# Patient Record
Sex: MALE | Race: BLACK OR AFRICAN AMERICAN | NOT HISPANIC OR LATINO | Employment: UNEMPLOYED | URBAN - METROPOLITAN AREA
[De-identification: names, ages, dates, MRNs, and addresses within clinical notes are randomized per-mention and may not be internally consistent; named-entity substitution may affect disease eponyms.]

---

## 2019-05-02 ENCOUNTER — OFFICE VISIT (OUTPATIENT)
Dept: FAMILY MEDICINE CLINIC | Facility: CLINIC | Age: 39
End: 2019-05-02

## 2019-05-02 ENCOUNTER — LAB (OUTPATIENT)
Dept: LAB | Facility: HOSPITAL | Age: 39
End: 2019-05-02

## 2019-05-02 VITALS
SYSTOLIC BLOOD PRESSURE: 140 MMHG | BODY MASS INDEX: 38.36 KG/M2 | WEIGHT: 315 LBS | OXYGEN SATURATION: 99 % | HEART RATE: 89 BPM | HEIGHT: 76 IN | DIASTOLIC BLOOD PRESSURE: 90 MMHG | TEMPERATURE: 98.1 F

## 2019-05-02 DIAGNOSIS — I50.9 CHRONIC CONGESTIVE HEART FAILURE, UNSPECIFIED HEART FAILURE TYPE (HCC): Primary | ICD-10-CM

## 2019-05-02 DIAGNOSIS — K92.1 MELENA: ICD-10-CM

## 2019-05-02 DIAGNOSIS — F33.9 RECURRENT MAJOR DEPRESSIVE DISORDER, REMISSION STATUS UNSPECIFIED (HCC): ICD-10-CM

## 2019-05-02 DIAGNOSIS — Z86.711 HISTORY OF PULMONARY EMBOLISM: ICD-10-CM

## 2019-05-02 DIAGNOSIS — I10 ESSENTIAL HYPERTENSION: ICD-10-CM

## 2019-05-02 DIAGNOSIS — F41.1 GENERALIZED ANXIETY DISORDER: ICD-10-CM

## 2019-05-02 LAB
INR PPP: 2.55 (ref 0.85–1.16)
PROTHROMBIN TIME: 26.4 SECONDS (ref 11.2–14.3)

## 2019-05-02 PROCEDURE — 85610 PROTHROMBIN TIME: CPT | Performed by: INTERNAL MEDICINE

## 2019-05-02 PROCEDURE — 99205 OFFICE O/P NEW HI 60 MIN: CPT | Performed by: INTERNAL MEDICINE

## 2019-05-02 RX ORDER — LISINOPRIL 10 MG/1
10 TABLET ORAL DAILY
COMMUNITY
End: 2019-09-12 | Stop reason: SDUPTHER

## 2019-05-02 RX ORDER — WARFARIN SODIUM 5 MG/1
5 TABLET ORAL
COMMUNITY
End: 2019-06-14

## 2019-05-02 RX ORDER — HYDROXYZINE PAMOATE 25 MG/1
25 CAPSULE ORAL 3 TIMES DAILY PRN
Status: ON HOLD | COMMUNITY
End: 2020-02-10 | Stop reason: SDUPTHER

## 2019-05-02 RX ORDER — SERTRALINE HYDROCHLORIDE 25 MG/1
25 TABLET, FILM COATED ORAL DAILY
COMMUNITY
End: 2019-05-02

## 2019-05-02 RX ORDER — DULOXETIN HYDROCHLORIDE 30 MG/1
30 CAPSULE, DELAYED RELEASE ORAL DAILY
Qty: 30 CAPSULE | Refills: 5 | Status: SHIPPED | OUTPATIENT
Start: 2019-05-02 | End: 2019-08-20 | Stop reason: DRUGHIGH

## 2019-05-02 RX ORDER — CARVEDILOL 6.25 MG/1
6.25 TABLET ORAL 2 TIMES DAILY WITH MEALS
COMMUNITY
End: 2020-05-28 | Stop reason: SDUPTHER

## 2019-05-02 NOTE — PROGRESS NOTES
"Chief Complaint:  F/u htn, depression    HPI:  Brigida Rodriguez is a 39 y.o. male who presents today for f/u multiple chronic medical conditions.  HTN- does not check pressure at home. On coreg and lisinopril. Pt reports he did not take medication today but \"usually\" takes it on most days.  History of heart failure- previously following with cardiology, [facility] records report echo in 2016 with reduced ejection fraction  Recurrent DVT/PE- Currently taking warfarin 10-15mg depending on the day. He would like his INR checked today. 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 Now has IVC filter.   Depression: takes sertraline \"every other day or so\" because it makes him tired. He is on wellbutrin as well but takes this rarely. He denies any side effects of wellbutrin but just doesn;t like taking so many mediations  Anxiety: takes hydroxyzine as needed on most days.   Tobacco abuse: smoking 0.5 ppd.   Genital herpes: takes acyclovir as needed  Melena: pt reports dark red stools over the last week. No symptoms or history of hemorrhoids.     ROS:  Constitutional: no fevers, night sweats or unexplained weight loss  Eyes: no vision changes  ENT: no runny nose, ear pain, sore throat  Cardio: no chest pain, palpitations  Pulm: no shortness of breath, wheezing, or cough  GI: no abdominal pain or changes in bowel movements  : no difficulty urinating  MSK: no difficulty ambulating, no joint pain  Neuro: no weakness, dizziness or headache  Psych: no trouble sleeping  Endo: no change in appetite      Past Medical History:   Diagnosis Date   • Depression    • GERD (gastroesophageal reflux disease)    • H/O blood clots    • Heart failure (CMS/HCC)    • Hypertension       Family History   Problem Relation Age of Onset   • Diabetes Mother    • Obesity Maternal Grandmother       Social History     Socioeconomic History   • Marital status: Single     Spouse name: Not on file   • Number of children: Not on file   • Years of " education: Not on file   • Highest education level: Not on file   Tobacco Use   • Smoking status: Current Every Day Smoker     Packs/day: 0.50     Years: 20.00     Pack years: 10.00     Types: Cigarettes   • Smokeless tobacco: Never Used   Substance and Sexual Activity   • Alcohol use: Yes     Alcohol/week: 3.6 oz     Types: 6 Cans of beer per week   • Drug use: Yes     Types: Marijuana     Comment: everyday smoker   • Sexual activity: Yes     Partners: Female     Birth control/protection: Condom      No Known Allergies     There is no immunization history on file for this patient.     PE:  Vitals:    05/02/19 1053   BP: 140/90   Pulse: 89   Temp: 98.1 °F (36.7 °C)   SpO2: 99%        Gen Appearance: NAD  HEENT: Normocephalic, PERRLA, no thyromegaly, trache midline  Heart: RRR, normal S1 and S2, no murmur  Lungs: CTA b/l, no wheezing, no crackles  Abdomen: Soft, non-tender, non-distended, no guarding and BSx4  MSK: Moves all extremities well, normal gait, no peripheral edema  Pulses: Palpable and equal b/l  Lymph nodes: No palpable lymphadenopathy   Neuro: No focal deficits      Current Outpatient Medications   Medication Sig Dispense Refill   • buPROPion HCl (WELLBUTRIN PO) Take  by mouth Daily.     • carvedilol (COREG) 6.25 MG tablet Take 6.25 mg by mouth 2 (Two) Times a Day With Meals.     • hydrOXYzine pamoate (VISTARIL) 25 MG capsule Take 25 mg by mouth 3 (Three) Times a Day As Needed for Itching.     • lisinopril (PRINIVIL,ZESTRIL) 10 MG tablet Take 10 mg by mouth Daily.     • warfarin (COUMADIN) 5 MG tablet Take 5 mg by mouth Daily. Take 2 tablets by mouth daily on everyday except for Thursday and Friday, take 3 tablets Thursday and Friday     • DULoxetine (CYMBALTA) 30 MG capsule Take 1 capsule by mouth Daily. 30 capsule 5     No current facility-administered medications for this visit.       Counseling was given to patient for the following topics: diagnostic results, instructions for management, risk factor  reductions, impressions, risks and benefits of treatment options and importance of treatment compliance for heart failure, anticoagulation, anxiety and depression. Total time of the encounter was 61 minutes and 34 minutes was spend counseling.    Brigida was seen today for establish care.    Diagnoses and all orders for this visit:    Chronic congestive heart failure, unspecified heart failure type (CMS/[facility])  Will refer to cards in [facility] to establish care. Will need records of recent cards visit and recent echo. Denies SOB or CP. Asymptomatic today. Taking coreg and lisinopril. No asa or statin, but taking daily warfarin for recurrent DVT/PE. Checking labs today.   Essential hypertension  -     CBC (No Diff); Future  -     Comprehensive Metabolic Panel; Future  -     Lipid Panel; Future  -     Hemoglobin A1c; Future  Elevated today, goal <130/80. He did not take blood pressure medication today.   Generalized anxiety disorder/Depression  -     DULoxetine (CYMBALTA) 30 MG capsule; Take 1 capsule by mouth Daily.  -     Ambulatory Referral to Psychology  Recommend stopping sertraline as he only takes this sporadically, which is not effective for SSRIs. He doesn't like the medicatoin because it makes him fatigued. No SI today. Recommend referral to psychology for therapy. Recommend trying SNRI rather than SSRI which may help with fatigue. Encourge daily wellbutrin as he does not have side effects to this med. Hydroxyzine as needed.  History of pulmonary embolism  -     Protime-INR; Future  See HPI for history. Recurrent DVT on warfarin therapy after TPA thrombolysis of DVT/PE. I suspect he may not be taking medication as prescribed based on history. He may benefit from DOAC although he is over the recommend weight limit. Check INR today, if in range would start xarleto the next day. Plan on checking factor Xa level after starting medication.  Recurrent major depressive disorder, remission status unspecified (CMS/[facility])  -      DULoxetine (CYMBALTA) 30 MG capsule; Take 1 capsule by mouth Daily.  -     Ambulatory Referral to Psychology  Melena   Recommend further evaluation with c-scope. He is on warfarin, checking blood work and INR today. No history of hemorrhoids. Pt deferred exam today.     Return in about 1 month (around 6/2/2019).

## 2019-05-03 DIAGNOSIS — Z86.711 HISTORY OF PULMONARY EMBOLISM: Primary | ICD-10-CM

## 2019-05-13 ENCOUNTER — TELEPHONE (OUTPATIENT)
Dept: FAMILY MEDICINE CLINIC | Facility: CLINIC | Age: 39
End: 2019-05-13

## 2019-05-13 NOTE — TELEPHONE ENCOUNTER
Patient would like to get a colonoscopy set up if that is possible. Please call back at [phone]

## 2019-05-15 DIAGNOSIS — K92.1 MELENA: Primary | ICD-10-CM

## 2019-05-15 NOTE — TELEPHONE ENCOUNTER
Central scheduling has tried to contact the patient twice to schedule colonoscopy. Attempted to call patient back. No answer.   Patient needs to call central scheduling.   [phone] ([facility]) ask for central scheduling

## 2019-05-17 NOTE — TELEPHONE ENCOUNTER
I left a message on his voicemail to contact the scheduling department to set up his colonoscopy appointment. The patient's voicemail did state his name.

## 2019-05-24 ENCOUNTER — TELEPHONE (OUTPATIENT)
Dept: FAMILY MEDICINE CLINIC | Facility: CLINIC | Age: 39
End: 2019-05-24

## 2019-06-07 NOTE — TELEPHONE ENCOUNTER
Patient has been contacted multiple times to call and schedule colonoscopy with no answer or call backs. Patient will have to contact central scheduling or Kassandra to get this completed.

## 2019-06-14 ENCOUNTER — TELEPHONE (OUTPATIENT)
Dept: FAMILY MEDICINE CLINIC | Facility: CLINIC | Age: 39
End: 2019-06-14

## 2019-06-14 ENCOUNTER — OFFICE VISIT (OUTPATIENT)
Dept: FAMILY MEDICINE CLINIC | Facility: CLINIC | Age: 39
End: 2019-06-14

## 2019-06-14 VITALS
SYSTOLIC BLOOD PRESSURE: 126 MMHG | DIASTOLIC BLOOD PRESSURE: 66 MMHG | TEMPERATURE: 96.7 F | HEIGHT: 76 IN | RESPIRATION RATE: 16 BRPM | WEIGHT: 315 LBS | OXYGEN SATURATION: 96 % | BODY MASS INDEX: 38.36 KG/M2 | HEART RATE: 94 BPM

## 2019-06-14 DIAGNOSIS — Z20.2 TRICHOMONAS CONTACT: Primary | ICD-10-CM

## 2019-06-14 DIAGNOSIS — Z86.711 HISTORY OF PULMONARY EMBOLISM: ICD-10-CM

## 2019-06-14 DIAGNOSIS — F33.9 RECURRENT MAJOR DEPRESSIVE DISORDER, REMISSION STATUS UNSPECIFIED (HCC): ICD-10-CM

## 2019-06-14 DIAGNOSIS — B00.9 HERPES: ICD-10-CM

## 2019-06-14 DIAGNOSIS — I10 ESSENTIAL HYPERTENSION: ICD-10-CM

## 2019-06-14 DIAGNOSIS — I50.9 CHRONIC CONGESTIVE HEART FAILURE, UNSPECIFIED HEART FAILURE TYPE (HCC): ICD-10-CM

## 2019-06-14 PROCEDURE — 99214 OFFICE O/P EST MOD 30 MIN: CPT | Performed by: INTERNAL MEDICINE

## 2019-06-14 RX ORDER — ACYCLOVIR 400 MG/1
400 TABLET ORAL DAILY PRN
Qty: 30 TABLET | Refills: 2 | Status: SHIPPED | OUTPATIENT
Start: 2019-06-14 | End: 2019-11-18 | Stop reason: SDUPTHER

## 2019-06-14 RX ORDER — METRONIDAZOLE 500 MG/1
500 TABLET ORAL 2 TIMES DAILY
Qty: 14 TABLET | Refills: 0 | Status: SHIPPED | OUTPATIENT
Start: 2019-06-14 | End: 2019-06-21

## 2019-06-14 NOTE — TELEPHONE ENCOUNTER
PER EVELIO CALLED, STATED THE ELIQUIS STARTER PACK IS NOT COVERED BY PT'S INSURANCE. CAN THIS BE CHANGED TO   ELIQUIS 5 MG? THE PHARMACY  WOULD NEED NEW DIRECTION AND QUANTITY. AMANUEL CALL BACK TO ADVISE.

## 2019-06-14 NOTE — PROGRESS NOTES
Chief Complaint:  Follow-up, history of PE, anxiety    HPI:  Brigida Rodriguez is a 39 y.o. male who presents today for follow-up several chronic medical conditions.  Melena- patient was referred to colonoscopy last visit but was unable to set up appointment.  Patient reports he had the wrong phone number listed in epic.  Anxiety-prescription of Cymbalta was sent to the pharmacy to wean off the Zoloft.  Patient picked up the prescription but did not start medication.  Patient states he is unsure of the directions.  Hypertension- currently taking medication as prescribed.  Does not check blood pressure at home.  Depression- stable on Wellbutrin  Trichomonas-patient reports recent exposure to trichomonas significantly.  Patient denies any symptoms.  He would like to be treated today if able.  History of PE- currently taking warfarin, not following with [facility] clinic.  He would like to switch to newer anticoagulant.  Heart failure-has not establish care with cardiology.  He was referred last visit.    ROS:  Constitutional: no fevers, night sweats or unexplained weight loss  Eyes: no vision changes  ENT: no runny nose, ear pain, sore throat  Cardio: no chest pain, palpitations  Pulm: no shortness of breath, wheezing, or cough  GI: no abdominal pain or changes in bowel movements  : no difficulty urinating  MSK: no difficulty ambulating, no joint pain  Neuro: no weakness, dizziness or headache  Psych: no trouble sleeping  Endo: no change in appetite      Past Medical History:   Diagnosis Date   • Depression    • GERD (gastroesophageal reflux disease)    • H/O blood clots    • Heart failure (CMS/HCC)    • Hypertension       Family History   Problem Relation Age of Onset   • Diabetes Mother    • Obesity Maternal Grandmother       Social History     Socioeconomic History   • Marital status: Single     Spouse name: Not on file   • Number of children: Not on file   • Years of education: Not on file   • Highest education  level: Not on file   Tobacco Use   • Smoking status: Current Every Day Smoker     Packs/day: 0.50     Years: 20.00     Pack years: 10.00     Types: Cigarettes   • Smokeless tobacco: Never Used   Substance and Sexual Activity   • Alcohol use: Yes     Alcohol/week: 3.6 oz     Types: 6 Cans of beer per week   • Drug use: Yes     Types: Marijuana     Comment: everyday smoker   • Sexual activity: Yes     Partners: Female     Birth control/protection: Condom      No Known Allergies     There is no immunization history on file for this patient.     PE:  Vitals:    06/14/19 1123   BP: 126/66   Pulse: 94   Resp: 16   Temp: 96.7 °F (35.9 °C)   SpO2: 96%        Gen Appearance: NAD  HEENT: Normocephalic, PERRLA, no thyromegaly, trache midline  Heart: RRR, normal S1 and S2, no murmur  Lungs: CTA b/l, no wheezing, no crackles  Abdomen: Soft, non-tender, non-distended, no guarding and BSx4  MSK: Moves all extremities well, normal gait, no peripheral edema  Pulses: Palpable and equal b/l  Lymph nodes: No palpable lymphadenopathy   Neuro: No focal deficits      Current Outpatient Medications   Medication Sig Dispense Refill   • buPROPion HCl (WELLBUTRIN PO) Take  by mouth Daily.     • carvedilol (COREG) 6.25 MG tablet Take 6.25 mg by mouth 2 (Two) Times a Day With Meals.     • DULoxetine (CYMBALTA) 30 MG capsule Take 1 capsule by mouth Daily. 30 capsule 5   • hydrOXYzine pamoate (VISTARIL) 25 MG capsule Take 25 mg by mouth 3 (Three) Times a Day As Needed for Itching.     • lisinopril (PRINIVIL,ZESTRIL) 10 MG tablet Take 10 mg by mouth Daily.     • acyclovir (ZOVIRAX) 400 MG tablet Take 1 tablet by mouth Daily As Needed (outbreaks). Take no more than 5 doses a day. 30 tablet 2   • Apixaban (ELIQUIS STARTER PACK) tablet Take two 5 mg tablets by mouth every 12 hours for 7 days. Followed by one 5 mg tablet every 12 hours. (Dispense starter pack if available) 74 tablet 0   • metroNIDAZOLE (FLAGYL) 500 MG tablet Take 1 tablet by mouth 2  (Two) Times a Day for 7 days. 14 tablet 0     No current facility-administered medications for this visit.         Brigida was seen today for chronic chf and blepharitis.    Diagnoses and all orders for this visit:    Trichomonas contact  -     metroNIDAZOLE (FLAGYL) 500 MG tablet; Take 1 tablet by mouth 2 (Two) Times a Day for 7 days.  Preventative treatment with Flagyl.  No symptoms.  History of pulmonary embolism  -     Apixaban (ELIQUIS STARTER PACK) tablet; Take two 5 mg tablets by mouth every 12 hours for 7 days. Followed by one 5 mg tablet every 12 hours. (Dispense starter pack if available)  Will be switching to Eliquis from warfarin.  Recent INR within normal limits.  Recurrent major depressive disorder, remission status unspecified (CMS/[facility])  Recommend DC Zoloft and starting Cymbalta.  Follow-up 4 weeks for recheck.  Essential hypertension  Well-controlled today 126/66.  Continue current medication.  Herpes  -     acyclovir (ZOVIRAX) 400 MG tablet; Take 1 tablet by mouth Daily As Needed (outbreaks). Take no more than 5 doses a day.  Refill acyclovir as needed.       Return in about 1 month (around 7/14/2019).

## 2019-06-20 ENCOUNTER — PRIOR AUTHORIZATION (OUTPATIENT)
Dept: FAMILY MEDICINE CLINIC | Facility: CLINIC | Age: 39
End: 2019-06-20

## 2019-06-25 NOTE — TELEPHONE ENCOUNTER
Unable to reach patient, phone goes straight to . If he returns call then we could possibly try xarelto instead of eliquis. Otherwise, he needs to be referred to coag clinic for warfarin monitoring.

## 2019-06-26 NOTE — TELEPHONE ENCOUNTER
Noted.       Attempted to Call patient to advise, no answer. Straight to voicemail. lvm with office number to call back.     If patient call back we can try two other options. Listed in previous message

## 2019-07-01 ENCOUNTER — HOSPITAL ENCOUNTER (EMERGENCY)
Facility: HOSPITAL | Age: 39
Discharge: PSYCHIATRIC OR SUBSTANCE USE FACILITY | End: 2019-07-02
Attending: EMERGENCY MEDICINE | Admitting: EMERGENCY MEDICINE

## 2019-07-01 DIAGNOSIS — F32.A DEPRESSION, UNSPECIFIED DEPRESSION TYPE: ICD-10-CM

## 2019-07-01 DIAGNOSIS — R45.851 SUICIDAL IDEATIONS: Primary | ICD-10-CM

## 2019-07-01 LAB
ALBUMIN SERPL-MCNC: 5.2 G/DL (ref 3.5–5.2)
ALBUMIN/GLOB SERPL: 1.3 G/DL
ALP SERPL-CCNC: 64 U/L (ref 39–117)
ALT SERPL W P-5'-P-CCNC: 27 U/L (ref 1–41)
ANION GAP SERPL CALCULATED.3IONS-SCNC: 20 MMOL/L (ref 5–15)
APAP SERPL-MCNC: <5 MCG/ML (ref 10–30)
AST SERPL-CCNC: 31 U/L (ref 1–40)
BASOPHILS # BLD AUTO: 0.04 10*3/MM3 (ref 0–0.2)
BASOPHILS NFR BLD AUTO: 0.5 % (ref 0–1.5)
BILIRUB SERPL-MCNC: 0.8 MG/DL (ref 0.2–1.2)
BUN BLD-MCNC: 12 MG/DL (ref 6–20)
BUN/CREAT SERPL: 14.1 (ref 7–25)
CALCIUM SPEC-SCNC: 10.2 MG/DL (ref 8.6–10.5)
CHLORIDE SERPL-SCNC: 97 MMOL/L (ref 98–107)
CO2 SERPL-SCNC: 19 MMOL/L (ref 22–29)
CREAT BLD-MCNC: 0.85 MG/DL (ref 0.76–1.27)
DEPRECATED RDW RBC AUTO: 43.5 FL (ref 37–54)
EOSINOPHIL # BLD AUTO: 0.16 10*3/MM3 (ref 0–0.4)
EOSINOPHIL NFR BLD AUTO: 1.9 % (ref 0.3–6.2)
ERYTHROCYTE [DISTWIDTH] IN BLOOD BY AUTOMATED COUNT: 13.3 % (ref 12.3–15.4)
ETHANOL BLD-MCNC: 41 MG/DL (ref 0–10)
GFR SERPL CREATININE-BSD FRML MDRD: 122 ML/MIN/1.73
GLOBULIN UR ELPH-MCNC: 3.9 GM/DL
GLUCOSE BLD-MCNC: 76 MG/DL (ref 65–99)
HCT VFR BLD AUTO: 45.6 % (ref 37.5–51)
HGB BLD-MCNC: 15.5 G/DL (ref 13–17.7)
HOLD SPECIMEN: NORMAL
HOLD SPECIMEN: NORMAL
IMM GRANULOCYTES # BLD AUTO: 0.02 10*3/MM3 (ref 0–0.05)
IMM GRANULOCYTES NFR BLD AUTO: 0.2 % (ref 0–0.5)
LYMPHOCYTES # BLD AUTO: 2.38 10*3/MM3 (ref 0.7–3.1)
LYMPHOCYTES NFR BLD AUTO: 27.7 % (ref 19.6–45.3)
MCH RBC QN AUTO: 30.2 PG (ref 26.6–33)
MCHC RBC AUTO-ENTMCNC: 34 G/DL (ref 31.5–35.7)
MCV RBC AUTO: 88.9 FL (ref 79–97)
MONOCYTES # BLD AUTO: 0.67 10*3/MM3 (ref 0.1–0.9)
MONOCYTES NFR BLD AUTO: 7.8 % (ref 5–12)
NEUTROPHILS # BLD AUTO: 5.31 10*3/MM3 (ref 1.7–7)
NEUTROPHILS NFR BLD AUTO: 61.9 % (ref 42.7–76)
NRBC BLD AUTO-RTO: 0 /100 WBC (ref 0–0.2)
PLATELET # BLD AUTO: 271 10*3/MM3 (ref 140–450)
PMV BLD AUTO: 10.2 FL (ref 6–12)
POTASSIUM BLD-SCNC: 3.9 MMOL/L (ref 3.5–5.2)
PROT SERPL-MCNC: 9.1 G/DL (ref 6–8.5)
RBC # BLD AUTO: 5.13 10*6/MM3 (ref 4.14–5.8)
SALICYLATES SERPL-MCNC: <0.3 MG/DL
SODIUM BLD-SCNC: 136 MMOL/L (ref 136–145)
WBC NRBC COR # BLD: 8.58 10*3/MM3 (ref 3.4–10.8)
WHOLE BLOOD HOLD SPECIMEN: NORMAL
WHOLE BLOOD HOLD SPECIMEN: NORMAL

## 2019-07-01 PROCEDURE — 80307 DRUG TEST PRSMV CHEM ANLYZR: CPT | Performed by: NURSE PRACTITIONER

## 2019-07-01 PROCEDURE — 99285 EMERGENCY DEPT VISIT HI MDM: CPT

## 2019-07-01 PROCEDURE — 85025 COMPLETE CBC W/AUTO DIFF WBC: CPT | Performed by: NURSE PRACTITIONER

## 2019-07-01 PROCEDURE — 80307 DRUG TEST PRSMV CHEM ANLYZR: CPT | Performed by: EMERGENCY MEDICINE

## 2019-07-01 PROCEDURE — 93005 ELECTROCARDIOGRAM TRACING: CPT | Performed by: NURSE PRACTITIONER

## 2019-07-01 PROCEDURE — 85610 PROTHROMBIN TIME: CPT | Performed by: EMERGENCY MEDICINE

## 2019-07-01 PROCEDURE — 80053 COMPREHEN METABOLIC PANEL: CPT | Performed by: NURSE PRACTITIONER

## 2019-07-02 VITALS
HEART RATE: 81 BPM | SYSTOLIC BLOOD PRESSURE: 162 MMHG | DIASTOLIC BLOOD PRESSURE: 94 MMHG | HEIGHT: 76 IN | OXYGEN SATURATION: 94 % | TEMPERATURE: 98.1 F | BODY MASS INDEX: 38.36 KG/M2 | WEIGHT: 315 LBS | RESPIRATION RATE: 14 BRPM

## 2019-07-02 LAB
AMPHET+METHAMPHET UR QL: NEGATIVE
AMPHETAMINES UR QL: NEGATIVE
BARBITURATES UR QL SCN: NEGATIVE
BENZODIAZ UR QL SCN: NEGATIVE
BUPRENORPHINE SERPL-MCNC: NEGATIVE NG/ML
CANNABINOIDS SERPL QL: POSITIVE
COCAINE UR QL: POSITIVE
INR PPP: 2.77 (ref 0.85–1.16)
METHADONE UR QL SCN: NEGATIVE
OPIATES UR QL: NEGATIVE
OXYCODONE UR QL SCN: NEGATIVE
PCP UR QL SCN: NEGATIVE
PROPOXYPH UR QL: NEGATIVE
PROTHROMBIN TIME: 28.2 SECONDS (ref 11.2–14.3)
TRICYCLICS UR QL SCN: NEGATIVE

## 2019-07-02 PROCEDURE — 80306 DRUG TEST PRSMV INSTRMNT: CPT | Performed by: EMERGENCY MEDICINE

## 2019-07-02 RX ORDER — WARFARIN SODIUM 5 MG/1
5 TABLET ORAL
COMMUNITY
End: 2019-08-20

## 2019-07-02 RX ORDER — DULOXETIN HYDROCHLORIDE 30 MG/1
30 CAPSULE, DELAYED RELEASE ORAL ONCE
Status: COMPLETED | OUTPATIENT
Start: 2019-07-02 | End: 2019-07-02

## 2019-07-02 RX ORDER — LISINOPRIL 10 MG/1
10 TABLET ORAL ONCE
Status: COMPLETED | OUTPATIENT
Start: 2019-07-02 | End: 2019-07-02

## 2019-07-02 RX ORDER — HYDROXYZINE HYDROCHLORIDE 25 MG/1
25 TABLET, FILM COATED ORAL ONCE
Status: COMPLETED | OUTPATIENT
Start: 2019-07-02 | End: 2019-07-02

## 2019-07-02 RX ORDER — CARVEDILOL 6.25 MG/1
6.25 TABLET ORAL ONCE
Status: COMPLETED | OUTPATIENT
Start: 2019-07-02 | End: 2019-07-02

## 2019-07-02 RX ADMIN — HYDROXYZINE HYDROCHLORIDE 25 MG: 25 TABLET, FILM COATED ORAL at 02:41

## 2019-07-02 RX ADMIN — DULOXETINE HYDROCHLORIDE 30 MG: 30 CAPSULE, DELAYED RELEASE ORAL at 02:41

## 2019-07-02 RX ADMIN — CARVEDILOL 6.25 MG: 6.25 TABLET, FILM COATED ORAL at 02:41

## 2019-07-02 RX ADMIN — LISINOPRIL 10 MG: 10 TABLET ORAL at 02:41

## 2019-07-02 NOTE — ED PROVIDER NOTES
Subjective   Mr. Brigida Rodriguez is a 39 y.o. male with a history of depression who presents to the ED due to suicidal ideation. He reports that he thinks about killing himself every day and came into the ED today to receive help and in-patient treatment. He states that he cuts himself and has thought of plans for suicide in the past but he has never made any suicidal attempts. He notes that he has been drinking since yesterday and his last drink was 30 minutes ago but he does not drink every day. He also has cocaine and marijuana in his system at this time. He has a history of HTN and heart failure. No other acute complaints at this time.         History provided by:  Patient  Suicidal   Presenting symptoms: self-mutilation and suicidal thoughts    Presenting symptoms: no suicide attempt    Timing:  Constant  Chronicity:  Chronic  Ineffective treatments:  Antidepressants  Associated symptoms: anxiety    Associated symptoms: no chest pain    Risk factors: no hx of suicide attempts        Review of Systems   Respiratory: Negative for shortness of breath.    Cardiovascular: Negative for chest pain.   Psychiatric/Behavioral: Positive for self-injury, sleep disturbance and suicidal ideas. The patient is nervous/anxious.    All other systems reviewed and are negative.      Past Medical History:   Diagnosis Date   • Depression    • GERD (gastroesophageal reflux disease)    • H/O blood clots    • Heart failure (CMS/HCC)    • Hypertension        No Known Allergies    Past Surgical History:   Procedure Laterality Date   • ADENOIDECTOMY     • KNEE SURGERY     • TONSILLECTOMY         Family History   Problem Relation Age of Onset   • Diabetes Mother    • Obesity Maternal Grandmother        Social History     Socioeconomic History   • Marital status: Single     Spouse name: Not on file   • Number of children: Not on file   • Years of education: Not on file   • Highest education level: Not on file   Tobacco Use   • Smoking  status: Current Every Day Smoker     Packs/day: 0.50     Years: 20.00     Pack years: 10.00     Types: Cigarettes   • Smokeless tobacco: Never Used   Substance and Sexual Activity   • Alcohol use: Yes     Alcohol/week: 3.6 oz     Types: 6 Cans of beer per week   • Drug use: Yes     Types: Marijuana     Comment: everyday smoker   • Sexual activity: Yes     Partners: Female     Birth control/protection: Condom         Objective   Physical Exam   Constitutional: He is oriented to person, place, and time. He appears well-developed and well-nourished. He is cooperative.  Non-toxic appearance. He appears distressed.   Pt is anxious.  Pt is easily agitated.   HENT:   Head: Normocephalic and atraumatic.   Mouth/Throat: Oropharynx is clear and moist.   Eyes: Conjunctivae, EOM and lids are normal. Pupils are equal, round, and reactive to light.   Neck: Trachea normal, normal range of motion and full passive range of motion without pain.   Cardiovascular: Regular rhythm, normal heart sounds, intact distal pulses and normal pulses.   Pulmonary/Chest: Effort normal and breath sounds normal. No respiratory distress. He has no decreased breath sounds. He has no wheezes. He has no rhonchi. He has no rales.   Abdominal: Soft. Normal appearance and bowel sounds are normal. There is no tenderness.   Musculoskeletal: Normal range of motion.   Neurological: He is alert and oriented to person, place, and time. He has normal strength. No cranial nerve deficit.   Skin: Skin is warm, dry and intact. No rash noted.   Psychiatric: His speech is normal. His mood appears anxious. He is agitated. He expresses suicidal ideation. He expresses no homicidal ideation. He expresses no suicidal plans and no homicidal plans.   Nursing note and vitals reviewed.      Procedures         ED Course  ED Course as of Jul 02 0513   Tue Jul 02, 2019   0130 THC Screen, Urine: (!) Positive [KG]   0130 Cocaine Screen, Urine: (!) Positive [KG]   0310 Patient has  been accepted at the [facility].  Patient will be transferred to the [facility].  [KG]      ED Course User Index  [KG] Mildred Hooper, SILVIA       Recent Results (from the past 24 hour(s))   Protime-INR    Collection Time: 07/01/19  9:19 PM   Result Value Ref Range    Protime 28.2 (H) 11.2 - 14.3 Seconds    INR 2.77 (H) 0.85 - 1.16   Ethanol    Collection Time: 07/01/19  9:53 PM   Result Value Ref Range    Ethanol 41 (H) 0 - 10 mg/dL   Light Blue Top    Collection Time: 07/01/19  9:53 PM   Result Value Ref Range    Extra Tube hold for add-on    Green Top (Gel)    Collection Time: 07/01/19  9:53 PM   Result Value Ref Range    Extra Tube Hold for add-ons.    Lavender Top    Collection Time: 07/01/19  9:53 PM   Result Value Ref Range    Extra Tube hold for add-on    Gold Top - SST    Collection Time: 07/01/19  9:53 PM   Result Value Ref Range    Extra Tube Hold for add-ons.    Comprehensive Metabolic Panel    Collection Time: 07/01/19  9:53 PM   Result Value Ref Range    Glucose 76 65 - 99 mg/dL    BUN 12 6 - 20 mg/dL    Creatinine 0.85 0.76 - 1.27 mg/dL    Sodium 136 136 - 145 mmol/L    Potassium 3.9 3.5 - 5.2 mmol/L    Chloride 97 (L) 98 - 107 mmol/L    CO2 19.0 (L) 22.0 - 29.0 mmol/L    Calcium 10.2 8.6 - 10.5 mg/dL    Total Protein 9.1 (H) 6.0 - 8.5 g/dL    Albumin 5.20 3.50 - 5.20 g/dL    ALT (SGPT) 27 1 - 41 U/L    AST (SGOT) 31 1 - 40 U/L    Alkaline Phosphatase 64 39 - 117 U/L    Total Bilirubin 0.8 0.2 - 1.2 mg/dL    eGFR  African Amer 122 >60 mL/min/1.73    Globulin 3.9 gm/dL    A/G Ratio 1.3 g/dL    BUN/Creatinine Ratio 14.1 7.0 - 25.0    Anion Gap 20.0 (H) 5.0 - 15.0 mmol/L   Acetaminophen Level    Collection Time: 07/01/19  9:53 PM   Result Value Ref Range    Acetaminophen <5.0 (L) 10.0 - 30.0 mcg/mL   Salicylate Level    Collection Time: 07/01/19  9:53 PM   Result Value Ref Range    Salicylate <0.3 <=30.0 mg/dL   CBC Auto Differential    Collection Time: 07/01/19  9:53 PM   Result Value Ref Range    WBC 8.58  3.40 - 10.80 10*3/mm3    RBC 5.13 4.14 - 5.80 10*6/mm3    Hemoglobin 15.5 13.0 - 17.7 g/dL    Hematocrit 45.6 37.5 - 51.0 %    MCV 88.9 79.0 - 97.0 fL    MCH 30.2 26.6 - 33.0 pg    MCHC 34.0 31.5 - 35.7 g/dL    RDW 13.3 12.3 - 15.4 %    RDW-SD 43.5 37.0 - 54.0 fl    MPV 10.2 6.0 - 12.0 fL    Platelets 271 140 - 450 10*3/mm3    Neutrophil % 61.9 42.7 - 76.0 %    Lymphocyte % 27.7 19.6 - 45.3 %    Monocyte % 7.8 5.0 - 12.0 %    Eosinophil % 1.9 0.3 - 6.2 %    Basophil % 0.5 0.0 - 1.5 %    Immature Grans % 0.2 0.0 - 0.5 %    Neutrophils, Absolute 5.31 1.70 - 7.00 10*3/mm3    Lymphocytes, Absolute 2.38 0.70 - 3.10 10*3/mm3    Monocytes, Absolute 0.67 0.10 - 0.90 10*3/mm3    Eosinophils, Absolute 0.16 0.00 - 0.40 10*3/mm3    Basophils, Absolute 0.04 0.00 - 0.20 10*3/mm3    Immature Grans, Absolute 0.02 0.00 - 0.05 10*3/mm3    nRBC 0.0 0.0 - 0.2 /100 WBC   Urine Drug Screen - Urine, Clean Catch    Collection Time: 07/02/19 12:58 AM   Result Value Ref Range    THC, Screen, Urine Positive (A) Negative    Phencyclidine (PCP), Urine Negative Negative    Cocaine Screen, Urine Positive (A) Negative    Methamphetamine, Ur Negative Negative    Opiate Screen Negative Negative    Amphetamine Screen, Urine Negative Negative    Benzodiazepine Screen, Urine Negative Negative    Tricyclic Antidepressants Screen Negative Negative    Methadone Screen, Urine Negative Negative    Barbiturates Screen, Urine Negative Negative    Oxycodone Screen, Urine Negative Negative    Propoxyphene Screen Negative Negative    Buprenorphine, Screen, Urine Negative Negative     Note: In addition to lab results from this visit, the labs listed above may include labs taken at another facility or during a different encounter within the last 24 hours. Please correlate lab times with ED admission and discharge times for further clarification of the services performed during this visit.    No orders to display     Vitals:    07/01/19 2104 07/02/19 0201 07/02/19  "0315   BP: (!) 187/108 152/92 162/94   BP Location:  Right arm Right arm   Patient Position:  Lying Lying   Pulse: 100 77 81   Resp: 18 15 14   Temp: 98.1 °F (36.7 °C)     TempSrc: Oral     SpO2: 96% 95% 94%   Weight: (!) 168 kg (370 lb)     Height: 193 cm (76\")       Medications   carvedilol (COREG) tablet 6.25 mg (6.25 mg Oral Given 7/2/19 0241)   DULoxetine (CYMBALTA) DR capsule 30 mg (30 mg Oral Given 7/2/19 0241)   hydrOXYzine (ATARAX) tablet 25 mg (25 mg Oral Given 7/2/19 0241)   lisinopril (PRINIVIL,ZESTRIL) tablet 10 mg (10 mg Oral Given 7/2/19 0241)     ECG/EMG Results (last 24 hours)     ** No results found for the last 24 hours. **        ECG 12 Lead                           MDM    Final diagnoses:   Suicidal ideations   Depression, unspecified depression type       Documentation assistance provided by peter Benavidez.  Information recorded by the peter was done at my direction and has been verified and validated by me.     Clarisa Benavidez  07/01/19 1008       Mildred Hooper APRN  07/02/19 6391    "

## 2019-07-11 ENCOUNTER — TELEPHONE (OUTPATIENT)
Dept: FAMILY MEDICINE CLINIC | Facility: CLINIC | Age: 39
End: 2019-07-11

## 2019-07-11 NOTE — TELEPHONE ENCOUNTER
JULIETH FROM [facility] REQUESTING INFO ON PTS MEDS..  NOT SURE WHICH MEDS PT NEEDS TO BE ON AS SOME SEEM TO BE OVERLAPPING

## 2019-07-12 ENCOUNTER — PRIOR AUTHORIZATION (OUTPATIENT)
Dept: FAMILY MEDICINE CLINIC | Facility: CLINIC | Age: 39
End: 2019-07-12

## 2019-07-12 ENCOUNTER — TELEPHONE (OUTPATIENT)
Dept: FAMILY MEDICINE CLINIC | Facility: CLINIC | Age: 39
End: 2019-07-12

## 2019-07-12 NOTE — TELEPHONE ENCOUNTER
Please contact pharmacy. They want to discuss medications. They have concern over medications that should not be taken together and written by different providers.     [facility] [place] [address] - [place], [place] - [address] AT [facility] - [phone]  - [phone]   [phone]

## 2019-07-12 NOTE — TELEPHONE ENCOUNTER
Called pharmacy regarding medications. Pharmacist stated that the patient was being prescribed Cymbalta 30 mg and 60 mg, Zoloft, Wellbutrin, and trazodone. She was wanting to verify the mediations. I advised her  That we prescribed the patient Cymbalta 30 mg daily on 05/02/2019. She stated that he was prescribed Cymbalta 60 mg on 07/05/2019 by another provider.    Pharmacist stated that she would try to contact other offices to receive information about what's going on.    Attempted to contact patient to verify med list, no answer. lvm with office number to call back. I did notice where  Patient was seen in the ER on 07/01/2019 and he decided to be admitted to the [facility]. Patient may be unavailable.

## 2019-07-15 NOTE — TELEPHONE ENCOUNTER
I tried calling him about this to switch to Xarelto.  No response.  Please give him a call and ask if he would like to switch to Xarelto which is very similar to Eliquis.  If he has any questions I can speak to him.

## 2019-07-17 NOTE — TELEPHONE ENCOUNTER
I was unable to reach Dilma so I informed the patient of the message, patient is willing to try xarelto

## 2019-08-20 ENCOUNTER — OFFICE VISIT (OUTPATIENT)
Dept: FAMILY MEDICINE CLINIC | Facility: CLINIC | Age: 39
End: 2019-08-20

## 2019-08-20 VITALS
WEIGHT: 315 LBS | RESPIRATION RATE: 14 BRPM | BODY MASS INDEX: 38.36 KG/M2 | HEART RATE: 87 BPM | TEMPERATURE: 98.8 F | HEIGHT: 76 IN | DIASTOLIC BLOOD PRESSURE: 88 MMHG | SYSTOLIC BLOOD PRESSURE: 144 MMHG | OXYGEN SATURATION: 99 %

## 2019-08-20 DIAGNOSIS — K64.9 HEMORRHOIDS, UNSPECIFIED HEMORRHOID TYPE: Primary | ICD-10-CM

## 2019-08-20 DIAGNOSIS — S03.00XA DISLOCATION OF TEMPOROMANDIBULAR JOINT, INITIAL ENCOUNTER: ICD-10-CM

## 2019-08-20 DIAGNOSIS — F32.A DEPRESSION, UNSPECIFIED DEPRESSION TYPE: ICD-10-CM

## 2019-08-20 DIAGNOSIS — I10 ESSENTIAL HYPERTENSION: ICD-10-CM

## 2019-08-20 PROCEDURE — 99214 OFFICE O/P EST MOD 30 MIN: CPT | Performed by: INTERNAL MEDICINE

## 2019-08-20 RX ORDER — DULOXETIN HYDROCHLORIDE 30 MG/1
60 CAPSULE, DELAYED RELEASE ORAL DAILY
COMMUNITY
Start: 2019-07-05 | End: 2019-11-04

## 2019-08-20 RX ORDER — TRAZODONE HYDROCHLORIDE 50 MG/1
50 TABLET ORAL NIGHTLY
COMMUNITY
Start: 2019-07-05 | End: 2022-05-19
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
## 2019-09-04 ENCOUNTER — OFFICE VISIT (OUTPATIENT)
Dept: FAMILY MEDICINE CLINIC | Facility: CLINIC | Age: 39
End: 2019-09-04

## 2019-09-04 VITALS
DIASTOLIC BLOOD PRESSURE: 84 MMHG | WEIGHT: 315 LBS | BODY MASS INDEX: 38.36 KG/M2 | HEIGHT: 76 IN | HEART RATE: 78 BPM | OXYGEN SATURATION: 98 % | SYSTOLIC BLOOD PRESSURE: 132 MMHG

## 2019-09-04 DIAGNOSIS — Z86.711 HISTORY OF PULMONARY EMBOLISM: Primary | ICD-10-CM

## 2019-09-04 DIAGNOSIS — Z72.0 TOBACCO ABUSE: ICD-10-CM

## 2019-09-04 DIAGNOSIS — Z86.711 HISTORY OF PULMONARY EMBOLISM: ICD-10-CM

## 2019-09-04 DIAGNOSIS — L29.9 ITCHING: ICD-10-CM

## 2019-09-04 PROCEDURE — 99214 OFFICE O/P EST MOD 30 MIN: CPT | Performed by: INTERNAL MEDICINE

## 2019-09-04 NOTE — PROGRESS NOTES
Chief Complaint:  itching    HPI:  Brigida Rodriguez is a 39 y.o. male who presents today for itching. Pt reports significant diffuse itching since starting eliquis ~4 weeks ago. He would like to stop smoking today as well. Currently smoking anywhere from 1 ppd to no cigarettes. He is asking about chantix. No other acute complaints or concerns.     ROS:  Constitutional: no fevers, night sweats or unexplained weight loss  Eyes: no vision changes  ENT: no runny nose, ear pain, sore throat  Cardio: no chest pain, palpitations  Pulm: no shortness of breath, wheezing, or cough  GI: no abdominal pain or changes in bowel movements  : no difficulty urinating  MSK: no difficulty ambulating, no joint pain  Neuro: no weakness, dizziness or headache  Psych: no trouble sleeping  Endo: no change in appetite      Past Medical History:   Diagnosis Date   • Depression    • GERD (gastroesophageal reflux disease)    • H/O blood clots    • Heart failure (CMS/HCC)    • Hypertension       Family History   Problem Relation Age of Onset   • Diabetes Mother    • Obesity Maternal Grandmother       Social History     Socioeconomic History   • Marital status: Single     Spouse name: Not on file   • Number of children: Not on file   • Years of education: Not on file   • Highest education level: Not on file   Tobacco Use   • Smoking status: Current Every Day Smoker     Packs/day: 0.50     Years: 20.00     Pack years: 10.00     Types: Cigarettes   • Smokeless tobacco: Never Used   Substance and Sexual Activity   • Alcohol use: Yes     Alcohol/week: 3.6 oz     Types: 6 Cans of beer per week   • Drug use: Yes     Types: Marijuana     Comment: everyday smoker   • Sexual activity: Yes     Partners: Female     Birth control/protection: Condom      No Known Allergies     There is no immunization history on file for this patient.     PE:  Vitals:    09/04/19 1439   BP: 132/84   Pulse: 78   SpO2: 98%        Gen Appearance: NAD  HEENT:  Normocephalic, PERRLA, no thyromegaly, trache midline  Heart: RRR, normal S1 and S2, no murmur  Lungs: CTA b/l, no wheezing, no crackles  Abdomen: Soft, non-tender, non-distended, no guarding and BSx4  MSK: Moves all extremities well, normal gait, no peripheral edema  Pulses: Palpable and equal b/l  Lymph nodes: No palpable lymphadenopathy   Neuro: No focal deficits      Current Outpatient Medications   Medication Sig Dispense Refill   • acyclovir (ZOVIRAX) 400 MG tablet Take 1 tablet by mouth Daily As Needed (outbreaks). Take no more than 5 doses a day. 30 tablet 2   • buPROPion HCl (WELLBUTRIN PO) Take 150 mg by mouth Daily.     • carvedilol (COREG) 6.25 MG tablet Take 6.25 mg by mouth 2 (Two) Times a Day With Meals.     • DULoxetine (CYMBALTA) 60 MG capsule Take 60 mg by mouth Daily.     • hydrOXYzine pamoate (VISTARIL) 25 MG capsule Take 25 mg by mouth 3 (Three) Times a Day As Needed for Itching.     • lisinopril (PRINIVIL,ZESTRIL) 10 MG tablet Take 10 mg by mouth Daily.     • traZODone (DESYREL) 50 MG tablet Take 50 mg by mouth Every Night.     • Rivaroxaban (XARELTO) tablet therapy pack starter pack Take one 15 mg tablet twice daily with food for 21 days.  Followed by one 20 mg tablet by mouth once daily with food. Take as directed 51 each 0   • varenicline (CHANTIX STARTING MONTH PAK) 0.5 MG X 11 & 1 MG X 42 tablet Take 0.5 mg one daily on days 1-3 and and 0.5 mg twice daily on days 4-7.Then 1 mg twice daily for a total of 12 weeks. 53 tablet 0     No current facility-administered medications for this visit.         Brigida was seen today for medication reaction and nicotine dependence.    Diagnoses and all orders for this visit:    History of pulmonary embolism  -     Rivaroxaban (XARELTO) tablet therapy pack starter pack; Take one 15 mg tablet twice daily with food for 21 days.  Followed by one 20 mg tablet by mouth once daily with food. Take as directed  Side effects to his Eliquis.  Will be starting on  Xarelto.  Follow-up 4 weeks.  Recommend checking blood work and CMP with symptoms.  Tobacco abuse  -     varenicline (CHANTIX STARTING MONTH NATTY) 0.5 MG X 11 & 1 MG X 42 tablet; Take 0.5 mg one daily on days 1-3 and and 0.5 mg twice daily on days 4-7.Then 1 mg twice daily for a total of 12 weeks.  Counseled patient on tobacco cessation.  Recommend setting quit date 7 days after starting Chantix.  Itching  -     CBC & Differential; Future  -     Comprehensive Metabolic Panel; Future         Return in about 4 weeks (around 10/2/2019).

## 2019-09-06 ENCOUNTER — TELEPHONE (OUTPATIENT)
Dept: PHARMACY | Facility: HOSPITAL | Age: 39
End: 2019-09-06

## 2019-09-06 DIAGNOSIS — Z86.711 HISTORY OF PULMONARY EMBOLISM: Primary | ICD-10-CM

## 2019-09-06 RX ORDER — WARFARIN SODIUM 5 MG/1
TABLET ORAL
Qty: 70 TABLET | Refills: 5 | Status: ON HOLD | OUTPATIENT
Start: 2019-09-06 | End: 2020-02-10 | Stop reason: SDUPTHER

## 2019-09-11 ENCOUNTER — APPOINTMENT (OUTPATIENT)
Dept: PHARMACY | Facility: HOSPITAL | Age: 39
End: 2019-09-11

## 2019-09-11 ENCOUNTER — TELEPHONE (OUTPATIENT)
Dept: PHARMACY | Facility: HOSPITAL | Age: 39
End: 2019-09-11

## 2019-09-11 NOTE — TELEPHONE ENCOUNTER
Patient called to request reschedule of anticoag appt for today. He reported he would not be able to come into clinic on Mon, 9/16 at 1400

## 2019-09-11 NOTE — TELEPHONE ENCOUNTER
Spoke with patient to see if he would be willing to have INR drawn at a lab of his choosing tonight or tomorrow. He has respectfully declined due to life issues and will come into clinic on 9/16 @ 1400. He says he is taking the warfarin dosing he had previously been on.

## 2019-09-11 NOTE — TELEPHONE ENCOUNTER
LVM requesting call back. Will ask patient if he would be willing to have INR drawn today or tomorrow at lab convenient to him. If patient started warfarin on Sat, 9/7, as directed by Dr Tom then would prefer to have INR drawn ASAP.

## 2019-09-12 ENCOUNTER — TELEPHONE (OUTPATIENT)
Dept: FAMILY MEDICINE CLINIC | Facility: CLINIC | Age: 39
End: 2019-09-12

## 2019-09-12 RX ORDER — LISINOPRIL 10 MG/1
10 TABLET ORAL DAILY
Qty: 30 TABLET | Refills: 0 | Status: SHIPPED | OUTPATIENT
Start: 2019-09-12 | End: 2019-10-09 | Stop reason: SDUPTHER

## 2019-09-16 ENCOUNTER — APPOINTMENT (OUTPATIENT)
Dept: PHARMACY | Facility: HOSPITAL | Age: 39
End: 2019-09-16

## 2019-09-19 ENCOUNTER — TELEPHONE (OUTPATIENT)
Dept: PHARMACY | Facility: HOSPITAL | Age: 39
End: 2019-09-19

## 2019-09-19 ENCOUNTER — ANTICOAGULATION VISIT (OUTPATIENT)
Dept: PHARMACY | Facility: HOSPITAL | Age: 39
End: 2019-09-19

## 2019-09-19 DIAGNOSIS — Z86.711 HISTORY OF PULMONARY EMBOLISM: ICD-10-CM

## 2019-09-19 LAB
INR PPP: 2.6 (ref 0.91–1.09)
PROTHROMBIN TIME: 31.4 SECONDS (ref 10–13.8)

## 2019-09-19 PROCEDURE — 36416 COLLJ CAPILLARY BLOOD SPEC: CPT

## 2019-09-19 PROCEDURE — G0463 HOSPITAL OUTPT CLINIC VISIT: HCPCS

## 2019-09-19 PROCEDURE — 85610 PROTHROMBIN TIME: CPT

## 2019-09-19 NOTE — PROGRESS NOTES
Anticoagulation Clinic Progress Note  Indication: Hx of PE and LE DVT (2010,2014)  Referring Provider: Vaishali  Initial Warfarin Start Date: 2010  Planned Duration of Therapy: lifelong  Goal INR: 2-3 (verified Dr Tom 9/19/19)  Current Drug Interactions:   Other: d/c Eliquis 9/6/19 due to itching    Diet: does like cooked greens, provided Vit K handout- encouraged consistency 9/19/19  Alcohol:   Tobacco: trying to quit smoking, started Chantix 9/16/19  OTC Pain Medication: recommended Tylenol prn    Anticoagulation Clinic INR History:  Date 9/19          Total Weekly Dose 80mg          INR 2.6          Notes             Clinic Interview:  Tablet Strength: 5mg  Verbal Release Authorization signed on 9/19/19-- may speak with Jammie Rodriguez (mother) John Rodriguez (father)      Patient Findings:    Brigida Rodriguez is a new start to warfarin therapy. He was by himself for counseling. Discussed warfarin's indication, mechanism, and dosing. Also enforced the importance of taking warfarin as instructed and at the same time every day, preferably in the evening so that we can make dose adjustments more easily following subsequent clinic visits. He expressed understanding of this fact. We also discussed what he should do about a missed dose; pts can take missed doses within about 12 hours of their usual scheduled dose, but he was instructed on the importance of not doubling up on doses unless told to do so by the warfarin clinic. Explained possible side effects of warfarin therapy, including increased risk of bleeding, s/sx of bleeding and s/sx of any additional clots/PE/CVA. Also discussed monitoring of warfarin, the INR, goal INR range 2-3, and the frequency of monitoring. Explained that he would be coming into the clinic more frequently in these first few weeks of therapy as we try to adjust his dose and achieve a therapeutic INR x 2 consecutive readings. Once that is achieved, pt will follow up in clinic every 4 weeks, on  "average. He stated no problems with transportation or scheduling clinic appts in this manner. Reviewed drug/food/tobacco/EtOH interactions and provided written information covering these topics in more detail, explaining that green, leafy vegetables interact most heavily with warfarin. Instructed the pt not to take or discontinue any medications without informing his physician/pharmacist and reminded him to inform us of any dietary changes, as well. He expressed understanding of the information provided and has no additional questions at this time.    Positives:  Signs/symptoms of bleeding, Change in medications   Negatives:  Signs/symptoms of thrombosis, Laboratory test error suspected, Change in health, Change in alcohol use, Change in activity, Upcoming invasive procedure, Emergency department visit, Upcoming dental procedure, Missed doses, Extra doses, Change in diet/appetite, Hospital admission, Bruising, Other complaints   Comments:  From phone encounter 9/6 \"Mr Rodriguez was originally prescribed warfarin for PE  in 2010 with a goal range of 2.0-3.0. In July 2019 he switched to Eliquis 5mg q12h. He started to have adverse side effects (itching, rash) and has decided to switch back to warfarin. Dr. Tom has called him in a prescription for warfarin which he will start tomorrow (9/7). Per Dr. Tom, he will resume his prior maintenance dose of warfarin 10mg daily except 15mg on ThursFri.\"   Mr Rodriguez is trying to quit smoking and started Chantix Monday 9/16. He has still had a few cigarettes this week. Verified last dose of eliquis was 9/7. He had an episode of bloody stool he believes was \"a few weeks ago\" and discussed with Dr Tom who recommended he get a colonoscopy, has not been scheduled or referred to a provider at this time. Per phone encounter from Dr Tom in May 2019 \"Central scheduling has tried to contact the patient twice to schedule colonoscopy. Attempted to call patient back. No answer. Patient needs " "to call central scheduling. [phone] ([facility]) ask for central scheduling \"     Mr Rodriguez was previously managed by the  antico clinic.   Per Melvin at clinic- Mr Rodriguez was seen in 2017 by the antico clinic, in 2018 he was being followed by family medicine, permanently dismissed in 10/2018 due to no shows.   At that time: INR goal 2-3  Warfarin dose 10mg sun-wed and 15mg thurs-sat (85mg/week)   Indication: PE in 2010 at [facility] likely secondary to LE DVT , second DVT in 2014 Dr Bennett with Family Medicine at  recommended lifelong therapy with goal 2-3   Last INR 3/2019 2.7          Plan:  1. INR is WNL today at 2.6.  For a more consistent INR kept weekly dosing the same but adjusted higher dose days and instructed pt to take 10mg daily except 15mg MonFri (80mg/week)  2. RTC in 2 weeks to ensure WNL, patient would prefer monthly checks after this time.  3. Medications reviewed, kirstie created-- He was unsure of his current Bupropion dose, verify at next encounter.   4. Pt declines warfarin refills.  5. Verbal and written information provided. Brigida Rodriguez expresses understanding by teach back and has no further questions at this time.  6. Sent message to Dr Tom to verify goal INR    Tara Barger RPH  9/19/2019  9:06 AM    "

## 2019-09-19 NOTE — TELEPHONE ENCOUNTER
Dr Tom,    Mr. Rodriguez is a new referral to [facility] anticoagulation clinic [facility] He was previously managed by Family Medicine at  with a goal INR of 2-3. Will continue to target this range unless otherwise directed by you.    Thank you for your referral!    Tara

## 2019-10-03 ENCOUNTER — APPOINTMENT (OUTPATIENT)
Dept: PHARMACY | Facility: HOSPITAL | Age: 39
End: 2019-10-03

## 2019-10-07 ENCOUNTER — OFFICE VISIT (OUTPATIENT)
Dept: FAMILY MEDICINE CLINIC | Facility: CLINIC | Age: 39
End: 2019-10-07

## 2019-10-07 VITALS
WEIGHT: 315 LBS | RESPIRATION RATE: 20 BRPM | TEMPERATURE: 97.4 F | BODY MASS INDEX: 38.36 KG/M2 | DIASTOLIC BLOOD PRESSURE: 102 MMHG | OXYGEN SATURATION: 97 % | HEIGHT: 76 IN | HEART RATE: 74 BPM | SYSTOLIC BLOOD PRESSURE: 158 MMHG

## 2019-10-07 DIAGNOSIS — Z72.0 TOBACCO ABUSE: Primary | ICD-10-CM

## 2019-10-07 DIAGNOSIS — K92.1 MELENA: ICD-10-CM

## 2019-10-07 DIAGNOSIS — Z86.711 HISTORY OF PULMONARY EMBOLISM: ICD-10-CM

## 2019-10-07 DIAGNOSIS — I10 ESSENTIAL HYPERTENSION: ICD-10-CM

## 2019-10-07 DIAGNOSIS — I50.9 CHRONIC CONGESTIVE HEART FAILURE, UNSPECIFIED HEART FAILURE TYPE (HCC): ICD-10-CM

## 2019-10-07 DIAGNOSIS — F33.9 RECURRENT MAJOR DEPRESSIVE DISORDER, REMISSION STATUS UNSPECIFIED (HCC): ICD-10-CM

## 2019-10-07 PROCEDURE — 99214 OFFICE O/P EST MOD 30 MIN: CPT | Performed by: INTERNAL MEDICINE

## 2019-10-07 NOTE — PROGRESS NOTES
Chief Complaint:  Tobacco abuse    HPI:  Brigida Rodriguez is a 39 y.o. male who presents today for conditions.  Tobacco abuse-currently smoking half pack a day.  He tried taking Chantix although this caused worsening depression so he discontinued.  He previously was on Wellbutrin for depression, although not taking this now since he started duloxetine.  He is asking about nicotine patches today.  History of heart failure- patient reports this was diagnosed during/after his PE.  No previous records available for review.  Patient denies any chest pain or shortness of breath currently.  He reports he had to cancel his cardiologist appointment due to her general.  History of PE-following with Coumadin clinic, currently taking warfarin.  Hypertension- taking lisinopril and also carvedilol daily.  Patient reports he did not take his blood pressure medication today.  He states that he has a home cuff although he does not think it is accurate because it is always low.  Anxiety and depression- well-controlled on duloxetine and hydroxyzine as needed.  Hemorrhoids and bloody stool-has not established with gastroenterology or had a colonoscopy yet.  Continues to have symptoms.    ROS:  Constitutional: no fevers, night sweats or unexplained weight loss  Eyes: no vision changes  ENT: no runny nose, ear pain, sore throat  Cardio: no chest pain, palpitations  Pulm: no shortness of breath, wheezing, or cough  GI: no abdominal pain + changes in bowel movements  : no difficulty urinating  MSK: no difficulty ambulating, no joint pain  Neuro: no weakness, dizziness or headache  Psych: no trouble sleeping  Endo: no change in appetite      Past Medical History:   Diagnosis Date   • Depression    • GERD (gastroesophageal reflux disease)    • H/O blood clots    • Heart failure (CMS/HCC)    • Hypertension       Family History   Problem Relation Age of Onset   • Diabetes Mother    • Obesity Maternal Grandmother       Social History      Socioeconomic History   • Marital status: Single     Spouse name: Not on file   • Number of children: Not on file   • Years of education: Not on file   • Highest education level: Not on file   Tobacco Use   • Smoking status: Current Every Day Smoker     Packs/day: 0.50     Years: 20.00     Pack years: 10.00     Types: Cigarettes   • Smokeless tobacco: Never Used   Substance and Sexual Activity   • Alcohol use: Yes     Alcohol/week: 3.6 oz     Types: 6 Cans of beer per week   • Drug use: Yes     Types: Marijuana     Comment: everyday smoker   • Sexual activity: Yes     Partners: Female     Birth control/protection: Condom      No Known Allergies     There is no immunization history on file for this patient.     PE:  Vitals:    10/07/19 0826   BP: (!) 158/102   Pulse: 74   Resp: 20   Temp: 97.4 °F (36.3 °C)   SpO2: 97%        Gen Appearance: NAD  HEENT: Normocephalic, PERRLA, no thyromegaly, trache midline  Heart: RRR, normal S1 and S2, no murmur  Lungs: CTA b/l, no wheezing, no crackles  Abdomen: Soft, non-tender, non-distended, no guarding and BSx4  MSK: Moves all extremities well, normal gait, no peripheral edema  Pulses: Palpable and equal b/l  Lymph nodes: No palpable lymphadenopathy   Neuro: No focal deficits      Current Outpatient Medications   Medication Sig Dispense Refill   • acyclovir (ZOVIRAX) 400 MG tablet Take 1 tablet by mouth Daily As Needed (outbreaks). Take no more than 5 doses a day. 30 tablet 2   • buPROPion HCl (WELLBUTRIN PO) Take 150 mg by mouth Daily.     • carvedilol (COREG) 6.25 MG tablet Take 6.25 mg by mouth 2 (Two) Times a Day With Meals.     • DULoxetine (CYMBALTA) 30 MG capsule Take 60 mg by mouth Daily.     • hydrOXYzine pamoate (VISTARIL) 25 MG capsule Take 25 mg by mouth 3 (Three) Times a Day As Needed for Itching.     • lisinopril (PRINIVIL,ZESTRIL) 10 MG tablet Take 1 tablet by mouth Daily. 30 tablet 0   • traZODone (DESYREL) 50 MG tablet Take 50 mg by mouth Every Night.     •  warfarin (COUMADIN) 5 MG tablet Take 2 tabs daily except 3 tabs on Thursday and Friday. 70 tablet 5   • varenicline (CHANTIX STARTING MONTH PAK) 0.5 MG X 11 & 1 MG X 42 tablet Take 0.5 mg one daily on days 1-3 and and 0.5 mg twice daily on days 4-7.Then 1 mg twice daily for a total of 12 weeks. 53 tablet 0     No current facility-administered medications for this visit.         Diagnoses and all orders for this visit:    Tobacco abuse  Recommend establishing care with cardiology.  Will need updated echo and possibly stress testing.  We will hold off on nicotine patches until he further discusses with cards.  Melena  -     Ambulatory Referral For Screening Colonoscopy  Sending for colonoscopy.  Recurrent major depressive disorder, remission status unspecified (CMS/HCC)  Stable on duloxetine.  Continue.  History of pulmonary embolism  Stable on warfarin.  Follows with anticoagulant.  Goal INR 2-3.  Essential hypertension  Significantly elevated today.  Patient reports he did not take his blood pressure medication today.  Previously well controlled last visit.  Recommend follow-up in 4 weeks.  Bring blood pressure cuff to visit.  Recommend taking medication at least 1 hour before visit as well.  Chronic congestive heart failure, unspecified heart failure type (CMS/HCC)  Recommend establishing care with cardiology.  Referral was ordered previously.       Return in about 4 weeks (around 11/4/2019).     Please note that portions of this document were completed with a voice recognition program. Efforts were made to edit the dictations, but occasionally words are mis-transcribed.

## 2019-10-08 ENCOUNTER — TELEPHONE (OUTPATIENT)
Dept: PHARMACY | Facility: HOSPITAL | Age: 39
End: 2019-10-08

## 2019-10-09 RX ORDER — LISINOPRIL 10 MG/1
TABLET ORAL
Qty: 30 TABLET | Refills: 0 | Status: SHIPPED | OUTPATIENT
Start: 2019-10-09 | End: 2019-11-04 | Stop reason: SDUPTHER

## 2019-10-16 ENCOUNTER — ANTICOAGULATION VISIT (OUTPATIENT)
Dept: PHARMACY | Facility: HOSPITAL | Age: 39
End: 2019-10-16

## 2019-10-16 ENCOUNTER — TELEPHONE (OUTPATIENT)
Dept: PHARMACY | Facility: HOSPITAL | Age: 39
End: 2019-10-16

## 2019-10-16 DIAGNOSIS — Z86.711 HISTORY OF PULMONARY EMBOLISM: ICD-10-CM

## 2019-10-16 LAB
INR PPP: 3.4 (ref 0.91–1.09)
PROTHROMBIN TIME: 40.7 SECONDS (ref 10–13.8)

## 2019-10-16 PROCEDURE — G0463 HOSPITAL OUTPT CLINIC VISIT: HCPCS

## 2019-10-16 PROCEDURE — 36416 COLLJ CAPILLARY BLOOD SPEC: CPT

## 2019-10-16 PROCEDURE — 85610 PROTHROMBIN TIME: CPT

## 2019-10-16 RX ORDER — BUPROPION HYDROCHLORIDE 150 MG/1
150 TABLET, EXTENDED RELEASE ORAL 2 TIMES DAILY
COMMUNITY
Start: 2019-10-16 | End: 2019-11-04 | Stop reason: SDUPTHER

## 2019-10-16 NOTE — TELEPHONE ENCOUNTER
Patient called to report he will take Wellbutrin 150mg 1 tab BID starting today to help with quitting smoking. He reports he stopped taking Chantix ~2 weeks ago.

## 2019-10-30 ENCOUNTER — APPOINTMENT (OUTPATIENT)
Dept: PHARMACY | Facility: HOSPITAL | Age: 39
End: 2019-10-30

## 2019-11-01 ENCOUNTER — TELEPHONE (OUTPATIENT)
Dept: PHARMACY | Facility: HOSPITAL | Age: 39
End: 2019-11-01

## 2019-11-01 NOTE — TELEPHONE ENCOUNTER
Patient LVM this morning requesting to cancel his appt for today and call him back to reschedule    LVM this morning to try to reschedule appt

## 2019-11-04 ENCOUNTER — OFFICE VISIT (OUTPATIENT)
Dept: FAMILY MEDICINE CLINIC | Facility: CLINIC | Age: 39
End: 2019-11-04

## 2019-11-04 VITALS
SYSTOLIC BLOOD PRESSURE: 130 MMHG | WEIGHT: 315 LBS | HEIGHT: 76 IN | BODY MASS INDEX: 38.36 KG/M2 | HEART RATE: 93 BPM | DIASTOLIC BLOOD PRESSURE: 86 MMHG | OXYGEN SATURATION: 99 %

## 2019-11-04 DIAGNOSIS — E66.01 MORBIDLY OBESE (HCC): ICD-10-CM

## 2019-11-04 DIAGNOSIS — I10 ESSENTIAL HYPERTENSION: Primary | ICD-10-CM

## 2019-11-04 DIAGNOSIS — F41.9 ANXIETY: ICD-10-CM

## 2019-11-04 DIAGNOSIS — F33.9 RECURRENT MAJOR DEPRESSIVE DISORDER, REMISSION STATUS UNSPECIFIED (HCC): ICD-10-CM

## 2019-11-04 DIAGNOSIS — Z23 IMMUNIZATION DUE: ICD-10-CM

## 2019-11-04 DIAGNOSIS — Z86.79 HISTORY OF CONGESTIVE HEART FAILURE: ICD-10-CM

## 2019-11-04 DIAGNOSIS — Z72.0 TOBACCO ABUSE: ICD-10-CM

## 2019-11-04 DIAGNOSIS — Z86.711 HISTORY OF PULMONARY EMBOLISM: ICD-10-CM

## 2019-11-04 PROCEDURE — 90674 CCIIV4 VAC NO PRSV 0.5 ML IM: CPT | Performed by: INTERNAL MEDICINE

## 2019-11-04 PROCEDURE — 99214 OFFICE O/P EST MOD 30 MIN: CPT | Performed by: INTERNAL MEDICINE

## 2019-11-04 PROCEDURE — 90471 IMMUNIZATION ADMIN: CPT | Performed by: INTERNAL MEDICINE

## 2019-11-04 RX ORDER — LISINOPRIL 10 MG/1
10 TABLET ORAL DAILY
Qty: 90 TABLET | Refills: 3 | Status: ON HOLD | OUTPATIENT
Start: 2019-11-04 | End: 2020-02-10 | Stop reason: SDUPTHER

## 2019-11-04 RX ORDER — BUPROPION HYDROCHLORIDE 150 MG/1
150 TABLET, EXTENDED RELEASE ORAL 2 TIMES DAILY
Qty: 60 TABLET | Refills: 5 | Status: SHIPPED | OUTPATIENT
Start: 2019-11-04 | End: 2020-07-30

## 2019-11-04 NOTE — PROGRESS NOTES
Chief Complaint:  htn    HPI:  Brigida Rodriguez is a 39 y.o. male who presents today for follow-up chronic medical conditions.  Patient reports he has a disability hearing later this month.  He is asking for letter that includes an update on his general health.  Patient reports his previous PCP for letter saying he has recurrent lower extremity ulcers requiring bedrest as treatment.  Patient reports he is currently not working, he has tried going back to work on several occasions but he reports his lower extremity ulcers will open back up.  He has no active ulcers today.  He states he has not seen a disability doctor before.  Hypertension-does not check blood pressure at home, taking lisinopril and Coreg as prescribed.  Depression-stable on Cymbalta 30 mg, bupropion 150 mg twice daily.  Denies any suicidal ideation.  Anxiety- takes hydroxyzine as needed, usually once daily.  History of PE-followed with anticoagulation clinic, taking warfarin daily.  Tobacco abuse-patient reports he is currently cutting down on cigarettes, he reports bupropion does help with his cravings.  History of congestive heart failure- no symptoms including weight gain, shortness of breath, chest pain.  Data deficient.  Patient reports he has not established with cardiology.  He missed his previous appointment.  Blood in stool-intermittent-has not scheduled colonoscopy yet.  Insomnia-taking trazodone nightly.  ROS:  Constitutional: no fevers, night sweats or unexplained weight loss  Eyes: no vision changes  ENT: no runny nose, ear pain, sore throat  Cardio: no chest pain, palpitations  Pulm: no shortness of breath, wheezing, or cough  GI: no abdominal pain, positive melena  : no difficulty urinating  MSK: no difficulty ambulating, no joint pain  Neuro: no weakness, dizziness or headache  Psych: + trouble sleeping  Endo: no change in appetite      Past Medical History:   Diagnosis Date   • Depression    • GERD (gastroesophageal reflux  disease)    • H/O blood clots    • Heart failure (CMS/HCC)    • Hypertension       Family History   Problem Relation Age of Onset   • Diabetes Mother    • Obesity Maternal Grandmother       Social History     Socioeconomic History   • Marital status: Single     Spouse name: Not on file   • Number of children: Not on file   • Years of education: Not on file   • Highest education level: Not on file   Tobacco Use   • Smoking status: Current Every Day Smoker     Packs/day: 0.50     Years: 20.00     Pack years: 10.00     Types: Cigarettes   • Smokeless tobacco: Never Used   Substance and Sexual Activity   • Alcohol use: Yes     Alcohol/week: 3.6 oz     Types: 6 Cans of beer per week   • Drug use: Yes     Types: Marijuana     Comment: everyday smoker   • Sexual activity: Yes     Partners: Female     Birth control/protection: Condom      No Known Allergies   Immunization History   Administered Date(s) Administered   • FLUARIX/FLUZONE/AFLURIA/FLULAVAL QUAD 11/04/2019        PE:  Vitals:    11/04/19 0809   BP: 130/86   Pulse: 93   SpO2: 99%      Body mass index is 46.26 kg/m².    Gen Appearance: NAD, obese  HEENT: Normocephalic, PERRLA, no thyromegaly, trache midline  Heart: RRR, normal S1 and S2, no murmur  Lungs: CTA b/l, no wheezing, no crackles  Abdomen: Soft, non-tender, non-distended, no guarding and BSx4  MSK: Moves all extremities well, normal gait, no peripheral edema, no ulcers  Pulses: Palpable and equal b/l  Lymph nodes: No palpable lymphadenopathy   Neuro: No focal deficits      Current Outpatient Medications   Medication Sig Dispense Refill   • acyclovir (ZOVIRAX) 400 MG tablet Take 1 tablet by mouth Daily As Needed (outbreaks). Take no more than 5 doses a day. 30 tablet 2   • buPROPion SR (WELLBUTRIN SR) 150 MG 12 hr tablet Take 1 tablet by mouth 2 (Two) Times a Day. 60 tablet 5   • carvedilol (COREG) 6.25 MG tablet Take 6.25 mg by mouth 2 (Two) Times a Day With Meals.     • hydrOXYzine pamoate (VISTARIL) 25  MG capsule Take 25 mg by mouth 3 (Three) Times a Day As Needed for Itching.     • lisinopril (PRINIVIL,ZESTRIL) 10 MG tablet Take 1 tablet by mouth Daily. 90 tablet 3   • traZODone (DESYREL) 50 MG tablet Take 50 mg by mouth Every Night.     • warfarin (COUMADIN) 5 MG tablet Take 2 tabs daily except 3 tabs on Thursday and Friday. 70 tablet 5     No current facility-administered medications for this visit.         Brigida was seen today for depression.    Diagnoses and all orders for this visit:    Essential hypertension  -     lisinopril (PRINIVIL,ZESTRIL) 10 MG tablet; Take 1 tablet by mouth Daily.  Stable.  Continue current medication.  Recommend checking blood pressure at home when able.  Diastolic slightly elevated at 86.  Follow-up 3 months for recheck and physical.  Tobacco abuse  Continue Wellbutrin.  Patient trying to wean down on cigarettes daily as able.  Recurrent major depressive disorder, remission status unspecified (CMS/[facility])  -     buPROPion SR (WELLBUTRIN SR) 150 MG 12 hr tablet; Take 1 tablet by mouth 2 (Two) Times a Day.  Stable.  Continue current medication.  Anxiety  Stable and hydroxyzine as needed.  History of pulmonary embolism  Stable, follows with anticoagulation clinic, taking warfarin daily for prevention.  History of congestive heart failure  Discussed with patient that he needs to establish care with cardiology.  Data deficient, no echo on file here.  I suspect his heart failure may have been transient during pulmonary embolism.  Morbidly obese (CMS/[facility])  Discussed weight loss with patient.  Immunization due  -     Fluarix/Fluzone/Afluria Quad>6 Months         Return in about 3 months (around 2/4/2020) for Annual.     Please note that portions of this document were completed with a voice recognition program. Efforts were made to edit the dictations, but occasionally words are mis-transcribed.

## 2019-11-04 NOTE — TELEPHONE ENCOUNTER
LVM re:overdue PT/INR    Of note, patient saw Dr Tom today. It appears he discontinued patient's duloxetine 60mg QD and called in another script for Wellbutrin SR 150mg BID. Patient had previously been taking this medication per 10/16/19 telephone encounter

## 2019-11-05 DIAGNOSIS — Z11.3 SCREENING EXAMINATION FOR STD (SEXUALLY TRANSMITTED DISEASE): Primary | ICD-10-CM

## 2019-11-06 ENCOUNTER — OFFICE VISIT (OUTPATIENT)
Dept: CARDIOLOGY | Facility: HOSPITAL | Age: 39
End: 2019-11-06

## 2019-11-06 VITALS
HEIGHT: 76 IN | HEART RATE: 81 BPM | SYSTOLIC BLOOD PRESSURE: 121 MMHG | DIASTOLIC BLOOD PRESSURE: 79 MMHG | TEMPERATURE: 96.9 F | BODY MASS INDEX: 38.36 KG/M2 | OXYGEN SATURATION: 99 % | RESPIRATION RATE: 16 BRPM | WEIGHT: 315 LBS

## 2019-11-06 DIAGNOSIS — R06.09 DOE (DYSPNEA ON EXERTION): ICD-10-CM

## 2019-11-06 DIAGNOSIS — I50.22 CHRONIC SYSTOLIC CONGESTIVE HEART FAILURE (HCC): Primary | ICD-10-CM

## 2019-11-06 DIAGNOSIS — I10 ESSENTIAL HYPERTENSION: ICD-10-CM

## 2019-11-06 DIAGNOSIS — R53.83 OTHER FATIGUE: ICD-10-CM

## 2019-11-06 LAB
ALBUMIN SERPL-MCNC: 3.8 G/DL (ref 3.5–5.2)
ALBUMIN/GLOB SERPL: 1 G/DL
ALP SERPL-CCNC: 62 U/L (ref 39–117)
ALT SERPL W P-5'-P-CCNC: 14 U/L (ref 1–41)
ANION GAP SERPL CALCULATED.3IONS-SCNC: 12.2 MMOL/L (ref 5–15)
AST SERPL-CCNC: 20 U/L (ref 1–40)
BASOPHILS # BLD AUTO: 0.03 10*3/MM3 (ref 0–0.2)
BASOPHILS NFR BLD AUTO: 0.6 % (ref 0–1.5)
BILIRUB SERPL-MCNC: 0.2 MG/DL (ref 0.2–1.2)
BUN BLD-MCNC: 16 MG/DL (ref 6–20)
BUN/CREAT SERPL: 13.9 (ref 7–25)
CALCIUM SPEC-SCNC: 9.3 MG/DL (ref 8.6–10.5)
CHLORIDE SERPL-SCNC: 106 MMOL/L (ref 98–107)
CO2 SERPL-SCNC: 22.8 MMOL/L (ref 22–29)
CREAT BLD-MCNC: 1.15 MG/DL (ref 0.76–1.27)
DEPRECATED RDW RBC AUTO: 42.1 FL (ref 37–54)
EOSINOPHIL # BLD AUTO: 0.29 10*3/MM3 (ref 0–0.4)
EOSINOPHIL NFR BLD AUTO: 5.3 % (ref 0.3–6.2)
ERYTHROCYTE [DISTWIDTH] IN BLOOD BY AUTOMATED COUNT: 12.9 % (ref 12.3–15.4)
GFR SERPL CREATININE-BSD FRML MDRD: 86 ML/MIN/1.73
GLOBULIN UR ELPH-MCNC: 3.8 GM/DL
GLUCOSE BLD-MCNC: 114 MG/DL (ref 65–99)
HCT VFR BLD AUTO: 39.2 % (ref 37.5–51)
HGB BLD-MCNC: 13.5 G/DL (ref 13–17.7)
IMM GRANULOCYTES # BLD AUTO: 0.02 10*3/MM3 (ref 0–0.05)
IMM GRANULOCYTES NFR BLD AUTO: 0.4 % (ref 0–0.5)
LYMPHOCYTES # BLD AUTO: 1.93 10*3/MM3 (ref 0.7–3.1)
LYMPHOCYTES NFR BLD AUTO: 35.5 % (ref 19.6–45.3)
MCH RBC QN AUTO: 31 PG (ref 26.6–33)
MCHC RBC AUTO-ENTMCNC: 34.4 G/DL (ref 31.5–35.7)
MCV RBC AUTO: 90.1 FL (ref 79–97)
MONOCYTES # BLD AUTO: 0.5 10*3/MM3 (ref 0.1–0.9)
MONOCYTES NFR BLD AUTO: 9.2 % (ref 5–12)
NEUTROPHILS # BLD AUTO: 2.67 10*3/MM3 (ref 1.7–7)
NEUTROPHILS NFR BLD AUTO: 49 % (ref 42.7–76)
NRBC BLD AUTO-RTO: 0.2 /100 WBC (ref 0–0.2)
NT-PROBNP SERPL-MCNC: 12.1 PG/ML (ref 5–450)
PLATELET # BLD AUTO: 191 10*3/MM3 (ref 140–450)
PMV BLD AUTO: 10.7 FL (ref 6–12)
POTASSIUM BLD-SCNC: 3.5 MMOL/L (ref 3.5–5.2)
PROT SERPL-MCNC: 7.6 G/DL (ref 6–8.5)
RBC # BLD AUTO: 4.35 10*6/MM3 (ref 4.14–5.8)
SODIUM BLD-SCNC: 141 MMOL/L (ref 136–145)
WBC NRBC COR # BLD: 5.44 10*3/MM3 (ref 3.4–10.8)

## 2019-11-06 PROCEDURE — 80053 COMPREHEN METABOLIC PANEL: CPT | Performed by: NURSE PRACTITIONER

## 2019-11-06 PROCEDURE — 85025 COMPLETE CBC W/AUTO DIFF WBC: CPT | Performed by: NURSE PRACTITIONER

## 2019-11-06 PROCEDURE — 99214 OFFICE O/P EST MOD 30 MIN: CPT | Performed by: NURSE PRACTITIONER

## 2019-11-06 PROCEDURE — 83880 ASSAY OF NATRIURETIC PEPTIDE: CPT | Performed by: NURSE PRACTITIONER

## 2019-11-06 NOTE — PROGRESS NOTES
[facility]      Encounter Date:11/06/2019     Brigida Rodriguez  [address] [address]  [unfilled]    1980    Elias Tom DO    Brigida Rodriguez is a 39 y.o. male.      Subjective:     Chief Complaint:  Congestive Heart Failure and Establish Care       HPI 39-year-old male presents the office today for ongoing evaluation of his chronic systolic heart failure and hypertension.  He is currently on anticoagulation for DVT/PE from 2010.  Past medical history significant for depression and anxiety.  He was discharged from [facility] due to repeated no shows.  He smokes half a pack a day.  Pulmonary embolus 2010 with TPA thrombolysis at [facility].  Thought to be secondary to right leg DVT.  Status post IVC filter.  Per echo 2016 [facility] EF 40 to 50%. Patient notes mild JARRELL that improves with rest, fatigue when he overdoes it. Denies chest pain. Notes mild pedal edema as the day goes on but notes that his ankles are normal size upon awakening in am. Has not seen Cardiology in awhile. Was followed by [facility]    Patient Active Problem List   Diagnosis   • Chronic congestive heart failure (CMS/HCC)   • Essential hypertension   • Generalized anxiety disorder   • Recurrent major depressive disorder (CMS/HCC)   • History of pulmonary embolism   • Melena   • Morbidly obese (CMS/HCC)       Past Medical History:   Diagnosis Date   • Depression    • GERD (gastroesophageal reflux disease)    • H/O blood clots    • Heart failure (CMS/HCC)    • Hypertension        Past Surgical History:   Procedure Laterality Date   • ADENOIDECTOMY     • KNEE SURGERY     • TONSILLECTOMY         Family History   Problem Relation Age of Onset   • Diabetes Mother    • Obesity Maternal Grandmother    • Diabetes Maternal Grandmother    • Cancer Father         prostrate   • No Known Problems Sister    • No Known Problems Brother    • No Known Problems Maternal  Grandfather    • No Known Problems Paternal Grandmother    • No Known Problems Paternal Grandfather        Social History     Socioeconomic History   • Marital status: Single     Spouse name: Not on file   • Number of children: Not on file   • Years of education: Not on file   • Highest education level: Not on file   Tobacco Use   • Smoking status: Current Every Day Smoker     Packs/day: 0.50     Years: 20.00     Pack years: 10.00     Types: Cigarettes   • Smokeless tobacco: Never Used   Substance and Sexual Activity   • Alcohol use: Yes     Alcohol/week: 3.6 oz     Types: 6 Cans of beer per week   • Drug use: Yes     Types: Marijuana     Comment: everyday smoker   • Sexual activity: Yes     Partners: Female     Birth control/protection: Condom   Social History Narrative    Caffeine 0       No Known Allergies      Current Outpatient Medications:   •  acyclovir (ZOVIRAX) 400 MG tablet, Take 1 tablet by mouth Daily As Needed (outbreaks). Take no more than 5 doses a day., Disp: 30 tablet, Rfl: 2  •  buPROPion SR (WELLBUTRIN SR) 150 MG 12 hr tablet, Take 1 tablet by mouth 2 (Two) Times a Day., Disp: 60 tablet, Rfl: 5  •  carvedilol (COREG) 6.25 MG tablet, Take 6.25 mg by mouth 2 (Two) Times a Day With Meals., Disp: , Rfl:   •  hydrOXYzine pamoate (VISTARIL) 25 MG capsule, Take 25 mg by mouth 3 (Three) Times a Day As Needed for Itching., Disp: , Rfl:   •  lisinopril (PRINIVIL,ZESTRIL) 10 MG tablet, Take 1 tablet by mouth Daily., Disp: 90 tablet, Rfl: 3  •  traZODone (DESYREL) 50 MG tablet, Take 50 mg by mouth Every Night., Disp: , Rfl:   •  warfarin (COUMADIN) 5 MG tablet, Take 2 tabs daily except 3 tabs on Thursday and Friday., Disp: 70 tablet, Rfl: 5    The following portions of the patient's history were reviewed today and updated as appropriate: allergies, current medications, past family history, past medical history, past social history, past surgical history and problem list     Review of Systems   Constitution:  "Positive for malaise/fatigue. Negative for chills, decreased appetite, diaphoresis, fever, weakness, night sweats, weight gain and weight loss.   HENT: Negative for congestion, hearing loss, hoarse voice and nosebleeds.    Eyes: Positive for blurred vision. Negative for visual disturbance and visual halos.   Cardiovascular: Positive for dyspnea on exertion and leg swelling. Negative for chest pain, claudication, cyanosis, irregular heartbeat, near-syncope, orthopnea, palpitations, paroxysmal nocturnal dyspnea and syncope.   Respiratory: Positive for shortness of breath. Negative for cough, hemoptysis, sleep disturbances due to breathing, snoring, sputum production and wheezing.    Hematologic/Lymphatic: Negative for bleeding problem. Does not bruise/bleed easily.   Skin: Negative for dry skin, itching and rash.   Musculoskeletal: Negative for arthritis, falls, joint pain, joint swelling, myalgias and neck pain.   Gastrointestinal: Negative for bloating, abdominal pain, constipation, diarrhea, flatus, heartburn, hematemesis, hematochezia, melena, nausea and vomiting.   Genitourinary: Negative for dysuria, frequency, hematuria, nocturia and urgency.   Neurological: Negative for excessive daytime sleepiness, dizziness, headaches, light-headedness and loss of balance.   Psychiatric/Behavioral: Negative for depression. The patient does not have insomnia and is not nervous/anxious.        Objective:     Vitals:    11/06/19 0836 11/06/19 0838 11/06/19 0839   BP: 139/81 133/79 121/79   BP Location: Right arm Left arm Left arm   Patient Position: Sitting Sitting Standing   Cuff Size: Large Adult Large Adult Large Adult   Pulse: 63  81   Resp: 16     Temp: 96.9 °F (36.1 °C)     TempSrc: Temporal     SpO2: 98%  99%   Weight: (!) 173 kg (382 lb 6 oz)     Height: 193 cm (76\")       Body mass index is 46.54 kg/m².  Physical Exam   Constitutional: He is oriented to person, place, and time. He appears well-developed and " well-nourished. He is active and cooperative. No distress.   HENT:   Head: Normocephalic and atraumatic.   Mouth/Throat: Oropharynx is clear and moist.   Eyes: Conjunctivae and EOM are normal. Pupils are equal, round, and reactive to light.   Neck: Normal range of motion. Neck supple. No JVD present. No tracheal deviation present. No thyromegaly present.   Cardiovascular: Normal rate, regular rhythm, normal heart sounds and intact distal pulses.   Pulmonary/Chest: Effort normal and breath sounds normal.   Abdominal: Soft. Bowel sounds are normal. He exhibits no distension. There is no tenderness.   Musculoskeletal: Normal range of motion.   Neurological: He is alert and oriented to person, place, and time.   Skin: Skin is warm, dry and intact.   Psychiatric: He has a normal mood and affect. His behavior is normal.   Nursing note and vitals reviewed.      Lab and Diagnostic Review:  Results for orders placed or performed in visit on 10/16/19   POC Protime / INR   Result Value Ref Range    Protime 40.7 (H) 10.0 - 13.8 seconds    INR 3.4 (H) 0.91 - 1.09     Bnp.cbc, probnp pending from today    Assessment and Plan:   1. Chronic systolic congestive heart failure (CMS/HCC)  euvolemic  Heart failure education today including signs and symptoms, the role of the heart failure center, daily weights, low sodium diet (less than 1500 mg per day), and daily physical activity. Reviewed HF Zones with patient and family.  Patient to continue current medications as previously ordered.   - Adult Transthoracic Echo Complete W/ Cont if Necessary Per Protocol; Future  - Comprehensive Metabolic Panel; Future  - proBNP; Future        2. Essential hypertension  Well controlled on coreg,lisinopril  - Adult Transthoracic Echo Complete W/ Cont if Necessary Per Protocol; Future  HTN Education provided today including signs and symptoms, medication management, daily blood pressure monitoring. Patient encouraged to call the Heart and Valve  center with any abnormal readings.   - Comprehensive Metabolic Panel    3. JARRELL (dyspnea on exertion)    - Adult Transthoracic Echo Complete W/ Cont if Necessary Per Protocol; Future    4. Other fatigue      - CBC Auto Differential    It has been a pleasure to participate in the care of this patient.  Patient was instructed to call the [facility] with any questions, concerns, or worsening symptoms.    *Please note that portions of this note were completed with a voice recognition program. Efforts were made to edit the dictations, but occasionally words are mistranscribed.

## 2019-11-07 ENCOUNTER — ANTICOAGULATION VISIT (OUTPATIENT)
Dept: PHARMACY | Facility: HOSPITAL | Age: 39
End: 2019-11-07

## 2019-11-07 ENCOUNTER — TELEPHONE (OUTPATIENT)
Dept: PHARMACY | Facility: HOSPITAL | Age: 39
End: 2019-11-07

## 2019-11-07 DIAGNOSIS — Z86.711 HISTORY OF PULMONARY EMBOLISM: ICD-10-CM

## 2019-11-07 LAB
INR PPP: 3.1 (ref 0.91–1.09)
PROTHROMBIN TIME: 37.7 SECONDS (ref 10–13.8)

## 2019-11-07 PROCEDURE — 85610 PROTHROMBIN TIME: CPT

## 2019-11-07 PROCEDURE — 36416 COLLJ CAPILLARY BLOOD SPEC: CPT

## 2019-11-07 PROCEDURE — G0463 HOSPITAL OUTPT CLINIC VISIT: HCPCS

## 2019-11-07 NOTE — TELEPHONE ENCOUNTER
Dr. Tom,    We were recently informed that Brigida Rodriguez is undergoing a colonoscopy on 27 November 2019 with John Mcdonnell MD, at [facility]. Dr. Mcdonnell requested that the patient hold warfarin 5 days prior to procedure and resume warfarin evening of procedure.     Brigida Rodriguez is a 39 y.o. male on warfarin for history of LE DVT and PE, therefore bridge therapy is not recommended    11/22: start warfarin hold 5x days    11/27: procedure with Dr Mcdonnell; resume warfarin that evening (will boost 2-3x doses)    12/3: recheck INR in clinic      Please advise if you are agreeable to plan above or if you prefer an alternative approach to Brigida Rodriguez's anticoagulation plan for the upcoming procedure.    Thank you,    Owen Boston    [facility] Anticoagulation Team  (t) [phone]  (f) [phone]

## 2019-11-07 NOTE — PROGRESS NOTES
Anticoagulation Clinic Progress Note  Indication: Hx of PE and LE DVT (2010,2014)  Referring Provider: Vaishali  Initial Warfarin Start Date: 2010  Planned Duration of Therapy: lifelong  Goal INR: 2-3 (verified Dr Tom 9/19/19)  Current Drug Interactions:   Other: d/c Eliquis 9/6/19 due to itching    Diet: does like cooked greens, provided Vit K handout- encouraged consistency 9/19/19  Alcohol:   Tobacco: trying to quit smoking, started Chantix 9/16/19  OTC Pain Medication: recommended Tylenol prn    Anticoagulation Clinic INR History:  Date 9/19 10/16 11/7        Total Weekly Dose 80mg 80 mg 77.5 mg        INR 2.6 3.4 3.1        Notes             Clinic Interview:  Tablet Strength: 5mg  Verbal Release Authorization signed on 9/19/19-- may speak with Jammie Rodriguez (mother) John Rodriguez (father)  Patient contact info:  [phone] (Mobile)    Patient Findings   Positives:  Upcoming invasive procedure, Change in medications   Negatives:  Signs/symptoms of thrombosis, Signs/symptoms of bleeding, Laboratory test error suspected, Change in health, Change in alcohol use, Change in activity, Emergency department visit, Upcoming dental procedure, Missed doses, Extra doses, Change in diet/appetite, Hospital admission, Bruising, Other complaints   Comments:  He continues on Wellbutrin SR 150mg BID to help with smoking cessation.  He stated that he is smoking less but that he has not quit altogether.     He stated that he continues to take Cymbalta as directed.  It has not been stopped.   He has a colonoscopy scheduled for November 27th with Dr. Mcdonnell.  He stated that they want his warfarin held for 5 days.  I will contact Lucretia in his office.     He stated that he has eaten more GLV this week.     Plan:    1. INR is slightly supra therapeutic today at 3.1.  Instructed Mr. Rodriguez to continue warfarin 10mg oral daily except 12.5mg MonWedFri (77.5mg/week) for now.  He will have a serving of GLV tonight.  May need to consider reducing  to 75 mg/week if INR supra therapeutic at next encounter.  2. RTC in 2 weeks on 11/19 to ensure WNL.  Will need to review perioperative plan at this appointment  3. Pt declines warfarin refills.  4. Verbal and written information provided. Brigida Rodriguez expresses understanding by teach back and has no further questions at this time.       Nishi Hooper, PharmD  11/7/2019  8:24 AM

## 2019-11-11 NOTE — TELEPHONE ENCOUNTER
Will plan to discuss perioperative plan with the patient at the next scheduled encounter scheduled for 11/19.

## 2019-11-13 ENCOUNTER — LAB (OUTPATIENT)
Dept: LAB | Facility: HOSPITAL | Age: 39
End: 2019-11-13

## 2019-11-13 ENCOUNTER — OFFICE VISIT (OUTPATIENT)
Dept: FAMILY MEDICINE CLINIC | Facility: CLINIC | Age: 39
End: 2019-11-13

## 2019-11-13 VITALS
DIASTOLIC BLOOD PRESSURE: 84 MMHG | WEIGHT: 315 LBS | HEART RATE: 97 BPM | HEIGHT: 76 IN | BODY MASS INDEX: 38.36 KG/M2 | OXYGEN SATURATION: 99 % | SYSTOLIC BLOOD PRESSURE: 144 MMHG

## 2019-11-13 DIAGNOSIS — I10 ESSENTIAL HYPERTENSION: ICD-10-CM

## 2019-11-13 DIAGNOSIS — F33.9 RECURRENT MAJOR DEPRESSIVE DISORDER, REMISSION STATUS UNSPECIFIED (HCC): ICD-10-CM

## 2019-11-13 DIAGNOSIS — Z11.3 SCREENING EXAMINATION FOR STD (SEXUALLY TRANSMITTED DISEASE): ICD-10-CM

## 2019-11-13 DIAGNOSIS — Z20.2 TRICHOMONAS EXPOSURE: ICD-10-CM

## 2019-11-13 DIAGNOSIS — F41.1 GENERALIZED ANXIETY DISORDER: ICD-10-CM

## 2019-11-13 DIAGNOSIS — Z11.3 SCREENING EXAMINATION FOR STD (SEXUALLY TRANSMITTED DISEASE): Primary | ICD-10-CM

## 2019-11-13 DIAGNOSIS — Z86.711 HISTORY OF PULMONARY EMBOLISM: ICD-10-CM

## 2019-11-13 LAB
HCV AB SER DONR QL: NORMAL
HIV1+2 AB SER QL: NORMAL
RPR SER QL: NORMAL

## 2019-11-13 PROCEDURE — 86592 SYPHILIS TEST NON-TREP QUAL: CPT

## 2019-11-13 PROCEDURE — 87491 CHLMYD TRACH DNA AMP PROBE: CPT

## 2019-11-13 PROCEDURE — 99214 OFFICE O/P EST MOD 30 MIN: CPT | Performed by: INTERNAL MEDICINE

## 2019-11-13 PROCEDURE — 86803 HEPATITIS C AB TEST: CPT

## 2019-11-13 PROCEDURE — G0432 EIA HIV-1/HIV-2 SCREEN: HCPCS

## 2019-11-13 PROCEDURE — 87591 N.GONORRHOEAE DNA AMP PROB: CPT

## 2019-11-13 PROCEDURE — 87661 TRICHOMONAS VAGINALIS AMPLIF: CPT

## 2019-11-13 RX ORDER — DULOXETIN HYDROCHLORIDE 30 MG/1
CAPSULE, DELAYED RELEASE ORAL
COMMUNITY
Start: 2019-11-08 | End: 2019-12-09 | Stop reason: SDUPTHER

## 2019-11-13 RX ORDER — METRONIDAZOLE 500 MG/1
2000 TABLET ORAL ONCE
Qty: 4 TABLET | Refills: 0 | Status: SHIPPED | OUTPATIENT
Start: 2019-11-13 | End: 2019-11-13

## 2019-11-13 NOTE — PROGRESS NOTES
Chief Complaint:  STD screening    HPI:  Brigida Rodriguez is a 39 y.o. male who presents today for follow-up and STD screening.  Patient reports his girlfriend was recently diagnosed with trichomonas again.  He states that she likely has not been faithful.  He denies any symptoms today including dysuria, pelvic pain or discharge.  He is again requesting a letter stating or including an update on his general health.  Reports his previous PCP had written a letter for recurrent lower extremity swelling and ulcers that prevented him from working.  He has not had a job since 2015.  He reports he is currently unable to work due to the swelling that occurs in both his feet and legs when he stands for long periods of time.  He reports these results and ulcers.  He recently established with cardiology for chronic heart failure.  He has an upcoming echo.  He denies any shortness of breath or chest pain today.  Hypertension-taking medication as prescribed, he reports his blood pressure is high due to rushing to visit.  Depression and anxiety-stable on current dose Cymbalta and Wellbutrin.  Taking trazodone to help sleep at night.    ROS:  Constitutional: no fevers, night sweats or unexplained weight loss  Eyes: no vision changes  ENT: no runny nose, ear pain, sore throat  Cardio: no chest pain, palpitations  Pulm: no shortness of breath, wheezing, or cough  GI: no abdominal pain or changes in bowel movements  : no difficulty urinating  MSK: no difficulty ambulating, no joint pain  Neuro: no weakness, dizziness or headache  Psych: no trouble sleeping  Endo: no change in appetite      Past Medical History:   Diagnosis Date   • Depression    • GERD (gastroesophageal reflux disease)    • H/O blood clots    • Heart failure (CMS/HCC)    • Hypertension       Family History   Problem Relation Age of Onset   • Diabetes Mother    • Obesity Maternal Grandmother    • Diabetes Maternal Grandmother    • Cancer Father          prostrate   • No Known Problems Sister    • No Known Problems Brother    • No Known Problems Maternal Grandfather    • No Known Problems Paternal Grandmother    • No Known Problems Paternal Grandfather       Social History     Socioeconomic History   • Marital status: Single     Spouse name: Not on file   • Number of children: Not on file   • Years of education: Not on file   • Highest education level: Not on file   Tobacco Use   • Smoking status: Current Every Day Smoker     Packs/day: 0.50     Years: 20.00     Pack years: 10.00     Types: Cigarettes   • Smokeless tobacco: Never Used   Substance and Sexual Activity   • Alcohol use: Yes     Alcohol/week: 3.6 oz     Types: 6 Cans of beer per week   • Drug use: Yes     Types: Marijuana     Comment: everyday smoker   • Sexual activity: Yes     Partners: Female     Birth control/protection: Condom   Social History Narrative    Caffeine 0      No Known Allergies   Immunization History   Administered Date(s) Administered   • FLUARIX/FLUZONE/AFLURIA/FLULAVAL QUAD 11/04/2019        PE:  Vitals:    11/13/19 0814   BP: 144/84   Pulse: 97   SpO2: 99%      Body mass index is 46.5 kg/m².    Gen Appearance: NAD  HEENT: Normocephalic, PERRLA, no thyromegaly, trache midline  Heart: RRR, normal S1 and S2, no murmur  Lungs: CTA b/l, no wheezing, no crackles  Abdomen: Soft, non-tender, non-distended, no guarding and BSx4  MSK: Moves all extremities well, normal gait, no peripheral edema  Pulses: Palpable and equal b/l  Lymph nodes: No palpable lymphadenopathy   Neuro: No focal deficits      Current Outpatient Medications   Medication Sig Dispense Refill   • acyclovir (ZOVIRAX) 400 MG tablet Take 1 tablet by mouth Daily As Needed (outbreaks). Take no more than 5 doses a day. 30 tablet 2   • buPROPion SR (WELLBUTRIN SR) 150 MG 12 hr tablet Take 1 tablet by mouth 2 (Two) Times a Day. 60 tablet 5   • carvedilol (COREG) 6.25 MG tablet Take 6.25 mg by mouth 2 (Two) Times a Day With Meals.      • DULoxetine (CYMBALTA) 30 MG capsule      • hydrOXYzine pamoate (VISTARIL) 25 MG capsule Take 25 mg by mouth 3 (Three) Times a Day As Needed for Itching.     • lisinopril (PRINIVIL,ZESTRIL) 10 MG tablet Take 1 tablet by mouth Daily. 90 tablet 3   • traZODone (DESYREL) 50 MG tablet Take 50 mg by mouth Every Night.     • warfarin (COUMADIN) 5 MG tablet Take 2 tabs daily except 3 tabs on Thursday and Friday. 70 tablet 5     No current facility-administered medications for this visit.         Brigida was seen today for exposure to std.  Will be writing letter for patient with an update on his general health.    Diagnoses and all orders for this visit:    Screening examination for STD (sexually transmitted disease)  -     Chlamydia trachomatis, Neisseria gonorrhoeae, Trichomonas vaginalis, PCR - Urine, Urine, Clean Catch; Future  -     RPR; Future  -     HIV-1 / O / 2 Ag / Antibody 4th Generation; Future  -     Hepatitis C Antibody; Future  Checking for STDs.  Asymptomatic today.  He reports his girlfriend was recently diagnosed with trichomonas.  Will be sending in prescription for Flagyl.  Would recommend 2 g once.  Flagyl can sometimes increase the effects of warfarin.  Will need to check INR 2 to 3 days after Flagyl dose.  History of pulmonary embolism  Continue warfarin.  Follows with anticoagulation clinic.  Essential hypertension  Elevated today due to rushing to visit.  Follow-up 3 months for recheck.  Generalized anxiety disorder  Stable on current medication.  Continue.  Recurrent major depressive disorder, remission status unspecified (CMS/HCC)  Stable on current medication.  Continue.       Return in about 3 months (around 2/13/2020).     Please note that portions of this document were completed with a voice recognition program. Efforts were made to edit the dictations, but occasionally words are mis-transcribed.

## 2019-11-15 LAB
C TRACH RRNA SPEC DONR QL NAA+PROBE: NEGATIVE
GNRH SERPL-MCNC: NEGATIVE
T VAGINALIS RRNA GENITAL QL PROBE: NEGATIVE

## 2019-11-18 DIAGNOSIS — B00.9 HERPES: ICD-10-CM

## 2019-11-18 RX ORDER — ACYCLOVIR 400 MG/1
400 TABLET ORAL DAILY PRN
Qty: 30 TABLET | Refills: 2 | Status: SHIPPED | OUTPATIENT
Start: 2019-11-18 | End: 2020-02-25 | Stop reason: SDUPTHER

## 2019-11-19 ENCOUNTER — ANTICOAGULATION VISIT (OUTPATIENT)
Dept: PHARMACY | Facility: HOSPITAL | Age: 39
End: 2019-11-19

## 2019-11-19 DIAGNOSIS — Z86.711 HISTORY OF PULMONARY EMBOLISM: ICD-10-CM

## 2019-11-19 LAB
INR PPP: 2.7 (ref 0.91–1.09)
PROTHROMBIN TIME: 32.1 SECONDS (ref 10–13.8)

## 2019-11-19 PROCEDURE — 36416 COLLJ CAPILLARY BLOOD SPEC: CPT

## 2019-11-19 PROCEDURE — 85610 PROTHROMBIN TIME: CPT

## 2019-11-19 PROCEDURE — G0463 HOSPITAL OUTPT CLINIC VISIT: HCPCS

## 2019-11-19 NOTE — PROGRESS NOTES
Anticoagulation Clinic Progress Note  Indication: Hx of PE and LE DVT (2010, 2014)  Referring Provider: Vaishali  Initial Warfarin Start Date: 2010  Planned Duration of Therapy: lifelong  Goal INR: 2-3 (verified Dr Tom 9/19/19)  Current Drug Interactions:   Other: d/c Eliquis 9/6/19 due to itching    Diet: does like cooked greens, provided Vit K handout- encouraged consistency 9/19/19  Alcohol:   Tobacco: trying to quit smoking, started Chantix 9/16/19  OTC Pain Medication: recommended Tylenol prn    Anticoagulation Clinic INR History:  Date 9/19 10/16 11/7 11/19       Total Weekly Dose 80mg 80 mg 77.5 mg 77.5mg       INR 2.6 3.4 3.1 2.7       Notes             Clinic Interview:  Tablet Strength: 5mg  Verbal Release Authorization signed on 9/19/19-- may speak with Jammie Rodriguez (mother) John Rodriguez (father)  Patient contact info:  [phone] (Mobile)    Patient Findings     Positives:  Upcoming invasive procedure   Negatives:  Signs/symptoms of thrombosis, Signs/symptoms of bleeding, Laboratory test error suspected, Change in health, Change in alcohol use, Change in activity, Emergency department visit, Upcoming dental procedure, Missed doses, Extra doses, Change in medications, Change in diet/appetite, Hospital admission, Bruising, Other complaints   Comments:  Mr. Rodriguez has a colonoscopy coming up with Dr. Mcdonnell. He reports that he has not been off of anticoagulation therapy since his previous VTE's.     Plan:  1. INR is therapeutic today at 2.7.  Instructed Mr. Rodriguez to continue warfarin 10mg oral daily except 12.5mg MonWedFri (77.5mg/week) until upcoming procedure. Cleared use of lovenox bridge with Dr. Tom's office and clarified when to resume warfarin with Dr. Mcdonnell's office. Dr. Tom did clear lovenox use, and Dr. Mcdonnell's office has noted this. They will instruct patient when to resume lovenox pending if polyps are removed.   Provided following dosing plan to Mr. Rodriguez today: Emailed new plan to  [email or web address]; sent lovenox in to his pharmacy  11/22: start warfarin hold 5x days  11/23: Warfarin dose hold; begin lovenox 150mg in PM only  11/24: Warfarin dose hold; Lovenox 150mg Q12h  11/25: Warfarin dose hold; Lovenox 150mg Q12h  11/26: Warfarin dose hold; Lovenox 150mg in the AM only  11/27: procedure with Dr Mcdonnell; Resume warfarin 15mg boost dose and Lovenox IF approved by Dr. Mcdonnell. Will try to call patient or post op to determine if can resume Lovenox; will attempt to call post op on Wednesday about [phone] to determine if patient is able to resume Lovenox.   11/28: Warfarin 12.5mg boost dose; Lovenox 150mg Q12h  11/29: Warfarin resume maintenance dose of 10mg daily except 12.5mg MonWedFri and continue Lovenox 150mg Q12h  12/3: recheck INR in clinic     2. RTC in 2 weeks on 12/3 post colonoscopy to ensure WNL.    3. Pt declines warfarin refills.  4. Verbal and written information provided. Jaretcharissa Nia Rodriguez expresses understanding by teach back and has no further questions at this time.     Grace Coyle, PharmD  11/19/2019  8:10 AM

## 2019-11-19 NOTE — TELEPHONE ENCOUNTER
Dr. Tom,     Upon further evaluation of Mr. Rodriguez who is on lifelong anticoagulation therapy for history of multiple VTEs (PE in 2010 at [facility] likely secondary to LE DVT , second DVT in 2014), would consider lovenox bridge for upcoming dose hold.     If you are agreeable, will dose subcutaneous lovenox 150mg q12h based on weight: 173kg; Scr: 1.15 and CrCl: 157.7.     Please advise if you are agreeable or prefer to proceed in an alternative way.    Thank you,    Grace Coyle

## 2019-11-27 ENCOUNTER — OUTSIDE FACILITY SERVICE (OUTPATIENT)
Dept: GASTROENTEROLOGY | Facility: CLINIC | Age: 39
End: 2019-11-27

## 2019-11-27 PROCEDURE — 45378 DIAGNOSTIC COLONOSCOPY: CPT | Performed by: INTERNAL MEDICINE

## 2019-12-03 ENCOUNTER — ANTICOAGULATION VISIT (OUTPATIENT)
Dept: PHARMACY | Facility: HOSPITAL | Age: 39
End: 2019-12-03

## 2019-12-03 DIAGNOSIS — Z86.711 HISTORY OF PULMONARY EMBOLISM: ICD-10-CM

## 2019-12-03 LAB
INR PPP: 1.5 (ref 0.91–1.09)
PROTHROMBIN TIME: 17.9 SECONDS (ref 10–13.8)

## 2019-12-03 PROCEDURE — 36416 COLLJ CAPILLARY BLOOD SPEC: CPT

## 2019-12-03 PROCEDURE — G0463 HOSPITAL OUTPT CLINIC VISIT: HCPCS

## 2019-12-03 PROCEDURE — 85610 PROTHROMBIN TIME: CPT

## 2019-12-03 NOTE — PROGRESS NOTES
"Anticoagulation Clinic Progress Note  Indication: Hx of PE and LE DVT (2010, 2014)  Referring Provider: Vaishali  Initial Warfarin Start Date: 2010  Planned Duration of Therapy: lifelong  Goal INR: 2-3 (verified Dr Tom 9/19/19)  Current Drug Interactions:   Other: d/c Eliquis 9/6/19 due to itching    Diet: does like cooked greens, provided Vit K handout- encouraged consistency 9/19/19  Alcohol:   Tobacco: trying to quit smoking, started Chantix 9/16/19  OTC Pain Medication: recommended Tylenol prn    Anticoagulation Clinic INR History:  Date 9/19 10/16 11/7 11/19 12/3      Total Weekly Dose 80mg 80 mg 77.5 mg 77.5mg 57.5 mg      INR 2.6 3.4 3.1 2.7 1.5      Notes     S/p c'scope, librado greens        Clinic Interview:  Tablet Strength: 5mg  Verbal Release Authorization signed on 9/19/19-- may speak with Jammie Rodriguez (mother) John Rodriguez (father)  Patient contact info:  [phone] (Mobile)    Patient Findings   Positives:  Missed doses, Change in diet/appetite   Negatives:  Signs/symptoms of thrombosis, Signs/symptoms of bleeding, Laboratory test error suspected, Change in health, Change in alcohol use, Change in activity, Upcoming invasive procedure, Emergency department visit, Upcoming dental procedure, Extra doses, Change in medications, Hospital admission, Bruising, Other complaints   Comments:  Patient had servings of librado greens yesterday and on Thanksgiving. Patient was unsure of exact amount, but estimated \"maybe a couple of cups both times\". Patient restarted warfarin on Thanksgiving and did not take boosted 15 mg dose from tracker on 11/27. Patient estimates he has 7 or 8 Lovenox syringes left, which will get him to Friday.       Plan:  1. INR is SUBtherapeutic today at 1.5 after holding warfarin for colonoscopy on 11/27. Patient restarted warfarin on 11/28 with 12.5 mg dose and did not take planned boosted 15 mg dose on 11/27. On top of this, patient had servings of librado greens on Thanksgiving and " yesterday (12/2). Patient has been using Lovenox 150 mg SQ q12h as planned. Due to low INR today, instructed Mr. Rodriguez to take boosted dose of warfarin 12.5 mg tonight (3% increase) and otherwise continue previous maintenance dose of warfarin 10mg daily except 12.5mg MonWedFri. Also instructed patient to continue to take Lovenox 150 mg SQ q12h and to avoid all GLV for time being. Avoiding any additional warfarin dose increases currently as there are clear reasons for low INR, patient has only restarted warfarin 5 days ago and did not take planned 15 mg dose 11/27, and previous supratherapeutic INRs.  2. RTC Friday 12/6 due to subtherapeutic INR today. If INR low again patient may need new prescription for Lovenox at that time.  3. Verbal and written information provided. Brigida Rodriguez expresses understanding by teach back and has no further questions at this time.     Maximo Hernandez, PharmD  12/3/2019  8:51 AM

## 2019-12-05 ENCOUNTER — APPOINTMENT (OUTPATIENT)
Dept: PHARMACY | Facility: HOSPITAL | Age: 39
End: 2019-12-05

## 2019-12-06 ENCOUNTER — APPOINTMENT (OUTPATIENT)
Dept: PHARMACY | Facility: HOSPITAL | Age: 39
End: 2019-12-06

## 2019-12-09 ENCOUNTER — ANTICOAGULATION VISIT (OUTPATIENT)
Dept: PHARMACY | Facility: HOSPITAL | Age: 39
End: 2019-12-09

## 2019-12-09 DIAGNOSIS — F41.1 GENERALIZED ANXIETY DISORDER: ICD-10-CM

## 2019-12-09 DIAGNOSIS — Z86.711 HISTORY OF PULMONARY EMBOLISM: ICD-10-CM

## 2019-12-09 DIAGNOSIS — F33.9 RECURRENT MAJOR DEPRESSIVE DISORDER, REMISSION STATUS UNSPECIFIED (HCC): ICD-10-CM

## 2019-12-09 LAB
INR PPP: 2.1 (ref 0.91–1.09)
PROTHROMBIN TIME: 25.7 SECONDS (ref 10–13.8)

## 2019-12-09 PROCEDURE — G0463 HOSPITAL OUTPT CLINIC VISIT: HCPCS

## 2019-12-09 PROCEDURE — 36416 COLLJ CAPILLARY BLOOD SPEC: CPT

## 2019-12-09 PROCEDURE — 85610 PROTHROMBIN TIME: CPT

## 2019-12-09 RX ORDER — DULOXETIN HYDROCHLORIDE 30 MG/1
CAPSULE, DELAYED RELEASE ORAL
Qty: 30 CAPSULE | Refills: 0 | Status: SHIPPED | OUTPATIENT
Start: 2019-12-09 | End: 2020-01-10

## 2019-12-09 NOTE — PROGRESS NOTES
Anticoagulation Clinic Progress Note  Indication: Hx of PE and LE DVT (2010, 2014)  Referring Provider: Vaishali  Initial Warfarin Start Date: 2010  Planned Duration of Therapy: lifelong  Goal INR: 2-3 (verified Dr Tom 9/19/19)  Current Drug Interactions:   Other: d/c Eliquis 9/6/19 due to itching    Diet: does like cooked greens, provided Vit K handout- encouraged consistency 9/19/19  Alcohol:   Tobacco: trying to quit smoking, started Chantix 9/16/19  OTC Pain Medication: recommended Tylenol prn    Anticoagulation Clinic INR History:  Date 9/19 10/16 11/7 11/19 12/3 12/9     Total Weekly Dose 80mg 80 mg 77.5 mg 77.5mg 57.5 mg 80mg 77.5mg    INR 2.6 3.4 3.1 2.7 1.5 2.1     Notes     S/p c'scope, librado greens 1 boost       Clinic Interview:  Tablet Strength: 5mg  Verbal Release Authorization signed on 9/19/19-- may speak with Jammie Rodriguez (mother) John Rodriguez (father)  Patient contact info:  [phone] (Mobile)    Patient Findings   Negatives:  Signs/symptoms of thrombosis, Signs/symptoms of bleeding, Laboratory test error suspected, Change in health, Change in alcohol use, Change in activity, Upcoming invasive procedure, Emergency department visit, Upcoming dental procedure, Missed doses, Extra doses, Change in medications, Change in diet/appetite, Hospital admission, Bruising, Other complaints   Comments:  Mr Rodriguez continued lovenox for 11 days post procedure, he believes his last dose was 12/5. Has resumed normal GLV consumption, had a few salads over the weekend.       Plan:  1. INR is therapeutic today 2.1, WNL, however increased from last week. Instructed Mr oRdriguez to resume maintenance dose of 10mg daily except 12.5mg MWF (77.5mg/week)  2. RTC in 2 weeks to ensure WNL 12/23  3. Verbal and written information provided. Brigida Rodriguez expresses understanding by teach back and has no further questions at this time.    Tara Barger, PharmD.  12/09/19   8:38 AM

## 2019-12-23 ENCOUNTER — APPOINTMENT (OUTPATIENT)
Dept: PHARMACY | Facility: HOSPITAL | Age: 39
End: 2019-12-23

## 2019-12-27 ENCOUNTER — APPOINTMENT (OUTPATIENT)
Dept: PHARMACY | Facility: HOSPITAL | Age: 39
End: 2019-12-27

## 2020-01-03 ENCOUNTER — ANTICOAGULATION VISIT (OUTPATIENT)
Dept: PHARMACY | Facility: HOSPITAL | Age: 40
End: 2020-01-03

## 2020-01-03 DIAGNOSIS — Z86.711 HISTORY OF PULMONARY EMBOLISM: ICD-10-CM

## 2020-01-03 LAB — INR PPP: 2.6

## 2020-01-03 PROCEDURE — G0463 HOSPITAL OUTPT CLINIC VISIT: HCPCS

## 2020-01-03 NOTE — PROGRESS NOTES
Anticoagulation Clinic Progress Note  Indication: Hx of PE and LE DVT (2010, 2014)  Referring Provider: Vaishali  Initial Warfarin Start Date: 2010  Planned Duration of Therapy: lifelong  Goal INR: 2-3 (verified Dr Tom 9/19/19)  Current Drug Interactions:   Other: d/c Eliquis 9/6/19 due to itching    Diet: iceburg salad 1/3/2020  Alcohol: Social   Tobacco: trying to quit smoking, started Chantix 9/16/19  OTC Pain Medication: recommended Tylenol prn    Anticoagulation Clinic INR History:  Date 9/19 10/16 11/7 11/19 12/3 12/9 1/3/2020    Total Weekly Dose 80mg 80 mg 77.5 mg 77.5mg 57.5 mg 80mg 72.5mg    INR 2.6 3.4 3.1 2.7 1.5 2.1 2.6    Notes     S/p c'scope, librado greens 1 boost Dec cig use      Clinic Interview:  Tablet Strength: 5mg  Verbal Release Authorization signed on 9/19/19-- may speak with Jammie Rodriguez (mother) John Rodriguez (father)  Patient contact info:  [phone] (Mobile)    Patient Findings   Positives:  Missed doses, Other complaints   Negatives:  Signs/symptoms of thrombosis, Signs/symptoms of bleeding, Laboratory test error suspected, Change in health, Change in alcohol use, Change in activity, Upcoming invasive procedure, Emergency department visit, Upcoming dental procedure, Extra doses, Change in medications, Change in diet/appetite, Hospital admission, Bruising   Comments:  He saw blood in his mouth after brushing his teeth on Saturday so he self adjusted his dose to 10mg daily. He is attempting to stop smoking-- he is down to 2-3 cig/day. Mr. Rodriguez did have some alcohol use over the holiday but doesn't report that he consumed more than normal. He was out of town for the holidays and reports that he typically doesn't dose adjust on his own.      Plan:  1. INR is therapeutic today 2.6- however, patient decreased dose of warfarin last night. Instructed Mr Rodriguez to continue self reduced dose of 10mg daily except 12.5mg Fridays (72.5mg/week)  2. RTC in 2 weeks to ensure WNL 1/10/2020.  3. Verbal  and written information provided. Jaretcharissa Nia Rodriguez expresses understanding by teach back and has no further questions at this time.    Grace Coyle, PharmD  01/03/20   9:33 AM

## 2020-01-10 ENCOUNTER — TELEPHONE (OUTPATIENT)
Dept: PHARMACY | Facility: HOSPITAL | Age: 40
End: 2020-01-10

## 2020-01-10 ENCOUNTER — APPOINTMENT (OUTPATIENT)
Dept: PHARMACY | Facility: HOSPITAL | Age: 40
End: 2020-01-10

## 2020-01-10 DIAGNOSIS — F33.9 RECURRENT MAJOR DEPRESSIVE DISORDER, REMISSION STATUS UNSPECIFIED (HCC): ICD-10-CM

## 2020-01-10 DIAGNOSIS — F41.1 GENERALIZED ANXIETY DISORDER: ICD-10-CM

## 2020-01-10 RX ORDER — DULOXETIN HYDROCHLORIDE 30 MG/1
CAPSULE, DELAYED RELEASE ORAL
Qty: 30 CAPSULE | Refills: 1 | Status: SHIPPED | OUTPATIENT
Start: 2020-01-10 | End: 2020-03-09

## 2020-01-13 ENCOUNTER — TELEPHONE (OUTPATIENT)
Dept: PHARMACY | Facility: HOSPITAL | Age: 40
End: 2020-01-13

## 2020-01-13 NOTE — TELEPHONE ENCOUNTER
Patient LVM prior to clinic hour this morning wanting to cancel/reschedule appt for today.       LVM for patient requesting call back

## 2020-01-20 ENCOUNTER — TELEPHONE (OUTPATIENT)
Dept: PHARMACY | Facility: HOSPITAL | Age: 40
End: 2020-01-20

## 2020-01-20 ENCOUNTER — APPOINTMENT (OUTPATIENT)
Dept: PHARMACY | Facility: HOSPITAL | Age: 40
End: 2020-01-20

## 2020-01-20 NOTE — TELEPHONE ENCOUNTER
Mr. Rodriguez LVM early this morning prior to clinic opening stating he needed to cancel his appointment this morning due to a family emergency. He has no showed/cancelled his last 3 appointments.     Per Grace Coyle, will give patient time to call the clinic later today. If he does not call by the end of the day, follow up with patient tomorrow. If he does not answer or return call by tomorrow- can send first letter.

## 2020-01-28 ENCOUNTER — HOSPITAL ENCOUNTER (INPATIENT)
Facility: HOSPITAL | Age: 40
LOS: 13 days | Discharge: HOME HEALTH | End: 2020-02-10
Attending: EMERGENCY MEDICINE | Admitting: SURGERY

## 2020-01-28 ENCOUNTER — APPOINTMENT (OUTPATIENT)
Dept: CT IMAGING | Facility: HOSPITAL | Age: 40
End: 2020-01-28

## 2020-01-28 ENCOUNTER — APPOINTMENT (OUTPATIENT)
Dept: GENERAL RADIOLOGY | Facility: HOSPITAL | Age: 40
End: 2020-01-28

## 2020-01-28 DIAGNOSIS — K57.92 DIVERTICULITIS: ICD-10-CM

## 2020-01-28 DIAGNOSIS — R10.9 INTRACTABLE ABDOMINAL PAIN: Primary | ICD-10-CM

## 2020-01-28 DIAGNOSIS — Z86.711 HISTORY OF PULMONARY EMBOLISM: ICD-10-CM

## 2020-01-28 DIAGNOSIS — K65.9 PERITONITIS (HCC): ICD-10-CM

## 2020-01-28 DIAGNOSIS — K57.32 SIGMOID DIVERTICULITIS: ICD-10-CM

## 2020-01-28 DIAGNOSIS — K57.80 PERFORATED DIVERTICULUM: ICD-10-CM

## 2020-01-28 DIAGNOSIS — I10 ESSENTIAL HYPERTENSION: ICD-10-CM

## 2020-01-28 DIAGNOSIS — Z98.890 S/P EXPLORATORY LAPAROTOMY: ICD-10-CM

## 2020-01-28 LAB
ALBUMIN SERPL-MCNC: 4.9 G/DL (ref 3.5–5.2)
ALBUMIN/GLOB SERPL: 1.4 G/DL
ALP SERPL-CCNC: 59 U/L (ref 39–117)
ALT SERPL W P-5'-P-CCNC: 14 U/L (ref 1–41)
ANION GAP SERPL CALCULATED.3IONS-SCNC: 15 MMOL/L (ref 5–15)
AST SERPL-CCNC: 18 U/L (ref 1–40)
BACTERIA UR QL AUTO: ABNORMAL /HPF
BASOPHILS # BLD AUTO: 0.03 10*3/MM3 (ref 0–0.2)
BASOPHILS NFR BLD AUTO: 0.4 % (ref 0–1.5)
BILIRUB SERPL-MCNC: 0.8 MG/DL (ref 0.2–1.2)
BILIRUB UR QL STRIP: NEGATIVE
BUN BLD-MCNC: 12 MG/DL (ref 6–20)
BUN/CREAT SERPL: 11.8 (ref 7–25)
CALCIUM SPEC-SCNC: 9.7 MG/DL (ref 8.6–10.5)
CHLORIDE SERPL-SCNC: 101 MMOL/L (ref 98–107)
CLARITY UR: CLEAR
CO2 SERPL-SCNC: 25 MMOL/L (ref 22–29)
COLOR UR: YELLOW
CREAT BLD-MCNC: 1.02 MG/DL (ref 0.76–1.27)
D-LACTATE SERPL-SCNC: 1.8 MMOL/L (ref 0.5–2)
DEPRECATED RDW RBC AUTO: 45.7 FL (ref 37–54)
DEVELOPER EXPIRATION DATE: NORMAL
DEVELOPER LOT NUMBER: NORMAL
EOSINOPHIL # BLD AUTO: 0.11 10*3/MM3 (ref 0–0.4)
EOSINOPHIL NFR BLD AUTO: 1.4 % (ref 0.3–6.2)
ERYTHROCYTE [DISTWIDTH] IN BLOOD BY AUTOMATED COUNT: 13.5 % (ref 12.3–15.4)
EXPIRATION DATE: NORMAL
FECAL OCCULT BLOOD SCREEN, POC: NEGATIVE
GFR SERPL CREATININE-BSD FRML MDRD: 98 ML/MIN/1.73
GLOBULIN UR ELPH-MCNC: 3.4 GM/DL
GLUCOSE BLD-MCNC: 111 MG/DL (ref 65–99)
GLUCOSE UR STRIP-MCNC: NEGATIVE MG/DL
HCT VFR BLD AUTO: 43.1 % (ref 37.5–51)
HGB BLD-MCNC: 14.2 G/DL (ref 13–17.7)
HGB UR QL STRIP.AUTO: ABNORMAL
HOLD SPECIMEN: NORMAL
HOLD SPECIMEN: NORMAL
HYALINE CASTS UR QL AUTO: ABNORMAL /LPF
IMM GRANULOCYTES # BLD AUTO: 0.02 10*3/MM3 (ref 0–0.05)
IMM GRANULOCYTES NFR BLD AUTO: 0.2 % (ref 0–0.5)
INR PPP: 2.1 (ref 0.85–1.16)
KETONES UR QL STRIP: ABNORMAL
LEUKOCYTE ESTERASE UR QL STRIP.AUTO: NEGATIVE
LIPASE SERPL-CCNC: 60 U/L (ref 13–60)
LYMPHOCYTES # BLD AUTO: 1.33 10*3/MM3 (ref 0.7–3.1)
LYMPHOCYTES NFR BLD AUTO: 16.5 % (ref 19.6–45.3)
Lab: NORMAL
MCH RBC QN AUTO: 30.1 PG (ref 26.6–33)
MCHC RBC AUTO-ENTMCNC: 32.9 G/DL (ref 31.5–35.7)
MCV RBC AUTO: 91.3 FL (ref 79–97)
MONOCYTES # BLD AUTO: 0.42 10*3/MM3 (ref 0.1–0.9)
MONOCYTES NFR BLD AUTO: 5.2 % (ref 5–12)
NEGATIVE CONTROL: NEGATIVE
NEUTROPHILS # BLD AUTO: 6.14 10*3/MM3 (ref 1.7–7)
NEUTROPHILS NFR BLD AUTO: 76.3 % (ref 42.7–76)
NITRITE UR QL STRIP: NEGATIVE
NRBC BLD AUTO-RTO: 0 /100 WBC (ref 0–0.2)
PH UR STRIP.AUTO: 6 [PH] (ref 5–8)
PLATELET # BLD AUTO: 212 10*3/MM3 (ref 140–450)
PMV BLD AUTO: 10.5 FL (ref 6–12)
POSITIVE CONTROL: POSITIVE
POTASSIUM BLD-SCNC: 4.4 MMOL/L (ref 3.5–5.2)
PROT SERPL-MCNC: 8.3 G/DL (ref 6–8.5)
PROT UR QL STRIP: NEGATIVE
PROTHROMBIN TIME: 22.7 SECONDS (ref 11.2–14.3)
RBC # BLD AUTO: 4.72 10*6/MM3 (ref 4.14–5.8)
RBC # UR: ABNORMAL /HPF
REF LAB TEST METHOD: ABNORMAL
SODIUM BLD-SCNC: 141 MMOL/L (ref 136–145)
SP GR UR STRIP: 1.04 (ref 1–1.03)
SQUAMOUS #/AREA URNS HPF: ABNORMAL /HPF
TROPONIN T SERPL-MCNC: <0.01 NG/ML (ref 0–0.03)
UROBILINOGEN UR QL STRIP: ABNORMAL
WBC NRBC COR # BLD: 8.05 10*3/MM3 (ref 3.4–10.8)
WBC UR QL AUTO: ABNORMAL /HPF
WHOLE BLOOD HOLD SPECIMEN: NORMAL
WHOLE BLOOD HOLD SPECIMEN: NORMAL

## 2020-01-28 PROCEDURE — 25010000002 HYDROMORPHONE 1 MG/ML SOLUTION: Performed by: EMERGENCY MEDICINE

## 2020-01-28 PROCEDURE — 85025 COMPLETE CBC W/AUTO DIFF WBC: CPT | Performed by: EMERGENCY MEDICINE

## 2020-01-28 PROCEDURE — 74174 CTA ABD&PLVS W/CONTRAST: CPT

## 2020-01-28 PROCEDURE — 83605 ASSAY OF LACTIC ACID: CPT | Performed by: EMERGENCY MEDICINE

## 2020-01-28 PROCEDURE — 80053 COMPREHEN METABOLIC PANEL: CPT | Performed by: EMERGENCY MEDICINE

## 2020-01-28 PROCEDURE — 85610 PROTHROMBIN TIME: CPT | Performed by: EMERGENCY MEDICINE

## 2020-01-28 PROCEDURE — 25010000002 PIPERACILLIN SOD-TAZOBACTAM PER 1 G: Performed by: NURSE PRACTITIONER

## 2020-01-28 PROCEDURE — 74176 CT ABD & PELVIS W/O CONTRAST: CPT

## 2020-01-28 PROCEDURE — 25010000002 ONDANSETRON PER 1 MG: Performed by: EMERGENCY MEDICINE

## 2020-01-28 PROCEDURE — 84484 ASSAY OF TROPONIN QUANT: CPT | Performed by: EMERGENCY MEDICINE

## 2020-01-28 PROCEDURE — 99223 1ST HOSP IP/OBS HIGH 75: CPT | Performed by: INTERNAL MEDICINE

## 2020-01-28 PROCEDURE — 0 IOPAMIDOL PER 1 ML: Performed by: EMERGENCY MEDICINE

## 2020-01-28 PROCEDURE — 93005 ELECTROCARDIOGRAM TRACING: CPT | Performed by: EMERGENCY MEDICINE

## 2020-01-28 PROCEDURE — 82270 OCCULT BLOOD FECES: CPT | Performed by: EMERGENCY MEDICINE

## 2020-01-28 PROCEDURE — 25010000002 MORPHINE PER 10 MG: Performed by: NURSE PRACTITIONER

## 2020-01-28 PROCEDURE — 99285 EMERGENCY DEPT VISIT HI MDM: CPT

## 2020-01-28 PROCEDURE — 83690 ASSAY OF LIPASE: CPT | Performed by: EMERGENCY MEDICINE

## 2020-01-28 PROCEDURE — 25010000002 MORPHINE PER 10 MG: Performed by: INTERNAL MEDICINE

## 2020-01-28 PROCEDURE — 81001 URINALYSIS AUTO W/SCOPE: CPT | Performed by: EMERGENCY MEDICINE

## 2020-01-28 PROCEDURE — 71045 X-RAY EXAM CHEST 1 VIEW: CPT

## 2020-01-28 RX ORDER — SODIUM CHLORIDE 0.9 % (FLUSH) 0.9 %
10 SYRINGE (ML) INJECTION AS NEEDED
Status: DISCONTINUED | OUTPATIENT
Start: 2020-01-28 | End: 2020-02-10 | Stop reason: HOSPADM

## 2020-01-28 RX ORDER — SODIUM CHLORIDE 9 MG/ML
50 INJECTION, SOLUTION INTRAVENOUS CONTINUOUS
Status: ACTIVE | OUTPATIENT
Start: 2020-01-28 | End: 2020-01-28

## 2020-01-28 RX ORDER — ONDANSETRON 2 MG/ML
4 INJECTION INTRAMUSCULAR; INTRAVENOUS EVERY 6 HOURS PRN
Status: DISCONTINUED | OUTPATIENT
Start: 2020-01-28 | End: 2020-01-31 | Stop reason: SDUPTHER

## 2020-01-28 RX ORDER — ONDANSETRON 4 MG/1
4 TABLET, FILM COATED ORAL EVERY 6 HOURS PRN
Status: DISCONTINUED | OUTPATIENT
Start: 2020-01-28 | End: 2020-01-31 | Stop reason: SDUPTHER

## 2020-01-28 RX ORDER — CARVEDILOL 6.25 MG/1
6.25 TABLET ORAL 2 TIMES DAILY WITH MEALS
Status: DISCONTINUED | OUTPATIENT
Start: 2020-01-28 | End: 2020-01-30

## 2020-01-28 RX ORDER — SODIUM CHLORIDE 0.9 % (FLUSH) 0.9 %
10 SYRINGE (ML) INJECTION EVERY 12 HOURS SCHEDULED
Status: DISCONTINUED | OUTPATIENT
Start: 2020-01-28 | End: 2020-02-10 | Stop reason: HOSPADM

## 2020-01-28 RX ORDER — LEVOFLOXACIN 5 MG/ML
750 INJECTION, SOLUTION INTRAVENOUS ONCE
Status: DISCONTINUED | OUTPATIENT
Start: 2020-01-28 | End: 2020-01-28

## 2020-01-28 RX ORDER — ACETAMINOPHEN 325 MG/1
650 TABLET ORAL EVERY 4 HOURS PRN
Status: DISCONTINUED | OUTPATIENT
Start: 2020-01-28 | End: 2020-02-10 | Stop reason: HOSPADM

## 2020-01-28 RX ORDER — WARFARIN SODIUM 2.5 MG/1
2.5 TABLET ORAL
Status: DISCONTINUED | OUTPATIENT
Start: 2020-01-31 | End: 2020-01-30

## 2020-01-28 RX ORDER — LEVOFLOXACIN 5 MG/ML
750 INJECTION, SOLUTION INTRAVENOUS EVERY 24 HOURS
Status: DISCONTINUED | OUTPATIENT
Start: 2020-01-28 | End: 2020-01-28 | Stop reason: ALTCHOICE

## 2020-01-28 RX ORDER — WARFARIN SODIUM 5 MG/1
10 TABLET ORAL
Status: DISCONTINUED | OUTPATIENT
Start: 2020-01-28 | End: 2020-01-29

## 2020-01-28 RX ORDER — ACETAMINOPHEN 160 MG/5ML
650 SOLUTION ORAL EVERY 4 HOURS PRN
Status: DISCONTINUED | OUTPATIENT
Start: 2020-01-28 | End: 2020-02-10 | Stop reason: HOSPADM

## 2020-01-28 RX ORDER — WARFARIN SODIUM 2 MG/1
2 TABLET ORAL
Status: DISCONTINUED | OUTPATIENT
Start: 2020-01-28 | End: 2020-01-28 | Stop reason: SDUPTHER

## 2020-01-28 RX ORDER — ONDANSETRON 2 MG/ML
4 INJECTION INTRAMUSCULAR; INTRAVENOUS ONCE
Status: COMPLETED | OUTPATIENT
Start: 2020-01-28 | End: 2020-01-28

## 2020-01-28 RX ORDER — HYDROCODONE BITARTRATE AND ACETAMINOPHEN 7.5; 325 MG/1; MG/1
1 TABLET ORAL EVERY 4 HOURS PRN
Status: DISCONTINUED | OUTPATIENT
Start: 2020-01-28 | End: 2020-02-10 | Stop reason: HOSPADM

## 2020-01-28 RX ORDER — MORPHINE SULFATE 2 MG/ML
2 INJECTION, SOLUTION INTRAMUSCULAR; INTRAVENOUS
Status: DISPENSED | OUTPATIENT
Start: 2020-01-28 | End: 2020-01-29

## 2020-01-28 RX ORDER — LISINOPRIL 10 MG/1
10 TABLET ORAL DAILY
Status: DISCONTINUED | OUTPATIENT
Start: 2020-01-28 | End: 2020-01-30

## 2020-01-28 RX ORDER — BUPROPION HYDROCHLORIDE 150 MG/1
150 TABLET, EXTENDED RELEASE ORAL 2 TIMES DAILY
Status: DISCONTINUED | OUTPATIENT
Start: 2020-01-28 | End: 2020-01-29 | Stop reason: SDUPTHER

## 2020-01-28 RX ORDER — MORPHINE SULFATE 2 MG/ML
1 INJECTION, SOLUTION INTRAMUSCULAR; INTRAVENOUS ONCE AS NEEDED
Status: COMPLETED | OUTPATIENT
Start: 2020-01-28 | End: 2020-01-28

## 2020-01-28 RX ORDER — HYDROCODONE BITARTRATE AND ACETAMINOPHEN 5; 325 MG/1; MG/1
1 TABLET ORAL EVERY 4 HOURS PRN
Status: DISCONTINUED | OUTPATIENT
Start: 2020-01-28 | End: 2020-01-28

## 2020-01-28 RX ORDER — TRAZODONE HYDROCHLORIDE 50 MG/1
50 TABLET ORAL NIGHTLY
Status: DISCONTINUED | OUTPATIENT
Start: 2020-01-28 | End: 2020-02-10 | Stop reason: HOSPADM

## 2020-01-28 RX ORDER — ACETAMINOPHEN 650 MG/1
650 SUPPOSITORY RECTAL EVERY 4 HOURS PRN
Status: DISCONTINUED | OUTPATIENT
Start: 2020-01-28 | End: 2020-02-10 | Stop reason: HOSPADM

## 2020-01-28 RX ORDER — LEVOFLOXACIN 5 MG/ML
750 INJECTION, SOLUTION INTRAVENOUS EVERY 24 HOURS
Status: DISCONTINUED | OUTPATIENT
Start: 2020-01-29 | End: 2020-01-28

## 2020-01-28 RX ADMIN — MORPHINE SULFATE 2 MG: 2 INJECTION, SOLUTION INTRAMUSCULAR; INTRAVENOUS at 09:43

## 2020-01-28 RX ADMIN — HYDROMORPHONE HYDROCHLORIDE 1 MG: 1 INJECTION, SOLUTION INTRAMUSCULAR; INTRAVENOUS; SUBCUTANEOUS at 01:00

## 2020-01-28 RX ADMIN — MORPHINE SULFATE 2 MG: 2 INJECTION, SOLUTION INTRAMUSCULAR; INTRAVENOUS at 18:45

## 2020-01-28 RX ADMIN — SODIUM CHLORIDE 1000 ML: 9 INJECTION, SOLUTION INTRAVENOUS at 00:44

## 2020-01-28 RX ADMIN — SODIUM CHLORIDE, PRESERVATIVE FREE 10 ML: 5 INJECTION INTRAVENOUS at 08:36

## 2020-01-28 RX ADMIN — TAZOBACTAM SODIUM AND PIPERACILLIN SODIUM 3.38 G: 375; 3 INJECTION, SOLUTION INTRAVENOUS at 16:00

## 2020-01-28 RX ADMIN — WARFARIN SODIUM 10 MG: 5 TABLET ORAL at 17:46

## 2020-01-28 RX ADMIN — HYDROCODONE BITARTRATE AND ACETAMINOPHEN 1 TABLET: 7.5; 325 TABLET ORAL at 14:44

## 2020-01-28 RX ADMIN — BUPROPION HYDROCHLORIDE 150 MG: 150 TABLET, EXTENDED RELEASE ORAL at 21:56

## 2020-01-28 RX ADMIN — HYDROCODONE BITARTRATE AND ACETAMINOPHEN 1 TABLET: 5; 325 TABLET ORAL at 08:33

## 2020-01-28 RX ADMIN — MORPHINE SULFATE 2 MG: 2 INJECTION, SOLUTION INTRAMUSCULAR; INTRAVENOUS at 15:31

## 2020-01-28 RX ADMIN — HYDROCODONE BITARTRATE AND ACETAMINOPHEN 1 TABLET: 5; 325 TABLET ORAL at 04:50

## 2020-01-28 RX ADMIN — CARVEDILOL 6.25 MG: 6.25 TABLET, FILM COATED ORAL at 08:33

## 2020-01-28 RX ADMIN — MORPHINE SULFATE 2 MG: 2 INJECTION, SOLUTION INTRAMUSCULAR; INTRAVENOUS at 21:56

## 2020-01-28 RX ADMIN — BUPROPION HYDROCHLORIDE 150 MG: 150 TABLET, EXTENDED RELEASE ORAL at 08:33

## 2020-01-28 RX ADMIN — CARVEDILOL 6.25 MG: 6.25 TABLET, FILM COATED ORAL at 17:46

## 2020-01-28 RX ADMIN — MORPHINE SULFATE 2 MG: 2 INJECTION, SOLUTION INTRAMUSCULAR; INTRAVENOUS at 12:19

## 2020-01-28 RX ADMIN — HYDROMORPHONE HYDROCHLORIDE 1 MG: 1 INJECTION, SOLUTION INTRAMUSCULAR; INTRAVENOUS; SUBCUTANEOUS at 03:20

## 2020-01-28 RX ADMIN — ONDANSETRON 4 MG: 2 INJECTION INTRAMUSCULAR; INTRAVENOUS at 00:48

## 2020-01-28 RX ADMIN — SODIUM CHLORIDE 50 ML/HR: 9 INJECTION, SOLUTION INTRAVENOUS at 05:52

## 2020-01-28 RX ADMIN — MORPHINE SULFATE 1 MG: 2 INJECTION, SOLUTION INTRAMUSCULAR; INTRAVENOUS at 05:52

## 2020-01-28 RX ADMIN — HYDROCODONE BITARTRATE AND ACETAMINOPHEN 1 TABLET: 7.5; 325 TABLET ORAL at 20:08

## 2020-01-28 RX ADMIN — METRONIDAZOLE 500 MG: 500 INJECTION, SOLUTION INTRAVENOUS at 04:19

## 2020-01-28 RX ADMIN — TAZOBACTAM SODIUM AND PIPERACILLIN SODIUM 3.38 G: 375; 3 INJECTION, SOLUTION INTRAVENOUS at 09:38

## 2020-01-28 RX ADMIN — HYDROMORPHONE HYDROCHLORIDE 1 MG: 1 INJECTION, SOLUTION INTRAMUSCULAR; INTRAVENOUS; SUBCUTANEOUS at 00:45

## 2020-01-28 RX ADMIN — TRAZODONE HYDROCHLORIDE 50 MG: 50 TABLET ORAL at 20:08

## 2020-01-28 RX ADMIN — IOPAMIDOL 150 ML: 755 INJECTION, SOLUTION INTRAVENOUS at 01:53

## 2020-01-28 RX ADMIN — LISINOPRIL 10 MG: 10 TABLET ORAL at 08:33

## 2020-01-29 ENCOUNTER — APPOINTMENT (OUTPATIENT)
Dept: CT IMAGING | Facility: HOSPITAL | Age: 40
End: 2020-01-29

## 2020-01-29 LAB
ALBUMIN SERPL-MCNC: 4.3 G/DL (ref 3.5–5.2)
ALBUMIN/GLOB SERPL: 1 G/DL
ALP SERPL-CCNC: 56 U/L (ref 39–117)
ALT SERPL W P-5'-P-CCNC: 9 U/L (ref 1–41)
ANION GAP SERPL CALCULATED.3IONS-SCNC: 15 MMOL/L (ref 5–15)
AST SERPL-CCNC: 9 U/L (ref 1–40)
BASOPHILS # BLD AUTO: 0.02 10*3/MM3 (ref 0–0.2)
BASOPHILS NFR BLD AUTO: 0.2 % (ref 0–1.5)
BILIRUB SERPL-MCNC: 1.4 MG/DL (ref 0.2–1.2)
BUN BLD-MCNC: 17 MG/DL (ref 6–20)
BUN/CREAT SERPL: 14.9 (ref 7–25)
CALCIUM SPEC-SCNC: 10.3 MG/DL (ref 8.6–10.5)
CHLORIDE SERPL-SCNC: 99 MMOL/L (ref 98–107)
CO2 SERPL-SCNC: 25 MMOL/L (ref 22–29)
CREAT BLD-MCNC: 1.14 MG/DL (ref 0.76–1.27)
D-LACTATE SERPL-SCNC: 1.1 MMOL/L (ref 0.5–2)
DEPRECATED RDW RBC AUTO: 47 FL (ref 37–54)
EOSINOPHIL # BLD AUTO: 0.04 10*3/MM3 (ref 0–0.4)
EOSINOPHIL NFR BLD AUTO: 0.3 % (ref 0.3–6.2)
ERYTHROCYTE [DISTWIDTH] IN BLOOD BY AUTOMATED COUNT: 13.8 % (ref 12.3–15.4)
GFR SERPL CREATININE-BSD FRML MDRD: 86 ML/MIN/1.73
GLOBULIN UR ELPH-MCNC: 4.5 GM/DL
GLUCOSE BLD-MCNC: 123 MG/DL (ref 65–99)
HCT VFR BLD AUTO: 42.1 % (ref 37.5–51)
HGB BLD-MCNC: 14.1 G/DL (ref 13–17.7)
IMM GRANULOCYTES # BLD AUTO: 0.2 10*3/MM3 (ref 0–0.05)
IMM GRANULOCYTES NFR BLD AUTO: 1.5 % (ref 0–0.5)
INR PPP: 2.58 (ref 0.85–1.16)
INR PPP: 2.59 (ref 0.85–1.16)
LYMPHOCYTES # BLD AUTO: 0.87 10*3/MM3 (ref 0.7–3.1)
LYMPHOCYTES NFR BLD AUTO: 6.6 % (ref 19.6–45.3)
MCH RBC QN AUTO: 31.1 PG (ref 26.6–33)
MCHC RBC AUTO-ENTMCNC: 33.5 G/DL (ref 31.5–35.7)
MCV RBC AUTO: 92.7 FL (ref 79–97)
MONOCYTES # BLD AUTO: 1.23 10*3/MM3 (ref 0.1–0.9)
MONOCYTES NFR BLD AUTO: 9.3 % (ref 5–12)
NEUTROPHILS # BLD AUTO: 10.87 10*3/MM3 (ref 1.7–7)
NEUTROPHILS NFR BLD AUTO: 82.1 % (ref 42.7–76)
NRBC BLD AUTO-RTO: 0 /100 WBC (ref 0–0.2)
PLATELET # BLD AUTO: 192 10*3/MM3 (ref 140–450)
PMV BLD AUTO: 10.6 FL (ref 6–12)
POTASSIUM BLD-SCNC: 3.6 MMOL/L (ref 3.5–5.2)
PROT SERPL-MCNC: 8.8 G/DL (ref 6–8.5)
PROTHROMBIN TIME: 26.6 SECONDS (ref 11.2–14.3)
PROTHROMBIN TIME: 26.7 SECONDS (ref 11.2–14.3)
RBC # BLD AUTO: 4.54 10*6/MM3 (ref 4.14–5.8)
SODIUM BLD-SCNC: 139 MMOL/L (ref 136–145)
WBC NRBC COR # BLD: 13.23 10*3/MM3 (ref 3.4–10.8)

## 2020-01-29 PROCEDURE — 25010000002 HYDROMORPHONE PER 4 MG: Performed by: INTERNAL MEDICINE

## 2020-01-29 PROCEDURE — 25010000002 HYDROMORPHONE PER 4 MG: Performed by: NURSE PRACTITIONER

## 2020-01-29 PROCEDURE — 86901 BLOOD TYPING SEROLOGIC RH(D): CPT

## 2020-01-29 PROCEDURE — 85610 PROTHROMBIN TIME: CPT | Performed by: PHYSICIAN ASSISTANT

## 2020-01-29 PROCEDURE — 80053 COMPREHEN METABOLIC PANEL: CPT | Performed by: PHYSICIAN ASSISTANT

## 2020-01-29 PROCEDURE — 25010000002 PIPERACILLIN SOD-TAZOBACTAM PER 1 G: Performed by: NURSE PRACTITIONER

## 2020-01-29 PROCEDURE — 85025 COMPLETE CBC W/AUTO DIFF WBC: CPT | Performed by: NURSE PRACTITIONER

## 2020-01-29 PROCEDURE — 83605 ASSAY OF LACTIC ACID: CPT | Performed by: PHYSICIAN ASSISTANT

## 2020-01-29 PROCEDURE — 25010000002 MORPHINE PER 10 MG: Performed by: NURSE PRACTITIONER

## 2020-01-29 PROCEDURE — 99233 SBSQ HOSP IP/OBS HIGH 50: CPT | Performed by: NURSE PRACTITIONER

## 2020-01-29 PROCEDURE — 74178 CT ABD&PLV WO CNTR FLWD CNTR: CPT

## 2020-01-29 PROCEDURE — 86900 BLOOD TYPING SEROLOGIC ABO: CPT

## 2020-01-29 PROCEDURE — 25810000003 SODIUM CHLORIDE 0.9 % WITH KCL 20 MEQ 20-0.9 MEQ/L-% SOLUTION: Performed by: NURSE PRACTITIONER

## 2020-01-29 PROCEDURE — 25010000002 IOPAMIDOL 61 % SOLUTION: Performed by: INTERNAL MEDICINE

## 2020-01-29 RX ORDER — DOCUSATE SODIUM 100 MG/1
100 CAPSULE, LIQUID FILLED ORAL 2 TIMES DAILY
Status: DISCONTINUED | OUTPATIENT
Start: 2020-01-29 | End: 2020-01-29 | Stop reason: SDUPTHER

## 2020-01-29 RX ORDER — WARFARIN SODIUM 5 MG/1
10 TABLET ORAL
Status: DISCONTINUED | OUTPATIENT
Start: 2020-01-30 | End: 2020-01-30

## 2020-01-29 RX ORDER — HYDROMORPHONE HYDROCHLORIDE 1 MG/ML
0.5 INJECTION, SOLUTION INTRAMUSCULAR; INTRAVENOUS; SUBCUTANEOUS
Status: DISCONTINUED | OUTPATIENT
Start: 2020-01-29 | End: 2020-01-29

## 2020-01-29 RX ORDER — HYDROMORPHONE HYDROCHLORIDE 1 MG/ML
0.5 INJECTION, SOLUTION INTRAMUSCULAR; INTRAVENOUS; SUBCUTANEOUS EVERY 4 HOURS PRN
Status: DISCONTINUED | OUTPATIENT
Start: 2020-01-29 | End: 2020-01-29

## 2020-01-29 RX ORDER — BUPROPION HYDROCHLORIDE 75 MG/1
75 TABLET ORAL EVERY 6 HOURS SCHEDULED
Status: DISCONTINUED | OUTPATIENT
Start: 2020-01-30 | End: 2020-02-10 | Stop reason: HOSPADM

## 2020-01-29 RX ORDER — WARFARIN SODIUM 7.5 MG/1
7.5 TABLET ORAL
Status: COMPLETED | OUTPATIENT
Start: 2020-01-29 | End: 2020-01-29

## 2020-01-29 RX ORDER — HYDROMORPHONE HYDROCHLORIDE 1 MG/ML
0.5 INJECTION, SOLUTION INTRAMUSCULAR; INTRAVENOUS; SUBCUTANEOUS
Status: DISCONTINUED | OUTPATIENT
Start: 2020-01-29 | End: 2020-02-05 | Stop reason: ALTCHOICE

## 2020-01-29 RX ORDER — DOCUSATE SODIUM 50 MG/5 ML
100 LIQUID (ML) ORAL 2 TIMES DAILY
Status: DISCONTINUED | OUTPATIENT
Start: 2020-01-29 | End: 2020-01-29

## 2020-01-29 RX ORDER — HYDROMORPHONE HYDROCHLORIDE 1 MG/ML
0.5 INJECTION, SOLUTION INTRAMUSCULAR; INTRAVENOUS; SUBCUTANEOUS ONCE AS NEEDED
Status: COMPLETED | OUTPATIENT
Start: 2020-01-29 | End: 2020-01-29

## 2020-01-29 RX ORDER — SODIUM CHLORIDE AND POTASSIUM CHLORIDE 150; 900 MG/100ML; MG/100ML
100 INJECTION, SOLUTION INTRAVENOUS CONTINUOUS
Status: DISCONTINUED | OUTPATIENT
Start: 2020-01-29 | End: 2020-01-31

## 2020-01-29 RX ORDER — BISACODYL 10 MG
10 SUPPOSITORY, RECTAL RECTAL DAILY
Status: DISCONTINUED | OUTPATIENT
Start: 2020-01-29 | End: 2020-02-05

## 2020-01-29 RX ADMIN — IOPAMIDOL 99 ML: 612 INJECTION, SOLUTION INTRAVENOUS at 18:15

## 2020-01-29 RX ADMIN — TAZOBACTAM SODIUM AND PIPERACILLIN SODIUM 3.38 G: 375; 3 INJECTION, SOLUTION INTRAVENOUS at 00:20

## 2020-01-29 RX ADMIN — HYDROCODONE BITARTRATE AND ACETAMINOPHEN 1 TABLET: 7.5; 325 TABLET ORAL at 18:05

## 2020-01-29 RX ADMIN — PHENOL 2 SPRAY: 1.5 LIQUID ORAL at 23:09

## 2020-01-29 RX ADMIN — CARVEDILOL 6.25 MG: 6.25 TABLET, FILM COATED ORAL at 07:40

## 2020-01-29 RX ADMIN — BUPROPION HYDROCHLORIDE 150 MG: 150 TABLET, EXTENDED RELEASE ORAL at 11:59

## 2020-01-29 RX ADMIN — HYDROCODONE BITARTRATE AND ACETAMINOPHEN 1 TABLET: 7.5; 325 TABLET ORAL at 13:47

## 2020-01-29 RX ADMIN — TRAZODONE HYDROCHLORIDE 50 MG: 50 TABLET ORAL at 23:05

## 2020-01-29 RX ADMIN — HYDROCODONE BITARTRATE AND ACETAMINOPHEN 1 TABLET: 7.5; 325 TABLET ORAL at 00:19

## 2020-01-29 RX ADMIN — TAZOBACTAM SODIUM AND PIPERACILLIN SODIUM 3.38 G: 375; 3 INJECTION, SOLUTION INTRAVENOUS at 07:40

## 2020-01-29 RX ADMIN — HYDROMORPHONE HYDROCHLORIDE 0.5 MG: 1 INJECTION, SOLUTION INTRAMUSCULAR; INTRAVENOUS; SUBCUTANEOUS at 20:33

## 2020-01-29 RX ADMIN — HYDROMORPHONE HYDROCHLORIDE 0.5 MG: 1 INJECTION, SOLUTION INTRAMUSCULAR; INTRAVENOUS; SUBCUTANEOUS at 15:55

## 2020-01-29 RX ADMIN — CARVEDILOL 6.25 MG: 6.25 TABLET, FILM COATED ORAL at 18:07

## 2020-01-29 RX ADMIN — HYDROMORPHONE HYDROCHLORIDE 0.5 MG: 1 INJECTION, SOLUTION INTRAMUSCULAR; INTRAVENOUS; SUBCUTANEOUS at 12:00

## 2020-01-29 RX ADMIN — HYDROCODONE BITARTRATE AND ACETAMINOPHEN 1 TABLET: 7.5; 325 TABLET ORAL at 09:11

## 2020-01-29 RX ADMIN — TAZOBACTAM SODIUM AND PIPERACILLIN SODIUM 3.38 G: 375; 3 INJECTION, SOLUTION INTRAVENOUS at 15:55

## 2020-01-29 RX ADMIN — MORPHINE SULFATE 2 MG: 2 INJECTION, SOLUTION INTRAMUSCULAR; INTRAVENOUS at 01:47

## 2020-01-29 RX ADMIN — WARFARIN SODIUM 7.5 MG: 7.5 TABLET ORAL at 18:07

## 2020-01-29 RX ADMIN — HYDROMORPHONE HYDROCHLORIDE 0.5 MG: 1 INJECTION, SOLUTION INTRAMUSCULAR; INTRAVENOUS; SUBCUTANEOUS at 07:43

## 2020-01-29 RX ADMIN — SODIUM CHLORIDE, PRESERVATIVE FREE 10 ML: 5 INJECTION INTRAVENOUS at 09:13

## 2020-01-29 RX ADMIN — LISINOPRIL 10 MG: 10 TABLET ORAL at 09:11

## 2020-01-29 RX ADMIN — BUPROPION HYDROCHLORIDE 75 MG: 75 TABLET, FILM COATED ORAL at 23:05

## 2020-01-29 RX ADMIN — HYDROMORPHONE HYDROCHLORIDE 0.5 MG: 1 INJECTION, SOLUTION INTRAMUSCULAR; INTRAVENOUS; SUBCUTANEOUS at 03:47

## 2020-01-29 RX ADMIN — POTASSIUM CHLORIDE AND SODIUM CHLORIDE 100 ML/HR: 900; 150 INJECTION, SOLUTION INTRAVENOUS at 21:55

## 2020-01-30 ENCOUNTER — ANESTHESIA EVENT (OUTPATIENT)
Dept: PERIOP | Facility: HOSPITAL | Age: 40
End: 2020-01-30

## 2020-01-30 ENCOUNTER — ANESTHESIA (OUTPATIENT)
Dept: PERIOP | Facility: HOSPITAL | Age: 40
End: 2020-01-30

## 2020-01-30 PROBLEM — K57.80 PERFORATED DIVERTICULUM: Status: ACTIVE | Noted: 2020-01-30

## 2020-01-30 PROBLEM — K65.9 PERITONITIS: Status: ACTIVE | Noted: 2020-01-30

## 2020-01-30 LAB
ABO GROUP BLD: NORMAL
ABO GROUP BLD: NORMAL
APTT PPP: 44 SECONDS (ref 24–37)
BASOPHILS # BLD AUTO: 0.02 10*3/MM3 (ref 0–0.2)
BASOPHILS NFR BLD AUTO: 0.1 % (ref 0–1.5)
BLD GP AB SCN SERPL QL: NEGATIVE
DEPRECATED RDW RBC AUTO: 47.7 FL (ref 37–54)
DEPRECATED RDW RBC AUTO: 48.4 FL (ref 37–54)
EOSINOPHIL # BLD AUTO: 0.07 10*3/MM3 (ref 0–0.4)
EOSINOPHIL NFR BLD AUTO: 0.5 % (ref 0.3–6.2)
ERYTHROCYTE [DISTWIDTH] IN BLOOD BY AUTOMATED COUNT: 13.8 % (ref 12.3–15.4)
ERYTHROCYTE [DISTWIDTH] IN BLOOD BY AUTOMATED COUNT: 14 % (ref 12.3–15.4)
HCT VFR BLD AUTO: 46.5 % (ref 37.5–51)
HCT VFR BLD AUTO: 49 % (ref 37.5–51)
HGB BLD-MCNC: 15.6 G/DL (ref 13–17.7)
HGB BLD-MCNC: 16.3 G/DL (ref 13–17.7)
IMM GRANULOCYTES # BLD AUTO: 0.1 10*3/MM3 (ref 0–0.05)
IMM GRANULOCYTES NFR BLD AUTO: 0.7 % (ref 0–0.5)
INR PPP: 1.4 (ref 0.85–1.16)
INR PPP: 2.69 (ref 0.85–1.16)
LYMPHOCYTES # BLD AUTO: 1.04 10*3/MM3 (ref 0.7–3.1)
LYMPHOCYTES NFR BLD AUTO: 7.4 % (ref 19.6–45.3)
MCH RBC QN AUTO: 31 PG (ref 26.6–33)
MCH RBC QN AUTO: 31.5 PG (ref 26.6–33)
MCHC RBC AUTO-ENTMCNC: 33.3 G/DL (ref 31.5–35.7)
MCHC RBC AUTO-ENTMCNC: 33.5 G/DL (ref 31.5–35.7)
MCV RBC AUTO: 93.2 FL (ref 79–97)
MCV RBC AUTO: 93.8 FL (ref 79–97)
MONOCYTES # BLD AUTO: 1.1 10*3/MM3 (ref 0.1–0.9)
MONOCYTES NFR BLD AUTO: 7.9 % (ref 5–12)
NEUTROPHILS # BLD AUTO: 11.66 10*3/MM3 (ref 1.7–7)
NEUTROPHILS NFR BLD AUTO: 83.4 % (ref 42.7–76)
NRBC BLD AUTO-RTO: 0 /100 WBC (ref 0–0.2)
PLATELET # BLD AUTO: 209 10*3/MM3 (ref 140–450)
PLATELET # BLD AUTO: 221 10*3/MM3 (ref 140–450)
PMV BLD AUTO: 11.1 FL (ref 6–12)
PMV BLD AUTO: 11.2 FL (ref 6–12)
POTASSIUM BLD-SCNC: 3.5 MMOL/L (ref 3.5–5.2)
PROTHROMBIN TIME: 16.5 SECONDS (ref 11.2–14.3)
PROTHROMBIN TIME: 27.5 SECONDS (ref 11.2–14.3)
RBC # BLD AUTO: 4.96 10*6/MM3 (ref 4.14–5.8)
RBC # BLD AUTO: 5.26 10*6/MM3 (ref 4.14–5.8)
RH BLD: POSITIVE
RH BLD: POSITIVE
T&S EXPIRATION DATE: NORMAL
WBC NRBC COR # BLD: 13.14 10*3/MM3 (ref 3.4–10.8)
WBC NRBC COR # BLD: 13.99 10*3/MM3 (ref 3.4–10.8)

## 2020-01-30 PROCEDURE — 25010000002 ALBUMIN HUMAN 5% PER 50 ML: Performed by: ANESTHESIOLOGY

## 2020-01-30 PROCEDURE — 25010000002 FENTANYL CITRATE (PF) 100 MCG/2ML SOLUTION: Performed by: ANESTHESIOLOGY

## 2020-01-30 PROCEDURE — 86901 BLOOD TYPING SEROLOGIC RH(D): CPT | Performed by: SURGERY

## 2020-01-30 PROCEDURE — 25010000003 LIDOCAINE 1 % SOLUTION: Performed by: ANESTHESIOLOGY

## 2020-01-30 PROCEDURE — 25010000002 SUCCINYLCHOLINE PER 20 MG: Performed by: ANESTHESIOLOGY

## 2020-01-30 PROCEDURE — 25010000002 PIPERACILLIN SOD-TAZOBACTAM PER 1 G: Performed by: NURSE PRACTITIONER

## 2020-01-30 PROCEDURE — 85027 COMPLETE CBC AUTOMATED: CPT | Performed by: SURGERY

## 2020-01-30 PROCEDURE — 25010000002 DEXAMETHASONE SODIUM PHOSPHATE 10 MG/ML SOLUTION: Performed by: ANESTHESIOLOGY

## 2020-01-30 PROCEDURE — 88307 TISSUE EXAM BY PATHOLOGIST: CPT | Performed by: SURGERY

## 2020-01-30 PROCEDURE — 25010000002 HYDROMORPHONE PER 4 MG: Performed by: NURSE PRACTITIONER

## 2020-01-30 PROCEDURE — P9041 ALBUMIN (HUMAN),5%, 50ML: HCPCS | Performed by: ANESTHESIOLOGY

## 2020-01-30 PROCEDURE — 0DTN0ZZ RESECTION OF SIGMOID COLON, OPEN APPROACH: ICD-10-PCS | Performed by: SURGERY

## 2020-01-30 PROCEDURE — 85730 THROMBOPLASTIN TIME PARTIAL: CPT | Performed by: SURGERY

## 2020-01-30 PROCEDURE — 0D1N0Z4 BYPASS SIGMOID COLON TO CUTANEOUS, OPEN APPROACH: ICD-10-PCS | Performed by: SURGERY

## 2020-01-30 PROCEDURE — 25010000002 VITAMIN K1 PER 1 MG: Performed by: SURGERY

## 2020-01-30 PROCEDURE — 84132 ASSAY OF SERUM POTASSIUM: CPT | Performed by: ANESTHESIOLOGY

## 2020-01-30 PROCEDURE — 25010000002 PROPOFOL 10 MG/ML EMULSION: Performed by: ANESTHESIOLOGY

## 2020-01-30 PROCEDURE — 25010000002 DEXAMETHASONE PER 1 MG: Performed by: ANESTHESIOLOGY

## 2020-01-30 PROCEDURE — 25010000002 PROTHROMBIN COMPLEX CONC HUMAN 1000 UNITS KIT: Performed by: SURGERY

## 2020-01-30 PROCEDURE — 25010000002 BUPRENORPHINE PER 0.1 MG: Performed by: ANESTHESIOLOGY

## 2020-01-30 PROCEDURE — 99291 CRITICAL CARE FIRST HOUR: CPT | Performed by: INTERNAL MEDICINE

## 2020-01-30 PROCEDURE — C9132 KCENTRA, PER I.U.: HCPCS | Performed by: SURGERY

## 2020-01-30 PROCEDURE — 85610 PROTHROMBIN TIME: CPT | Performed by: SURGERY

## 2020-01-30 PROCEDURE — 86927 PLASMA FRESH FROZEN: CPT

## 2020-01-30 PROCEDURE — P9017 PLASMA 1 DONOR FRZ W/IN 8 HR: HCPCS

## 2020-01-30 PROCEDURE — 85610 PROTHROMBIN TIME: CPT | Performed by: PHYSICIAN ASSISTANT

## 2020-01-30 PROCEDURE — 25010000002 ONDANSETRON PER 1 MG: Performed by: ANESTHESIOLOGY

## 2020-01-30 PROCEDURE — 25010000002 PHENYLEPHRINE PER 1 ML: Performed by: ANESTHESIOLOGY

## 2020-01-30 PROCEDURE — 86850 RBC ANTIBODY SCREEN: CPT | Performed by: SURGERY

## 2020-01-30 PROCEDURE — 86900 BLOOD TYPING SEROLOGIC ABO: CPT | Performed by: SURGERY

## 2020-01-30 PROCEDURE — 85025 COMPLETE CBC W/AUTO DIFF WBC: CPT | Performed by: NURSE PRACTITIONER

## 2020-01-30 PROCEDURE — 25010000002 PIPERACILLIN SOD-TAZOBACTAM PER 1 G: Performed by: ANESTHESIOLOGY

## 2020-01-30 PROCEDURE — 25010000002 NEOSTIGMINE 10 MG/10ML SOLUTION: Performed by: ANESTHESIOLOGY

## 2020-01-30 PROCEDURE — 25010000002 LORAZEPAM PER 2 MG: Performed by: INTERNAL MEDICINE

## 2020-01-30 PROCEDURE — 36430 TRANSFUSION BLD/BLD COMPNT: CPT

## 2020-01-30 DEVICE — CONTOUR CURVED CUTTER STAPLER RELOAD
Type: IMPLANTABLE DEVICE | Site: ABDOMEN | Status: FUNCTIONAL
Brand: CONTOUR

## 2020-01-30 DEVICE — CONTOUR CURVED CUTTER STAPLER
Type: IMPLANTABLE DEVICE | Site: ABDOMEN | Status: FUNCTIONAL
Brand: CONTOUR

## 2020-01-30 RX ORDER — FENTANYL CITRATE 50 UG/ML
50 INJECTION, SOLUTION INTRAMUSCULAR; INTRAVENOUS
Status: DISCONTINUED | OUTPATIENT
Start: 2020-01-30 | End: 2020-01-31 | Stop reason: HOSPADM

## 2020-01-30 RX ORDER — ONDANSETRON 2 MG/ML
INJECTION INTRAMUSCULAR; INTRAVENOUS AS NEEDED
Status: DISCONTINUED | OUTPATIENT
Start: 2020-01-30 | End: 2020-01-30 | Stop reason: SURG

## 2020-01-30 RX ORDER — SODIUM CHLORIDE 0.9 % (FLUSH) 0.9 %
10 SYRINGE (ML) INJECTION EVERY 12 HOURS SCHEDULED
Status: DISCONTINUED | OUTPATIENT
Start: 2020-01-30 | End: 2020-01-31 | Stop reason: HOSPADM

## 2020-01-30 RX ORDER — NEOSTIGMINE METHYLSULFATE 1 MG/ML
INJECTION, SOLUTION INTRAVENOUS AS NEEDED
Status: DISCONTINUED | OUTPATIENT
Start: 2020-01-30 | End: 2020-01-30 | Stop reason: SURG

## 2020-01-30 RX ORDER — BUPRENORPHINE HYDROCHLORIDE 0.32 MG/ML
INJECTION INTRAMUSCULAR; INTRAVENOUS
Status: COMPLETED | OUTPATIENT
Start: 2020-01-30 | End: 2020-01-30

## 2020-01-30 RX ORDER — FAMOTIDINE 20 MG/1
20 TABLET, FILM COATED ORAL ONCE
Status: CANCELLED | OUTPATIENT
Start: 2020-01-30 | End: 2020-01-30

## 2020-01-30 RX ORDER — GLYCOPYRROLATE 0.2 MG/ML
INJECTION INTRAMUSCULAR; INTRAVENOUS AS NEEDED
Status: DISCONTINUED | OUTPATIENT
Start: 2020-01-30 | End: 2020-01-30 | Stop reason: SURG

## 2020-01-30 RX ORDER — ATRACURIUM BESYLATE 10 MG/ML
INJECTION, SOLUTION INTRAVENOUS AS NEEDED
Status: DISCONTINUED | OUTPATIENT
Start: 2020-01-30 | End: 2020-01-30 | Stop reason: SURG

## 2020-01-30 RX ORDER — SODIUM CHLORIDE 0.9 % (FLUSH) 0.9 %
10 SYRINGE (ML) INJECTION AS NEEDED
Status: DISCONTINUED | OUTPATIENT
Start: 2020-01-30 | End: 2020-01-31 | Stop reason: HOSPADM

## 2020-01-30 RX ORDER — LORAZEPAM 2 MG/ML
1 INJECTION INTRAMUSCULAR EVERY 4 HOURS PRN
Status: DISPENSED | OUTPATIENT
Start: 2020-01-30 | End: 2020-02-09

## 2020-01-30 RX ORDER — BUPIVACAINE HYDROCHLORIDE 2.5 MG/ML
INJECTION, SOLUTION EPIDURAL; INFILTRATION; INTRACAUDAL
Status: COMPLETED | OUTPATIENT
Start: 2020-01-30 | End: 2020-01-30

## 2020-01-30 RX ORDER — HYDROMORPHONE HYDROCHLORIDE 1 MG/ML
0.5 INJECTION, SOLUTION INTRAMUSCULAR; INTRAVENOUS; SUBCUTANEOUS
Status: DISCONTINUED | OUTPATIENT
Start: 2020-01-30 | End: 2020-01-31 | Stop reason: HOSPADM

## 2020-01-30 RX ORDER — SUCCINYLCHOLINE CHLORIDE 20 MG/ML
INJECTION INTRAMUSCULAR; INTRAVENOUS AS NEEDED
Status: DISCONTINUED | OUTPATIENT
Start: 2020-01-30 | End: 2020-01-30 | Stop reason: SURG

## 2020-01-30 RX ORDER — FAMOTIDINE 20 MG/1
20 TABLET, FILM COATED ORAL
Status: CANCELLED | OUTPATIENT
Start: 2020-01-30

## 2020-01-30 RX ORDER — ROCURONIUM BROMIDE 10 MG/ML
INJECTION, SOLUTION INTRAVENOUS AS NEEDED
Status: DISCONTINUED | OUTPATIENT
Start: 2020-01-30 | End: 2020-01-30 | Stop reason: SURG

## 2020-01-30 RX ORDER — MAGNESIUM HYDROXIDE 1200 MG/15ML
LIQUID ORAL AS NEEDED
Status: DISCONTINUED | OUTPATIENT
Start: 2020-01-30 | End: 2020-01-30 | Stop reason: HOSPADM

## 2020-01-30 RX ORDER — LIDOCAINE HYDROCHLORIDE 10 MG/ML
0.5 INJECTION, SOLUTION EPIDURAL; INFILTRATION; INTRACAUDAL; PERINEURAL ONCE AS NEEDED
Status: CANCELLED | OUTPATIENT
Start: 2020-01-30

## 2020-01-30 RX ORDER — SODIUM CHLORIDE, SODIUM LACTATE, POTASSIUM CHLORIDE, AND CALCIUM CHLORIDE .6; .31; .03; .02 G/100ML; G/100ML; G/100ML; G/100ML
9 INJECTION, SOLUTION INTRAVENOUS CONTINUOUS
Status: DISCONTINUED | OUTPATIENT
Start: 2020-01-30 | End: 2020-01-31

## 2020-01-30 RX ORDER — FENTANYL CITRATE 50 UG/ML
50 INJECTION, SOLUTION INTRAMUSCULAR; INTRAVENOUS ONCE
Status: COMPLETED | OUTPATIENT
Start: 2020-01-30 | End: 2020-01-30

## 2020-01-30 RX ORDER — DEXAMETHASONE SODIUM PHOSPHATE 10 MG/ML
INJECTION, SOLUTION INTRAMUSCULAR; INTRAVENOUS
Status: COMPLETED | OUTPATIENT
Start: 2020-01-30 | End: 2020-01-30

## 2020-01-30 RX ORDER — SODIUM CHLORIDE, SODIUM LACTATE, POTASSIUM CHLORIDE, CALCIUM CHLORIDE 600; 310; 30; 20 MG/100ML; MG/100ML; MG/100ML; MG/100ML
9 INJECTION, SOLUTION INTRAVENOUS CONTINUOUS PRN
Status: CANCELLED | OUTPATIENT
Start: 2020-01-30

## 2020-01-30 RX ORDER — SODIUM CHLORIDE, SODIUM LACTATE, POTASSIUM CHLORIDE, CALCIUM CHLORIDE 600; 310; 30; 20 MG/100ML; MG/100ML; MG/100ML; MG/100ML
INJECTION, SOLUTION INTRAVENOUS CONTINUOUS PRN
Status: DISCONTINUED | OUTPATIENT
Start: 2020-01-30 | End: 2020-01-30 | Stop reason: SURG

## 2020-01-30 RX ORDER — ALBUMIN, HUMAN INJ 5% 5 %
SOLUTION INTRAVENOUS CONTINUOUS PRN
Status: DISCONTINUED | OUTPATIENT
Start: 2020-01-30 | End: 2020-01-30 | Stop reason: SURG

## 2020-01-30 RX ORDER — FAMOTIDINE 10 MG/ML
20 INJECTION, SOLUTION INTRAVENOUS ONCE
Status: DISCONTINUED | OUTPATIENT
Start: 2020-01-30 | End: 2020-01-31 | Stop reason: HOSPADM

## 2020-01-30 RX ORDER — FAMOTIDINE 10 MG/ML
20 INJECTION, SOLUTION INTRAVENOUS ONCE
Status: COMPLETED | OUTPATIENT
Start: 2020-01-30 | End: 2020-01-30

## 2020-01-30 RX ORDER — DEXAMETHASONE SODIUM PHOSPHATE 4 MG/ML
INJECTION, SOLUTION INTRA-ARTICULAR; INTRALESIONAL; INTRAMUSCULAR; INTRAVENOUS; SOFT TISSUE AS NEEDED
Status: DISCONTINUED | OUTPATIENT
Start: 2020-01-30 | End: 2020-01-30 | Stop reason: SURG

## 2020-01-30 RX ORDER — SODIUM CHLORIDE, SODIUM LACTATE, POTASSIUM CHLORIDE, CALCIUM CHLORIDE 600; 310; 30; 20 MG/100ML; MG/100ML; MG/100ML; MG/100ML
9 INJECTION, SOLUTION INTRAVENOUS CONTINUOUS
Status: DISCONTINUED | OUTPATIENT
Start: 2020-01-30 | End: 2020-01-31

## 2020-01-30 RX ORDER — PROPOFOL 10 MG/ML
VIAL (ML) INTRAVENOUS AS NEEDED
Status: DISCONTINUED | OUTPATIENT
Start: 2020-01-30 | End: 2020-01-30 | Stop reason: SURG

## 2020-01-30 RX ORDER — FENTANYL CITRATE 50 UG/ML
INJECTION, SOLUTION INTRAMUSCULAR; INTRAVENOUS AS NEEDED
Status: DISCONTINUED | OUTPATIENT
Start: 2020-01-30 | End: 2020-01-30 | Stop reason: SURG

## 2020-01-30 RX ORDER — DULOXETIN HYDROCHLORIDE 30 MG/1
30 CAPSULE, DELAYED RELEASE ORAL DAILY
Status: DISCONTINUED | OUTPATIENT
Start: 2020-01-30 | End: 2020-02-10 | Stop reason: HOSPADM

## 2020-01-30 RX ORDER — LIDOCAINE HYDROCHLORIDE 10 MG/ML
INJECTION, SOLUTION INFILTRATION; PERINEURAL AS NEEDED
Status: DISCONTINUED | OUTPATIENT
Start: 2020-01-30 | End: 2020-01-30 | Stop reason: SURG

## 2020-01-30 RX ADMIN — ATRACURIUM BESYLATE 10 MG: 10 INJECTION, SOLUTION INTRAVENOUS at 20:42

## 2020-01-30 RX ADMIN — SODIUM CHLORIDE, POTASSIUM CHLORIDE, SODIUM LACTATE AND CALCIUM CHLORIDE 9 ML/HR: 600; 310; 30; 20 INJECTION, SOLUTION INTRAVENOUS at 17:32

## 2020-01-30 RX ADMIN — DEXAMETHASONE SODIUM PHOSPHATE 6 MG: 4 INJECTION, SOLUTION INTRAMUSCULAR; INTRAVENOUS at 19:26

## 2020-01-30 RX ADMIN — SODIUM CHLORIDE, POTASSIUM CHLORIDE, SODIUM LACTATE AND CALCIUM CHLORIDE: 600; 310; 30; 20 INJECTION, SOLUTION INTRAVENOUS at 19:11

## 2020-01-30 RX ADMIN — FENTANYL CITRATE 100 MCG: 50 INJECTION, SOLUTION INTRAMUSCULAR; INTRAVENOUS at 20:22

## 2020-01-30 RX ADMIN — DEXAMETHASONE SODIUM PHOSPHATE 2 MG: 10 INJECTION, SOLUTION INTRAMUSCULAR; INTRAVENOUS at 19:36

## 2020-01-30 RX ADMIN — HYDROMORPHONE HYDROCHLORIDE 0.5 MG: 1 INJECTION, SOLUTION INTRAMUSCULAR; INTRAVENOUS; SUBCUTANEOUS at 10:07

## 2020-01-30 RX ADMIN — ATRACURIUM BESYLATE 10 MG: 10 INJECTION, SOLUTION INTRAVENOUS at 20:00

## 2020-01-30 RX ADMIN — PROPOFOL 150 MG: 10 INJECTION, EMULSION INTRAVENOUS at 19:20

## 2020-01-30 RX ADMIN — LORAZEPAM 1 MG: 2 INJECTION INTRAMUSCULAR; INTRAVENOUS at 14:48

## 2020-01-30 RX ADMIN — TAZOBACTAM SODIUM AND PIPERACILLIN SODIUM 3.38 G: 375; 3 INJECTION, SOLUTION INTRAVENOUS at 00:39

## 2020-01-30 RX ADMIN — SUCCINYLCHOLINE CHLORIDE 200 MG: 20 INJECTION, SOLUTION INTRAMUSCULAR; INTRAVENOUS; PARENTERAL at 19:20

## 2020-01-30 RX ADMIN — SODIUM CHLORIDE, PRESERVATIVE FREE 10 ML: 5 INJECTION INTRAVENOUS at 08:59

## 2020-01-30 RX ADMIN — PHYTONADIONE 10 MG: 10 INJECTION, EMULSION INTRAMUSCULAR; INTRAVENOUS; SUBCUTANEOUS at 07:54

## 2020-01-30 RX ADMIN — ALBUMIN (HUMAN): 12.5 SOLUTION INTRAVENOUS at 21:53

## 2020-01-30 RX ADMIN — PROTHROMBIN, COAGULATION FACTOR VII HUMAN, COAGULATION FACTOR IX HUMAN, COAGULATION FACTOR X HUMAN, PROTEIN C, PROTEIN S HUMAN, AND WATER 5000 UNITS: KIT at 18:39

## 2020-01-30 RX ADMIN — FENTANYL CITRATE 100 MCG: 50 INJECTION, SOLUTION INTRAMUSCULAR; INTRAVENOUS at 20:35

## 2020-01-30 RX ADMIN — HYDROMORPHONE HYDROCHLORIDE 0.5 MG: 1 INJECTION, SOLUTION INTRAMUSCULAR; INTRAVENOUS; SUBCUTANEOUS at 04:24

## 2020-01-30 RX ADMIN — ATRACURIUM BESYLATE 10 MG: 10 INJECTION, SOLUTION INTRAVENOUS at 22:22

## 2020-01-30 RX ADMIN — ONDANSETRON 4 MG: 2 INJECTION INTRAMUSCULAR; INTRAVENOUS at 23:05

## 2020-01-30 RX ADMIN — TAZOBACTAM SODIUM AND PIPERACILLIN SODIUM 3.38 G: 375; 3 INJECTION, SOLUTION INTRAVENOUS at 16:53

## 2020-01-30 RX ADMIN — BUPIVACAINE HYDROCHLORIDE 60 ML: 2.5 INJECTION, SOLUTION EPIDURAL; INFILTRATION; INTRACAUDAL; PERINEURAL at 19:36

## 2020-01-30 RX ADMIN — FAMOTIDINE 20 MG: 10 INJECTION INTRAVENOUS at 17:32

## 2020-01-30 RX ADMIN — HYDROMORPHONE HYDROCHLORIDE 0.5 MG: 1 INJECTION, SOLUTION INTRAMUSCULAR; INTRAVENOUS; SUBCUTANEOUS at 00:39

## 2020-01-30 RX ADMIN — ATRACURIUM BESYLATE 10 MG: 10 INJECTION, SOLUTION INTRAVENOUS at 22:02

## 2020-01-30 RX ADMIN — PHENYLEPHRINE HYDROCHLORIDE 100 MCG: 10 INJECTION, SOLUTION INTRAMUSCULAR; INTRAVENOUS; SUBCUTANEOUS at 19:19

## 2020-01-30 RX ADMIN — METOPROLOL TARTRATE 1 MG: 5 INJECTION, SOLUTION INTRAVENOUS at 21:30

## 2020-01-30 RX ADMIN — ATRACURIUM BESYLATE 20 MG: 10 INJECTION, SOLUTION INTRAVENOUS at 20:30

## 2020-01-30 RX ADMIN — FENTANYL CITRATE 50 MCG: 50 INJECTION, SOLUTION INTRAMUSCULAR; INTRAVENOUS at 23:04

## 2020-01-30 RX ADMIN — NEOSTIGMINE METHYLSULFATE 3 MG: 1 INJECTION, SOLUTION INTRAVENOUS at 23:13

## 2020-01-30 RX ADMIN — BUPRENORPHINE HYDROCHLORIDE 0.3 MG: 0.32 INJECTION INTRAMUSCULAR; INTRAVENOUS at 19:36

## 2020-01-30 RX ADMIN — FENTANYL CITRATE 100 MCG: 50 INJECTION, SOLUTION INTRAMUSCULAR; INTRAVENOUS at 19:15

## 2020-01-30 RX ADMIN — GLYCOPYRROLATE 0.4 MG: 0.2 INJECTION, SOLUTION INTRAMUSCULAR; INTRAVENOUS at 23:13

## 2020-01-30 RX ADMIN — PHENOL 2 SPRAY: 1.5 LIQUID ORAL at 06:56

## 2020-01-30 RX ADMIN — ROCURONIUM BROMIDE 5 MG: 10 SOLUTION INTRAVENOUS at 19:19

## 2020-01-30 RX ADMIN — TAZOBACTAM SODIUM AND PIPERACILLIN SODIUM 3.38 G: 375; 3 INJECTION, SOLUTION INTRAVENOUS at 19:31

## 2020-01-30 RX ADMIN — FENTANYL CITRATE 50 MCG: 0.05 INJECTION, SOLUTION INTRAMUSCULAR; INTRAVENOUS at 18:30

## 2020-01-30 RX ADMIN — LORAZEPAM 1 MG: 2 INJECTION INTRAMUSCULAR; INTRAVENOUS at 10:23

## 2020-01-30 RX ADMIN — HYDROMORPHONE HYDROCHLORIDE 0.5 MG: 1 INJECTION, SOLUTION INTRAMUSCULAR; INTRAVENOUS; SUBCUTANEOUS at 07:44

## 2020-01-30 RX ADMIN — TAZOBACTAM SODIUM AND PIPERACILLIN SODIUM 3.38 G: 375; 3 INJECTION, SOLUTION INTRAVENOUS at 08:59

## 2020-01-30 RX ADMIN — FENTANYL CITRATE 100 MCG: 50 INJECTION, SOLUTION INTRAMUSCULAR; INTRAVENOUS at 21:20

## 2020-01-30 RX ADMIN — HYDROMORPHONE HYDROCHLORIDE 0.5 MG: 1 INJECTION, SOLUTION INTRAMUSCULAR; INTRAVENOUS; SUBCUTANEOUS at 14:41

## 2020-01-30 RX ADMIN — FENTANYL CITRATE 50 MCG: 0.05 INJECTION, SOLUTION INTRAMUSCULAR; INTRAVENOUS at 18:15

## 2020-01-30 RX ADMIN — LIDOCAINE HYDROCHLORIDE 50 MG: 10 INJECTION, SOLUTION INFILTRATION; PERINEURAL at 19:20

## 2020-01-30 RX ADMIN — ATRACURIUM BESYLATE 40 MG: 10 INJECTION, SOLUTION INTRAVENOUS at 19:23

## 2020-01-30 NOTE — ANESTHESIA PREPROCEDURE EVALUATION
Anesthesia Evaluation     Patient summary reviewed and Nursing notes reviewed                Airway   Mallampati: II  TM distance: >3 FB  Neck ROM: full  No difficulty expected  Dental - normal exam     Pulmonary - normal exam   (+) pulmonary embolism (10 YEARS AGO), a smoker Current,   Cardiovascular - normal exam    (+) hypertension, CHF , DVT (PRESENCE OF IVC FILTER),       Neuro/Psych  (+) psychiatric history Anxiety and Depression,     GI/Hepatic/Renal/Endo    (+) morbid obesity, GERD,      Musculoskeletal (-) negative ROS    Abdominal  - normal exam    Bowel sounds: normal.   Substance History   (+) drug use (MARIJUANA USE)     OB/GYN negative ob/gyn ROS         Other                      Anesthesia Plan    ASA 3     general   (TAPS FOR POSTOP PAIN)  intravenous induction     Anesthetic plan, all risks, benefits, and alternatives have been provided, discussed and informed consent has been obtained with: patient.    Plan discussed with CRNA.

## 2020-01-31 PROBLEM — Z72.0 TOBACCO USE: Status: ACTIVE | Noted: 2020-01-31

## 2020-01-31 PROBLEM — Z98.890 S/P EXPLORATORY LAPAROTOMY: Status: ACTIVE | Noted: 2020-01-31

## 2020-01-31 LAB
ABO + RH BLD: NORMAL
ABO + RH BLD: NORMAL
ALBUMIN SERPL-MCNC: 3.6 G/DL (ref 3.5–5.2)
ALBUMIN/GLOB SERPL: 1 G/DL
ALP SERPL-CCNC: 43 U/L (ref 39–117)
ALT SERPL W P-5'-P-CCNC: 10 U/L (ref 1–41)
ANION GAP SERPL CALCULATED.3IONS-SCNC: 16 MMOL/L (ref 5–15)
APTT PPP: 30.9 SECONDS (ref 24–37)
AST SERPL-CCNC: 18 U/L (ref 1–40)
BASOPHILS # BLD AUTO: 0.05 10*3/MM3 (ref 0–0.2)
BASOPHILS NFR BLD AUTO: 0.3 % (ref 0–1.5)
BH BB BLOOD EXPIRATION DATE: NORMAL
BH BB BLOOD EXPIRATION DATE: NORMAL
BH BB BLOOD TYPE BARCODE: 7300
BH BB BLOOD TYPE BARCODE: 7300
BH BB DISPENSE STATUS: NORMAL
BH BB DISPENSE STATUS: NORMAL
BH BB PRODUCT CODE: NORMAL
BH BB PRODUCT CODE: NORMAL
BH BB UNIT NUMBER: NORMAL
BH BB UNIT NUMBER: NORMAL
BILIRUB SERPL-MCNC: 2.6 MG/DL (ref 0.2–1.2)
BUN BLD-MCNC: 31 MG/DL (ref 6–20)
BUN/CREAT SERPL: 26.3 (ref 7–25)
CALCIUM SPEC-SCNC: 8.6 MG/DL (ref 8.6–10.5)
CHLORIDE SERPL-SCNC: 99 MMOL/L (ref 98–107)
CLUMPED PLATELETS: PRESENT
CO2 SERPL-SCNC: 24 MMOL/L (ref 22–29)
CREAT BLD-MCNC: 1.18 MG/DL (ref 0.76–1.27)
D-LACTATE SERPL-SCNC: 1.5 MMOL/L (ref 0.5–2)
D-LACTATE SERPL-SCNC: 2.2 MMOL/L (ref 0.5–2)
DEPRECATED RDW RBC AUTO: 46.7 FL (ref 37–54)
EOSINOPHIL # BLD AUTO: 0.04 10*3/MM3 (ref 0–0.4)
EOSINOPHIL NFR BLD AUTO: 0.2 % (ref 0.3–6.2)
ERYTHROCYTE [DISTWIDTH] IN BLOOD BY AUTOMATED COUNT: 13.9 % (ref 12.3–15.4)
GFR SERPL CREATININE-BSD FRML MDRD: 83 ML/MIN/1.73
GLOBULIN UR ELPH-MCNC: 3.7 GM/DL
GLUCOSE BLD-MCNC: 188 MG/DL (ref 65–99)
GLUCOSE BLDC GLUCOMTR-MCNC: 114 MG/DL (ref 70–130)
GLUCOSE BLDC GLUCOMTR-MCNC: 119 MG/DL (ref 70–130)
GLUCOSE BLDC GLUCOMTR-MCNC: 144 MG/DL (ref 70–130)
HBA1C MFR BLD: 5.6 % (ref 4.8–5.6)
HCT VFR BLD AUTO: 50.5 % (ref 37.5–51)
HGB BLD-MCNC: 16.7 G/DL (ref 13–17.7)
HOLD SPECIMEN: NORMAL
IMM GRANULOCYTES # BLD AUTO: 0.1 10*3/MM3 (ref 0–0.05)
IMM GRANULOCYTES NFR BLD AUTO: 0.6 % (ref 0–0.5)
INR PPP: 1.22 (ref 0.85–1.16)
LYMPHOCYTES # BLD AUTO: 0.78 10*3/MM3 (ref 0.7–3.1)
LYMPHOCYTES NFR BLD AUTO: 4.8 % (ref 19.6–45.3)
MAGNESIUM SERPL-MCNC: 2.1 MG/DL (ref 1.6–2.6)
MCH RBC QN AUTO: 30.1 PG (ref 26.6–33)
MCHC RBC AUTO-ENTMCNC: 33.1 G/DL (ref 31.5–35.7)
MCV RBC AUTO: 91.2 FL (ref 79–97)
MONOCYTES # BLD AUTO: 1.68 10*3/MM3 (ref 0.1–0.9)
MONOCYTES NFR BLD AUTO: 10.3 % (ref 5–12)
NEUTROPHILS # BLD AUTO: 13.64 10*3/MM3 (ref 1.7–7)
NEUTROPHILS NFR BLD AUTO: 83.8 % (ref 42.7–76)
NRBC BLD AUTO-RTO: 0 /100 WBC (ref 0–0.2)
PHOSPHATE SERPL-MCNC: 4.5 MG/DL (ref 2.5–4.5)
PLATELET # BLD AUTO: 246 10*3/MM3 (ref 140–450)
PMV BLD AUTO: 11.2 FL (ref 6–12)
POTASSIUM BLD-SCNC: 3.9 MMOL/L (ref 3.5–5.2)
PROCALCITONIN SERPL-MCNC: 2.99 NG/ML (ref 0.1–0.25)
PROT SERPL-MCNC: 7.3 G/DL (ref 6–8.5)
PROTHROMBIN TIME: 14.8 SECONDS (ref 11.2–14.3)
RBC # BLD AUTO: 5.54 10*6/MM3 (ref 4.14–5.8)
RBC MORPH BLD: NORMAL
SODIUM BLD-SCNC: 139 MMOL/L (ref 136–145)
UNIT  ABO: NORMAL
UNIT  ABO: NORMAL
UNIT  RH: NORMAL
UNIT  RH: NORMAL
WBC MORPH BLD: NORMAL
WBC NRBC COR # BLD: 16.29 10*3/MM3 (ref 3.4–10.8)

## 2020-01-31 PROCEDURE — 99233 SBSQ HOSP IP/OBS HIGH 50: CPT | Performed by: INTERNAL MEDICINE

## 2020-01-31 PROCEDURE — 85007 BL SMEAR W/DIFF WBC COUNT: CPT | Performed by: INTERNAL MEDICINE

## 2020-01-31 PROCEDURE — 84145 PROCALCITONIN (PCT): CPT | Performed by: INTERNAL MEDICINE

## 2020-01-31 PROCEDURE — 83036 HEMOGLOBIN GLYCOSYLATED A1C: CPT | Performed by: INTERNAL MEDICINE

## 2020-01-31 PROCEDURE — 83605 ASSAY OF LACTIC ACID: CPT | Performed by: INTERNAL MEDICINE

## 2020-01-31 PROCEDURE — 85730 THROMBOPLASTIN TIME PARTIAL: CPT | Performed by: SURGERY

## 2020-01-31 PROCEDURE — 63710000001 INSULIN LISPRO (HUMAN) PER 5 UNITS: Performed by: INTERNAL MEDICINE

## 2020-01-31 PROCEDURE — 85610 PROTHROMBIN TIME: CPT | Performed by: SURGERY

## 2020-01-31 PROCEDURE — 82962 GLUCOSE BLOOD TEST: CPT

## 2020-01-31 PROCEDURE — 25010000002 HYDROMORPHONE PER 4 MG: Performed by: ANESTHESIOLOGY

## 2020-01-31 PROCEDURE — 25010000002 HYDROMORPHONE PER 4 MG: Performed by: SURGERY

## 2020-01-31 PROCEDURE — 25010000002 HYDROMORPHONE HCL-NACL 30-0.9 MG/30ML-% SOLUTION PREFILLED SYRINGE: Performed by: SURGERY

## 2020-01-31 PROCEDURE — 25010000002 MORPHINE PER 10 MG: Performed by: SURGERY

## 2020-01-31 PROCEDURE — 83735 ASSAY OF MAGNESIUM: CPT | Performed by: INTERNAL MEDICINE

## 2020-01-31 PROCEDURE — 25010000002 HEPARIN (PORCINE) PER 1000 UNITS: Performed by: SURGERY

## 2020-01-31 PROCEDURE — 80053 COMPREHEN METABOLIC PANEL: CPT | Performed by: SURGERY

## 2020-01-31 PROCEDURE — 25010000002 PIPERACILLIN SOD-TAZOBACTAM PER 1 G: Performed by: SURGERY

## 2020-01-31 PROCEDURE — 84100 ASSAY OF PHOSPHORUS: CPT | Performed by: INTERNAL MEDICINE

## 2020-01-31 PROCEDURE — 85025 COMPLETE CBC W/AUTO DIFF WBC: CPT | Performed by: INTERNAL MEDICINE

## 2020-01-31 PROCEDURE — 25010000002 LORAZEPAM PER 2 MG: Performed by: SURGERY

## 2020-01-31 RX ORDER — NALOXONE HCL 0.4 MG/ML
0.1 VIAL (ML) INJECTION
Status: DISCONTINUED | OUTPATIENT
Start: 2020-01-31 | End: 2020-02-10 | Stop reason: HOSPADM

## 2020-01-31 RX ORDER — PROMETHAZINE HYDROCHLORIDE 25 MG/ML
12.5 INJECTION, SOLUTION INTRAMUSCULAR; INTRAVENOUS EVERY 6 HOURS PRN
Status: DISCONTINUED | OUTPATIENT
Start: 2020-01-31 | End: 2020-01-31 | Stop reason: SDUPTHER

## 2020-01-31 RX ORDER — PROMETHAZINE HYDROCHLORIDE 25 MG/ML
12.5 INJECTION, SOLUTION INTRAMUSCULAR; INTRAVENOUS EVERY 6 HOURS PRN
Status: DISCONTINUED | OUTPATIENT
Start: 2020-01-31 | End: 2020-02-10 | Stop reason: HOSPADM

## 2020-01-31 RX ORDER — LABETALOL HYDROCHLORIDE 5 MG/ML
5 INJECTION, SOLUTION INTRAVENOUS
Status: DISCONTINUED | OUTPATIENT
Start: 2020-01-31 | End: 2020-01-31 | Stop reason: HOSPADM

## 2020-01-31 RX ORDER — ONDANSETRON 2 MG/ML
4 INJECTION INTRAMUSCULAR; INTRAVENOUS EVERY 6 HOURS PRN
Status: DISCONTINUED | OUTPATIENT
Start: 2020-01-31 | End: 2020-02-10 | Stop reason: HOSPADM

## 2020-01-31 RX ORDER — NALOXONE HCL 0.4 MG/ML
0.4 VIAL (ML) INJECTION
Status: DISCONTINUED | OUTPATIENT
Start: 2020-01-31 | End: 2020-02-05 | Stop reason: ALTCHOICE

## 2020-01-31 RX ORDER — DIPHENHYDRAMINE HYDROCHLORIDE 50 MG/ML
25 INJECTION INTRAMUSCULAR; INTRAVENOUS EVERY 6 HOURS PRN
Status: DISCONTINUED | OUTPATIENT
Start: 2020-01-31 | End: 2020-02-10 | Stop reason: HOSPADM

## 2020-01-31 RX ORDER — PANTOPRAZOLE SODIUM 40 MG/1
40 TABLET, DELAYED RELEASE ORAL
Status: DISCONTINUED | OUTPATIENT
Start: 2020-01-31 | End: 2020-02-10 | Stop reason: HOSPADM

## 2020-01-31 RX ORDER — DOCUSATE SODIUM 100 MG/1
100 CAPSULE, LIQUID FILLED ORAL 2 TIMES DAILY
Status: DISCONTINUED | OUTPATIENT
Start: 2020-01-31 | End: 2020-02-10 | Stop reason: HOSPADM

## 2020-01-31 RX ORDER — MORPHINE SULFATE 2 MG/ML
2 INJECTION, SOLUTION INTRAMUSCULAR; INTRAVENOUS
Status: DISCONTINUED | OUTPATIENT
Start: 2020-01-31 | End: 2020-02-05 | Stop reason: ALTCHOICE

## 2020-01-31 RX ORDER — SODIUM CHLORIDE, SODIUM LACTATE, POTASSIUM CHLORIDE, CALCIUM CHLORIDE 600; 310; 30; 20 MG/100ML; MG/100ML; MG/100ML; MG/100ML
125 INJECTION, SOLUTION INTRAVENOUS CONTINUOUS
Status: DISCONTINUED | OUTPATIENT
Start: 2020-01-31 | End: 2020-02-04

## 2020-01-31 RX ORDER — HEPARIN SODIUM 5000 [USP'U]/ML
5000 INJECTION, SOLUTION INTRAVENOUS; SUBCUTANEOUS EVERY 8 HOURS SCHEDULED
Status: DISCONTINUED | OUTPATIENT
Start: 2020-01-31 | End: 2020-02-06

## 2020-01-31 RX ORDER — ONDANSETRON 4 MG/1
4 TABLET, FILM COATED ORAL EVERY 6 HOURS PRN
Status: DISCONTINUED | OUTPATIENT
Start: 2020-01-31 | End: 2020-02-10 | Stop reason: HOSPADM

## 2020-01-31 RX ADMIN — PHENOL 2 SPRAY: 1.5 LIQUID ORAL at 02:00

## 2020-01-31 RX ADMIN — HYDROMORPHONE HYDROCHLORIDE 0.5 MG: 1 INJECTION, SOLUTION INTRAMUSCULAR; INTRAVENOUS; SUBCUTANEOUS at 07:29

## 2020-01-31 RX ADMIN — HYDROMORPHONE HYDROCHLORIDE 0.5 MG: 1 INJECTION, SOLUTION INTRAMUSCULAR; INTRAVENOUS; SUBCUTANEOUS at 11:27

## 2020-01-31 RX ADMIN — PANTOPRAZOLE SODIUM 40 MG: 40 TABLET, DELAYED RELEASE ORAL at 07:30

## 2020-01-31 RX ADMIN — MORPHINE SULFATE 2 MG: 2 INJECTION, SOLUTION INTRAMUSCULAR; INTRAVENOUS at 20:31

## 2020-01-31 RX ADMIN — DULOXETINE 30 MG: 30 CAPSULE, DELAYED RELEASE ORAL at 08:23

## 2020-01-31 RX ADMIN — LORAZEPAM 1 MG: 2 INJECTION INTRAMUSCULAR; INTRAVENOUS at 16:24

## 2020-01-31 RX ADMIN — ACETAMINOPHEN 650 MG: 325 TABLET, FILM COATED ORAL at 22:24

## 2020-01-31 RX ADMIN — HYDROCODONE BITARTRATE AND ACETAMINOPHEN 1 TABLET: 7.5; 325 TABLET ORAL at 07:30

## 2020-01-31 RX ADMIN — MORPHINE SULFATE 2 MG: 2 INJECTION, SOLUTION INTRAMUSCULAR; INTRAVENOUS at 04:35

## 2020-01-31 RX ADMIN — DOCUSATE SODIUM 100 MG: 100 CAPSULE, LIQUID FILLED ORAL at 20:32

## 2020-01-31 RX ADMIN — SODIUM CHLORIDE, POTASSIUM CHLORIDE, SODIUM LACTATE AND CALCIUM CHLORIDE 175 ML/HR: 600; 310; 30; 20 INJECTION, SOLUTION INTRAVENOUS at 11:32

## 2020-01-31 RX ADMIN — HEPARIN SODIUM 5000 UNITS: 5000 INJECTION, SOLUTION INTRAVENOUS; SUBCUTANEOUS at 14:28

## 2020-01-31 RX ADMIN — HYDROMORPHONE HYDROCHLORIDE 0.5 MG: 1 INJECTION, SOLUTION INTRAMUSCULAR; INTRAVENOUS; SUBCUTANEOUS at 18:00

## 2020-01-31 RX ADMIN — LORAZEPAM 1 MG: 2 INJECTION INTRAMUSCULAR; INTRAVENOUS at 20:31

## 2020-01-31 RX ADMIN — TRAZODONE HYDROCHLORIDE 50 MG: 50 TABLET ORAL at 20:32

## 2020-01-31 RX ADMIN — HYDROMORPHONE HYDROCHLORIDE 0.5 MG: 1 INJECTION, SOLUTION INTRAMUSCULAR; INTRAVENOUS; SUBCUTANEOUS at 00:55

## 2020-01-31 RX ADMIN — HYDROCODONE BITARTRATE AND ACETAMINOPHEN 1 TABLET: 7.5; 325 TABLET ORAL at 01:20

## 2020-01-31 RX ADMIN — LORAZEPAM 1 MG: 2 INJECTION INTRAMUSCULAR; INTRAVENOUS at 02:30

## 2020-01-31 RX ADMIN — SODIUM CHLORIDE, POTASSIUM CHLORIDE, SODIUM LACTATE AND CALCIUM CHLORIDE 175 ML/HR: 600; 310; 30; 20 INJECTION, SOLUTION INTRAVENOUS at 00:46

## 2020-01-31 RX ADMIN — HYDROMORPHONE HYDROCHLORIDE 0.5 MG: 1 INJECTION, SOLUTION INTRAMUSCULAR; INTRAVENOUS; SUBCUTANEOUS at 14:29

## 2020-01-31 RX ADMIN — SODIUM CHLORIDE, POTASSIUM CHLORIDE, SODIUM LACTATE AND CALCIUM CHLORIDE 175 ML/HR: 600; 310; 30; 20 INJECTION, SOLUTION INTRAVENOUS at 06:12

## 2020-01-31 RX ADMIN — BUPROPION HYDROCHLORIDE 75 MG: 75 TABLET, FILM COATED ORAL at 11:32

## 2020-01-31 RX ADMIN — HYDROCODONE BITARTRATE AND ACETAMINOPHEN 1 TABLET: 7.5; 325 TABLET ORAL at 14:29

## 2020-01-31 RX ADMIN — TAZOBACTAM SODIUM AND PIPERACILLIN SODIUM 3.38 G: 375; 3 INJECTION, SOLUTION INTRAVENOUS at 16:16

## 2020-01-31 RX ADMIN — HEPARIN SODIUM 5000 UNITS: 5000 INJECTION, SOLUTION INTRAVENOUS; SUBCUTANEOUS at 22:00

## 2020-01-31 RX ADMIN — HYDROMORPHONE HYDROCHLORIDE 0.5 MG: 1 INJECTION, SOLUTION INTRAMUSCULAR; INTRAVENOUS; SUBCUTANEOUS at 00:00

## 2020-01-31 RX ADMIN — SODIUM CHLORIDE, POTASSIUM CHLORIDE, SODIUM LACTATE AND CALCIUM CHLORIDE 175 ML/HR: 600; 310; 30; 20 INJECTION, SOLUTION INTRAVENOUS at 17:55

## 2020-01-31 RX ADMIN — HYDROMORPHONE HYDROCHLORIDE 0.5 MG: 1 INJECTION, SOLUTION INTRAMUSCULAR; INTRAVENOUS; SUBCUTANEOUS at 22:24

## 2020-01-31 RX ADMIN — HYDROMORPHONE HYDROCHLORIDE 0.5 MG: 1 INJECTION, SOLUTION INTRAMUSCULAR; INTRAVENOUS; SUBCUTANEOUS at 00:03

## 2020-01-31 RX ADMIN — MORPHINE SULFATE 2 MG: 2 INJECTION, SOLUTION INTRAMUSCULAR; INTRAVENOUS at 02:16

## 2020-01-31 RX ADMIN — TAZOBACTAM SODIUM AND PIPERACILLIN SODIUM 3.38 G: 375; 3 INJECTION, SOLUTION INTRAVENOUS at 07:30

## 2020-01-31 RX ADMIN — Medication: at 00:10

## 2020-01-31 RX ADMIN — LABETALOL 20 MG/4 ML (5 MG/ML) INTRAVENOUS SYRINGE 5 MG: at 00:21

## 2020-01-31 RX ADMIN — HEPARIN SODIUM 5000 UNITS: 5000 INJECTION, SOLUTION INTRAVENOUS; SUBCUTANEOUS at 07:29

## 2020-01-31 RX ADMIN — BUPROPION HYDROCHLORIDE 75 MG: 75 TABLET, FILM COATED ORAL at 07:30

## 2020-01-31 NOTE — ANESTHESIA PROCEDURE NOTES
Airway  Urgency: elective    Date/Time: 1/30/2020 7:21 PM  End Time:1/30/2020 7:21 PM  Airway not difficult    General Information and Staff    Patient location during procedure: OR    Indications and Patient Condition  Indications for airway management: airway protection    Preoxygenated: yes  MILS not maintained throughout  Mask difficulty assessment: 1 - vent by mask    Final Airway Details  Final airway type: endotracheal airway      Successful airway: ETT  Cuffed: yes   Successful intubation technique: direct laryngoscopy  Endotracheal tube insertion site: oral  Blade: Alondra  Blade size: 3  ETT size (mm): 7.5  Cormack-Lehane Classification: grade I - full view of glottis  Placement verified by: chest auscultation and capnometry   Measured from: lips  ETT/EBT  to lips (cm): 20  Number of attempts at approach: 1    Additional Comments  Negative epigastric sounds, Breath sound equal bilaterally with symmetric chest rise and fall

## 2020-01-31 NOTE — ANESTHESIA PROCEDURE NOTES
Peripheral Block      Patient reassessed immediately prior to procedure    Patient location during procedure: OR  Stop time: 1/30/2020 7:30 PM  Reason for block: at surgeon's request and post-op pain management  Performed by  Anesthesiologist: Michael Miller MD  Preanesthetic Checklist  Completed: patient identified, site marked, surgical consent, pre-op evaluation, timeout performed, IV checked, risks and benefits discussed and monitors and equipment checked  Prep:  Pt Position: supine  Sterile barriers:cap, gloves, sterile barriers and mask  Prep: ChloraPrep  Patient monitoring: blood pressure monitoring, continuous pulse oximetry and EKG  Procedure  Sedation:yes  Performed under: general  Guidance:ultrasound guided  Images:still images obtained, printed/placed on chart    Laterality:Bilateral  Block Type:TAP  Injection Technique:single-shot  Needle Type:short-bevel and echogenic  Needle Gauge:20 G  Resistance on Injection: none    Medications Used: buprenorphine (BUPRENEX) injection, 0.3 mg  dexamethasone sodium phosphate injection, 2 mg  bupivacaine PF (MARCAINE) 0.25 % injection, 60 mL      Medications  Comment:Block Injection:  LA dose divided between Right and Left block  Subcostal blocks        Post Assessment  Injection Assessment: negative aspiration for heme, incremental injection and no paresthesia on injection  Patient Tolerance:comfortable throughout block  Complications:no  Additional Notes      Under Ultrasound guidance, a BBraun 4inch 360 degree needle was advanced with Normal Saline hydro dissection of tissue.  The Internal Oblique and Transversus Abdominus muscles where visualized.  At or before the aponeurosis of Internal Oblique, local anesthetic spread was visualized in the Transversus Abdominus Plane. Injection was made incrementally with aspiration every 5 mls.  There was no  intravascular injection,  injection pressure was normal, there was no neural injection, and the procedure was  completed without difficulty.  Thank You.

## 2020-01-31 NOTE — ANESTHESIA POSTPROCEDURE EVALUATION
Patient: Brigida Rodriguez    Procedure Summary     Date:  01/30/20 Room / Location:   [facility] /  [facility]    Anesthesia Start:  1911 Anesthesia Stop:  2347    Procedure:  EXPLORATORY LAPAROTOMY,  SIGMOID COLECTOMY End COLOSTOMY, EGD (N/A Abdomen) Diagnosis:      Surgeon:  Taye Valenzuela MD Provider:  Michael Miller MD    Anesthesia Type:  general ASA Status:  3          Anesthesia Type: general    Vitals  No vitals data found for the desired time range.          Post Anesthesia Care and Evaluation    Patient location during evaluation: PACU  Patient participation: complete - patient participated  Level of consciousness: combative  Pain score: 2  Pain management: adequate  Airway patency: patent  Anesthetic complications: No anesthetic complications  PONV Status: none  Cardiovascular status: acceptable  Respiratory status: acceptable  Hydration status: acceptable

## 2020-02-01 LAB
ALBUMIN SERPL-MCNC: 3.4 G/DL (ref 3.5–5.2)
ALBUMIN/GLOB SERPL: 0.9 G/DL
ALP SERPL-CCNC: 45 U/L (ref 39–117)
ALT SERPL W P-5'-P-CCNC: 10 U/L (ref 1–41)
ANION GAP SERPL CALCULATED.3IONS-SCNC: 14 MMOL/L (ref 5–15)
AST SERPL-CCNC: 15 U/L (ref 1–40)
BASOPHILS # BLD AUTO: 0.04 10*3/MM3 (ref 0–0.2)
BASOPHILS NFR BLD AUTO: 0.3 % (ref 0–1.5)
BILIRUB SERPL-MCNC: 3 MG/DL (ref 0.2–1.2)
BUN BLD-MCNC: 24 MG/DL (ref 6–20)
BUN/CREAT SERPL: 22 (ref 7–25)
CALCIUM SPEC-SCNC: 8.7 MG/DL (ref 8.6–10.5)
CHLORIDE SERPL-SCNC: 98 MMOL/L (ref 98–107)
CO2 SERPL-SCNC: 28 MMOL/L (ref 22–29)
CREAT BLD-MCNC: 1.09 MG/DL (ref 0.76–1.27)
D-LACTATE SERPL-SCNC: 1.4 MMOL/L (ref 0.5–2)
DEPRECATED RDW RBC AUTO: 48.6 FL (ref 37–54)
EOSINOPHIL # BLD AUTO: 0.14 10*3/MM3 (ref 0–0.4)
EOSINOPHIL NFR BLD AUTO: 1.1 % (ref 0.3–6.2)
ERYTHROCYTE [DISTWIDTH] IN BLOOD BY AUTOMATED COUNT: 14 % (ref 12.3–15.4)
GFR SERPL CREATININE-BSD FRML MDRD: 91 ML/MIN/1.73
GLOBULIN UR ELPH-MCNC: 3.6 GM/DL
GLUCOSE BLD-MCNC: 117 MG/DL (ref 65–99)
GLUCOSE BLDC GLUCOMTR-MCNC: 106 MG/DL (ref 70–130)
GLUCOSE BLDC GLUCOMTR-MCNC: 86 MG/DL (ref 70–130)
GLUCOSE BLDC GLUCOMTR-MCNC: 96 MG/DL (ref 70–130)
GLUCOSE BLDC GLUCOMTR-MCNC: 99 MG/DL (ref 70–130)
HCT VFR BLD AUTO: 42.5 % (ref 37.5–51)
HGB BLD-MCNC: 14.3 G/DL (ref 13–17.7)
IMM GRANULOCYTES # BLD AUTO: 0.08 10*3/MM3 (ref 0–0.05)
IMM GRANULOCYTES NFR BLD AUTO: 0.6 % (ref 0–0.5)
INR PPP: 1.44 (ref 0.85–1.16)
LYMPHOCYTES # BLD AUTO: 1.32 10*3/MM3 (ref 0.7–3.1)
LYMPHOCYTES NFR BLD AUTO: 10.6 % (ref 19.6–45.3)
MAGNESIUM SERPL-MCNC: 2.4 MG/DL (ref 1.6–2.6)
MCH RBC QN AUTO: 31.4 PG (ref 26.6–33)
MCHC RBC AUTO-ENTMCNC: 33.6 G/DL (ref 31.5–35.7)
MCV RBC AUTO: 93.2 FL (ref 79–97)
MONOCYTES # BLD AUTO: 1.67 10*3/MM3 (ref 0.1–0.9)
MONOCYTES NFR BLD AUTO: 13.4 % (ref 5–12)
NEUTROPHILS # BLD AUTO: 9.25 10*3/MM3 (ref 1.7–7)
NEUTROPHILS NFR BLD AUTO: 74 % (ref 42.7–76)
NRBC BLD AUTO-RTO: 0 /100 WBC (ref 0–0.2)
NT-PROBNP SERPL-MCNC: 62.3 PG/ML (ref 5–450)
PHOSPHATE SERPL-MCNC: 1.5 MG/DL (ref 2.5–4.5)
PHOSPHATE SERPL-MCNC: 1.5 MG/DL (ref 2.5–4.5)
PLATELET # BLD AUTO: 205 10*3/MM3 (ref 140–450)
PMV BLD AUTO: 10.8 FL (ref 6–12)
POTASSIUM BLD-SCNC: 3.7 MMOL/L (ref 3.5–5.2)
PROT SERPL-MCNC: 7 G/DL (ref 6–8.5)
PROTHROMBIN TIME: 16.9 SECONDS (ref 11.2–14.3)
RBC # BLD AUTO: 4.56 10*6/MM3 (ref 4.14–5.8)
SODIUM BLD-SCNC: 140 MMOL/L (ref 136–145)
WBC NRBC COR # BLD: 12.5 10*3/MM3 (ref 3.4–10.8)

## 2020-02-01 PROCEDURE — 25010000002 METOCLOPRAMIDE PER 10 MG: Performed by: SURGERY

## 2020-02-01 PROCEDURE — 80053 COMPREHEN METABOLIC PANEL: CPT | Performed by: INTERNAL MEDICINE

## 2020-02-01 PROCEDURE — 99232 SBSQ HOSP IP/OBS MODERATE 35: CPT | Performed by: INTERNAL MEDICINE

## 2020-02-01 PROCEDURE — 84100 ASSAY OF PHOSPHORUS: CPT | Performed by: INTERNAL MEDICINE

## 2020-02-01 PROCEDURE — 83880 ASSAY OF NATRIURETIC PEPTIDE: CPT | Performed by: INTERNAL MEDICINE

## 2020-02-01 PROCEDURE — 25010000002 HEPARIN (PORCINE) PER 1000 UNITS: Performed by: SURGERY

## 2020-02-01 PROCEDURE — 85610 PROTHROMBIN TIME: CPT | Performed by: SURGERY

## 2020-02-01 PROCEDURE — 25010000002 HYDROMORPHONE PER 4 MG: Performed by: SURGERY

## 2020-02-01 PROCEDURE — 82962 GLUCOSE BLOOD TEST: CPT

## 2020-02-01 PROCEDURE — 25010000002 LORAZEPAM PER 2 MG: Performed by: SURGERY

## 2020-02-01 PROCEDURE — 85025 COMPLETE CBC W/AUTO DIFF WBC: CPT | Performed by: INTERNAL MEDICINE

## 2020-02-01 PROCEDURE — 83735 ASSAY OF MAGNESIUM: CPT | Performed by: INTERNAL MEDICINE

## 2020-02-01 PROCEDURE — 83605 ASSAY OF LACTIC ACID: CPT | Performed by: INTERNAL MEDICINE

## 2020-02-01 PROCEDURE — 25010000002 PIPERACILLIN SOD-TAZOBACTAM PER 1 G: Performed by: SURGERY

## 2020-02-01 PROCEDURE — 25010000002 MORPHINE PER 10 MG: Performed by: SURGERY

## 2020-02-01 RX ORDER — BISACODYL 5 MG/1
10 TABLET, DELAYED RELEASE ORAL DAILY
Status: DISCONTINUED | OUTPATIENT
Start: 2020-02-01 | End: 2020-02-05

## 2020-02-01 RX ORDER — METOCLOPRAMIDE HYDROCHLORIDE 5 MG/ML
10 INJECTION INTRAMUSCULAR; INTRAVENOUS EVERY 6 HOURS SCHEDULED
Status: DISCONTINUED | OUTPATIENT
Start: 2020-02-01 | End: 2020-02-09

## 2020-02-01 RX ADMIN — PANTOPRAZOLE SODIUM 40 MG: 40 TABLET, DELAYED RELEASE ORAL at 06:17

## 2020-02-01 RX ADMIN — TRAZODONE HYDROCHLORIDE 50 MG: 50 TABLET ORAL at 21:50

## 2020-02-01 RX ADMIN — BISACODYL 10 MG: 5 TABLET, COATED ORAL at 09:02

## 2020-02-01 RX ADMIN — MORPHINE SULFATE 2 MG: 2 INJECTION, SOLUTION INTRAMUSCULAR; INTRAVENOUS at 04:52

## 2020-02-01 RX ADMIN — SODIUM CHLORIDE, POTASSIUM CHLORIDE, SODIUM LACTATE AND CALCIUM CHLORIDE 175 ML/HR: 600; 310; 30; 20 INJECTION, SOLUTION INTRAVENOUS at 00:30

## 2020-02-01 RX ADMIN — SODIUM CHLORIDE, PRESERVATIVE FREE 10 ML: 5 INJECTION INTRAVENOUS at 22:21

## 2020-02-01 RX ADMIN — METRONIDAZOLE 500 MG: 500 INJECTION, SOLUTION INTRAVENOUS at 16:15

## 2020-02-01 RX ADMIN — HEPARIN SODIUM 5000 UNITS: 5000 INJECTION, SOLUTION INTRAVENOUS; SUBCUTANEOUS at 21:50

## 2020-02-01 RX ADMIN — MORPHINE SULFATE 2 MG: 2 INJECTION, SOLUTION INTRAMUSCULAR; INTRAVENOUS at 00:31

## 2020-02-01 RX ADMIN — METOCLOPRAMIDE 10 MG: 5 INJECTION, SOLUTION INTRAMUSCULAR; INTRAVENOUS at 09:03

## 2020-02-01 RX ADMIN — METOCLOPRAMIDE 10 MG: 5 INJECTION, SOLUTION INTRAMUSCULAR; INTRAVENOUS at 11:29

## 2020-02-01 RX ADMIN — TAZOBACTAM SODIUM AND PIPERACILLIN SODIUM 3.38 G: 375; 3 INJECTION, SOLUTION INTRAVENOUS at 16:15

## 2020-02-01 RX ADMIN — BUPROPION HYDROCHLORIDE 75 MG: 75 TABLET, FILM COATED ORAL at 06:17

## 2020-02-01 RX ADMIN — HEPARIN SODIUM 5000 UNITS: 5000 INJECTION, SOLUTION INTRAVENOUS; SUBCUTANEOUS at 13:02

## 2020-02-01 RX ADMIN — LORAZEPAM 1 MG: 2 INJECTION INTRAMUSCULAR; INTRAVENOUS at 04:53

## 2020-02-01 RX ADMIN — MORPHINE SULFATE 2 MG: 2 INJECTION, SOLUTION INTRAMUSCULAR; INTRAVENOUS at 11:22

## 2020-02-01 RX ADMIN — HYDROMORPHONE HYDROCHLORIDE 0.5 MG: 1 INJECTION, SOLUTION INTRAMUSCULAR; INTRAVENOUS; SUBCUTANEOUS at 02:18

## 2020-02-01 RX ADMIN — POTASSIUM & SODIUM PHOSPHATES POWDER PACK 280-160-250 MG 2 PACKET: 280-160-250 PACK at 06:18

## 2020-02-01 RX ADMIN — LORAZEPAM 1 MG: 2 INJECTION INTRAMUSCULAR; INTRAVENOUS at 00:31

## 2020-02-01 RX ADMIN — METOCLOPRAMIDE 10 MG: 5 INJECTION, SOLUTION INTRAMUSCULAR; INTRAVENOUS at 18:13

## 2020-02-01 RX ADMIN — DOCUSATE SODIUM 100 MG: 100 CAPSULE, LIQUID FILLED ORAL at 09:02

## 2020-02-01 RX ADMIN — BUPROPION HYDROCHLORIDE 75 MG: 75 TABLET, FILM COATED ORAL at 11:29

## 2020-02-01 RX ADMIN — BUPROPION HYDROCHLORIDE 75 MG: 75 TABLET, FILM COATED ORAL at 18:01

## 2020-02-01 RX ADMIN — SODIUM CHLORIDE, POTASSIUM CHLORIDE, SODIUM LACTATE AND CALCIUM CHLORIDE 125 ML/HR: 600; 310; 30; 20 INJECTION, SOLUTION INTRAVENOUS at 22:09

## 2020-02-01 RX ADMIN — SODIUM CHLORIDE, POTASSIUM CHLORIDE, SODIUM LACTATE AND CALCIUM CHLORIDE 125 ML/HR: 600; 310; 30; 20 INJECTION, SOLUTION INTRAVENOUS at 13:01

## 2020-02-01 RX ADMIN — TAZOBACTAM SODIUM AND PIPERACILLIN SODIUM 3.38 G: 375; 3 INJECTION, SOLUTION INTRAVENOUS at 09:03

## 2020-02-01 RX ADMIN — BUPROPION HYDROCHLORIDE 75 MG: 75 TABLET, FILM COATED ORAL at 00:00

## 2020-02-01 RX ADMIN — TAZOBACTAM SODIUM AND PIPERACILLIN SODIUM 3.38 G: 375; 3 INJECTION, SOLUTION INTRAVENOUS at 00:30

## 2020-02-01 RX ADMIN — HYDROCODONE BITARTRATE AND ACETAMINOPHEN 1 TABLET: 7.5; 325 TABLET ORAL at 09:03

## 2020-02-01 RX ADMIN — HYDROCODONE BITARTRATE AND ACETAMINOPHEN 1 TABLET: 7.5; 325 TABLET ORAL at 13:01

## 2020-02-01 RX ADMIN — MORPHINE SULFATE 2 MG: 2 INJECTION, SOLUTION INTRAMUSCULAR; INTRAVENOUS at 06:54

## 2020-02-01 RX ADMIN — METRONIDAZOLE 500 MG: 500 INJECTION, SOLUTION INTRAVENOUS at 09:18

## 2020-02-01 RX ADMIN — DULOXETINE 30 MG: 30 CAPSULE, DELAYED RELEASE ORAL at 09:03

## 2020-02-01 RX ADMIN — POTASSIUM & SODIUM PHOSPHATES POWDER PACK 280-160-250 MG 2 PACKET: 280-160-250 PACK at 16:30

## 2020-02-01 RX ADMIN — HEPARIN SODIUM 5000 UNITS: 5000 INJECTION, SOLUTION INTRAVENOUS; SUBCUTANEOUS at 06:17

## 2020-02-01 NOTE — NURSING NOTE
Stoma looks good. Appliance is intact. Serosanguinous drainage noted. No flatus or stool. NT-tube is anchored. POC discussed with patient. Will follow daily for stomal support and performed extensive stoma education with patient when appropriate. Thanks

## 2020-02-01 NOTE — PLAN OF CARE
Problem: Patient Care Overview  Goal: Plan of Care Review  Outcome: Ongoing (interventions implemented as appropriate)  Flowsheets  Taken 2/1/2020 1714  Progress: improving  Taken 2/1/2020 1600  Plan of Care Reviewed With: patient  Note:   VSS. NVI. Pain well controlled with PO pain medication and intermittent morphine for breakthrough pain. Pt having intermittent suction and tolerating ice chips well. Will cont to monitor.

## 2020-02-01 NOTE — PLAN OF CARE
Problem: Patient Care Overview  Goal: Plan of Care Review  Outcome: Ongoing (interventions implemented as appropriate)  Flowsheets (Taken 2/1/2020 1911)  Progress: improving  Plan of Care Reviewed With: patient  Note:   PT VSS. AO x4. However, Pt has brief intermit episodes of medication-related confusion at times. HR continues to be -125 bpm. Abdominal pain rated 3-9/10 and addressed with PCA pain pump and PRN pain meds. Pt abdominal dressing changed and tolerated well. Pt's wounds look pink with clear serosanguinous scant drainage; the wound appears to be healing well. Pt voiding without difficulty post gonzales cath removal. WCTM.

## 2020-02-01 NOTE — PROGRESS NOTES
"Patient Name:  Brigida Rodriguez  YOB: 1980  [id number]    Surgery Progress Note    Date of visit: 2/1/2020    Subjective   Subjective: Feels better, pain better controlled. High NG output, but partly due to amount of ice chips ingested.         Objective     Objective:     BP (!) 165/113   Pulse 117   Temp 98.8 °F (37.1 °C) (Axillary)   Resp 22   Ht 193 cm (75.98\")   Wt (!) 166 kg (365 lb 11.9 oz)   SpO2 93%   BMI 44.54 kg/m²     Intake/Output Summary (Last 24 hours) at 2/1/2020 0743  Last data filed at 2/1/2020 0600  Gross per 24 hour   Intake 4151.88 ml   Output 7072 ml   Net -2920.12 ml       CV:  Rhythm  regular and rate regular   L:  Clear  to auscultation bilaterally   Abd:  Bowel sounds hypoactive, soft, appropriately tender. Ostomy pink, viable. TAMARA serous. Dressings c/d/i   Ext:  No cyanosis, clubbing, edema    Recent labs that are back at this time have been reviewed.        Assessment/Plan     Assessment/ Plan:    Hospital Problem List     * (Principal) Diverticulitis - Stable after resection. Continue NG. Increase mobility.       Chronic congestive heart failure. Data deficit (CMS/HCC)        Essential hypertension        Generalized anxiety disorder        Recurrent major depressive disorder (CMS/HCC)            Morbidly obese (CMS/HCC)        Peritonitis (CMS/HCC)    Perforated diverticulum of sigmoid colon    S/P exploratory laparotomy with sigmoid colectomy/end colostomy/enterotomy repair 1/31/20    Active Tobacco use              Taye Valenzuela MD  2/1/2020  7:43 AM      "

## 2020-02-01 NOTE — PROGRESS NOTES
Intensivist Note     2/1/2020  Hospital Day: 4  2 Days Post-Op  ICU Stays Timeline      Dates and times are displayed in the time zone of the admission          Hospital Admission: 01/28/20 0022 - Current  ICU stays: 1      In Date/Time Event Department ICU Stay Duration     01/28/20 0022 Admission  [facility]      01/28/20 0436 Transfer In  [facility] 5F      01/30/20 1709 Transfer In  [facility]      01/31/20 0035 Transfer In  [facility] 2B ICU 1 day 15 hours 8 minutes                Mr. Brigida Rodriguez, 40 y.o. male is followed for:    Diverticulitis    Perforated diverticulum of sigmoid colon    S/P exploratory laparotomy with sigmoid colectomy/end colostomy/enterotomy repair 1/31/20    Peritonitis (CMS/HCC)    Chronic congestive heart failure. 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  Patient was admitted 1/28/2020 with 2-day history of epigastric pain and a CT of the abdomen/pelvis showing acute sigmoid diverticulitis.  Also noted on CT RIVERS was chronic occlusion of the IVC.  Was placed on Zosyn and pain medications but developed severe leukocytosis, worsening pain, and repeat CT scan suggested bowel obstruction.  Was taken to the OR 1/30/2020 and underwent exploratory laparotomy, sigmoid colectomy and end colostomy.was noted to have massively dilated bowel requiring enterotomy for decompression with subsequent repair.  Was then transferred to the ICU.    Interval history: No problems overnight.  He has the usual amount of postoperative pain and is receiving Dilaudid PCA without plaints of nausea  "or vomiting..  Does have significant NG output but this is due to oral intake of ice chips.  Urine output is excellent and renal function looks good on labs.  Remains on lactated Ringer's at 125 cc an hour.  No fever noted and WBC down to 12,000.  Remains tachycardic with  bpm, but no chest pain, edema, pleurisy, or complaints of dyspnea.  (O2 sats are excellent on room air).    ROS: Per subjective, all other systems reviewed and were negative.    The patient's relevant PMH, PSH, FH, and SH were reviewed and updated in Epic as appropriate. Allergies and Medications reviewed.    OBJECTIVE     /95   Pulse 99   Temp 98.8 °F (37.1 °C) (Axillary)   Resp 22   Ht 193 cm (75.98\")   Wt (!) 166 kg (365 lb 11.9 oz)   SpO2 93%   BMI 44.54 kg/m²      Presently on room air    Flowsheet Rows      First Filed Value   Admission Height  193 cm (76\") Documented at 01/28/2020 0031   Admission Weight  (!) 170 kg (375 lb) Documented at 01/28/2020 0031        Intake & Output (last day)       01/31 0701 - 02/01 0700 02/01 0701 - 02/02 0700    P.O. 360     I.V. (mL/kg) 4010.8 (24.2) 893.1 (5.4)    Blood      IV Piggyback 69 77.1    Total Intake(mL/kg) 4439.8 (26.7) 970.2 (5.8)    Urine (mL/kg/hr) 2225 (0.6)     Emesis/NG output 4900     Drains 22 50    Stool 50     Blood      Total Output 7197 50    Net -2757.2 +920.2                Exam:  General Exam:  Well-developed overweight middle-aged black male propped up in bed in NAD  HEENT: Pupils equal and reactive.  NG tube to low wall suction  Neck:                          Supple, no JVD, thyromegaly, or adenopathy  Lungs: Clear anteriorly, laterally, posteriorly.  Cardiovascular: Regular rate and rhythm without murmurs or gallops.  Abdomen: Diffuse postop tenderness without organomegaly or masses. No peritoneal signs. Diminished bowel sounds.  Colostomy functioning and TAMARA drain in place   and rectal: Deferred.  Extremities: No cyanosis clubbing edema.  Neurologic:        "          Symmetric strength. No focal deficits.  Oriented x3    Chest X-Ray: No film today    INFUSIONS    HYDROmorphone HCl-NaCl   PCA pump   lactated ringers 125 mL/hr Last Rate: 125 mL/hr (02/01/20 1301)       Results from last 7 days   Lab Units 02/01/20  0320 01/31/20  0541 01/30/20  1734   WBC 10*3/mm3 12.50* 16.29* 13.14*   HEMOGLOBIN g/dL 14.3 16.7 15.6   HEMATOCRIT % 42.5 50.5 46.5   PLATELETS 10*3/mm3 205 246 209     Results from last 7 days   Lab Units 02/01/20  0320 01/31/20  0541   SODIUM mmol/L 140 139   POTASSIUM mmol/L 3.7 3.9   CHLORIDE mmol/L 98 99   CO2 mmol/L 28.0 24.0   BUN mg/dL 24* 31*   CREATININE mg/dL 1.09 1.18   GLUCOSE mg/dL 117* 188*   CALCIUM mg/dL 8.7 8.6     Results from last 7 days   Lab Units 02/01/20  1156 02/01/20  0320 01/31/20  0541   MAGNESIUM mg/dL  --  2.4 2.1   PHOSPHORUS mg/dL 1.5* 1.5* 4.5     Results from last 7 days   Lab Units 02/01/20  0320 01/31/20  0541 01/29/20  2043   ALK PHOS U/L 45 43 56   BILIRUBIN mg/dL 3.0* 2.6* 1.4*   ALT (SGPT) U/L 10 10 9   AST (SGOT) U/L 15 18 9       No results found for: SEDRATE  No results found for: BNP  Lab Results   Component Value Date    TROPONINT <0.010 01/28/2020     No results found for: TSH  Lab Results   Component Value Date    LACTATE 1.4 02/01/2020     No results found for: CORTISOL        I reviewed the patient's results, images and medication.    Assessment/Plan   ASSESSMENT        Diverticulitis    Perforated diverticulum of sigmoid colon    S/P exploratory laparotomy with sigmoid colectomy/end colostomy/enterotomy repair 1/31/20    Peritonitis (CMS/HCC)    Chronic congestive heart failure. 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  Remains on Zosyn and  metronidazole because of his perforated diverticulum but he is afebrile and WBC coming down.  No pulmonary or cardiac complications noted.  Is on appropriate DVT prophylaxis with subcu heparin and ulcer prophylaxis with pantoprazole.    PLAN     1.  Continue antimicrobial therapy  2.  Continue ulcer and DVT prophylaxis  3.  Mobilize up in chair  4.  Hopefully will continue to rapidly improve and allow oral intake as determined by surgery.  5.  Incentive spirometry/pulmonary toilet  6.  Should be able to back off on fluids tomorrow depending on NG output    Plan of care and goals reviewed with mulitdisciplinary team at daily rounds.    I discussed the patient's findings and my recommendations with patient and nursing staff        Time spent Critical care 25 min (It does not include procedure time).    Imtiaz Grissom MD  Intensive Care Medicine  02/01/20 3:43 PM

## 2020-02-01 NOTE — PROGRESS NOTES
Adult Nutrition  Assessment/PES    Patient Name:  Brigida Rodriguez  YOB: 1980  MRN: [id number]  Admit Date:  1/28/2020    Assessment Date:  2/1/2020    Comments:  Pt NPO>/= 72 hrs; if unable to use consider PN , RD and pharmacy available per consult.    Reason for Assessment     Row Name 02/01/20 1544          Reason for Assessment    Reason For Assessment  per organizational policy NPO>/= 72 hrs     Diagnosis  gastrointestinal disease Pt adm w/ perforated diverticulum s/p exp lapr w/ sigmid colectomy and end colostomy, enterotomy repair (1/30) , peritonitis Hx: HTN,HF, PE , diverticulitis     Identified At Risk by Screening Criteria  no indicators present         Nutrition/Diet History     Row Name 02/01/20 1547          Nutrition/Diet History    Typical Food/Fluid Intake  RN reports pt w/ significant amount of NGT output d/t pt consuming signifcant amount of ice chips     Factors Affecting Nutritional Intake  altered gastrointestinal function           Labs/Tests/Procedures/Meds     Row Name 02/01/20 1549          Labs/Procedures/Meds    Lab Results Reviewed  reviewed, pertinent     Lab Results Comments  low phos and K+ noted        Diagnostic Tests/Procedures    Diagnostic Test/Procedures Comments  OP note, surgery notes        Medications    Pertinent Medications Reviewed  reviewed, pertinent     Pertinent Medications Comments  reglan added         Physical Findings     Row Name 02/01/20 1605          Physical Findings    Overall Physical Appearance  obese     Tubes  nasogastric tube     Skin  surgical incision           Nutrition Prescription Ordered     Row Name 02/01/20 1605          Nutrition Prescription PO    Current PO Diet  NPO         Evaluation of Received Nutrient/Fluid Intake     Row Name 02/01/20 1605          PO Evaluation    Number of Days PO Intake Evaluated  3 days     Number of Meals  0     % PO Intake  NPO since adm 1/28               Problem/Interventions:  Problem 1      Row Name 02/01/20 1606          Nutrition Diagnoses Problem 1    Problem 1  Inadequate Intake/Infusion     Inadequate Intake Type  Oral     Macronutrient  Kcal;Protein     Micronutrient  Vitamin;Mineral     Etiology (related to)  Medical Diagnosis     Gastrointestinal  Bowel perforation;Bowel resection     Signs/Symptoms (evidenced by)  NPO NPO >/= 72 hrs; consider PN if unable to use gut               Intervention Goal     Row Name 02/01/20 1607          Intervention Goal    General  Nutrition support treatment;Meet nutritional needs for age/condition     PO  Initiate feeding     TF/PN  Inititiate TF/PN         Nutrition Intervention     Row Name 02/01/20 1607          Nutrition Intervention    RD/Tech Action  Await begin PO;Follow Tx progress;Care plan reviewd           Education/Evaluation     Row Name 02/01/20 1607          Monitor/Evaluation    Monitor  Per protocol;I&O;Pertinent labs;Symptoms;Skin status           Electronically signed by:  Lana Butterfield MS,RD,LD  02/01/20 4:08 PM

## 2020-02-02 ENCOUNTER — APPOINTMENT (OUTPATIENT)
Dept: CARDIOLOGY | Facility: HOSPITAL | Age: 40
End: 2020-02-02

## 2020-02-02 LAB
ALBUMIN SERPL-MCNC: 3 G/DL (ref 3.5–5.2)
ANION GAP SERPL CALCULATED.3IONS-SCNC: 12 MMOL/L (ref 5–15)
BASOPHILS # BLD MANUAL: 0 10*3/MM3 (ref 0–0.2)
BASOPHILS NFR BLD AUTO: 0 % (ref 0–1.5)
BH CV ECHO MEAS - AO MAX PG (FULL): 0.72 MMHG
BH CV ECHO MEAS - AO MAX PG: 3.8 MMHG
BH CV ECHO MEAS - AO MEAN PG (FULL): 0.9 MMHG
BH CV ECHO MEAS - AO MEAN PG: 2.2 MMHG
BH CV ECHO MEAS - AO ROOT AREA (BSA CORRECTED): 1.3
BH CV ECHO MEAS - AO ROOT AREA: 11.4 CM^2
BH CV ECHO MEAS - AO ROOT DIAM: 3.8 CM
BH CV ECHO MEAS - AO V2 MAX: 97.1 CM/SEC
BH CV ECHO MEAS - AO V2 MEAN: 65.9 CM/SEC
BH CV ECHO MEAS - AO V2 VTI: 16 CM
BH CV ECHO MEAS - AVA(I,A): 4.1 CM^2
BH CV ECHO MEAS - AVA(I,D): 4.1 CM^2
BH CV ECHO MEAS - AVA(V,A): 4.6 CM^2
BH CV ECHO MEAS - AVA(V,D): 4.6 CM^2
BH CV ECHO MEAS - BSA(HAYCOCK): 3.1 M^2
BH CV ECHO MEAS - BSA: 2.9 M^2
BH CV ECHO MEAS - BZI_BMI: 44.8 KILOGRAMS/M^2
BH CV ECHO MEAS - BZI_METRIC_HEIGHT: 193 CM
BH CV ECHO MEAS - BZI_METRIC_WEIGHT: 166.9 KG
BH CV ECHO MEAS - EDV(CUBED): 81.5 ML
BH CV ECHO MEAS - EDV(MOD-SP2): 102 ML
BH CV ECHO MEAS - EDV(MOD-SP4): 120 ML
BH CV ECHO MEAS - EDV(TEICH): 84.7 ML
BH CV ECHO MEAS - EF(CUBED): 72.2 %
BH CV ECHO MEAS - EF(MOD-BP): 65 %
BH CV ECHO MEAS - EF(MOD-SP2): 67.6 %
BH CV ECHO MEAS - EF(MOD-SP4): 63.3 %
BH CV ECHO MEAS - EF(TEICH): 64.2 %
BH CV ECHO MEAS - ESV(CUBED): 22.7 ML
BH CV ECHO MEAS - ESV(MOD-SP2): 33 ML
BH CV ECHO MEAS - ESV(MOD-SP4): 44 ML
BH CV ECHO MEAS - ESV(TEICH): 30.3 ML
BH CV ECHO MEAS - FS: 34.7 %
BH CV ECHO MEAS - IVS/LVPW: 0.81
BH CV ECHO MEAS - IVSD: 1.1 CM
BH CV ECHO MEAS - LA DIMENSION: 3.6 CM
BH CV ECHO MEAS - LA/AO: 0.94
BH CV ECHO MEAS - LAD MAJOR: 4.7 CM
BH CV ECHO MEAS - LAT PEAK E' VEL: 7.9 CM/SEC
BH CV ECHO MEAS - LATERAL E/E' RATIO: 7.1
BH CV ECHO MEAS - LV DIASTOLIC VOL/BSA (35-75): 41.8 ML/M^2
BH CV ECHO MEAS - LV MASS(C)D: 162.6 GRAMS
BH CV ECHO MEAS - LV MASS(C)DI: 56.6 GRAMS/M^2
BH CV ECHO MEAS - LV MAX PG: 3 MMHG
BH CV ECHO MEAS - LV MEAN PG: 1.3 MMHG
BH CV ECHO MEAS - LV SYSTOLIC VOL/BSA (12-30): 15.3 ML/M^2
BH CV ECHO MEAS - LV V1 MAX: 87.3 CM/SEC
BH CV ECHO MEAS - LV V1 MEAN: 49.6 CM/SEC
BH CV ECHO MEAS - LV V1 VTI: 12.8 CM
BH CV ECHO MEAS - LVIDD: 4.3 CM
BH CV ECHO MEAS - LVIDS: 2.8 CM
BH CV ECHO MEAS - LVLD AP2: 8.6 CM
BH CV ECHO MEAS - LVLD AP4: 8.2 CM
BH CV ECHO MEAS - LVLS AP2: 6.4 CM
BH CV ECHO MEAS - LVLS AP4: 5.9 CM
BH CV ECHO MEAS - LVOT AREA (M): 4.9 CM^2
BH CV ECHO MEAS - LVOT AREA: 5.1 CM^2
BH CV ECHO MEAS - LVOT DIAM: 2.5 CM
BH CV ECHO MEAS - LVPWD: 1.1 CM
BH CV ECHO MEAS - MED PEAK E' VEL: 7.9 CM/SEC
BH CV ECHO MEAS - MEDIAL E/E' RATIO: 7.1
BH CV ECHO MEAS - MV A MAX VEL: 48.8 CM/SEC
BH CV ECHO MEAS - MV DEC TIME: 0.24 SEC
BH CV ECHO MEAS - MV E MAX VEL: 57.3 CM/SEC
BH CV ECHO MEAS - MV E/A: 1.2
BH CV ECHO MEAS - MV MAX PG: 2 MMHG
BH CV ECHO MEAS - MV MEAN PG: 1.2 MMHG
BH CV ECHO MEAS - MV V2 MAX: 70 CM/SEC
BH CV ECHO MEAS - MV V2 MEAN: 53.1 CM/SEC
BH CV ECHO MEAS - MV V2 VTI: 20 CM
BH CV ECHO MEAS - MVA(VTI): 3.3 CM^2
BH CV ECHO MEAS - PA ACC SLOPE: 780.5 CM/SEC^2
BH CV ECHO MEAS - PA ACC TIME: 0.13 SEC
BH CV ECHO MEAS - PA MAX PG: 5.5 MMHG
BH CV ECHO MEAS - PA PR(ACCEL): 20.4 MMHG
BH CV ECHO MEAS - PA V2 MAX: 116.7 CM/SEC
BH CV ECHO MEAS - SI(AO): 63.8 ML/M^2
BH CV ECHO MEAS - SI(CUBED): 20.5 ML/M^2
BH CV ECHO MEAS - SI(LVOT): 22.7 ML/M^2
BH CV ECHO MEAS - SI(MOD-SP2): 24 ML/M^2
BH CV ECHO MEAS - SI(MOD-SP4): 26.5 ML/M^2
BH CV ECHO MEAS - SI(TEICH): 18.9 ML/M^2
BH CV ECHO MEAS - SV(AO): 183.1 ML
BH CV ECHO MEAS - SV(CUBED): 58.9 ML
BH CV ECHO MEAS - SV(LVOT): 65.1 ML
BH CV ECHO MEAS - SV(MOD-SP2): 69 ML
BH CV ECHO MEAS - SV(MOD-SP4): 76 ML
BH CV ECHO MEAS - SV(TEICH): 54.4 ML
BH CV ECHO MEAS - TAPSE (>1.6): 2.7 CM2
BH CV ECHO MEASUREMENTS AVERAGE E/E' RATIO: 7.25
BH CV VAS BP RIGHT ARM: NORMAL MMHG
BH CV XLRA - RV BASE: 2.4 CM
BH CV XLRA - RV LENGTH: 7 CM
BH CV XLRA - RV MID: 1.5 CM
BH CV XLRA - TDI S': 15.8 CM/SEC
BUN BLD-MCNC: 13 MG/DL (ref 6–20)
BUN/CREAT SERPL: 13.7 (ref 7–25)
CALCIUM SPEC-SCNC: 8.7 MG/DL (ref 8.6–10.5)
CHLORIDE SERPL-SCNC: 99 MMOL/L (ref 98–107)
CO2 SERPL-SCNC: 27 MMOL/L (ref 22–29)
CREAT BLD-MCNC: 0.95 MG/DL (ref 0.76–1.27)
DEPRECATED RDW RBC AUTO: 47.5 FL (ref 37–54)
EOSINOPHIL # BLD MANUAL: 0.73 10*3/MM3 (ref 0–0.4)
EOSINOPHIL NFR BLD MANUAL: 6.1 % (ref 0.3–6.2)
ERYTHROCYTE [DISTWIDTH] IN BLOOD BY AUTOMATED COUNT: 14 % (ref 12.3–15.4)
GFR SERPL CREATININE-BSD FRML MDRD: 106 ML/MIN/1.73
GLUCOSE BLD-MCNC: 94 MG/DL (ref 65–99)
GLUCOSE BLDC GLUCOMTR-MCNC: 79 MG/DL (ref 70–130)
GLUCOSE BLDC GLUCOMTR-MCNC: 80 MG/DL (ref 70–130)
GLUCOSE BLDC GLUCOMTR-MCNC: 82 MG/DL (ref 70–130)
GLUCOSE BLDC GLUCOMTR-MCNC: 89 MG/DL (ref 70–130)
HCT VFR BLD AUTO: 37.2 % (ref 37.5–51)
HGB BLD-MCNC: 12.4 G/DL (ref 13–17.7)
INR PPP: 1.43 (ref 0.85–1.16)
LEFT ATRIUM VOLUME INDEX: 18.1 ML/M^2
LEFT ATRIUM VOLUME: 52 ML
LV EF 2D ECHO EST: 65 %
LYMPHOCYTES # BLD MANUAL: 2.05 10*3/MM3 (ref 0.7–3.1)
LYMPHOCYTES NFR BLD MANUAL: 12.1 % (ref 5–12)
LYMPHOCYTES NFR BLD MANUAL: 17.2 % (ref 19.6–45.3)
MAXIMAL PREDICTED HEART RATE: 180 BPM
MCH RBC QN AUTO: 31.1 PG (ref 26.6–33)
MCHC RBC AUTO-ENTMCNC: 33.3 G/DL (ref 31.5–35.7)
MCV RBC AUTO: 93.2 FL (ref 79–97)
MONOCYTES # BLD AUTO: 1.44 10*3/MM3 (ref 0.1–0.9)
NEUTROPHILS # BLD AUTO: 7.48 10*3/MM3 (ref 1.7–7)
NEUTROPHILS NFR BLD MANUAL: 61.6 % (ref 42.7–76)
NEUTS BAND NFR BLD MANUAL: 1 % (ref 0–5)
PHOSPHATE SERPL-MCNC: 1.7 MG/DL (ref 2.5–4.5)
PHOSPHATE SERPL-MCNC: 2.5 MG/DL (ref 2.5–4.5)
PLAT MORPH BLD: NORMAL
PLATELET # BLD AUTO: 197 10*3/MM3 (ref 140–450)
PMV BLD AUTO: 10.5 FL (ref 6–12)
POTASSIUM BLD-SCNC: 3.5 MMOL/L (ref 3.5–5.2)
PROTHROMBIN TIME: 16.8 SECONDS (ref 11.2–14.3)
RBC # BLD AUTO: 3.99 10*6/MM3 (ref 4.14–5.8)
RBC MORPH BLD: NORMAL
SMUDGE CELLS BLD QL SMEAR: ABNORMAL
SODIUM BLD-SCNC: 138 MMOL/L (ref 136–145)
STRESS TARGET HR: 153 BPM
VARIANT LYMPHS NFR BLD MANUAL: 2 % (ref 0–5)
WBC NRBC COR # BLD: 11.94 10*3/MM3 (ref 3.4–10.8)

## 2020-02-02 PROCEDURE — 85610 PROTHROMBIN TIME: CPT | Performed by: SURGERY

## 2020-02-02 PROCEDURE — 93306 TTE W/DOPPLER COMPLETE: CPT | Performed by: INTERNAL MEDICINE

## 2020-02-02 PROCEDURE — 93306 TTE W/DOPPLER COMPLETE: CPT

## 2020-02-02 PROCEDURE — 99232 SBSQ HOSP IP/OBS MODERATE 35: CPT | Performed by: INTERNAL MEDICINE

## 2020-02-02 PROCEDURE — 25010000002 METOCLOPRAMIDE PER 10 MG: Performed by: SURGERY

## 2020-02-02 PROCEDURE — 25010000002 HEPARIN (PORCINE) PER 1000 UNITS: Performed by: SURGERY

## 2020-02-02 PROCEDURE — 85025 COMPLETE CBC W/AUTO DIFF WBC: CPT | Performed by: SURGERY

## 2020-02-02 PROCEDURE — 25010000002 LORAZEPAM PER 2 MG: Performed by: SURGERY

## 2020-02-02 PROCEDURE — 25010000002 MORPHINE PER 10 MG: Performed by: SURGERY

## 2020-02-02 PROCEDURE — 84100 ASSAY OF PHOSPHORUS: CPT | Performed by: INTERNAL MEDICINE

## 2020-02-02 PROCEDURE — 85007 BL SMEAR W/DIFF WBC COUNT: CPT | Performed by: SURGERY

## 2020-02-02 PROCEDURE — 80069 RENAL FUNCTION PANEL: CPT | Performed by: SURGERY

## 2020-02-02 PROCEDURE — 82962 GLUCOSE BLOOD TEST: CPT

## 2020-02-02 PROCEDURE — 25010000002 PIPERACILLIN SOD-TAZOBACTAM PER 1 G: Performed by: SURGERY

## 2020-02-02 RX ORDER — POTASSIUM CHLORIDE 750 MG/1
40 CAPSULE, EXTENDED RELEASE ORAL AS NEEDED
Status: DISCONTINUED | OUTPATIENT
Start: 2020-02-02 | End: 2020-02-10 | Stop reason: HOSPADM

## 2020-02-02 RX ORDER — POTASSIUM CHLORIDE 1.5 G/1.77G
40 POWDER, FOR SOLUTION ORAL AS NEEDED
Status: DISCONTINUED | OUTPATIENT
Start: 2020-02-02 | End: 2020-02-10 | Stop reason: HOSPADM

## 2020-02-02 RX ADMIN — HEPARIN SODIUM 5000 UNITS: 5000 INJECTION, SOLUTION INTRAVENOUS; SUBCUTANEOUS at 06:11

## 2020-02-02 RX ADMIN — METRONIDAZOLE 500 MG: 500 INJECTION, SOLUTION INTRAVENOUS at 16:13

## 2020-02-02 RX ADMIN — TAZOBACTAM SODIUM AND PIPERACILLIN SODIUM 3.38 G: 375; 3 INJECTION, SOLUTION INTRAVENOUS at 08:35

## 2020-02-02 RX ADMIN — LORAZEPAM 1 MG: 2 INJECTION INTRAMUSCULAR; INTRAVENOUS at 01:22

## 2020-02-02 RX ADMIN — DULOXETINE 30 MG: 30 CAPSULE, DELAYED RELEASE ORAL at 08:33

## 2020-02-02 RX ADMIN — METOCLOPRAMIDE 10 MG: 5 INJECTION, SOLUTION INTRAMUSCULAR; INTRAVENOUS at 00:58

## 2020-02-02 RX ADMIN — PANTOPRAZOLE SODIUM 40 MG: 40 TABLET, DELAYED RELEASE ORAL at 06:11

## 2020-02-02 RX ADMIN — HEPARIN SODIUM 5000 UNITS: 5000 INJECTION, SOLUTION INTRAVENOUS; SUBCUTANEOUS at 14:35

## 2020-02-02 RX ADMIN — METOCLOPRAMIDE 10 MG: 5 INJECTION, SOLUTION INTRAMUSCULAR; INTRAVENOUS at 06:11

## 2020-02-02 RX ADMIN — METOCLOPRAMIDE 10 MG: 5 INJECTION, SOLUTION INTRAMUSCULAR; INTRAVENOUS at 11:23

## 2020-02-02 RX ADMIN — TAZOBACTAM SODIUM AND PIPERACILLIN SODIUM 3.38 G: 375; 3 INJECTION, SOLUTION INTRAVENOUS at 00:58

## 2020-02-02 RX ADMIN — POTASSIUM & SODIUM PHOSPHATES POWDER PACK 280-160-250 MG 2 PACKET: 280-160-250 PACK at 08:33

## 2020-02-02 RX ADMIN — HYDROCODONE BITARTRATE AND ACETAMINOPHEN 1 TABLET: 7.5; 325 TABLET ORAL at 21:22

## 2020-02-02 RX ADMIN — POTASSIUM & SODIUM PHOSPHATES POWDER PACK 280-160-250 MG 2 PACKET: 280-160-250 PACK at 21:08

## 2020-02-02 RX ADMIN — PANTOPRAZOLE SODIUM 40 MG: 40 TABLET, DELAYED RELEASE ORAL at 08:33

## 2020-02-02 RX ADMIN — METOCLOPRAMIDE 10 MG: 5 INJECTION, SOLUTION INTRAMUSCULAR; INTRAVENOUS at 18:24

## 2020-02-02 RX ADMIN — HEPARIN SODIUM 5000 UNITS: 5000 INJECTION, SOLUTION INTRAVENOUS; SUBCUTANEOUS at 21:08

## 2020-02-02 RX ADMIN — METRONIDAZOLE 500 MG: 500 INJECTION, SOLUTION INTRAVENOUS at 08:36

## 2020-02-02 RX ADMIN — POTASSIUM CHLORIDE 40 MEQ: 1.5 POWDER, FOR SOLUTION ORAL at 21:09

## 2020-02-02 RX ADMIN — LORAZEPAM 1 MG: 2 INJECTION INTRAMUSCULAR; INTRAVENOUS at 08:50

## 2020-02-02 RX ADMIN — MORPHINE SULFATE 2 MG: 2 INJECTION, SOLUTION INTRAMUSCULAR; INTRAVENOUS at 16:20

## 2020-02-02 RX ADMIN — SODIUM CHLORIDE, PRESERVATIVE FREE 10 ML: 5 INJECTION INTRAVENOUS at 08:39

## 2020-02-02 RX ADMIN — METRONIDAZOLE 500 MG: 500 INJECTION, SOLUTION INTRAVENOUS at 00:58

## 2020-02-02 RX ADMIN — SODIUM CHLORIDE, POTASSIUM CHLORIDE, SODIUM LACTATE AND CALCIUM CHLORIDE 125 ML/HR: 600; 310; 30; 20 INJECTION, SOLUTION INTRAVENOUS at 07:37

## 2020-02-02 RX ADMIN — BUPROPION HYDROCHLORIDE 75 MG: 75 TABLET, FILM COATED ORAL at 08:34

## 2020-02-02 RX ADMIN — BISACODYL 10 MG: 5 TABLET, COATED ORAL at 08:34

## 2020-02-02 RX ADMIN — BUPROPION HYDROCHLORIDE 75 MG: 75 TABLET, FILM COATED ORAL at 00:57

## 2020-02-02 RX ADMIN — TRAZODONE HYDROCHLORIDE 50 MG: 50 TABLET ORAL at 21:08

## 2020-02-02 RX ADMIN — TAZOBACTAM SODIUM AND PIPERACILLIN SODIUM 3.38 G: 375; 3 INJECTION, SOLUTION INTRAVENOUS at 16:13

## 2020-02-02 RX ADMIN — DOCUSATE SODIUM 100 MG: 100 CAPSULE, LIQUID FILLED ORAL at 21:07

## 2020-02-02 NOTE — PROGRESS NOTES
Nutrition Services    Patient Name:  Brigida Rodriguez  YOB: 1980  MRN: [id number]  Admit Date:  1/28/2020    Spoke w/ Dr. Valenzuela , he does not to begin PN w/ this pt. He believes he will be able to start po diet tomorrow.    Electronically signed by:  Lana Butterfield MS,RD,LD  02/02/20 9:09 AM

## 2020-02-02 NOTE — PROGRESS NOTES
"Patient Name:  Brigida Rodriguez  YOB: 1980  [id number]    Surgery Progress Note    Date of visit: 2/2/2020    Subjective   Subjective: Pain control adequate. Up in chair. Some output from colostomy         Objective     Objective:     /91 (BP Location: Right leg, Patient Position: Lying)   Pulse 106   Temp 97 °F (36.1 °C) (Axillary)   Resp 18   Ht 193 cm (75.98\")   Wt (!) 166 kg (365 lb 11.9 oz)   SpO2 93%   BMI 44.54 kg/m²     Intake/Output Summary (Last 24 hours) at 2/2/2020 0838  Last data filed at 2/2/2020 0600  Gross per 24 hour   Intake 3644.74 ml   Output 5670 ml   Net -2025.26 ml       CV:  Rhythm  regular and rate mildly tachy  L:  Clear  to auscultation bilaterally   Abd:  Bowel sounds hypoactive, soft, appropriately tender. TAMARA serous. Ostomy pink with some stool output  Ext:  No cyanosis, clubbing, edema    Recent labs that are back at this time have been reviewed.        Assessment/Plan     Assessment/ Plan:    Hospital Problem List     * (Principal) Diverticulitis - Slow improvement. Given significant distention and serosal tears, would wait 1 more day prior to starting PO. Continue IVF. Increase mobility. Plan for transfer to floor tomorrow.      Chronic congestive heart failure. 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

## 2020-02-02 NOTE — NURSING NOTE
No issues at this time. Appliance intact. Stoma viable. Encouraged ambulation. Will plan to meet patient and patients mother tomorrow for extensive stomal education in th AM. Will continue to follow daily for stomal support. Thanks

## 2020-02-02 NOTE — PLAN OF CARE
Problem: Patient Care Overview  Goal: Plan of Care Review  Outcome: Ongoing (interventions implemented as appropriate)  Flowsheets (Taken 2/2/2020 1600)  Plan of Care Reviewed With: patient  Note:   RAMESH CLARK. Pt complains of severe pain in the L AC from IV infiltration. Pt refuses to ambulate and states he is in too much pain. Pt educated on importance of activity in healing process. Will cont to monitor.

## 2020-02-03 ENCOUNTER — APPOINTMENT (OUTPATIENT)
Dept: GENERAL RADIOLOGY | Facility: HOSPITAL | Age: 40
End: 2020-02-03

## 2020-02-03 LAB
ALBUMIN SERPL-MCNC: 3.5 G/DL (ref 3.5–5.2)
ALBUMIN/GLOB SERPL: 0.9 G/DL
ALP SERPL-CCNC: 43 U/L (ref 39–117)
ALT SERPL W P-5'-P-CCNC: 13 U/L (ref 1–41)
ANION GAP SERPL CALCULATED.3IONS-SCNC: 14 MMOL/L (ref 5–15)
AST SERPL-CCNC: 18 U/L (ref 1–40)
BASOPHILS # BLD AUTO: 0.06 10*3/MM3 (ref 0–0.2)
BASOPHILS NFR BLD AUTO: 0.7 % (ref 0–1.5)
BILIRUB SERPL-MCNC: 2.2 MG/DL (ref 0.2–1.2)
BUN BLD-MCNC: 11 MG/DL (ref 6–20)
BUN/CREAT SERPL: 11.8 (ref 7–25)
CALCIUM SPEC-SCNC: 8.9 MG/DL (ref 8.6–10.5)
CHLORIDE SERPL-SCNC: 99 MMOL/L (ref 98–107)
CO2 SERPL-SCNC: 26 MMOL/L (ref 22–29)
CREAT BLD-MCNC: 0.93 MG/DL (ref 0.76–1.27)
DEPRECATED RDW RBC AUTO: 49.3 FL (ref 37–54)
EOSINOPHIL # BLD AUTO: 0.36 10*3/MM3 (ref 0–0.4)
EOSINOPHIL NFR BLD AUTO: 4 % (ref 0.3–6.2)
ERYTHROCYTE [DISTWIDTH] IN BLOOD BY AUTOMATED COUNT: 14.3 % (ref 12.3–15.4)
GFR SERPL CREATININE-BSD FRML MDRD: 109 ML/MIN/1.73
GLOBULIN UR ELPH-MCNC: 4 GM/DL
GLUCOSE BLD-MCNC: 84 MG/DL (ref 65–99)
HCT VFR BLD AUTO: 37.9 % (ref 37.5–51)
HGB BLD-MCNC: 12.2 G/DL (ref 13–17.7)
IMM GRANULOCYTES # BLD AUTO: 0.12 10*3/MM3 (ref 0–0.05)
IMM GRANULOCYTES NFR BLD AUTO: 1.3 % (ref 0–0.5)
INR PPP: 1.38 (ref 0.85–1.16)
LYMPHOCYTES # BLD AUTO: 1.65 10*3/MM3 (ref 0.7–3.1)
LYMPHOCYTES NFR BLD AUTO: 18.2 % (ref 19.6–45.3)
MAGNESIUM SERPL-MCNC: 2.2 MG/DL (ref 1.6–2.6)
MCH RBC QN AUTO: 30.5 PG (ref 26.6–33)
MCHC RBC AUTO-ENTMCNC: 32.2 G/DL (ref 31.5–35.7)
MCV RBC AUTO: 94.8 FL (ref 79–97)
MONOCYTES # BLD AUTO: 0.74 10*3/MM3 (ref 0.1–0.9)
MONOCYTES NFR BLD AUTO: 8.2 % (ref 5–12)
NEUTROPHILS # BLD AUTO: 6.14 10*3/MM3 (ref 1.7–7)
NEUTROPHILS NFR BLD AUTO: 67.6 % (ref 42.7–76)
NRBC BLD AUTO-RTO: 0 /100 WBC (ref 0–0.2)
PHOSPHATE SERPL-MCNC: 2.7 MG/DL (ref 2.5–4.5)
PLAT MORPH BLD: NORMAL
PLATELET # BLD AUTO: 208 10*3/MM3 (ref 140–450)
PMV BLD AUTO: 11 FL (ref 6–12)
POTASSIUM BLD-SCNC: 3.5 MMOL/L (ref 3.5–5.2)
POTASSIUM BLD-SCNC: 3.5 MMOL/L (ref 3.5–5.2)
PROT SERPL-MCNC: 7.5 G/DL (ref 6–8.5)
PROTHROMBIN TIME: 16.3 SECONDS (ref 11.2–14.3)
RBC # BLD AUTO: 4 10*6/MM3 (ref 4.14–5.8)
RBC MORPH BLD: NORMAL
SODIUM BLD-SCNC: 139 MMOL/L (ref 136–145)
WBC MORPH BLD: NORMAL
WBC NRBC COR # BLD: 9.07 10*3/MM3 (ref 3.4–10.8)

## 2020-02-03 PROCEDURE — 80053 COMPREHEN METABOLIC PANEL: CPT | Performed by: SURGERY

## 2020-02-03 PROCEDURE — 25010000002 ONDANSETRON PER 1 MG: Performed by: SURGERY

## 2020-02-03 PROCEDURE — 83735 ASSAY OF MAGNESIUM: CPT | Performed by: INTERNAL MEDICINE

## 2020-02-03 PROCEDURE — 85007 BL SMEAR W/DIFF WBC COUNT: CPT | Performed by: SURGERY

## 2020-02-03 PROCEDURE — 25010000002 MORPHINE PER 10 MG: Performed by: SURGERY

## 2020-02-03 PROCEDURE — 84132 ASSAY OF SERUM POTASSIUM: CPT | Performed by: NURSE PRACTITIONER

## 2020-02-03 PROCEDURE — 85025 COMPLETE CBC W/AUTO DIFF WBC: CPT | Performed by: SURGERY

## 2020-02-03 PROCEDURE — 94799 UNLISTED PULMONARY SVC/PX: CPT

## 2020-02-03 PROCEDURE — 25010000002 METHYLNALTREXONE 12 MG/0.6ML SOLUTION: Performed by: INTERNAL MEDICINE

## 2020-02-03 PROCEDURE — 85610 PROTHROMBIN TIME: CPT | Performed by: SURGERY

## 2020-02-03 PROCEDURE — 25010000002 LORAZEPAM PER 2 MG: Performed by: SURGERY

## 2020-02-03 PROCEDURE — 25010000002 PIPERACILLIN SOD-TAZOBACTAM PER 1 G: Performed by: INTERNAL MEDICINE

## 2020-02-03 PROCEDURE — 99233 SBSQ HOSP IP/OBS HIGH 50: CPT | Performed by: INTERNAL MEDICINE

## 2020-02-03 PROCEDURE — 71045 X-RAY EXAM CHEST 1 VIEW: CPT

## 2020-02-03 PROCEDURE — 25010000002 PIPERACILLIN SOD-TAZOBACTAM PER 1 G: Performed by: SURGERY

## 2020-02-03 PROCEDURE — 84100 ASSAY OF PHOSPHORUS: CPT | Performed by: INTERNAL MEDICINE

## 2020-02-03 PROCEDURE — 25010000002 HEPARIN (PORCINE) PER 1000 UNITS: Performed by: SURGERY

## 2020-02-03 PROCEDURE — 25010000002 METOCLOPRAMIDE PER 10 MG: Performed by: SURGERY

## 2020-02-03 RX ORDER — WARFARIN SODIUM 5 MG/1
10 TABLET ORAL
Status: DISCONTINUED | OUTPATIENT
Start: 2020-02-03 | End: 2020-02-05

## 2020-02-03 RX ADMIN — BUPROPION HYDROCHLORIDE 75 MG: 75 TABLET, FILM COATED ORAL at 23:10

## 2020-02-03 RX ADMIN — DOCUSATE SODIUM 100 MG: 100 CAPSULE, LIQUID FILLED ORAL at 20:34

## 2020-02-03 RX ADMIN — LORAZEPAM 1 MG: 2 INJECTION INTRAMUSCULAR; INTRAVENOUS at 13:02

## 2020-02-03 RX ADMIN — HYDROCODONE BITARTRATE AND ACETAMINOPHEN 1 TABLET: 7.5; 325 TABLET ORAL at 12:55

## 2020-02-03 RX ADMIN — ONDANSETRON 4 MG: 2 INJECTION INTRAMUSCULAR; INTRAVENOUS at 13:37

## 2020-02-03 RX ADMIN — BUPROPION HYDROCHLORIDE 75 MG: 75 TABLET, FILM COATED ORAL at 06:30

## 2020-02-03 RX ADMIN — TAZOBACTAM SODIUM AND PIPERACILLIN SODIUM 3.38 G: 375; 3 INJECTION, SOLUTION INTRAVENOUS at 08:26

## 2020-02-03 RX ADMIN — MORPHINE SULFATE 2 MG: 2 INJECTION, SOLUTION INTRAMUSCULAR; INTRAVENOUS at 07:37

## 2020-02-03 RX ADMIN — METOCLOPRAMIDE 10 MG: 5 INJECTION, SOLUTION INTRAMUSCULAR; INTRAVENOUS at 12:11

## 2020-02-03 RX ADMIN — LORAZEPAM 1 MG: 2 INJECTION INTRAMUSCULAR; INTRAVENOUS at 00:32

## 2020-02-03 RX ADMIN — BISACODYL 10 MG: 5 TABLET, COATED ORAL at 08:25

## 2020-02-03 RX ADMIN — HEPARIN SODIUM 5000 UNITS: 5000 INJECTION, SOLUTION INTRAVENOUS; SUBCUTANEOUS at 21:53

## 2020-02-03 RX ADMIN — METOCLOPRAMIDE 10 MG: 5 INJECTION, SOLUTION INTRAMUSCULAR; INTRAVENOUS at 00:31

## 2020-02-03 RX ADMIN — TAZOBACTAM SODIUM AND PIPERACILLIN SODIUM 3.38 G: 375; 3 INJECTION, SOLUTION INTRAVENOUS at 00:30

## 2020-02-03 RX ADMIN — METOCLOPRAMIDE 10 MG: 5 INJECTION, SOLUTION INTRAMUSCULAR; INTRAVENOUS at 18:11

## 2020-02-03 RX ADMIN — DULOXETINE 30 MG: 30 CAPSULE, DELAYED RELEASE ORAL at 08:25

## 2020-02-03 RX ADMIN — BUPROPION HYDROCHLORIDE 75 MG: 75 TABLET, FILM COATED ORAL at 12:12

## 2020-02-03 RX ADMIN — POTASSIUM CHLORIDE 40 MEQ: 1.5 POWDER, FOR SOLUTION ORAL at 20:34

## 2020-02-03 RX ADMIN — PIPERACILLIN AND TAZOBACTAM 3.38 G: 3; .375 INJECTION, POWDER, LYOPHILIZED, FOR SOLUTION INTRAVENOUS at 23:10

## 2020-02-03 RX ADMIN — METRONIDAZOLE 500 MG: 500 INJECTION, SOLUTION INTRAVENOUS at 00:30

## 2020-02-03 RX ADMIN — POTASSIUM CHLORIDE 40 MEQ: 1.5 POWDER, FOR SOLUTION ORAL at 03:09

## 2020-02-03 RX ADMIN — WARFARIN SODIUM 10 MG: 5 TABLET ORAL at 18:11

## 2020-02-03 RX ADMIN — BUPROPION HYDROCHLORIDE 75 MG: 75 TABLET, FILM COATED ORAL at 00:31

## 2020-02-03 RX ADMIN — POTASSIUM CHLORIDE 40 MEQ: 750 CAPSULE, EXTENDED RELEASE ORAL at 12:55

## 2020-02-03 RX ADMIN — HEPARIN SODIUM 5000 UNITS: 5000 INJECTION, SOLUTION INTRAVENOUS; SUBCUTANEOUS at 14:35

## 2020-02-03 RX ADMIN — METOCLOPRAMIDE 10 MG: 5 INJECTION, SOLUTION INTRAMUSCULAR; INTRAVENOUS at 23:10

## 2020-02-03 RX ADMIN — DOCUSATE SODIUM 100 MG: 100 CAPSULE, LIQUID FILLED ORAL at 08:25

## 2020-02-03 RX ADMIN — HEPARIN SODIUM 5000 UNITS: 5000 INJECTION, SOLUTION INTRAVENOUS; SUBCUTANEOUS at 06:19

## 2020-02-03 RX ADMIN — BUPROPION HYDROCHLORIDE 75 MG: 75 TABLET, FILM COATED ORAL at 18:11

## 2020-02-03 RX ADMIN — HYDROCODONE BITARTRATE AND ACETAMINOPHEN 1 TABLET: 7.5; 325 TABLET ORAL at 20:40

## 2020-02-03 RX ADMIN — METOCLOPRAMIDE 10 MG: 5 INJECTION, SOLUTION INTRAMUSCULAR; INTRAVENOUS at 06:20

## 2020-02-03 RX ADMIN — METRONIDAZOLE 500 MG: 500 INJECTION, SOLUTION INTRAVENOUS at 08:26

## 2020-02-03 RX ADMIN — SODIUM CHLORIDE, POTASSIUM CHLORIDE, SODIUM LACTATE AND CALCIUM CHLORIDE 125 ML/HR: 600; 310; 30; 20 INJECTION, SOLUTION INTRAVENOUS at 03:21

## 2020-02-03 RX ADMIN — PANTOPRAZOLE SODIUM 40 MG: 40 TABLET, DELAYED RELEASE ORAL at 06:20

## 2020-02-03 RX ADMIN — TAZOBACTAM SODIUM AND PIPERACILLIN SODIUM 3.38 G: 375; 3 INJECTION, SOLUTION INTRAVENOUS at 16:16

## 2020-02-03 RX ADMIN — SODIUM CHLORIDE, POTASSIUM CHLORIDE, SODIUM LACTATE AND CALCIUM CHLORIDE 125 ML/HR: 600; 310; 30; 20 INJECTION, SOLUTION INTRAVENOUS at 21:53

## 2020-02-03 RX ADMIN — METHYLNALTREXONE BROMIDE 12 MG: 12 INJECTION, SOLUTION SUBCUTANEOUS at 12:12

## 2020-02-03 RX ADMIN — TRAZODONE HYDROCHLORIDE 50 MG: 50 TABLET ORAL at 20:34

## 2020-02-03 NOTE — PROGRESS NOTES
"Pharmacy Consult  -  Warfarin    Brigida Rodriguez is a  40 y.o. male   Height - 193 cm (75.98\")  Weight - (!) 166 kg (365 lb)    Consulting Provider: - Hospitalist  Indication: - Hx of PE and LE DVT  Goal INR: 2-3   Home Regimen:   - 10 mg daily, except 12.5 mg on Friday     Bridge Therapy:     Drug-Drug Interactions with current regimen:   Trazodone- increases bleed risk    Warfarin Dosing During Admission:    Date  2/3           INR  1.38           Dose  (10 mg)                Education Provided: Patient is on warfarin prior to admission.  Education provided  on 2/3 verbally and in writing .  Discussed effects of warfarin, importance of checking INR, drug-drug and drug-food interactions, and signs/symptoms of bleeding and clotting.  Patient verbalized understanding through teach back.  All pertinent questions were answered.        Discharge Follow up:   Following Provider - [facility] anticoagulation clinic   Follow up time range or appointment: 2-3 days after discharge      Labs:    Results from last 7 days   Lab Units 02/03/20  0348 02/02/20  0704 02/01/20  0320 01/31/20  0748 01/31/20  0541 01/30/20  1734 01/30/20  0423 01/29/20  0824   INR  1.38* 1.43* 1.44* 1.22*  --  1.40* 2.69* 2.58*   APTT seconds  --   --   --  30.9  --  44.0*  --   --    HEMOGLOBIN g/dL 12.2* 12.4* 14.3  --  16.7 15.6 16.3 14.1   HEMATOCRIT % 37.9 37.2* 42.5  --  50.5 46.5 49.0 42.1   PLATELETS 10*3/mm3 208 197 205  --  246 209 221 192     Results from last 7 days   Lab Units 02/03/20  0634 02/02/20  0704 02/01/20  0320 01/31/20  0541   SODIUM mmol/L 139 138 140 139   POTASSIUM mmol/L 3.5  3.5 3.5 3.7 3.9   CHLORIDE mmol/L 99 99 98 99   CO2 mmol/L 26.0 27.0 28.0 24.0   BUN mg/dL 11 13 24* 31*   CREATININE mg/dL 0.93 0.95 1.09 1.18   CALCIUM mg/dL 8.9 8.7 8.7 8.6   BILIRUBIN mg/dL 2.2*  --  3.0* 2.6*   ALK PHOS U/L 43  --  45 43   ALT (SGPT) U/L 13  --  10 10   AST (SGOT) U/L 18  --  15 18   GLUCOSE mg/dL 84 94 117* 188*     Current " dietary intake: 25% of breakfast    Assessment/Plan:   1. Warfarin dosing for a history of DVT/PE.   2. INR today subtherapeutic at 1.38.  This is to be expected as warfarin has been on hold due to colectomy/colostomy/enterotomy repair on 1/30.  Patient has been on metronidazole for the past 3 days, therapy was discontinued today.  Will resume patient's home dose of warfarin and give 10 mg today.  3. Monitor for s/sx of bleeding, dietary intake, drug/drug interactions, and clinical status.  4. Will follow daily INR and adjust accordingly.     Thank you,  Aurora Wright RPH  2/3/2020  1:43 PM

## 2020-02-03 NOTE — PROGRESS NOTES
"                  Clinical Nutrition     Nutrition Assessment  Reason for Visit:   MDR, NPO/Clear liquid  NPO/Clear >5days     Patient Name: Brigida Rodriguez  YOB: 1980  MRN: [id number]  Date of Encounter: 02/03/20 7:25 AM  Admission date: 1/28/2020    Nutrition Assessment   Admission Diagnosis     Diverticulitis    Additional applicable diagnosis, conditions, procedures    Perforated diverticulum of sigmoid colon    S/P exploratory laparotomy with sigmoid colectomy/end colostomy/enterotomy repair 1/31/20    Peritonitis (CMS/HCC)    Applicable PMH/ PSxH    Chronic congestive heart failure. 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  Order to transfer out of the ICU .    Patient with active calorie count since 2/1- patient has been NPO.     Anthropometrics     Height: 193 cm (75.98\")  Last filed wt: Weight: (!) 166 kg (365 lb) (02/02/20 1240)  Weight Method: Bed scale    BMI: BMI (Calculated): 44.4  Obese Class III extreme obesity: > or equal to 40kg/m2    Ideal Body Weight (IBW) (kg): 93.2    Labs reviewed     Results from last 7 days   Lab Units 02/02/20  0704 02/01/20  0320   SODIUM mmol/L 138 140   POTASSIUM mmol/L 3.5 3.7   CHLORIDE mmol/L 99 98   CO2 mmol/L 27.0 28.0   BUN mg/dL 13 24*   CREATININE mg/dL 0.95 1.09   CALCIUM mg/dL 8.7 8.7   BILIRUBIN mg/dL  --  3.0*   ALK PHOS U/L  --  45   ALT (SGPT) U/L  --  10   AST (SGOT) U/L  --  15   GLUCOSE mg/dL 94 117*       Results from last 7 days   Lab Units 02/02/20  2101 02/02/20  1805 02/02/20  1134 02/02/20  0741 02/01/20 2020 02/01/20  1608   GLUCOSE mg/dL 82 89 80 79 86 96 "     Lab Results   Lab Value Date/Time    HGBA1C 5.60 01/31/2020 0541       Medications reviewed   Pertinent:  Dulcolax, colace, wellbutrin, Reglan, Protonix, abx, trazodone   LR @ 125ml/hr     Current Nutrition Prescription     PO: Diet Clear Liquid  Orders Placed This Encounter      Calorie Count    Intake:  25% x 1 meal (patient has been mostly NPO)    Nutrition Diagnosis   2/3  Problem Inadequate oral intake   Etiology Clinical condition; Altered GI function    Signs/Symptoms Slow GI progress post GI surgery      Nutrition Intervention     Interventions Goal    General: Nutrition support treatment    Nutrition Interventions   1.  Follow treatment progress, Care plan reviewed, Supplement provided   Start clear liquid supplement with meals until diet is advanced.   D/C calorie count as patient NPO and now only on clear liquids.   Will monitor intake per nursing intake report.     Monitor/ Evaluation    Per protocol, PO intake, Supplement intake, Pertinent labs\      Will Continue to follow per protocol      Simona Carmona RD  Time Spent: 30min

## 2020-02-03 NOTE — PLAN OF CARE
Problem: Patient Care Overview  Goal: Plan of Care Review  Outcome: Ongoing (interventions implemented as appropriate)  Flowsheets (Taken 2/3/2020 6623)  Progress: improving  Plan of Care Reviewed With: patient  Note:   Pt c/o abdominal pain. PCA managing fairly well. Norco given. Anxiety required ativan mid shift. Gas discomfort and belching often (usually relieved after NG unclamped following meds). Phos replaced. K replaced. VSS. Afebrile.

## 2020-02-03 NOTE — NURSING NOTE
Inpatient Ostomy Therapy Note    Date of Surgery: 1/30/20      Days Post Procedure:  4 Days Post-Op    Surgeon:  Taye Valenzuela MD    Ostomy:  Colostomy- LUQ    Appliance Type:  Domingo, 1 Piece and Flat    Appliance Size:  Size: 2-3/4    Stoma Description: Viable, Red and Moist    Stoma Size:  35 X 44 mm    Peristomal Skin:  Intact    Last Appliance Change:  2/3/20    Accessories:  Cristofer Ring and Stomahesive Paste    Education:  Diet, Emptying, Changing Appliance, [employer] [place] and Peristomal Skin Issues    Dressing Change:  No    Supplies Provided:  No    Education Folder Provided:  Yes    Plan of Care:  WOC Visit    Teaching provided to:  Patient    Comments:  WOC nurse f/u for colostomy. Appliance leaking at lower side. Stoma red and moist with minimal protrusion above skin level; os pointing to 5:00 at skin level. Peristomal skin intact and dry. Scant amount serosaanguinous output in pouch. Cristofer ring placed from 3:00 - 9:00; Domingo #[id number] flat flexible one-piece appliance used with paste around opening. Pt likely will need convexity eventually. Pt observed measuring of stoma, touched stoma, closed appliance end, observed remainder of change; drowsy from medications. NGT clamped at this time.      Summary:  Limited stomal education performed today as above. WOC nurse will f/u for continued colostomy education. Please contact WOC nurse as needed for concerns.     Dannielle Vasquez, SILVIA - 02/03/20, 4:17 PM

## 2020-02-03 NOTE — PROGRESS NOTES
Continued Stay Note  [facility]     Patient Name: Brigida Rodriguez  MRN: [id number]  Today's Date: 2/3/2020    Admit Date: 1/28/2020    Discharge Plan     Row Name 02/03/20 1010       Plan    Plan  Home    Plan Comments  Spoke c Mr. Rodriguez at .  His goal is to return home c assist from his wife/mother.  CM will follow for needs.         Discharge Codes    No documentation.       Expected Discharge Date and Time     Expected Discharge Date Expected Discharge Time    Jan 29, 2020             Stephanie Pressley RN

## 2020-02-03 NOTE — PROGRESS NOTES
Intensivist Note     2/2/2020  Hospital Day: 5  3 Days Post-Op  ICU Stays Timeline      Dates and times are displayed in the time zone of the admission          Hospital Admission: 01/28/20 0022 - Current  ICU stays: 1      In Date/Time Event Department ICU Stay Duration     01/28/20 0022 Admission  [facility]      01/28/20 0436 Transfer In  [facility] 5F      01/30/20 1709 Transfer In  [facility] OR      01/31/20 0035 Transfer In  [facility] 2B ICU 2 days 21 hours 39 minutes                Mr. Brigida Rodriguez, 40 y.o. male is followed for:    Diverticulitis    Perforated diverticulum of sigmoid colon    S/P exploratory laparotomy with sigmoid colectomy/end colostomy/enterotomy repair 1/31/20    Peritonitis (CMS/HCC)    Chronic congestive heart failure. 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  Patient was admitted 1/28/2020 with 2-day history of epigastric pain and a CT of the abdomen/pelvis showing acute sigmoid diverticulitis.  Also noted on CTA was chronic occlusion of the IVC.  Was placed on Zosyn and pain medications but developed severe leukocytosis, worsening abdominal pain, and repeat CT scan suggested bowel obstruction.  Was taken to the OR 1/30/2020 and underwent exploratory laparotomy, sigmoid colectomy and end colostomy.was noted to have massively dilated bowel with serosal tears.  Required enterotomy for decompression with subsequent repair.  Was then transferred to the ICU.     Interval history:  Continues to have an acceptable postoperative course.  Pain is adequately controlled on  "Dilaudid PCA without complaints of nausea or vomiting.  Continues with significant NG output in the last 24 hours (3650 cc) but he must be taking in a significant amount of ice chips.  BUN and creatinine continue to improve and are now normal and urine output is adequate.  Remains on lactated Ringer's at 125 cc an hour.  T-max 99.7 and WBC down to 11.94.  Heart rate down to 85 bpm and no complaints of chest pain cough purulent sputum production, hemoptysis, pleuritic pain, or complaints of dyspnea.  Well oxygenated on room air.         ROS: Per subjective, all other systems reviewed and were negative.    The patient's relevant PMH, PSH, FH, and SH were reviewed and updated in Epic as appropriate. Allergies and Medications reviewed.    OBJECTIVE     /95 (BP Location: Right arm, Patient Position: Lying)   Pulse 94   Temp 98.2 °F (36.8 °C) (Oral)   Resp 18   Ht 193 cm (75.98\")   Wt (!) 166 kg (365 lb)   SpO2 95%   BMI 44.45 kg/m²      Flow (L/min): 2    Flowsheet Rows      First Filed Value   Admission Height  193 cm (76\") Documented at 01/28/2020 0031   Admission Weight  (!) 170 kg (375 lb) Documented at 01/28/2020 0031        Intake & Output (last day)     intake 3645 cc                    output 5720 cc (1850 cc urine)     Exam:  General Exam:  Overweight black male propped up in bed in NAD  HEENT: Pupils equal and reactive.  NG tube in place  Neck:                          Supple, no JVD, thyromegaly, or adenopathy  Lungs: Clear anteriorly and laterally  Cardiovascular: Regular rate and rhythm without murmurs or gallops.  HR 85 bpm  Abdomen: Mildly tender to palpation.  Colostomy functioning.  TAMARA drain with minimal drainage (75 cc over the last 24 hours).  Obese   and rectal: Deferred.  Extremities: No cyanosis clubbing edema.  Neurologic:                 Symmetric strength. No focal deficits.    Chest X-Ray: No film today    INFUSIONS    HYDROmorphone HCl-NaCl   PCA pump   lactated ringers 125 mL/hr " Last Rate: 125 mL/hr (02/02/20 0737)       Results from last 7 days   Lab Units 02/02/20  0704 02/01/20  0320 01/31/20  0541   WBC 10*3/mm3 11.94* 12.50* 16.29*   HEMOGLOBIN g/dL 12.4* 14.3 16.7   HEMATOCRIT % 37.2* 42.5 50.5   PLATELETS 10*3/mm3 197 205 246     Results from last 7 days   Lab Units 02/02/20  0704 02/01/20  0320   SODIUM mmol/L 138 140   POTASSIUM mmol/L 3.5 3.7   CHLORIDE mmol/L 99 98   CO2 mmol/L 27.0 28.0   BUN mg/dL 13 24*   CREATININE mg/dL 0.95 1.09   GLUCOSE mg/dL 94 117*   CALCIUM mg/dL 8.7 8.7     Results from last 7 days   Lab Units 02/02/20  1645 02/02/20  0704 02/01/20  1156 02/01/20  0320 01/31/20  0541   MAGNESIUM mg/dL  --   --   --  2.4 2.1   PHOSPHORUS mg/dL 2.5 1.7* 1.5* 1.5* 4.5     Results from last 7 days   Lab Units 02/01/20  0320 01/31/20  0541 01/29/20  2043   ALK PHOS U/L 45 43 56   BILIRUBIN mg/dL 3.0* 2.6* 1.4*   ALT (SGPT) U/L 10 10 9   AST (SGOT) U/L 15 18 9       No results found for: SEDRATE  No results found for: BNP  Lab Results   Component Value Date    TROPONINT <0.010 01/28/2020     No results found for: TSH  Lab Results   Component Value Date    LACTATE 1.4 02/01/2020     No results found for: CORTISOL      I reviewed the patient's results, images and medication.    Assessment/Plan   ASSESSMENT        Diverticulitis    Perforated diverticulum of sigmoid colon    S/P exploratory laparotomy with sigmoid colectomy/end colostomy/enterotomy repair 1/31/20    Peritonitis (CMS/HCC)    Chronic congestive heart failure. 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  Is to remain n.p.o. another day because of massive dilatation and serosal tears.  Overall however seems to be doing well with excellent gas exchange, normal renal function,  only low-grade temp and minimal leukocytosis.    PLAN     1.  Continue n.p.o. and fluids.  Timing of oral intake to be determined by surgery  2.  Continue empiric antimicrobial therapy with Zosyn and Flagyl  3.  Is not a diabetic and all blood sugars controlled so we will discontinue Accu-Cheks    Plan of care and goals reviewed with mulitdisciplinary team at daily rounds.    I discussed the patient's findings and my recommendations with patient and nursing staff    High level of risk due to: severe exacerbation of chronic illness, illness with threat to life or bodily function and parenteral controlled substances.    Time spent Critical care 20 min (It does not include procedure time).    Imtiaz Grissom MD  Intensive Care Medicine  02/02/20 10:14 PM

## 2020-02-03 NOTE — PROGRESS NOTES
"Patient Name:  Brigida Rodriguez  YOB: 1980  [id number]    Surgery Progress Note    Date of visit: 2/3/2020    Subjective   Subjective: Reports minimal nausea, and occasional pain, but overall doing better.         Objective     Objective:     /85   Pulse 98   Temp 97.7 °F (36.5 °C) (Oral)   Resp 16   Ht 193 cm (75.98\")   Wt (!) 166 kg (365 lb)   SpO2 97%   BMI 44.45 kg/m²     Intake/Output Summary (Last 24 hours) at 2/3/2020 0627  Last data filed at 2/3/2020 0430  Gross per 24 hour   Intake 3739.25 ml   Output 5395 ml   Net -1655.75 ml       CV:  Rhythm  regular and rate regular   L:  Clear  to auscultation bilaterally   Abd:  Bowel sounds hypoactive, soft, appropriately tender. Ostomy pink, viable, with minimal stool output. TAMARA serous  Ext:  No cyanosis, clubbing, edema    Recent labs that are back at this time have been reviewed. WBC normalizing       Assessment/Plan     Assessment/ Plan:    Hospital Problem List     * (Principal) Diverticulitis - Slowly improving. Clamp NG and check residuals. OK for transfer to floor from my perspective (telemetry). OK to start anticoagulation with heparin gtt from my perspective (no bolus please). Will follow.      Chronic congestive heart failure. 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

## 2020-02-03 NOTE — PROGRESS NOTES
INTENSIVIST / PULMONARY FOLLOW UP NOTE     Hospital:  LOS: 6 days   Mr. Brigida Rodriguez, 40 y.o. male is followed for:     Diverticulitis    Chronic congestive heart failure. 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    Morbidly obese (CMS/HCC)    Peritonitis (CMS/HCC)    Perforated diverticulum of sigmoid colon    S/P exploratory laparotomy with sigmoid colectomy/end colostomy/enterotomy repair 1/31/20    Active Tobacco use          SUBJECTIVE   Pain controlled    The patient's relevant past medical, surgical, family, and social history were reviewed    Allergies and medications were reviewed    ROS:  Per subjective, all other systems were reviewed and were negative        OBJECTIVE     Vital Sign Min/Max for last 24 hours:  Temp  Min: 97.7 °F (36.5 °C)  Max: 98.4 °F (36.9 °C)   BP  Min: 102/77  Max: 179/99   Pulse  Min: 77  Max: 98   Resp  Min: 16  Max: 18   SpO2  Min: 91 %  Max: 97 %   No data recorded     Physical Exam:  General Appearance:  Conversant, in no acute distress  Eyes:  No scleral icterus or pallor, pupils normal  Ears, Nose, Mouth, Throat:  Atraumatic, oropharynx clear  Neck:  Trachea midline, thyroid normal  Respiratory:  Clear to auscultation bilaterally, normal effort, no tenderness to palpation  Cardiovascular:  Regular rate and rhythm, no murmurs, no peripheral edema, no thrill  Gastrointestinal:  Soft, +ostomy, incisions C/D/I  Skin:  Normal temperature, no rash  Psychiatric:  Alert and oriented x 3, normal judgement and insight  Neuro:  No new focal neurologic deficits observed    Telemetry:              Hemodynamics:   CVP:     PAP:     PAOP:     CO:     CI:     SVI:     SVR:       SpO2: 94 % SpO2  Min: 91 %  Max: 97 %   Device:      Flow Rate:   No data recorded     Mechanical Ventilator Settings:                                          Intake/Ouptut 24 hrs (7:00AM - 6:59 AM)  Intake & Output (last 3 days)       01/31 0701 - 02/01 0700 02/01 0701 - 02/02 0700 02/02 0701 - 02/03 0700 02/03 0701 - 02/04 0700    P.O. 360  600 300    I.V. (mL/kg) 4010.8 (24.2) 3318.4 (20) 2926.2 (17.6) 276.2 (1.7)    Blood        NG/GT   400     IV Piggyback 69 326.3 200 2.8    Total Intake(mL/kg) 4439.8 (26.7) 3644.7 (22) 4126.2 (24.9) 579 (3.5)    Urine (mL/kg/hr) 2225 (0.6) 1850 (0.5) 2050 (0.5)     Emesis/NG output 4900 3650 4450     Drains 22 145 25     Stool 50 75 50     Blood        Total Output 7197 5720 6575     Net -2757.2 -2075.3 -2448.9 +579                  Lines, Drains & Airways    Active LDAs     Name:   Placement date:   Placement time:   Site:   Days:    Peripheral IV 01/30/20 1905 Anterior;Left Forearm   01/30/20    1905    Forearm   3    Closed/Suction Drain RLQ Bulb 10 Fr.   01/30/20    2141    RLQ   3    NG/OG Tube Nasogastric 16 Fr Left nostril   01/30/20    0654    Left nostril   4    Colostomy LUQ   01/30/20    2245    LUQ   3                Hematology:  Results from last 7 days   Lab Units 02/03/20  0348 02/02/20  0704 02/01/20  0320 01/31/20  0541 01/30/20  1734 01/30/20  0423 01/29/20  0824   WBC 10*3/mm3 9.07 11.94* 12.50* 16.29* 13.14* 13.99* 13.23*   HEMOGLOBIN g/dL 12.2* 12.4* 14.3 16.7 15.6 16.3 14.1   HEMATOCRIT % 37.9 37.2* 42.5 50.5 46.5 49.0 42.1   PLATELETS 10*3/mm3 208 197 205 246 209 221 192   MONOCYTES % %  --  12.1*  --   --   --   --   --      Electrolytes, Magnesium and Phosphorus:  Results from last 7 days   Lab Units 02/03/20  0634 02/02/20  1645 02/02/20  0704 02/01/20  1156 02/01/20  0320 01/31/20  0541 01/30/20  1734 01/29/20  2043 01/28/20  0031   SODIUM mmol/L 139  --  138  --  140 139  --  139 141   CHLORIDE mmol/L 99  --  99  --  98 99  --  99 101   POTASSIUM mmol/L 3.5  3.5  --  3.5  --  3.7 3.9 3.5 3.6 4.4   CO2 mmol/L 26.0  --  27.0  --  28.0 24.0  --  25.0 25.0   MAGNESIUM mg/dL 2.2  --   --   --  2.4 2.1   --   --   --    PHOSPHORUS mg/dL 2.7 2.5 1.7* 1.5* 1.5* 4.5  --   --   --      Renal:  Results from last 7 days   Lab Units 02/03/20  0634 02/02/20  0704 02/01/20 0320 01/31/20  0541 01/29/20 2043 01/28/20  0031   CREATININE mg/dL 0.93 0.95 1.09 1.18 1.14 1.02   BUN mg/dL 11 13 24* 31* 17 12     Estimated Creatinine Clearance: 176.2 mL/min (by C-G formula based on SCr of 0.93 mg/dL).  Hepatic:  Results from last 7 days   Lab Units 02/03/20  0634 02/01/20  0320 01/31/20  0541 01/29/20 2043 01/28/20  0031   ALK PHOS U/L 43 45 43 56 59   BILIRUBIN mg/dL 2.2* 3.0* 2.6* 1.4* 0.8   ALT (SGPT) U/L 13 10 10 9 14   AST (SGOT) U/L 18 15 18 9 18     Arterial Blood Gases:        Results from last 7 days   Lab Units 01/31/20  0541   HEMOGLOBIN A1C % 5.60       Lab Results   Component Value Date    LACTATE 1.4 02/01/2020       Relevant imaging studies and labs from 02/03/20 were reviewed and interpreted by me    Medications (drips):    HYDROmorphone HCl-NaCl    lactated ringers Last Rate: 125 mL/hr (02/03/20 0321)         bisacodyl 10 mg Oral Daily   bisacodyl 10 mg Rectal Daily   buPROPion 75 mg Oral Q6H   docusate sodium 100 mg Oral BID   DULoxetine 30 mg Oral Daily   heparin (porcine) 5,000 Units Subcutaneous Q8H   methylnaltrexone 12 mg Subcutaneous Once   metoclopramide 10 mg Intravenous Q6H   pantoprazole 40 mg Oral Q AM   piperacillin-tazobactam 3.375 g Intravenous Q8H   sodium chloride 10 mL Intravenous Q12H   traZODone 50 mg Oral Nightly       Assessment/Plan   IMPRESSION / PLAN     Inpatient Problem List:  40 y.o.male:  Active Hospital Problems    Diagnosis   • **Diverticulitis   • S/P exploratory laparotomy with sigmoid colectomy/end colostomy/enterotomy repair 1/31/20   • Active Tobacco use   • Peritonitis (CMS/HCC)   • Perforated diverticulum of sigmoid colon   • Morbidly obese (CMS/HCC)   • Recurrent major depressive disorder (CMS/HCC)   • 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 Data deficit (CMS/[facility])        Impression:  41 y/o AAM w/ h/o Obesity, Diastolic HF EF 65%, h/o remote PE 10 years ago, IVC filter which is clotted off or stenoses w/ abdominal wall collateralization, GERD, Tobacco abuse, Anxiety, Depression admitted on 1/28/20 with abdominal pain, vomiting, and constipation.  He had an episode of diverticulitis in Nov 2019.  CT initially showed acute sigmoid diverticulitis with evidence of abscess and chronic occlusion of the IVC filter.  He was managed conservatively initially, however on 1/30 repeat CT showed free air, worsening distention of small bowel, abscess of the sigmoid colon with increasing amount of intraabdominal free fluid.  He went to to the OR on same day with Dr. Valenzuela and had exploratory laparotomy with sigmoid colectomy, end colostomy and EGD.    Plan:  Post op care per Dr. Valenzuela    Peritonitis - d/c Flagyl, continue Pip-Tazo    Ileus - Relistor SQ x 1, on stool softeners as well    H/o DVT / PE / Occluded IVC Filter - patient tried Eliquis in past.  He prefers to stay on coumadin. Will resume.  I expect it will take some time to get back in the therapeutic range and by then his risk of post op bleeding should be low.    PT/OT    DVT prophylaxis w/ SQ Heparin - d/c when INR close the therapeutic range    Nutrition - Diet Clear Liquid    To tele    Plan of care and goals reviewed with mulitdisciplinary team at daily rounds           Valentin Andrade MD  Intensive Care Medicine  02/03/20 12:05 PM

## 2020-02-04 LAB
ALBUMIN SERPL-MCNC: 3.5 G/DL (ref 3.5–5.2)
ALBUMIN/GLOB SERPL: 1.1 G/DL
ALP SERPL-CCNC: 45 U/L (ref 39–117)
ALT SERPL W P-5'-P-CCNC: 22 U/L (ref 1–41)
ANION GAP SERPL CALCULATED.3IONS-SCNC: 13 MMOL/L (ref 5–15)
AST SERPL-CCNC: 28 U/L (ref 1–40)
BASOPHILS # BLD AUTO: 0.04 10*3/MM3 (ref 0–0.2)
BASOPHILS NFR BLD AUTO: 0.6 % (ref 0–1.5)
BILIRUB SERPL-MCNC: 1.8 MG/DL (ref 0.2–1.2)
BUN BLD-MCNC: 9 MG/DL (ref 6–20)
BUN/CREAT SERPL: 10.1 (ref 7–25)
CALCIUM SPEC-SCNC: 8.7 MG/DL (ref 8.6–10.5)
CHLORIDE SERPL-SCNC: 101 MMOL/L (ref 98–107)
CO2 SERPL-SCNC: 24 MMOL/L (ref 22–29)
CREAT BLD-MCNC: 0.89 MG/DL (ref 0.76–1.27)
CYTO UR: NORMAL
DEPRECATED RDW RBC AUTO: 49.4 FL (ref 37–54)
EOSINOPHIL # BLD AUTO: 0.25 10*3/MM3 (ref 0–0.4)
EOSINOPHIL NFR BLD AUTO: 3.6 % (ref 0.3–6.2)
ERYTHROCYTE [DISTWIDTH] IN BLOOD BY AUTOMATED COUNT: 14.3 % (ref 12.3–15.4)
GFR SERPL CREATININE-BSD FRML MDRD: 115 ML/MIN/1.73
GLOBULIN UR ELPH-MCNC: 3.1 GM/DL
GLUCOSE BLD-MCNC: 86 MG/DL (ref 65–99)
HCT VFR BLD AUTO: 36.3 % (ref 37.5–51)
HGB BLD-MCNC: 11.9 G/DL (ref 13–17.7)
IMM GRANULOCYTES # BLD AUTO: 0.11 10*3/MM3 (ref 0–0.05)
IMM GRANULOCYTES NFR BLD AUTO: 1.6 % (ref 0–0.5)
INR PPP: 1.55 (ref 0.85–1.16)
LAB AP CASE REPORT: NORMAL
LAB AP CLINICAL INFORMATION: NORMAL
LYMPHOCYTES # BLD AUTO: 1.23 10*3/MM3 (ref 0.7–3.1)
LYMPHOCYTES NFR BLD AUTO: 17.5 % (ref 19.6–45.3)
MAGNESIUM SERPL-MCNC: 2.1 MG/DL (ref 1.6–2.6)
MCH RBC QN AUTO: 30.8 PG (ref 26.6–33)
MCHC RBC AUTO-ENTMCNC: 32.8 G/DL (ref 31.5–35.7)
MCV RBC AUTO: 94 FL (ref 79–97)
MONOCYTES # BLD AUTO: 0.64 10*3/MM3 (ref 0.1–0.9)
MONOCYTES NFR BLD AUTO: 9.1 % (ref 5–12)
NEUTROPHILS # BLD AUTO: 4.77 10*3/MM3 (ref 1.7–7)
NEUTROPHILS NFR BLD AUTO: 67.6 % (ref 42.7–76)
NRBC BLD AUTO-RTO: 0 /100 WBC (ref 0–0.2)
PATH REPORT.FINAL DX SPEC: NORMAL
PATH REPORT.GROSS SPEC: NORMAL
PHOSPHATE SERPL-MCNC: 2.7 MG/DL (ref 2.5–4.5)
PLATELET # BLD AUTO: 202 10*3/MM3 (ref 140–450)
PMV BLD AUTO: 11.2 FL (ref 6–12)
POTASSIUM BLD-SCNC: 4.2 MMOL/L (ref 3.5–5.2)
POTASSIUM BLD-SCNC: 4.7 MMOL/L (ref 3.5–5.2)
PROT SERPL-MCNC: 6.6 G/DL (ref 6–8.5)
PROTHROMBIN TIME: 17.9 SECONDS (ref 11.2–14.3)
RBC # BLD AUTO: 3.86 10*6/MM3 (ref 4.14–5.8)
SODIUM BLD-SCNC: 138 MMOL/L (ref 136–145)
WBC NRBC COR # BLD: 7.04 10*3/MM3 (ref 3.4–10.8)

## 2020-02-04 PROCEDURE — 25010000002 METOCLOPRAMIDE PER 10 MG: Performed by: SURGERY

## 2020-02-04 PROCEDURE — 80053 COMPREHEN METABOLIC PANEL: CPT | Performed by: INTERNAL MEDICINE

## 2020-02-04 PROCEDURE — 25010000002 HEPARIN (PORCINE) PER 1000 UNITS: Performed by: SURGERY

## 2020-02-04 PROCEDURE — 85025 COMPLETE CBC W/AUTO DIFF WBC: CPT | Performed by: INTERNAL MEDICINE

## 2020-02-04 PROCEDURE — 84100 ASSAY OF PHOSPHORUS: CPT | Performed by: INTERNAL MEDICINE

## 2020-02-04 PROCEDURE — 85610 PROTHROMBIN TIME: CPT | Performed by: SURGERY

## 2020-02-04 PROCEDURE — 99232 SBSQ HOSP IP/OBS MODERATE 35: CPT | Performed by: INTERNAL MEDICINE

## 2020-02-04 PROCEDURE — 84132 ASSAY OF SERUM POTASSIUM: CPT | Performed by: NURSE PRACTITIONER

## 2020-02-04 PROCEDURE — 25010000002 ONDANSETRON PER 1 MG: Performed by: SURGERY

## 2020-02-04 PROCEDURE — 25010000002 PIPERACILLIN SOD-TAZOBACTAM PER 1 G: Performed by: INTERNAL MEDICINE

## 2020-02-04 PROCEDURE — 83735 ASSAY OF MAGNESIUM: CPT | Performed by: INTERNAL MEDICINE

## 2020-02-04 PROCEDURE — 25010000002 HYDROMORPHONE PER 4 MG: Performed by: SURGERY

## 2020-02-04 RX ORDER — OXYCODONE HYDROCHLORIDE AND ACETAMINOPHEN 5; 325 MG/1; MG/1
2 TABLET ORAL EVERY 4 HOURS PRN
Status: DISCONTINUED | OUTPATIENT
Start: 2020-02-04 | End: 2020-02-10 | Stop reason: HOSPADM

## 2020-02-04 RX ORDER — DEXTROSE, SODIUM CHLORIDE, AND POTASSIUM CHLORIDE 5; .45; .15 G/100ML; G/100ML; G/100ML
50 INJECTION INTRAVENOUS CONTINUOUS
Status: DISCONTINUED | OUTPATIENT
Start: 2020-02-04 | End: 2020-02-07

## 2020-02-04 RX ORDER — HYDROMORPHONE HYDROCHLORIDE 1 MG/ML
0.2 INJECTION, SOLUTION INTRAMUSCULAR; INTRAVENOUS; SUBCUTANEOUS
Status: DISCONTINUED | OUTPATIENT
Start: 2020-02-04 | End: 2020-02-10 | Stop reason: HOSPADM

## 2020-02-04 RX ADMIN — DULOXETINE 30 MG: 30 CAPSULE, DELAYED RELEASE ORAL at 08:47

## 2020-02-04 RX ADMIN — HEPARIN SODIUM 5000 UNITS: 5000 INJECTION, SOLUTION INTRAVENOUS; SUBCUTANEOUS at 05:30

## 2020-02-04 RX ADMIN — BUPROPION HYDROCHLORIDE 75 MG: 75 TABLET, FILM COATED ORAL at 05:30

## 2020-02-04 RX ADMIN — HYDROMORPHONE HYDROCHLORIDE 0.5 MG: 1 INJECTION, SOLUTION INTRAMUSCULAR; INTRAVENOUS; SUBCUTANEOUS at 21:50

## 2020-02-04 RX ADMIN — HYDROMORPHONE HYDROCHLORIDE 0.5 MG: 1 INJECTION, SOLUTION INTRAMUSCULAR; INTRAVENOUS; SUBCUTANEOUS at 18:50

## 2020-02-04 RX ADMIN — METOCLOPRAMIDE 10 MG: 5 INJECTION, SOLUTION INTRAMUSCULAR; INTRAVENOUS at 13:50

## 2020-02-04 RX ADMIN — POTASSIUM CHLORIDE, DEXTROSE MONOHYDRATE AND SODIUM CHLORIDE 100 ML/HR: 150; 5; 450 INJECTION, SOLUTION INTRAVENOUS at 18:42

## 2020-02-04 RX ADMIN — SODIUM CHLORIDE, PRESERVATIVE FREE 10 ML: 5 INJECTION INTRAVENOUS at 21:08

## 2020-02-04 RX ADMIN — PIPERACILLIN AND TAZOBACTAM 3.38 G: 3; .375 INJECTION, POWDER, LYOPHILIZED, FOR SOLUTION INTRAVENOUS at 08:47

## 2020-02-04 RX ADMIN — HYDROCODONE BITARTRATE AND ACETAMINOPHEN 1 TABLET: 7.5; 325 TABLET ORAL at 18:42

## 2020-02-04 RX ADMIN — WARFARIN SODIUM 10 MG: 5 TABLET ORAL at 18:42

## 2020-02-04 RX ADMIN — SODIUM CHLORIDE, PRESERVATIVE FREE 10 ML: 5 INJECTION INTRAVENOUS at 08:48

## 2020-02-04 RX ADMIN — METOCLOPRAMIDE 10 MG: 5 INJECTION, SOLUTION INTRAMUSCULAR; INTRAVENOUS at 18:42

## 2020-02-04 RX ADMIN — HEPARIN SODIUM 5000 UNITS: 5000 INJECTION, SOLUTION INTRAVENOUS; SUBCUTANEOUS at 13:50

## 2020-02-04 RX ADMIN — HYDROMORPHONE HYDROCHLORIDE 0.5 MG: 1 INJECTION, SOLUTION INTRAMUSCULAR; INTRAVENOUS; SUBCUTANEOUS at 11:36

## 2020-02-04 RX ADMIN — POTASSIUM CHLORIDE, DEXTROSE MONOHYDRATE AND SODIUM CHLORIDE 100 ML/HR: 150; 5; 450 INJECTION, SOLUTION INTRAVENOUS at 08:48

## 2020-02-04 RX ADMIN — DOCUSATE SODIUM 100 MG: 100 CAPSULE, LIQUID FILLED ORAL at 08:47

## 2020-02-04 RX ADMIN — HYDROCODONE BITARTRATE AND ACETAMINOPHEN 1 TABLET: 7.5; 325 TABLET ORAL at 13:50

## 2020-02-04 RX ADMIN — ONDANSETRON 4 MG: 2 INJECTION INTRAMUSCULAR; INTRAVENOUS at 11:36

## 2020-02-04 RX ADMIN — METOCLOPRAMIDE 10 MG: 5 INJECTION, SOLUTION INTRAMUSCULAR; INTRAVENOUS at 05:30

## 2020-02-04 RX ADMIN — OXYCODONE HYDROCHLORIDE AND ACETAMINOPHEN 2 TABLET: 5; 325 TABLET ORAL at 15:14

## 2020-02-04 RX ADMIN — HYDROMORPHONE HYDROCHLORIDE 0.5 MG: 1 INJECTION, SOLUTION INTRAMUSCULAR; INTRAVENOUS; SUBCUTANEOUS at 08:47

## 2020-02-04 RX ADMIN — BUPROPION HYDROCHLORIDE 75 MG: 75 TABLET, FILM COATED ORAL at 18:43

## 2020-02-04 RX ADMIN — SODIUM CHLORIDE, POTASSIUM CHLORIDE, SODIUM LACTATE AND CALCIUM CHLORIDE 125 ML/HR: 600; 310; 30; 20 INJECTION, SOLUTION INTRAVENOUS at 05:33

## 2020-02-04 RX ADMIN — HYDROCODONE BITARTRATE AND ACETAMINOPHEN 1 TABLET: 7.5; 325 TABLET ORAL at 08:47

## 2020-02-04 RX ADMIN — PIPERACILLIN AND TAZOBACTAM 3.38 G: 3; .375 INJECTION, POWDER, LYOPHILIZED, FOR SOLUTION INTRAVENOUS at 15:15

## 2020-02-04 RX ADMIN — HYDROCODONE BITARTRATE AND ACETAMINOPHEN 1 TABLET: 7.5; 325 TABLET ORAL at 03:28

## 2020-02-04 RX ADMIN — PANTOPRAZOLE SODIUM 40 MG: 40 TABLET, DELAYED RELEASE ORAL at 05:30

## 2020-02-04 RX ADMIN — HYDROMORPHONE HYDROCHLORIDE 0.5 MG: 1 INJECTION, SOLUTION INTRAMUSCULAR; INTRAVENOUS; SUBCUTANEOUS at 13:50

## 2020-02-04 RX ADMIN — BISACODYL 10 MG: 5 TABLET, COATED ORAL at 08:47

## 2020-02-04 RX ADMIN — HEPARIN SODIUM 5000 UNITS: 5000 INJECTION, SOLUTION INTRAVENOUS; SUBCUTANEOUS at 21:07

## 2020-02-04 NOTE — PROGRESS NOTES
Continued Stay Note  [facility]     Patient Name: Brigida Rodriguez  MRN: [id number]  Today's Date: 2/4/2020    Admit Date: 1/28/2020    Discharge Plan     Row Name 02/04/20 0850       Plan    Plan  update    Patient/Family in Agreement with Plan  yes    Plan Comments  Spoke with patient at bedside regarding discharge plan.  Patient reports not sure if he is feeling better, currently has NG tube.  No discharge needs verbalized.  CM following.  Patient plan is to discharge home via car with family to transport.      Final Discharge Disposition Code  01 - home or self-care        Discharge Codes    No documentation.       Expected Discharge Date and Time     Expected Discharge Date Expected Discharge Time    Feb 5, 2020             Raquel Barba RN

## 2020-02-04 NOTE — PROGRESS NOTES
"Pharmacy Consult  -  Warfarin    Brigida Rodriguez is a  40 y.o. male   Height - 193 cm (76\")  Weight - (!) 169 kg (373 lb 3.2 oz)    Consulting Provider: - Hospitalist  Indication: - Hx of PE and LE DVT  Goal INR: 2-3   Home Regimen:   - warfarin 10 mg Sunday, Monday, Tuesday, Wednesday, Thursday, Saturday               - warfarin 12.5 mg on Fridays     Bridge Therapy: none, patient is on SQH but not full anticoagulation    Drug-Drug Interactions with current regimen:   Trazodone- increases bleed risk    Warfarin Dosing During Admission:    Date  2/3 2/4          INR  1.38 1.55          Dose  10mg 10mg               Education Provided: Patient is on warfarin prior to admission.  Education provided  on 2/3 verbally and in writing .  Discussed effects of warfarin, importance of checking INR, drug-drug and drug-food interactions, and signs/symptoms of bleeding and clotting.  Patient verbalized understanding through teach back.  All pertinent questions were answered.        Discharge Follow up:   Following Provider - [facility] anticoagulation clinic   Follow up time range or appointment: 2-3 days after discharge      Labs:    Results from last 7 days   Lab Units 02/04/20  0848 02/04/20  0723 02/03/20  0348 02/02/20  0704 02/01/20  0320 01/31/20  0748 01/31/20  0541 01/30/20  1734 01/30/20  0423   INR  1.55*  --  1.38* 1.43* 1.44* 1.22*  --  1.40* 2.69*   APTT seconds  --   --   --   --   --  30.9  --  44.0*  --    HEMOGLOBIN g/dL  --  11.9* 12.2* 12.4* 14.3  --  16.7 15.6 16.3   HEMATOCRIT %  --  36.3* 37.9 37.2* 42.5  --  50.5 46.5 49.0   PLATELETS 10*3/mm3  --  202 208 197 205  --  246 209 221     Results from last 7 days   Lab Units 02/04/20  0723 02/04/20  0058 02/03/20  0634 02/02/20  0704 02/01/20  0320   SODIUM mmol/L 138  --  139 138 140   POTASSIUM mmol/L 4.2 4.7 3.5  3.5 3.5 3.7   CHLORIDE mmol/L 101  --  99 99 98   CO2 mmol/L 24.0  --  26.0 27.0 28.0   BUN mg/dL 9  --  11 13 24*   CREATININE mg/dL 0.89  --  " 0.93 0.95 1.09   CALCIUM mg/dL 8.7  --  8.9 8.7 8.7   BILIRUBIN mg/dL 1.8*  --  2.2*  --  3.0*   ALK PHOS U/L 45  --  43  --  45   ALT (SGPT) U/L 22  --  13  --  10   AST (SGOT) U/L 28  --  18  --  15   GLUCOSE mg/dL 86  --  84 94 117*     Current dietary intake: 25% of documented meals  Diet Order   Procedures    Diet Clear Liquid       Assessment/Plan:   Warfarin dosing for a history of DVT/PE.   Goal INR; 2-3  2/4 INR - 1.55  2/4 H/H - 11.9/36.3    Warfarin doses held 1/30, 1/31, 2/1, 2/2 - restarted 2/3 @ warfarin 10mg daily  Will continue warfarin 10mg daily  Currently on heparin 5000units sq q8h, d/c when INR >2  Monitor for s/sx of bleeding, dietary intake, drug/drug interactions, and clinical status.  Follow daily INR and adjust accordingly.     Thank you,  Kenn Maynard [facility]  2/4/2020  10:08 AM

## 2020-02-04 NOTE — PROGRESS NOTES
[facility] Medicine Services  PROGRESS NOTE    Patient Name: Brigida Rodriguez  : 1980  MRN: [id number]    Date of Admission: 2020  Primary Care Physician: Elias Tom,     Subjective   Subjective     CC:  Abdominal pain    HPI:  Still having a lot of abdominal pressure.  Denies flatus or stool in ostomy.  NG finally declogged and now that it is back on suction appears to be helping with abdominal discomfort.  She plans to get up and walk some this afternoon.    Review of Systems  Gen- No fevers, chills  CV- No chest pain, palpitations  Resp- No cough, dyspnea  GI-abdominal discomfort as above        Objective   Objective     Vital Signs:   Temp:  [97.5 °F (36.4 °C)-98.9 °F (37.2 °C)] 98.2 °F (36.8 °C)  Heart Rate:  [72-92] 86  Resp:  [16-18] 16  BP: (134-168)/() 146/96        Physical Exam:  Constitutional -nontoxic, in bed  HEENT-NCAT, mucous membranes moist, NG in place  CV-RRR, S1 S2 normal, no m/r/g  Resp-grossly clear bilaterally  Abd-soft, non-tender to light palpation, non-distended, bowel sounds hypoactive but present.  Ostomy in place no stool in bag.  Morbidly obese.  Ext-No lower extremity cyanosis, clubbing or edema bilaterally  Neuro-alert and oriented, speech clear, moves all extremities   Psych-normal affect   Skin- No rash on exposed UE or LE bilaterally      Results Reviewed:  Results from last 7 days   Lab Units 20  0848 20  0723 20  0348 20  0704  20  0541   WBC 10*3/mm3  --  7.04 9.07 11.94*   < > 16.29*   HEMOGLOBIN g/dL  --  11.9* 12.2* 12.4*   < > 16.7   HEMATOCRIT %  --  36.3* 37.9 37.2*   < > 50.5   PLATELETS 10*3/mm3  --  202 208 197   < > 246   INR  1.55*  --  1.38* 1.43*   < >  --    PROCALCITONIN ng/mL  --   --   --   --   --  2.99*    < > = values in this interval not displayed.     Results from last 7 days   Lab Units 20  0723 20  0058 20  0634 20  0704 20  0320    SODIUM mmol/L 138  --  139 138 140   POTASSIUM mmol/L 4.2 4.7 3.5  3.5 3.5 3.7   CHLORIDE mmol/L 101  --  99 99 98   CO2 mmol/L 24.0  --  26.0 27.0 28.0   BUN mg/dL 9  --  11 13 24*   CREATININE mg/dL 0.89  --  0.93 0.95 1.09   GLUCOSE mg/dL 86  --  84 94 117*   CALCIUM mg/dL 8.7  --  8.9 8.7 8.7   ALT (SGPT) U/L 22  --  13  --  10   AST (SGOT) U/L 28  --  18  --  15   PROBNP pg/mL  --   --   --   --  62.3     Estimated Creatinine Clearance: 187.3 mL/min (by C-G formula based on SCr of 0.89 mg/dL).    Microbiology Results Abnormal     None          Imaging Results (Last 24 Hours)     Procedure Component Value Units Date/Time    XR Chest 1 View [[id number]] Collected:  02/03/20 0842     Updated:  02/03/20 1822    Narrative:       EXAMINATION: XR CHEST 1 VW-      INDICATION: Postop perforated sigmoid diverticulum with exploratory lap,  sigmoid colectomy, and colostomy.; R10.9-Unspecified abdominal pain;  K57.32-Diverticulitis of large intestine without perforation or abscess  without bleeding; K57.92-Diverticulitis of intestine, part unspecified,  without perforation or abscess without bleeding.      COMPARISON: 01/28/2020.     FINDINGS: Portable chest reveals mild increased markings seen at the  lung bases bilaterally with small bilateral pleural effusions.  Nasogastric tube placed with tip below the level of the diaphragm. The  upper lung fields are clear.           Impression:       Mild increased markings seen at the lung bases bilaterally  with small bilateral pleural effusions and nasogastric tube below the  level of the diaphragm. Remainder of the chest is unremarkable.     D:  02/03/2020  E:  02/03/2020     This report was finalized on 2/3/2020 6:19 PM by Dr. iDana Brown MD.             Results for orders placed during the hospital encounter of 01/28/20   Adult Transthoracic Echo Complete W/ Cont if Necessary Per Protocol    Narrative · Mild mitral valve regurgitation is present.  · Mild tricuspid  valve regurgitation is present.  · Estimated EF = 65%.  · Left ventricular systolic function is normal.  · Normal right ventricular cavity size, wall thickness, systolic function   and septal motion noted.  · Left ventricular diastolic function is normal.  · No evidence of pulmonary hypertension is present.  · There is no evidence of pericardial effusion.  · No significant structural valvular abnormality demonstrated.          I have reviewed the medications:  Scheduled Meds:  bisacodyl 10 mg Oral Daily   bisacodyl 10 mg Rectal Daily   buPROPion 75 mg Oral Q6H   docusate sodium 100 mg Oral BID   DULoxetine 30 mg Oral Daily   heparin (porcine) 5,000 Units Subcutaneous Q8H   metoclopramide 10 mg Intravenous Q6H   pantoprazole 40 mg Oral Q AM   piperacillin-tazobactam 3.375 g Intravenous Q8H   sodium chloride 10 mL Intravenous Q12H   traZODone 50 mg Oral Nightly   warfarin 10 mg Oral Daily     Continuous Infusions:  dextrose 5 % and sodium chloride 0.45 % with KCl 20 mEq/L 100 mL/hr Last Rate: 100 mL/hr (02/04/20 0848)   Pharmacy to dose warfarin       PRN Meds:.•  acetaminophen **OR** acetaminophen **OR** acetaminophen  •  diphenhydrAMINE  •  HYDROcodone-acetaminophen  •  HYDROmorphone  •  HYDROmorphone  •  LORazepam  •  Morphine **AND** naloxone  •  naloxone  •  ondansetron **OR** ondansetron  •  oxyCODONE-acetaminophen  •  Pharmacy to dose warfarin  •  phenol  •  potassium & sodium phosphates **OR** potassium & sodium phosphates  •  potassium chloride  •  potassium chloride  •  promethazine **OR** promethazine  •  sodium chloride  •  sodium chloride    Assessment/Plan   Assessment & Plan     Active Hospital Problems    Diagnosis  POA   • **Diverticulitis [K57.92]  Yes   • S/P exploratory laparotomy with sigmoid colectomy/end colostomy/enterotomy repair 1/31/20 [Z98.890]  Not Applicable   • Active Tobacco use [Z72.0]  Yes   • Peritonitis (CMS/HCC) [K65.9]  No   • Perforated diverticulum of sigmoid colon [K57.80]  No    • Morbidly obese (CMS/[facility]) [E66.01]  Yes   • Recurrent major depressive disorder (CMS/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 Data deficit (CMS/[facility]) [I50.9]  Yes      Resolved Hospital Problems   No resolved problems to display.        Brief Hospital Course to date:  Brigida Rodriguez is a 40 y.o. male with history of diastolic heart failure, remote PE status post IVC filter, GERD, tobacco abuse, anxiety and depression who was admitted on 1/28/2020 with abdominal pain, vomiting and constipation.  Patient had a recent episode of diverticulitis in November 2019.    Diverticulitis with perforation  -Status post exploratory laparotomy and sigmoid colectomy  -Continue Zosyn    Ileus  -Encouraged ambulation  -NG to low wall suction    History of DVT/PE/occluded IVC filter  -Coumadin restarted, INR 1.5 today, repeat INR in the morning    Morbid obesity    Tobacco abuse  - cessation  - continue wellbutrin    Depression/anxiety      DVT Prophylaxis: Heparin    Disposition: I expect the patient to be discharged TBD  CODE STATUS:   Code Status and Medical Interventions:   Ordered at: 01/28/20 0410     Level Of Support Discussed With:    Patient     Code Status:    CPR     Medical Interventions (Level of Support Prior to Arrest):    Full         Electronically signed by Jagdish Stephens MD, 02/04/20, 12:09 PM.

## 2020-02-04 NOTE — PLAN OF CARE
Problem: Patient Care Overview  Goal: Plan of Care Review  Outcome: Ongoing (interventions implemented as appropriate)  Flowsheets (Taken 2/4/2020 1000)  Progress: no change  Plan of Care Reviewed With: patient; other (see comments) (Vianca RN)  Note:   WOC nurse f/u for colostomy. Appliance intact with no leakage; small amount serosanguinous output in pouch. Stoma red with minimal protrusion above skin level. NGT clamped at this time. Pt c/o abdominal pain. Did not want to discuss his colostomy at this time. Pt seems to having difficulty accepting his colostomy. Encouraged to increase ambulation. WOC nurse will f/u for more education, including ordering of supplies. Please contact WOC nurse as needed for concerns.

## 2020-02-04 NOTE — PLAN OF CARE
Problem: Patient Care Overview  Goal: Plan of Care Review  Outcome: Ongoing (interventions implemented as appropriate)  Flowsheets  Taken 2/3/2020 0540 by Brittney Avilez RN  Progress: improving  Taken 2/3/2020 2200 by Whitney Bangura RN  Plan of Care Reviewed With: patient  Taken 2/4/2020 0237 by Whitney Bangura RN  Outcome Summary: VSS. Received as transfer from ICU this last pm.  Has NG clamped.  Residual check was last 200ml.  NG remains clamped and will be checked q4 hours for residuals.  If greater than 300, will connect to suction for 1 hours.  IVFs with IV abx.  Colostomy with thin watery pink drainage.  Abdominal dressing with q 12 hour wet to dry dressing changes.  PCA for pain management.  Received potassium supplements prior to transfer.  K+ recheck was 4.7.  Am labs ordered.

## 2020-02-04 NOTE — PAYOR COMM NOTE
"Ref # [id number]  Laurel Andrade RN, BSN  Phone # [phone]  Fax # [phone]  Brigida Rodriguez (40 y.o. Male)     Date of Birth Social Security Number Address Home Phone MRN    1980  [id number] [facility] [phone] [id number]    Sikhism Marital Status          None Single       Admission Date Admission Type Admitting Provider Attending Provider Department, Room/Bed    1/28/20 Emergency Jagdish Stephens MD Sloan, Walker E, MD [facility] 6B, N633/1    Discharge Date Discharge Disposition Discharge Destination                       Attending Provider:  Jagdish Stephens MD    Allergies:  No Known Allergies    Isolation:  None   Infection:  None   Code Status:  CPR    Ht:  193 cm (76\")   Wt:  169 kg (373 lb 3.2 oz)    Admission Cmt:  None   Principal Problem:  Diverticulitis [K57.92]                 Active Insurance as of 1/28/2020     Primary Coverage     Payor Plan Insurance Group Employer/Plan Group    [employer] [place] [employer] [place]      Payor Plan Address Payor Plan Phone Number Payor Plan Fax Number Effective Dates    [address] [address] [phone]  5/2/2019 - None Entered    [facility] [id number]       Subscriber Name Subscriber Birth Date Member ID       BRIGIDA RODRIGUEZ 1980 [id number]                  Physician Progress Notes (last 48 hours) (Notes from 02/02/20 1115 through 02/04/20 1115)      Taye Valenzuela MD at 02/04/20 0652          Patient Name:  Brigida Rodriguez  YOB: 1980  [id number]    Surgery Progress Note    Date of visit: 2/4/2020    Subjective   Subjective: Feeling a bit better. Less nausea. Pain controlled.        Objective     Objective:     /95 (BP Location: Right arm, Patient Position: Lying)   Pulse 82   Temp 98.6 °F (37 °C) (Oral)   Resp 18   Ht 193 cm (76\")   Wt (!) 169 kg (373 lb 3.2 oz)   SpO2 96%   BMI 45.43 kg/m²      Intake/Output Summary (Last 24 hours) at 2/4/2020 0652  Last data " filed at 2/4/2020 0540  Gross per 24 hour   Intake 5788.9 ml   Output 4015 ml   Net 1773.9 ml       CV:  Rhythm  regular and rate regular   L:  Clear  to auscultation bilaterally   Abd:  Bowel sounds positive , soft, less tender. Ostomy pink, with minimal output. TAMARA serous. Dressings c/d/i  Ext:  No cyanosis, clubbing, edema    Recent labs that are back at this time have been reviewed.       Assessment/Plan     Assessment/ Plan:    Hospital Problem List     * (Principal) Diverticulitis - Slowly improving. Increase mobility. May D/C NG later today vs. Tomorrow. OK to resume anticoagulation from my standpoint.      Chronic congestive heart failure. 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 Brigida Rodriguez, 40 y.o. male is followed for:     Diverticulitis    Chronic congestive heart failure. 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    Morbidly obese (CMS/HCC)    Peritonitis (CMS/HCC)    Perforated diverticulum of sigmoid colon    S/P exploratory laparotomy with sigmoid colectomy/end colostomy/enterotomy repair 1/31/20    Active Tobacco use       Subjective   SUBJECTIVE   Pain controlled    The patient's relevant past medical,  surgical, family, and social history were reviewed    Allergies and medications were reviewed    ROS:  Per subjective, all other systems were reviewed and were negative     Objective   OBJECTIVE     Vital Sign Min/Max for last 24 hours:  Temp  Min: 97.7 °F (36.5 °C)  Max: 98.4 °F (36.9 °C)   BP  Min: 102/77  Max: 179/99   Pulse  Min: 77  Max: 98   Resp  Min: 16  Max: 18   SpO2  Min: 91 %  Max: 97 %   No data recorded     Physical Exam:  General Appearance:  Conversant, in no acute distress  Eyes:  No scleral icterus or pallor, pupils normal  Ears, Nose, Mouth, Throat:  Atraumatic, oropharynx clear  Neck:  Trachea midline, thyroid normal  Respiratory:  Clear to auscultation bilaterally, normal effort, no tenderness to palpation  Cardiovascular:  Regular rate and rhythm, no murmurs, no peripheral edema, no thrill  Gastrointestinal:  Soft, +ostomy, incisions C/D/I  Skin:  Normal temperature, no rash  Psychiatric:  Alert and oriented x 3, normal judgement and insight  Neuro:  No new focal neurologic deficits observed    Telemetry:              Hemodynamics:   CVP:     PAP:     PAOP:     CO:     CI:     SVI:     SVR:       SpO2: 94 % SpO2  Min: 91 %  Max: 97 %   Device:      Flow Rate:   No data recorded     Mechanical Ventilator Settings:                                         Intake/Ouptut 24 hrs (7:00AM - 6:59 AM)  Intake & Output (last 3 days)       01/31 0701 - 02/01 0700 02/01 0701 - 02/02 0700 02/02 0701 - 02/03 0700 02/03 0701 - 02/04 0700    P.O. 360  600 300    I.V. (mL/kg) 4010.8 (24.2) 3318.4 (20) 2926.2 (17.6) 276.2 (1.7)    Blood        NG/GT   400     IV Piggyback 69 326.3 200 2.8    Total Intake(mL/kg) 4439.8 (26.7) 3644.7 (22) 4126.2 (24.9) 579 (3.5)    Urine (mL/kg/hr) 2225 (0.6) 1850 (0.5) 2050 (0.5)     Emesis/NG output 4900 3650 4450     Drains 22 145 25     Stool 50 75 50     Blood        Total Output 7197 5720 6575     Net -2757.2 -2075.3 -2448.9 +579                  Lines, Drains & Airways     Active LDAs     Name:   Placement date:   Placement time:   Site:   Days:    Peripheral IV 01/30/20 1905 Anterior;Left Forearm   01/30/20 1905    Forearm   3    Closed/Suction Drain RLQ Bulb 10 Fr.   01/30/20    2141    RLQ   3    NG/OG Tube Nasogastric 16 Fr Left nostril   01/30/20    0654    Left nostril   4    Colostomy LUQ   01/30/20    2245    LUQ   3                Hematology:  Results from last 7 days   Lab Units 02/03/20  0348 02/02/20  0704 02/01/20  0320 01/31/20  0541 01/30/20  1734 01/30/20  0423 01/29/20  0824   WBC 10*3/mm3 9.07 11.94* 12.50* 16.29* 13.14* 13.99* 13.23*   HEMOGLOBIN g/dL 12.2* 12.4* 14.3 16.7 15.6 16.3 14.1   HEMATOCRIT % 37.9 37.2* 42.5 50.5 46.5 49.0 42.1   PLATELETS 10*3/mm3 208 197 205 246 209 221 192   MONOCYTES % %  --  12.1*  --   --   --   --   --      Electrolytes, Magnesium and Phosphorus:  Results from last 7 days   Lab Units 02/03/20  0634 02/02/20  1645 02/02/20  0704 02/01/20  1156 02/01/20  0320 01/31/20  0541 01/30/20  1734 01/29/20  2043 01/28/20  0031   SODIUM mmol/L 139  --  138  --  140 139  --  139 141   CHLORIDE mmol/L 99  --  99  --  98 99  --  99 101   POTASSIUM mmol/L 3.5  3.5  --  3.5  --  3.7 3.9 3.5 3.6 4.4   CO2 mmol/L 26.0  --  27.0  --  28.0 24.0  --  25.0 25.0   MAGNESIUM mg/dL 2.2  --   --   --  2.4 2.1  --   --   --    PHOSPHORUS mg/dL 2.7 2.5 1.7* 1.5* 1.5* 4.5  --   --   --      Renal:  Results from last 7 days   Lab Units 02/03/20  0634 02/02/20  0704 02/01/20  0320 01/31/20  0541 01/29/20 2043 01/28/20  0031   CREATININE mg/dL 0.93 0.95 1.09 1.18 1.14 1.02   BUN mg/dL 11 13 24* 31* 17 12     Estimated Creatinine Clearance: 176.2 mL/min (by C-G formula based on SCr of 0.93 mg/dL).  Hepatic:  Results from last 7 days   Lab Units 02/03/20  0634 02/01/20  0320 01/31/20  0541 01/29/20 2043 01/28/20  0031   ALK PHOS U/L 43 45 43 56 59   BILIRUBIN mg/dL 2.2* 3.0* 2.6* 1.4* 0.8   ALT (SGPT) U/L 13 10 10 9 14   AST (SGOT) U/L 18 15 18 9 18      Arterial Blood Gases:        Results from last 7 days   Lab Units 01/31/20  0541   HEMOGLOBIN A1C % 5.60       Lab Results   Component Value Date    LACTATE 1.4 02/01/2020       Relevant imaging studies and labs from 02/03/20 were reviewed and interpreted by me    Medications (kevin):    HYDROmorphone HCl-NaCl    lactated ringers Last Rate: 125 mL/hr (02/03/20 0321)         bisacodyl 10 mg Oral Daily   bisacodyl 10 mg Rectal Daily   buPROPion 75 mg Oral Q6H   docusate sodium 100 mg Oral BID   DULoxetine 30 mg Oral Daily   heparin (porcine) 5,000 Units Subcutaneous Q8H   methylnaltrexone 12 mg Subcutaneous Once   metoclopramide 10 mg Intravenous Q6H   pantoprazole 40 mg Oral Q AM   piperacillin-tazobactam 3.375 g Intravenous Q8H   sodium chloride 10 mL Intravenous Q12H   traZODone 50 mg Oral Nightly       Assessment/Plan   IMPRESSION / PLAN     Inpatient Problem List:  40 y.o.male:  Active Hospital Problems    Diagnosis   • **Diverticulitis   • S/P exploratory laparotomy with sigmoid colectomy/end colostomy/enterotomy repair 1/31/20   • Active Tobacco use   • Peritonitis (CMS/[facility])   • Perforated diverticulum of sigmoid colon   • Morbidly obese (CMS/[facility])   • Recurrent major depressive disorder (CMS/HCC)   • 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 Data deficit (CMS/[facility])        Impression:  39 y/o AAM w/ h/o Obesity, Diastolic HF EF 65%, h/o remote PE 10 years ago, IVC filter which is clotted off or stenoses w/ abdominal wall collateralization, GERD, Tobacco abuse, Anxiety, Depression admitted on 1/28/20 with abdominal pain, vomiting, and constipation.  He had an episode of diverticulitis in Nov 2019.  CT initially showed acute sigmoid diverticulitis with evidence of abscess and chronic occlusion of the IVC filter.  He was managed conservatively initially, however on 1/30 repeat CT showed free air,  "worsening distention of small bowel, abscess of the sigmoid colon with increasing amount of intraabdominal free fluid.  He went to to the OR on same day with Dr. Valenzuela and had exploratory laparotomy with sigmoid colectomy, end colostomy and EGD.    Plan:  Post op care per Dr. Valenzuela    Peritonitis - d/c Flagyl, continue Pip-Tazo    Ileus - Relistor SQ x 1, on stool softeners as well    H/o DVT / PE / Occluded IVC Filter - patient tried Eliquis in past.  He prefers to stay on coumadin. Will resume.  I expect it will take some time to get back in the therapeutic range and by then his risk of post op bleeding should be low.    PT/OT    DVT prophylaxis w/ SQ Heparin - d/c when INR close the therapeutic range    Nutrition - Diet Clear Liquid    To tele    Plan of care and goals reviewed with mulitdisciplinary team at daily rounds           Valentin Andrade MD  Intensive Care Medicine  02/03/20 12:05 PM         Electronically signed by Valentin Andrade MD at 02/03/20 1211     Taye Valenzuela MD at 02/03/20 0626          Patient Name:  Brigida Rodriguez  YOB: 1980  [id number]    Surgery Progress Note    Date of visit: 2/3/2020    Subjective   Subjective: Reports minimal nausea, and occasional pain, but overall doing better.        Objective     Objective:     /85   Pulse 98   Temp 97.7 °F (36.5 °C) (Oral)   Resp 16   Ht 193 cm (75.98\")   Wt (!) 166 kg (365 lb)   SpO2 97%   BMI 44.45 kg/m²      Intake/Output Summary (Last 24 hours) at 2/3/2020 0627  Last data filed at 2/3/2020 0430  Gross per 24 hour   Intake 3739.25 ml   Output 5395 ml   Net -1655.75 ml       CV:  Rhythm  regular and rate regular   L:  Clear  to auscultation bilaterally   Abd:  Bowel sounds hypoactive, soft, appropriately tender. Ostomy pink, viable, with minimal stool output. TAMARA serous  Ext:  No cyanosis, clubbing, edema    Recent labs that are back at this time have been reviewed. WBC " normalizing      Assessment/Plan     Assessment/ Plan:    Hospital Problem List     * (Principal) Diverticulitis - Slowly improving. Clamp NG and check residuals. OK for transfer to floor from my perspective (telemetry). OK to start anticoagulation with heparin gtt from my perspective (no bolus please). Will follow.      Chronic congestive heart failure. 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    Morbidly obese (CMS/HCC)        Peritonitis (CMS/HCC)    Perforated diverticulum of sigmoid colon    S/P exploratory laparotomy with sigmoid colectomy/end colostomy/enterotomy repair 1/31/20    Active Tobacco use              Taye Valenzuela MD  2/3/2020  6:27 AM        Electronically signed by Taye Valenzuela MD at 02/03/20 0629     Imtiaz Grissom MD at 02/02/20 7454          Intensivist Note     2/2/2020  Hospital Day: 5  3 Days Post-Op  ICU Stays Timeline      Dates and times are displayed in the time zone of the admission          Hospital Admission: 01/28/20 0022 - Current  ICU stays: 1      In Date/Time Event Department ICU Stay Duration     01/28/20 0022 Admission  [facility]      01/28/20 0436 Transfer In  [facility] 5F      01/30/20 1709 Transfer In  [facility] OR      01/31/20 0035 Transfer In  [facility] 2B ICU 2 days 21 hours 39 minutes                Mr. Brigida Rodriguez, 40 y.o. male is followed for:    Diverticulitis    Perforated diverticulum of sigmoid colon    S/P exploratory laparotomy with sigmoid colectomy/end colostomy/enterotomy repair 1/31/20    Peritonitis (CMS/HCC)    Chronic congestive heart failure. 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  Patient was admitted 1/28/2020 with 2-day history of epigastric pain and a CT of the abdomen/pelvis showing acute sigmoid diverticulitis.  Also noted on CTA was chronic occlusion of the IVC.  Was placed on Zosyn and pain medications but developed severe leukocytosis, worsening abdominal pain, and repeat CT scan suggested bowel obstruction.  Was taken to the OR 1/30/2020 and underwent exploratory laparotomy, sigmoid colectomy and end colostomy.was noted to have massively dilated bowel with serosal tears.  Required enterotomy for decompression with subsequent repair.  Was then transferred to the ICU.     Interval history:   Continues to have an acceptable postoperative course.  Pain is adequately controlled on Dilaudid PCA without complaints of nausea or vomiting.  Continues with significant NG output in the last 24 hours (3650 cc) but he must be taking in a significant amount of ice chips.  BUN and creatinine continue to improve and are now normal and urine output is adequate.  Remains on lactated Ringer's at 125 cc an hour.  T-max 99.7 and WBC down to 11.94.  Heart rate down to 85 bpm and no complaints of chest pain cough purulent sputum production, hemoptysis, pleuritic pain, or complaints of dyspnea.  Well oxygenated on room air.         ROS: Per subjective, all other systems reviewed and were negative.    The patient's relevant PMH, PSH, FH, and SH were reviewed and updated in Epic as appropriate. Allergies and Medications reviewed.    OBJECTIVE     /95 (BP Location: Right arm, Patient Position: Lying)   Pulse 94   Temp 98.2 °F (36.8 °C) (Oral)   Resp 18   Ht 193 cm (75.98\")   Wt (!) 166 kg (365 lb)   SpO2 95%   BMI 44.45 kg/m²       Flow (L/min): 2    Flowsheet Rows  " "    First Filed Value   Admission Height  193 cm (76\") Documented at 01/28/2020 0031   Admission Weight  (!) 170 kg (375 lb) Documented at 01/28/2020 0031        Intake & Output (last day)     intake 3645 cc                    output 5720 cc (1850 cc urine)     Exam:  General Exam:  Overweight black male propped up in bed in NAD  HEENT: Pupils equal and reactive.  NG tube in place  Neck:                          Supple, no JVD, thyromegaly, or adenopathy  Lungs: Clear anteriorly and laterally  Cardiovascular: Regular rate and rhythm without murmurs or gallops.  HR 85 bpm  Abdomen: Mildly tender to palpation.  Colostomy functioning.  TAMRAA drain with minimal drainage (75 cc over the last 24 hours).  Obese   and rectal: Deferred.  Extremities: No cyanosis clubbing edema.  Neurologic:                 Symmetric strength. No focal deficits.    Chest X-Ray: No film today    INFUSIONS    HYDROmorphone HCl-NaCl   PCA pump   lactated ringers 125 mL/hr Last Rate: 125 mL/hr (02/02/20 0737)       Results from last 7 days   Lab Units 02/02/20  0704 02/01/20 0320 01/31/20  0541   WBC 10*3/mm3 11.94* 12.50* 16.29*   HEMOGLOBIN g/dL 12.4* 14.3 16.7   HEMATOCRIT % 37.2* 42.5 50.5   PLATELETS 10*3/mm3 197 205 246     Results from last 7 days   Lab Units 02/02/20  0704 02/01/20  0320   SODIUM mmol/L 138 140   POTASSIUM mmol/L 3.5 3.7   CHLORIDE mmol/L 99 98   CO2 mmol/L 27.0 28.0   BUN mg/dL 13 24*   CREATININE mg/dL 0.95 1.09   GLUCOSE mg/dL 94 117*   CALCIUM mg/dL 8.7 8.7     Results from last 7 days   Lab Units 02/02/20  1645 02/02/20  0704 02/01/20  1156 02/01/20  0320 01/31/20  0541   MAGNESIUM mg/dL  --   --   --  2.4 2.1   PHOSPHORUS mg/dL 2.5 1.7* 1.5* 1.5* 4.5     Results from last 7 days   Lab Units 02/01/20  0320 01/31/20  0541 01/29/20  2043   ALK PHOS U/L 45 43 56   BILIRUBIN mg/dL 3.0* 2.6* 1.4*   ALT (SGPT) U/L 10 10 9   AST (SGOT) U/L 15 18 9       No results found for: SEDRATE  No results found for: BNP  Lab Results "   Component Value Date    TROPONINT <0.010 01/28/2020     No results found for: TSH  Lab Results   Component Value Date    LACTATE 1.4 02/01/2020     No results found for: CORTISOL      I reviewed the patient's results, images and medication.    Assessment/Plan   ASSESSMENT        Diverticulitis    Perforated diverticulum of sigmoid colon    S/P exploratory laparotomy with sigmoid colectomy/end colostomy/enterotomy repair 1/31/20    Peritonitis (CMS/[facility])    Chronic congestive heart failure. 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  Is to remain n.p.o. another day because of massive dilatation and serosal tears.  Overall however seems to be doing well with excellent gas exchange, normal renal function, only low-grade temp and minimal leukocytosis.    PLAN     1.  Continue n.p.o. and fluids.  Timing of oral intake to be determined by surgery  2.  Continue empiric antimicrobial therapy with Zosyn and Flagyl  3.  Is not a diabetic and all blood sugars controlled so we will discontinue Accu-Cheks    Plan of care and goals reviewed with mulitdisciplinary team at daily rounds.    I discussed the patient's findings and my recommendations with patient and nursing staff    High level of risk due to: severe exacerbation of chronic illness, illness with threat to life or bodily function and parenteral controlled substances.    Time spent Critical care 20 min (It does not include procedure time).    Imtiza Grissom MD  Intensive Care Medicine  02/02/20 10:14 PM       Electronically signed by Imtiaz Grissom MD at 02/02/20 2223       Raquel Barba RN      Case Management   Progress Notes   Signed   Date of Service:  02/04/20 0904   Creation Time:  02/04/20 0904             Signed             Show:Clear all  []Manual[x]Template[]Copied    Added by:  [x]Raquel Barba RN    []ver for details  Continued Stay Note   [place]     Patient Name: Brigida Rodriguez                    MRN: [id number]  Today's Date: 2/4/2020                       Admit Date: 1/28/2020          Discharge Plan      Row Name 02/04/20 0850           Plan     Plan  update     Patient/Family in Agreement with Plan  yes     Plan Comments  Spoke with patient at bedside regarding discharge plan.  Patient reports not sure if he is feeling better, currently has NG tube.  No discharge needs verbalized.  CM following.  Patient plan is to discharge home via car with family to transport.       Final Discharge Disposition Code  01 - home or self-care          Discharge Codes    No documentation.              Expected Discharge Date and Time      Expected Discharge Date Expected Discharge Time     Feb 5, 2020                  Raquel Barba, RN

## 2020-02-04 NOTE — PROGRESS NOTES
"Patient Name:  Brigida Rodriguez  YOB: 1980  [id number]    Surgery Progress Note    Date of visit: 2/4/2020    Subjective   Subjective: Feeling a bit better. Less nausea. Pain controlled.         Objective     Objective:     /95 (BP Location: Right arm, Patient Position: Lying)   Pulse 82   Temp 98.6 °F (37 °C) (Oral)   Resp 18   Ht 193 cm (76\")   Wt (!) 169 kg (373 lb 3.2 oz)   SpO2 96%   BMI 45.43 kg/m²     Intake/Output Summary (Last 24 hours) at 2/4/2020 0652  Last data filed at 2/4/2020 0540  Gross per 24 hour   Intake 5788.9 ml   Output 4015 ml   Net 1773.9 ml       CV:  Rhythm  regular and rate regular   L:  Clear  to auscultation bilaterally   Abd:  Bowel sounds positive , soft, less tender. Ostomy pink, with minimal output. TAMARA serous. Dressings c/d/i  Ext:  No cyanosis, clubbing, edema    Recent labs that are back at this time have been reviewed.        Assessment/Plan     Assessment/ Plan:    Hospital Problem List     * (Principal) Diverticulitis - Slowly improving. Increase mobility. May D/C NG later today vs. Tomorrow. OK to resume anticoagulation from my standpoint.      Chronic congestive heart failure. 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

## 2020-02-05 LAB
ALBUMIN SERPL-MCNC: 3.6 G/DL (ref 3.5–5.2)
ANION GAP SERPL CALCULATED.3IONS-SCNC: 11 MMOL/L (ref 5–15)
BASOPHILS # BLD AUTO: 0.02 10*3/MM3 (ref 0–0.2)
BASOPHILS NFR BLD AUTO: 0.2 % (ref 0–1.5)
BUN BLD-MCNC: 7 MG/DL (ref 6–20)
BUN/CREAT SERPL: 8.9 (ref 7–25)
CALCIUM SPEC-SCNC: 9.1 MG/DL (ref 8.6–10.5)
CHLORIDE SERPL-SCNC: 102 MMOL/L (ref 98–107)
CO2 SERPL-SCNC: 26 MMOL/L (ref 22–29)
CREAT BLD-MCNC: 0.79 MG/DL (ref 0.76–1.27)
DEPRECATED RDW RBC AUTO: 46.6 FL (ref 37–54)
EOSINOPHIL # BLD AUTO: 0.28 10*3/MM3 (ref 0–0.4)
EOSINOPHIL NFR BLD AUTO: 3.3 % (ref 0.3–6.2)
ERYTHROCYTE [DISTWIDTH] IN BLOOD BY AUTOMATED COUNT: 13.8 % (ref 12.3–15.4)
GFR SERPL CREATININE-BSD FRML MDRD: 132 ML/MIN/1.73
GLUCOSE BLD-MCNC: 102 MG/DL (ref 65–99)
HCT VFR BLD AUTO: 37.4 % (ref 37.5–51)
HGB BLD-MCNC: 12.4 G/DL (ref 13–17.7)
IMM GRANULOCYTES # BLD AUTO: 0.11 10*3/MM3 (ref 0–0.05)
IMM GRANULOCYTES NFR BLD AUTO: 1.3 % (ref 0–0.5)
INR PPP: 1.88 (ref 0.85–1.16)
LYMPHOCYTES # BLD AUTO: 1.42 10*3/MM3 (ref 0.7–3.1)
LYMPHOCYTES NFR BLD AUTO: 16.5 % (ref 19.6–45.3)
MCH RBC QN AUTO: 30.5 PG (ref 26.6–33)
MCHC RBC AUTO-ENTMCNC: 33.2 G/DL (ref 31.5–35.7)
MCV RBC AUTO: 92.1 FL (ref 79–97)
MONOCYTES # BLD AUTO: 0.91 10*3/MM3 (ref 0.1–0.9)
MONOCYTES NFR BLD AUTO: 10.6 % (ref 5–12)
NEUTROPHILS # BLD AUTO: 5.86 10*3/MM3 (ref 1.7–7)
NEUTROPHILS NFR BLD AUTO: 68.1 % (ref 42.7–76)
NRBC BLD AUTO-RTO: 0 /100 WBC (ref 0–0.2)
PHOSPHATE SERPL-MCNC: 2.6 MG/DL (ref 2.5–4.5)
PLATELET # BLD AUTO: 280 10*3/MM3 (ref 140–450)
PMV BLD AUTO: 10.2 FL (ref 6–12)
POTASSIUM BLD-SCNC: 4.2 MMOL/L (ref 3.5–5.2)
PROTHROMBIN TIME: 20.8 SECONDS (ref 11.2–14.3)
RBC # BLD AUTO: 4.06 10*6/MM3 (ref 4.14–5.8)
SODIUM BLD-SCNC: 139 MMOL/L (ref 136–145)
WBC NRBC COR # BLD: 8.6 10*3/MM3 (ref 3.4–10.8)

## 2020-02-05 PROCEDURE — C1751 CATH, INF, PER/CENT/MIDLINE: HCPCS

## 2020-02-05 PROCEDURE — 25010000002 HYDROMORPHONE PER 4 MG: Performed by: SURGERY

## 2020-02-05 PROCEDURE — 99232 SBSQ HOSP IP/OBS MODERATE 35: CPT | Performed by: NURSE PRACTITIONER

## 2020-02-05 PROCEDURE — 02HV33Z INSERTION OF INFUSION DEVICE INTO SUPERIOR VENA CAVA, PERCUTANEOUS APPROACH: ICD-10-PCS | Performed by: SURGERY

## 2020-02-05 PROCEDURE — 25010000002 METOCLOPRAMIDE PER 10 MG: Performed by: SURGERY

## 2020-02-05 PROCEDURE — 25010000002 LORAZEPAM PER 2 MG: Performed by: SURGERY

## 2020-02-05 PROCEDURE — 25010000002 METHYLNALTREXONE 12 MG/0.6ML SOLUTION: Performed by: SURGERY

## 2020-02-05 PROCEDURE — 94799 UNLISTED PULMONARY SVC/PX: CPT

## 2020-02-05 PROCEDURE — 85610 PROTHROMBIN TIME: CPT | Performed by: SURGERY

## 2020-02-05 PROCEDURE — 85025 COMPLETE CBC W/AUTO DIFF WBC: CPT | Performed by: SURGERY

## 2020-02-05 PROCEDURE — C1894 INTRO/SHEATH, NON-LASER: HCPCS

## 2020-02-05 PROCEDURE — 25010000002 HEPARIN (PORCINE) PER 1000 UNITS: Performed by: SURGERY

## 2020-02-05 PROCEDURE — 97161 PT EVAL LOW COMPLEX 20 MIN: CPT

## 2020-02-05 PROCEDURE — 25010000002 PIPERACILLIN-TAZOBACTAM: Performed by: INTERNAL MEDICINE

## 2020-02-05 PROCEDURE — 25010000002 PIPERACILLIN SOD-TAZOBACTAM PER 1 G: Performed by: INTERNAL MEDICINE

## 2020-02-05 PROCEDURE — 80069 RENAL FUNCTION PANEL: CPT | Performed by: SURGERY

## 2020-02-05 RX ORDER — FAMOTIDINE 20 MG/1
20 TABLET, FILM COATED ORAL
Status: DISCONTINUED | OUTPATIENT
Start: 2020-02-05 | End: 2020-02-05

## 2020-02-05 RX ORDER — SODIUM CHLORIDE 0.9 % (FLUSH) 0.9 %
10 SYRINGE (ML) INJECTION AS NEEDED
Status: DISCONTINUED | OUTPATIENT
Start: 2020-02-05 | End: 2020-02-10 | Stop reason: HOSPADM

## 2020-02-05 RX ORDER — FAMOTIDINE 10 MG/ML
20 INJECTION, SOLUTION INTRAVENOUS ONCE
Status: COMPLETED | OUTPATIENT
Start: 2020-02-05 | End: 2020-02-05

## 2020-02-05 RX ORDER — FAMOTIDINE 20 MG/1
20 TABLET, FILM COATED ORAL 2 TIMES DAILY PRN
Status: DISPENSED | OUTPATIENT
Start: 2020-02-05 | End: 2020-02-07

## 2020-02-05 RX ORDER — BISACODYL 5 MG/1
10 TABLET, DELAYED RELEASE ORAL DAILY
Status: DISCONTINUED | OUTPATIENT
Start: 2020-02-06 | End: 2020-02-10

## 2020-02-05 RX ORDER — WARFARIN SODIUM 7.5 MG/1
7.5 TABLET ORAL
Status: COMPLETED | OUTPATIENT
Start: 2020-02-05 | End: 2020-02-05

## 2020-02-05 RX ORDER — WARFARIN SODIUM 5 MG/1
10 TABLET ORAL
Status: DISCONTINUED | OUTPATIENT
Start: 2020-02-06 | End: 2020-02-06

## 2020-02-05 RX ORDER — SODIUM CHLORIDE 0.9 % (FLUSH) 0.9 %
10 SYRINGE (ML) INJECTION EVERY 12 HOURS SCHEDULED
Status: DISCONTINUED | OUTPATIENT
Start: 2020-02-05 | End: 2020-02-10 | Stop reason: HOSPADM

## 2020-02-05 RX ORDER — BISACODYL 10 MG
10 SUPPOSITORY, RECTAL RECTAL DAILY
Status: DISCONTINUED | OUTPATIENT
Start: 2020-02-06 | End: 2020-02-10

## 2020-02-05 RX ADMIN — HYDROMORPHONE HYDROCHLORIDE 0.2 MG: 1 INJECTION, SOLUTION INTRAMUSCULAR; INTRAVENOUS; SUBCUTANEOUS at 21:37

## 2020-02-05 RX ADMIN — HEPARIN SODIUM 5000 UNITS: 5000 INJECTION, SOLUTION INTRAVENOUS; SUBCUTANEOUS at 21:37

## 2020-02-05 RX ADMIN — HYDROMORPHONE HYDROCHLORIDE 0.5 MG: 1 INJECTION, SOLUTION INTRAMUSCULAR; INTRAVENOUS; SUBCUTANEOUS at 00:07

## 2020-02-05 RX ADMIN — PIPERACILLIN AND TAZOBACTAM 3.38 G: 3; .375 INJECTION, POWDER, LYOPHILIZED, FOR SOLUTION INTRAVENOUS at 01:07

## 2020-02-05 RX ADMIN — PIPERACILLIN AND TAZOBACTAM 3.38 G: 3; .375 INJECTION, POWDER, LYOPHILIZED, FOR SOLUTION INTRAVENOUS at 16:41

## 2020-02-05 RX ADMIN — METOCLOPRAMIDE 10 MG: 5 INJECTION, SOLUTION INTRAMUSCULAR; INTRAVENOUS at 01:07

## 2020-02-05 RX ADMIN — SODIUM CHLORIDE, PRESERVATIVE FREE 10 ML: 5 INJECTION INTRAVENOUS at 13:38

## 2020-02-05 RX ADMIN — HEPARIN SODIUM 5000 UNITS: 5000 INJECTION, SOLUTION INTRAVENOUS; SUBCUTANEOUS at 13:26

## 2020-02-05 RX ADMIN — SODIUM CHLORIDE, PRESERVATIVE FREE 10 ML: 5 INJECTION INTRAVENOUS at 22:42

## 2020-02-05 RX ADMIN — LORAZEPAM 1 MG: 2 INJECTION INTRAMUSCULAR; INTRAVENOUS at 05:40

## 2020-02-05 RX ADMIN — FAMOTIDINE 20 MG: 10 INJECTION, SOLUTION INTRAVENOUS at 05:39

## 2020-02-05 RX ADMIN — SODIUM CHLORIDE, PRESERVATIVE FREE 10 ML: 5 INJECTION INTRAVENOUS at 09:03

## 2020-02-05 RX ADMIN — BUPROPION HYDROCHLORIDE 75 MG: 75 TABLET, FILM COATED ORAL at 11:04

## 2020-02-05 RX ADMIN — HYDROMORPHONE HYDROCHLORIDE 0.5 MG: 1 INJECTION, SOLUTION INTRAMUSCULAR; INTRAVENOUS; SUBCUTANEOUS at 13:46

## 2020-02-05 RX ADMIN — TRAZODONE HYDROCHLORIDE 50 MG: 50 TABLET ORAL at 21:37

## 2020-02-05 RX ADMIN — METOCLOPRAMIDE 10 MG: 5 INJECTION, SOLUTION INTRAMUSCULAR; INTRAVENOUS at 05:55

## 2020-02-05 RX ADMIN — FAMOTIDINE 20 MG: 10 INJECTION, SOLUTION INTRAVENOUS at 18:36

## 2020-02-05 RX ADMIN — LORAZEPAM 1 MG: 2 INJECTION INTRAMUSCULAR; INTRAVENOUS at 17:02

## 2020-02-05 RX ADMIN — HYDROMORPHONE HYDROCHLORIDE 0.5 MG: 1 INJECTION, SOLUTION INTRAMUSCULAR; INTRAVENOUS; SUBCUTANEOUS at 05:40

## 2020-02-05 RX ADMIN — HYDROMORPHONE HYDROCHLORIDE 0.2 MG: 1 INJECTION, SOLUTION INTRAMUSCULAR; INTRAVENOUS; SUBCUTANEOUS at 17:02

## 2020-02-05 RX ADMIN — METOCLOPRAMIDE 10 MG: 5 INJECTION, SOLUTION INTRAMUSCULAR; INTRAVENOUS at 11:04

## 2020-02-05 RX ADMIN — POTASSIUM CHLORIDE, DEXTROSE MONOHYDRATE AND SODIUM CHLORIDE 100 ML/HR: 150; 5; 450 INJECTION, SOLUTION INTRAVENOUS at 09:20

## 2020-02-05 RX ADMIN — METHYLNALTREXONE BROMIDE 4 MG: 12 INJECTION, SOLUTION SUBCUTANEOUS at 09:04

## 2020-02-05 RX ADMIN — POTASSIUM CHLORIDE, DEXTROSE MONOHYDRATE AND SODIUM CHLORIDE 100 ML/HR: 150; 5; 450 INJECTION, SOLUTION INTRAVENOUS at 23:42

## 2020-02-05 RX ADMIN — LORAZEPAM 1 MG: 2 INJECTION INTRAMUSCULAR; INTRAVENOUS at 00:08

## 2020-02-05 RX ADMIN — HEPARIN SODIUM 5000 UNITS: 5000 INJECTION, SOLUTION INTRAVENOUS; SUBCUTANEOUS at 05:40

## 2020-02-05 RX ADMIN — HYDROMORPHONE HYDROCHLORIDE 0.5 MG: 1 INJECTION, SOLUTION INTRAMUSCULAR; INTRAVENOUS; SUBCUTANEOUS at 11:04

## 2020-02-05 RX ADMIN — LORAZEPAM 1 MG: 2 INJECTION INTRAMUSCULAR; INTRAVENOUS at 11:04

## 2020-02-05 RX ADMIN — HYDROMORPHONE HYDROCHLORIDE 0.5 MG: 1 INJECTION, SOLUTION INTRAMUSCULAR; INTRAVENOUS; SUBCUTANEOUS at 01:53

## 2020-02-05 RX ADMIN — WARFARIN SODIUM 7.5 MG: 7.5 TABLET ORAL at 17:02

## 2020-02-05 RX ADMIN — DOCUSATE SODIUM 100 MG: 100 CAPSULE, LIQUID FILLED ORAL at 21:37

## 2020-02-05 RX ADMIN — METOCLOPRAMIDE 10 MG: 5 INJECTION, SOLUTION INTRAMUSCULAR; INTRAVENOUS at 17:02

## 2020-02-05 RX ADMIN — HYDROMORPHONE HYDROCHLORIDE 0.5 MG: 1 INJECTION, SOLUTION INTRAMUSCULAR; INTRAVENOUS; SUBCUTANEOUS at 08:27

## 2020-02-05 NOTE — PROGRESS NOTES
Continued Stay Note  [facility]     Patient Name: Brigida Rodriguez  MRN: [id number]  Today's Date: 2/5/2020    Admit Date: 1/28/2020    Discharge Plan     Row Name 02/05/20 0927       Plan    Plan  update    Patient/Family in Agreement with Plan  yes    Plan Comments  Spoke with patient at bedside regarding discharge plan.  Patient up to chair after working with PT.  Discussed possibility of HH coming out to see him when he goes home, patient agreeable.  HH agency list provided, patient wants to look it over and decide.  No needs verbalized.  CM following.  Patient plan is to discharge home with HH via car with family to transport.      Final Discharge Disposition Code  01 - home or self-care        Discharge Codes    No documentation.       Expected Discharge Date and Time     Expected Discharge Date Expected Discharge Time    Feb 5, 2020             Raquel Barba RN

## 2020-02-05 NOTE — PLAN OF CARE
Problem: Patient Care Overview  Goal: Plan of Care Review  Flowsheets (Taken 2/5/2020 2256)  Plan of Care Reviewed With: patient  Outcome Summary: IP PT eval completed. Pt ambulated 300 ft independently. No gross motor deficits. No LOB. Good safety awareness. Recommend appropriate for return home when medically ready. Encouraged pt to ambulatd 3x per day. Will d/c IP PT, skilled services not warranted.

## 2020-02-05 NOTE — PROGRESS NOTES
[facility] Medicine Services  PROGRESS NOTE    Patient Name: Brigida Rodriguez  : 1980  MRN: [id number]    Date of Admission: 2020  Primary Care Physician: Elias Tom DO    Subjective   Subjective     CC:  Abdominal pain    HPI:  Anxious this morning. Having pain, but nausea. Trouble swallowing pills with NG in place. Not sleeping well- between pain and multiple interruptions. Walked in HW several times yesterday  No flatus/ stool in ostomy yet    Review of Systems  Gen- No fevers, chills  CV- No chest pain, palpitations  Resp- No cough, dyspnea  GI-abdominal discomfort as above        Objective   Objective     Vital Signs:   Temp:  [97.2 °F (36.2 °C)-98.4 °F (36.9 °C)] 97.2 °F (36.2 °C)  Heart Rate:  [] 82  Resp:  [16-18] 18  BP: (140-159)/() 156/107        Physical Exam:  Constitutional -nontoxic, in bed, anxious/ tearful  HEENT-NCAT, mucous membranes moist, NG in place  CV-RRR, S1 S2 normal, no m/r/g  Resp-grossly clear bilaterally  Abd-soft, non-tender to light palpation, non-distended, bowel sounds hypoactive but present.  Ostomy in place - dark liquid output/ no stool  Morbidly obese.  Ext-No lower extremity cyanosis, clubbing or edema bilaterally  Neuro-alert and oriented, speech clear, moves all extremities   Psych-anxious  Skin- No rash on exposed UE or LE bilaterally      Results Reviewed:  Results from last 7 days   Lab Units 20  0653 20  0848 20  0723 20  0348  20  0541   WBC 10*3/mm3 8.60  --  7.04 9.07   < > 16.29*   HEMOGLOBIN g/dL 12.4*  --  11.9* 12.2*   < > 16.7   HEMATOCRIT % 37.4*  --  36.3* 37.9   < > 50.5   PLATELETS 10*3/mm3 280  --  202 208   < > 246   INR  1.88* 1.55*  --  1.38*   < >  --    PROCALCITONIN ng/mL  --   --   --   --   --  2.99*    < > = values in this interval not displayed.     Results from last 7 days   Lab Units 20  0653 20  0723 20  0058 20  0634   02/01/20  0320   SODIUM mmol/L 139 138  --  139   < > 140   POTASSIUM mmol/L 4.2 4.2 4.7 3.5  3.5   < > 3.7   CHLORIDE mmol/L 102 101  --  99   < > 98   CO2 mmol/L 26.0 24.0  --  26.0   < > 28.0   BUN mg/dL 7 9  --  11   < > 24*   CREATININE mg/dL 0.79 0.89  --  0.93   < > 1.09   GLUCOSE mg/dL 102* 86  --  84   < > 117*   CALCIUM mg/dL 9.1 8.7  --  8.9   < > 8.7   ALT (SGPT) U/L  --  22  --  13  --  10   AST (SGOT) U/L  --  28  --  18  --  15   PROBNP pg/mL  --   --   --   --   --  62.3    < > = values in this interval not displayed.     Estimated Creatinine Clearance: 211 mL/min (by C-G formula based on SCr of 0.79 mg/dL).    Microbiology Results Abnormal     None          Imaging Results (Last 24 Hours)     ** No results found for the last 24 hours. **          Results for orders placed during the hospital encounter of 01/28/20   Adult Transthoracic Echo Complete W/ Cont if Necessary Per Protocol    Narrative · Mild mitral valve regurgitation is present.  · Mild tricuspid valve regurgitation is present.  · Estimated EF = 65%.  · Left ventricular systolic function is normal.  · Normal right ventricular cavity size, wall thickness, systolic function   and septal motion noted.  · Left ventricular diastolic function is normal.  · No evidence of pulmonary hypertension is present.  · There is no evidence of pericardial effusion.  · No significant structural valvular abnormality demonstrated.          I have reviewed the medications:  Scheduled Meds:    bisacodyl 10 mg Oral Daily   bisacodyl 10 mg Rectal Daily   buPROPion 75 mg Oral Q6H   docusate sodium 100 mg Oral BID   DULoxetine 30 mg Oral Daily   heparin (porcine) 5,000 Units Subcutaneous Q8H   methylnaltrexone 4 mg Subcutaneous Every Other Day   metoclopramide 10 mg Intravenous Q6H   pantoprazole 40 mg Oral Q AM   piperacillin-tazobactam 3.375 g Intravenous Q8H   sodium chloride 10 mL Intravenous Q12H   traZODone 50 mg Oral Nightly   warfarin 10 mg Oral Daily      Continuous Infusions:    dextrose 5 % and sodium chloride 0.45 % with KCl 20 mEq/L 100 mL/hr Last Rate: 100 mL/hr (02/05/20 0920)   Pharmacy to dose warfarin       PRN Meds:.•  acetaminophen **OR** acetaminophen **OR** acetaminophen  •  diphenhydrAMINE  •  HYDROcodone-acetaminophen  •  HYDROmorphone  •  HYDROmorphone  •  LORazepam  •  Morphine **AND** naloxone  •  naloxone  •  ondansetron **OR** ondansetron  •  oxyCODONE-acetaminophen  •  Pharmacy to dose warfarin  •  phenol  •  potassium & sodium phosphates **OR** potassium & sodium phosphates  •  potassium chloride  •  potassium chloride  •  promethazine **OR** promethazine  •  sodium chloride  •  sodium chloride    Assessment/Plan   Assessment & Plan     Active Hospital Problems    Diagnosis  POA   • **Diverticulitis [K57.92]  Yes   • S/P exploratory laparotomy with sigmoid colectomy/end colostomy/enterotomy repair 1/31/20 [Z98.890]  Not Applicable   • Active Tobacco use [Z72.0]  Yes   • Peritonitis (CMS/HCC) [K65.9]  No   • Perforated diverticulum of sigmoid colon [K57.80]  No   • Morbidly obese (CMS/HCC) [E66.01]  Yes   • Recurrent major depressive disorder (CMS/HCC) [F33.9]  Yes   • 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 Data deficit (CMS/HCC) [I50.9]  Yes      Resolved Hospital Problems   No resolved problems to display.        Brief Hospital Course to date:  Brigida Rodriguez is a 40 y.o. male with history of diastolic heart failure, remote PE status post IVC filter, GERD, tobacco abuse, anxiety and depression who was admitted on 1/28/2020 with abdominal pain, vomiting and constipation.  Patient had a recent episode of diverticulitis in November 2019.    Diverticulitis with perforation  -Status post exploratory laparotomy and sigmoid colectomy with end colostomy placement per   Nicolas  -Continue Zosyn, pain and nausea control    Ileus  -Encouraged ambulation  -NG to low wall suction  -slow return of bowel function. Monitor for TPN needs    History of DVT/PE/occluded IVC filter  -Coumadin restarted, INR 1.88 today, repeat INR in the morning. Pharmacy dosing    Morbid obesity    Tobacco abuse  - cessation  - continue wellbutrin    Depression/anxiety  -prn ativan  -continue wellbutrin, cymbalta, trazadone      DVT Prophylaxis: Heparin    Disposition: I expect the patient to be discharged TBD  CODE STATUS:   Code Status and Medical Interventions:   Ordered at: 01/28/20 0410     Level Of Support Discussed With:    Patient     Code Status:    CPR     Medical Interventions (Level of Support Prior to Arrest):    Full         Electronically signed by SILVIA Hughes, 02/05/20, 11:05 AM.       English

## 2020-02-05 NOTE — PLAN OF CARE
"  Problem: Patient Care Overview  Goal: Plan of Care Review  Outcome: Ongoing (interventions implemented as appropriate)  Flowsheets (Taken 2/5/2020 1697)  Plan of Care Reviewed With: patient  Note:   Pt had nausea but no vomiting overnight. Complained of abdominal pressure. Requested IV dilaudid often. Pt is very anxious about \"dying\" or \"having panic attacks\". He also stated he is concerned about his INR. Q4 residual checks done and dressing changed. Pt ambulated in room for approximately 10 min pacing from door to bed and back. Had to give IV ativan. Refused all PO meds. Pt requested IV pepcid and was given one time dose.VSS Will continue to monitor.      "

## 2020-02-05 NOTE — THERAPY EVALUATION
Patient Name: Brigida Rodriguez  : 1980    MRN: [id number]                              Today's Date: 2020       Admit Date: 2020    Visit Dx:     ICD-10-CM ICD-9-CM   1. Intractable abdominal pain R10.9 789.00   2. Sigmoid diverticulitis K57.32 562.11   3. Diverticulitis K57.92 562.11     Patient Active Problem List   Diagnosis   • Chronic congestive heart failure. 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 place   • Melena   • Morbidly obese (CMS/HCC)   • Diverticulitis   • Peritonitis (CMS/HCC)   • Perforated diverticulum of sigmoid colon   • S/P exploratory laparotomy with sigmoid colectomy/end colostomy/enterotomy repair 20   • Active Tobacco use     Past Medical History:   Diagnosis Date   • Anxiety    • Depression    • GERD (gastroesophageal reflux disease)    • H/O blood clots    • Heart failure (CMS/HCC)    • Hypertension    • Presence of IVC filter    • Pulmonary embolism (CMS/HCC)     10 YEARS AGO     Past Surgical History:   Procedure Laterality Date   • ADENOIDECTOMY     • EXPLORATORY LAPAROTOMY N/A 2020    Procedure: EXPLORATORY LAPAROTOMY,  SIGMOID COLECTOMY, COLOSTOMY, EGD;  Surgeon: Taye Valenzuela MD;  Location: [facility];  Service: General   • KNEE SURGERY     • TONSILLECTOMY       General Information     Row Name 20 0855          PT Evaluation Time/Intention    Document Type  evaluation  -VG     Mode of Treatment  individual therapy;physical therapy  -VG     Row Name 20 0855          General Information    Patient Profile Reviewed?  yes  -VG     Prior Level of Function  independent:;all household mobility;community mobility;ADL's;home management  -VG     Existing Precautions/Restrictions  no known precautions/restrictions  -VG     Barriers to Rehab  none identified  -VG     Row Name 20 0855           Relationship/Environment    Lives With  parent(s) Lives with mother who is available 24/7 for spv/assist.  -VG     Row Name 02/05/20 0855          Resource/Environmental Concerns    Current Living Arrangements  home/apartment/condo  -VG     Row Name 02/05/20 0855          Home Main Entrance    Number of Stairs, Main Entrance  none  -VG     Stair Railings, Main Entrance  none  -VG     Row Name 02/05/20 0855          Cognitive Assessment/Intervention- PT/OT    Orientation Status (Cognition)  oriented to;person;place;situation;time  -VG       User Key  (r) = Recorded By, (t) = Taken By, (c) = Cosigned By    Initials Name Provider Type    VG Marixa Shahid, PT Physical Therapist        Mobility     Row Name 02/05/20 0855          Bed Mobility Assessment/Treatment    Bed Mobility Assessment/Treatment  supine-sit  -VG     Supine-Sit Vida (Bed Mobility)  independent  -VG     Row Name 02/05/20 0855          Sit-Stand Transfer    Sit-Stand Vida (Transfers)  independent  -VG     Row Name 02/05/20 0855          Gait/Stairs Assessment/Training    Vida Level (Gait)  independent  -VG     Distance in Feet (Gait)  300  -VG     Comment (Gait/Stairs)  Distance limited by pain. No LOB. No gross motor deficits. Good safety awareness.  -VG       User Key  (r) = Recorded By, (t) = Taken By, (c) = Cosigned By    Initials Name Provider Type    VG Marixa Shahid, PT Physical Therapist        Obj/Interventions     Row Name 02/05/20 0856          General ROM    GENERAL ROM COMMENTS  BLEs WFL  -VG     Row Name 02/05/20 0856          MMT (Manual Muscle Testing)    General MMT Comments  BLEs WFL  -VG     Row Name 02/05/20 0856          Static Sitting Balance    Level of Vida (Unsupported Sitting, Static Balance)  independent  -VG     Row Name 02/05/20 0856          Static Standing Balance    Level of Vida (Supported Standing, Static Balance)  independent  -VG       User Key  (r) = Recorded By, (t) =  Taken By, (c) = Cosigned By    Initials Name Provider Type    VG Marixa Shahid, PT Physical Therapist        Goals/Plan    No documentation.       Clinical Impression     Row Name 02/05/20 0856          Pain Assessment    Additional Documentation  Pain Scale: Numbers Pre/Post-Treatment (Group)  -VG     [facility] Name 02/05/20 0856          Pain Scale: Numbers Pre/Post-Treatment    Pain Scale: Numbers, Pretreatment  8/10  -VG     Pain Scale: Numbers, Post-Treatment  6/10  -VG     Pain Intervention(s)  Ambulation/increased activity;Repositioned  -VG     Row Name 02/05/20 0856          Plan of Care Review    Plan of Care Reviewed With  patient  -VG     Outcome Summary  IP PT eval completed. Pt ambulated 300 ft independently. No gross motor deficits. No LOB. Good safety awareness. Recommend appropriate for return home when medically ready. Encouraged pt to ambulatd 3x per day. Will d/c IP PT, skilled services not warranted.   -VG     Row Name 02/05/20 0856          Physical Therapy Clinical Impression    Criteria for Skilled Interventions Met (PT Clinical Impression)  no;no problems identified which require skilled intervention;current level of function same as previous level of function  -VG     [facility] Name 02/05/20 0856          Positioning and Restraints    Pre-Treatment Position  in bed  -VG     Post Treatment Position  chair  -VG     In Chair  reclined;call light within reach;encouraged to call for assist;legs elevated  -VG       User Key  (r) = Recorded By, (t) = Taken By, (c) = Cosigned By    Initials Name Provider Type    VG Marixa Shahid, PT Physical Therapist        Outcome Measures     Row Name 02/05/20 0857          How much help from another person do you currently need...    Turning from your back to your side while in flat bed without using bedrails?  4  -VG     Moving from lying on back to sitting on the side of a flat bed without bedrails?  4  -VG     Moving to and from a bed to a chair (including a  wheelchair)?  4  -VG     Standing up from a chair using your arms (e.g., wheelchair, bedside chair)?  4  -VG     Climbing 3-5 steps with a railing?  4  -VG     To walk in hospital room?  4  -VG     AM-PAC 6 Clicks Score (PT)  24  -VG     Row Name 02/05/20 0857          Functional Assessment    Outcome Measure Options  AM-PAC 6 Clicks Basic Mobility (PT)  -VG       User Key  (r) = Recorded By, (t) = Taken By, (c) = Cosigned By    Initials Name Provider Type    Marixa Avalos, PT Physical Therapist          PT Recommendation and Plan     Outcome Summary/Treatment Plan (PT)  Anticipated Discharge Disposition (PT): home  Plan of Care Reviewed With: patient  Outcome Summary: IP PT eval completed. Pt ambulated 300 ft independently. No gross motor deficits. No LOB. Good safety awareness. Recommend appropriate for return home when medically ready. Encouraged pt to ambulatd 3x per day. Will d/c IP PT, skilled services not warranted.      Time Calculation:   PT Charges     Row Name 02/05/20 0757             Time Calculation    Start Time  0757  -VG      PT Received On  02/05/20  -VG      PT Goal Re-Cert Due Date  02/15/20  -VG        User Key  (r) = Recorded By, (t) = Taken By, (c) = Cosigned By    Initials Name Provider Type    Marixa Avalos, PT Physical Therapist        Therapy Charges for Today     Code Description Service Date Service Provider Modifiers Qty    [id number] HC PT EVAL LOW COMPLEXITY 4 2/5/2020 Marixa Shahid, PT GP 1          PT G-Codes  Outcome Measure Options: AM-PAC 6 Clicks Basic Mobility (PT)  AM-PAC 6 Clicks Score (PT): 24    Sosa Shahid PT  2/5/2020

## 2020-02-05 NOTE — PROGRESS NOTES
"Patient Name:  Brigida Rodriguez  YOB: 1980  [id number]    Surgery Progress Note    Date of visit: 2/5/2020    Subjective   Subjective: Feels OK. Had some abdominal pressure yesterday, but feeling a bit better this AM.         Objective     Objective:     /98 (BP Location: Right arm, Patient Position: Lying)   Pulse 77   Temp 98.4 °F (36.9 °C) (Oral)   Resp 18   Ht 193 cm (76\")   Wt (!) 169 kg (373 lb 3.2 oz)   SpO2 (!) 89%   BMI 45.43 kg/m²     Intake/Output Summary (Last 24 hours) at 2/5/2020 0641  Last data filed at 2/5/2020 0520  Gross per 24 hour   Intake 850 ml   Output 4780 ml   Net -3930 ml       CV:  Rhythm  regular and rate regular   L:  Clear  to auscultation bilaterally   Abd:  Bowel sounds positive and more active, soft, minimally tender. Ostomy pink with some serous output. Wound c/d/i  Ext:  No cyanosis, clubbing, edema    Recent labs that are back at this time have been reviewed.        Assessment/Plan     Assessment/ Plan:    Hospital Problem List     * (Principal) Diverticulitis - Expected slow return of bowel function. Place PICC given difficult IV access, possible need for IV nutrition if bowel function does not resume. Ambulate TID in halls.      Chronic congestive heart failure. 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

## 2020-02-05 NOTE — NURSING NOTE
At this time appliance is intact. Stoma viable. Still no bowel function at this time. NG-tube in place. Lost of hiccups at this time. Encouraged lots of ambulation today. Encouragement provided. Will continue to follow daily and provide follow-up stomal education when appropriate. Thanks

## 2020-02-05 NOTE — PLAN OF CARE
Problem: Patient Care Overview  Goal: Plan of Care Review  Outcome: Ongoing (interventions implemented as appropriate)  Flowsheets (Taken 2/4/2020 2111)  Progress: no change  Plan of Care Reviewed With: patient; spouse  Outcome Summary: Pt had a lot of N/V and abdominal pressure today. He requested he be hooked up to low-wall suction twice while refusing the Q4 residual checks. Pt also refused to ambulate and have his dressing changed. VSS during shift.

## 2020-02-05 NOTE — PROGRESS NOTES
"Pharmacy Consult  -  Warfarin    Brigida Rodriguez is a  40 y.o. male   Height - 193 cm (76\")  Weight - (!) 169 kg (373 lb 3.2 oz)    Consulting Provider: - Hospitalist  Indication: - Hx of PE and LE DVT  Goal INR: 2-3   Home Regimen:   - warfarin 10 mg oral daily EXCEPT  - warfarin 12.5 mg on Fridays     Bridge Therapy: patient is on RASHEEDA but not full anticoagulation    Drug-Drug Interactions with current regimen:   Trazodone- increases bleed risk   Bupropion, duloxetine, heparin subcut, zosyn - may result in an increased risk of bleeding   Zosyn - may result in an increased risk of bleeding (1/28 - 2/7)     S/p Metronidazole (MAJOR) - may result in an increased INR (2/1 - 2/3)    Warfarin Dosing During Admission:    Date  2/3  2/4  2/5         INR  1.38  1.55  1.88         Dose  10 mg  10 mg  7.5 mg          • Note: received vitamin K 10 mg IVPB x1 and KCentra on 1/30 (procedure)    Education Provided: Patient is on warfarin prior to admission.  Education provided  on 2/3 verbally and in writing .  Discussed effects of warfarin, importance of checking INR, drug-drug and drug-food interactions, and signs/symptoms of bleeding and clotting.  Patient verbalized understanding through teach back.  All pertinent questions were answered.      Discharge Follow up:   Following Provider - [facility]   Follow up time range or appointment: 2-3 days after discharge    Labs:    Results from last 7 days   Lab Units 02/05/20  0653 02/04/20  0848 02/04/20  0723 02/03/20  0348 02/02/20  0704 02/01/20  0320 01/31/20  0748 01/31/20  0541 01/30/20  1734   INR  1.88* 1.55*  --  1.38* 1.43* 1.44* 1.22*  --  1.40*   APTT seconds  --   --   --   --   --   --  30.9  --  44.0*   HEMOGLOBIN g/dL 12.4*  --  11.9* 12.2* 12.4* 14.3  --  16.7 15.6   HEMATOCRIT % 37.4*  --  36.3* 37.9 37.2* 42.5  --  50.5 46.5   PLATELETS 10*3/mm3 280  --  202 208 197 205  --  246 209     Results from last 7 days   Lab Units 02/05/20  0653 " 02/04/20  0723 02/04/20  0058 02/03/20  0634  02/01/20  0320   SODIUM mmol/L 139 138  --  139   < > 140   POTASSIUM mmol/L 4.2 4.2 4.7 3.5  3.5   < > 3.7   CHLORIDE mmol/L 102 101  --  99   < > 98   CO2 mmol/L 26.0 24.0  --  26.0   < > 28.0   BUN mg/dL 7 9  --  11   < > 24*   CREATININE mg/dL 0.79 0.89  --  0.93   < > 1.09   CALCIUM mg/dL 9.1 8.7  --  8.9   < > 8.7   BILIRUBIN mg/dL  --  1.8*  --  2.2*  --  3.0*   ALK PHOS U/L  --  45  --  43  --  45   ALT (SGPT) U/L  --  22  --  13  --  10   AST (SGOT) U/L  --  28  --  18  --  15   GLUCOSE mg/dL 102* 86  --  84   < > 117*    < > = values in this interval not displayed.     Current dietary intake: Per RN, he did not drink any BOOST this morning.  Unsure if he has had any prior to today;   Continue to monitor intake of BOOST supplement if possible  Diet Order   Procedures   • Diet Clear Liquid     Assessment/Plan:   1. Warfarin dosing for a history of DVT/PE.   2. INR today subtherapeutic at 1.88, from 1.55 yesterday.       Of note: warfarin on hold from 1/30 to 2/2 due to colectomy/colostomy/enterotomy repair on 1/30.   Warfarin was restarted on 2/3.     Also, patient received vitamin K 10 mg IV and KCentra on 1/30, which may confer resistance to warfarin for ~ 1 week.     Last dose of metronidazole was 2/3.  Other DDI noted as well, will monitor closely.  3. Due to decreased intake and potential DDI, recommend warfarin 7.5 mg dose tonight, then tomorrow resume 10 mg oral daily.      Heparin subcut to be stopped when INR 2 or greater.  4. Daily PT/INR  5. Pharmacy will monitor for s/sx of bleeding, dietary intake, drug/drug interactions, and clinical status and make adjustments as needed.      Thank you,  Nishi Hooper, [facility]  2/5/2020  11:24 AM

## 2020-02-05 NOTE — PROGRESS NOTES
"                  Clinical Nutrition     Nutrition Assessment  Reason for Visit:   MDR, NPO/Clear liquid  NPO/Clear >5days     Patient Name: Brigida Rodriguez  YOB: 1980  MRN: [id number]  Date of Encounter: 02/05/20 10:45 AM  Admission date: 1/28/2020    Nutrition Assessment     Admission Diagnosis  Perforated diverticulum of sigmoid colon    Additional applicable diagnosis, conditions, procedures  Perforated diverticulum of sigmoid colon  (1/31) S/p S/P exploratory laparotomy with sigmoid colectomy/end colostomy/enterotomy repair   Peritonitis (CMS/[facility])  Ileus  NGT to LWS    Applicable PMH/ PSxH  Chronic congestive heart failure. 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     Anthropometrics     Height: 193 cm (76\")  Last filed wt: Weight: (!) 169 kg (373 lb 3.2 oz) (02/04/20 0500)  Weight Method: Bed scale    BMI: BMI (Calculated): 45.4  Obese Class III extreme obesity: > or equal to 40kg/m2    Ideal Body Weight (IBW) (kg): 93.24    Last 15 Recorded Weights   Weight Weight (kg) Weight (lbs) Weight Method   2/4/2020 169.282 kg 373 lb 3.2 oz Bed scale   2/3/2020 169.282 kg 373 lb 3.2 oz Bed scale   2/2/2020 165.563 kg 365 lb -   2/1/2020 165.9 kg 365 lb 11.9 oz Bed scale   1/30/2020 170.099 kg 375 lb Stated   1/28/2020 170.099 kg 375 lb -   1/28/2020 170.099 " kg 375 lb Stated   11/13/2019 173.274 kg 382 lb -   11/6/2019 173.444 kg 382 lb 6 oz -   11/4/2019 172.367 kg 380 lb -   10/7/2019 173.183 kg 381 lb 12.8 oz -   9/4/2019 171.46 kg 378 lb -   8/20/2019 172.73 kg 380 lb 12.8 oz -       Labs reviewed     Results from last 7 days   Lab Units 02/05/20  0653 02/04/20  0723   SODIUM mmol/L 139 138   POTASSIUM mmol/L 4.2 4.2   CHLORIDE mmol/L 102 101   CO2 mmol/L 26.0 24.0   BUN mg/dL 7 9   CREATININE mg/dL 0.79 0.89   CALCIUM mg/dL 9.1 8.7   BILIRUBIN mg/dL  --  1.8*   ALK PHOS U/L  --  45   ALT (SGPT) U/L  --  22   AST (SGOT) U/L  --  28   GLUCOSE mg/dL 102* 86       Results from last 7 days   Lab Units 02/02/20  2101 02/02/20  1805 02/02/20  1134 02/02/20  0741 02/01/20 2020 02/01/20  1608   GLUCOSE mg/dL 82 89 80 79 86 96     Lab Results   Lab Value Date/Time    HGBA1C 5.60 01/31/2020 0541       Medications reviewed     GTT: D5+NaCl@100mL/hr     Other:  Dulcolax, colace, Reglan, relistor, Protonix, abx, coumadin     Current Nutrition Prescription     PO: Diet Clear Liquid    Nutrition Supplement:       Dietary Nutrition Supplements Boost Breeze (Clear Liquid) TID     Intake:  33% x 3 meal (clear liquids)    Nutrition Diagnosis     2/3, revised 2/5  Problem Inadequate oral intake   Etiology Clinical condition; Altered GI function    Signs/Symptoms Slow GI progress post GI surgery/NPO/Clear liquids 7 days         Nutrition Intervention     1.  Today is day 7 of NPO/clear status, PN initiation would be appropriate given length of NPO/clear liquid status. Will await surgeon/Physician approval.       Goal:     General: Nutrition to support treatment   PO: tolerate PO intake   EN/PN: Initiate PN if unable to meet needs via GI tract     Monitoring/Evaluation:   GI status  POC/GOC      Will Continue to follow per protocol      Deepa Taylor RDN, LD, CNSC  Time Spent: 40min

## 2020-02-06 LAB
ALBUMIN SERPL-MCNC: 3.9 G/DL (ref 3.5–5.2)
ALBUMIN/GLOB SERPL: 0.9 G/DL
ALP SERPL-CCNC: 65 U/L (ref 39–117)
ALT SERPL W P-5'-P-CCNC: 76 U/L (ref 1–41)
ANION GAP SERPL CALCULATED.3IONS-SCNC: 14 MMOL/L (ref 5–15)
AST SERPL-CCNC: 70 U/L (ref 1–40)
BASOPHILS # BLD AUTO: 0.05 10*3/MM3 (ref 0–0.2)
BASOPHILS NFR BLD AUTO: 0.6 % (ref 0–1.5)
BILIRUB SERPL-MCNC: 1.4 MG/DL (ref 0.2–1.2)
BUN BLD-MCNC: 7 MG/DL (ref 6–20)
BUN/CREAT SERPL: 7.2 (ref 7–25)
CALCIUM SPEC-SCNC: 9.2 MG/DL (ref 8.6–10.5)
CHLORIDE SERPL-SCNC: 99 MMOL/L (ref 98–107)
CO2 SERPL-SCNC: 24 MMOL/L (ref 22–29)
CREAT BLD-MCNC: 0.97 MG/DL (ref 0.76–1.27)
DEPRECATED RDW RBC AUTO: 46.7 FL (ref 37–54)
EOSINOPHIL # BLD AUTO: 0.29 10*3/MM3 (ref 0–0.4)
EOSINOPHIL NFR BLD AUTO: 3.4 % (ref 0.3–6.2)
ERYTHROCYTE [DISTWIDTH] IN BLOOD BY AUTOMATED COUNT: 13.9 % (ref 12.3–15.4)
GFR SERPL CREATININE-BSD FRML MDRD: 104 ML/MIN/1.73
GLOBULIN UR ELPH-MCNC: 4.3 GM/DL
GLUCOSE BLD-MCNC: 94 MG/DL (ref 65–99)
HCT VFR BLD AUTO: 38.9 % (ref 37.5–51)
HGB BLD-MCNC: 13.2 G/DL (ref 13–17.7)
IMM GRANULOCYTES # BLD AUTO: 0.1 10*3/MM3 (ref 0–0.05)
IMM GRANULOCYTES NFR BLD AUTO: 1.2 % (ref 0–0.5)
INR PPP: 2.22 (ref 0.85–1.16)
LYMPHOCYTES # BLD AUTO: 1.62 10*3/MM3 (ref 0.7–3.1)
LYMPHOCYTES NFR BLD AUTO: 18.9 % (ref 19.6–45.3)
MCH RBC QN AUTO: 30.9 PG (ref 26.6–33)
MCHC RBC AUTO-ENTMCNC: 33.9 G/DL (ref 31.5–35.7)
MCV RBC AUTO: 91.1 FL (ref 79–97)
MONOCYTES # BLD AUTO: 0.78 10*3/MM3 (ref 0.1–0.9)
MONOCYTES NFR BLD AUTO: 9.1 % (ref 5–12)
NEUTROPHILS # BLD AUTO: 5.75 10*3/MM3 (ref 1.7–7)
NEUTROPHILS NFR BLD AUTO: 66.8 % (ref 42.7–76)
NRBC BLD AUTO-RTO: 0 /100 WBC (ref 0–0.2)
PLATELET # BLD AUTO: 292 10*3/MM3 (ref 140–450)
PMV BLD AUTO: 10.7 FL (ref 6–12)
POTASSIUM BLD-SCNC: 4.2 MMOL/L (ref 3.5–5.2)
PROT SERPL-MCNC: 8.2 G/DL (ref 6–8.5)
PROTHROMBIN TIME: 23.7 SECONDS (ref 11.2–14.3)
RBC # BLD AUTO: 4.27 10*6/MM3 (ref 4.14–5.8)
SODIUM BLD-SCNC: 137 MMOL/L (ref 136–145)
WBC NRBC COR # BLD: 8.59 10*3/MM3 (ref 3.4–10.8)

## 2020-02-06 PROCEDURE — 85610 PROTHROMBIN TIME: CPT | Performed by: SURGERY

## 2020-02-06 PROCEDURE — 25010000002 HYDROMORPHONE PER 4 MG: Performed by: SURGERY

## 2020-02-06 PROCEDURE — 99232 SBSQ HOSP IP/OBS MODERATE 35: CPT | Performed by: INTERNAL MEDICINE

## 2020-02-06 PROCEDURE — 25010000002 LORAZEPAM PER 2 MG: Performed by: SURGERY

## 2020-02-06 PROCEDURE — 80053 COMPREHEN METABOLIC PANEL: CPT | Performed by: SURGERY

## 2020-02-06 PROCEDURE — 85025 COMPLETE CBC W/AUTO DIFF WBC: CPT | Performed by: SURGERY

## 2020-02-06 PROCEDURE — 25010000002 METOCLOPRAMIDE PER 10 MG: Performed by: SURGERY

## 2020-02-06 PROCEDURE — 25010000002 HEPARIN (PORCINE) PER 1000 UNITS: Performed by: SURGERY

## 2020-02-06 PROCEDURE — 25010000002 PIPERACILLIN SOD-TAZOBACTAM PER 1 G: Performed by: INTERNAL MEDICINE

## 2020-02-06 RX ORDER — WARFARIN SODIUM 7.5 MG/1
7.5 TABLET ORAL
Status: DISCONTINUED | OUTPATIENT
Start: 2020-02-06 | End: 2020-02-07

## 2020-02-06 RX ADMIN — TRAZODONE HYDROCHLORIDE 50 MG: 50 TABLET ORAL at 20:17

## 2020-02-06 RX ADMIN — METOCLOPRAMIDE 10 MG: 5 INJECTION, SOLUTION INTRAMUSCULAR; INTRAVENOUS at 12:40

## 2020-02-06 RX ADMIN — BUPROPION HYDROCHLORIDE 75 MG: 75 TABLET, FILM COATED ORAL at 18:23

## 2020-02-06 RX ADMIN — PIPERACILLIN AND TAZOBACTAM 3.38 G: 3; .375 INJECTION, POWDER, LYOPHILIZED, FOR SOLUTION INTRAVENOUS at 00:23

## 2020-02-06 RX ADMIN — SODIUM CHLORIDE, PRESERVATIVE FREE 10 ML: 5 INJECTION INTRAVENOUS at 20:21

## 2020-02-06 RX ADMIN — DULOXETINE 30 MG: 30 CAPSULE, DELAYED RELEASE ORAL at 08:52

## 2020-02-06 RX ADMIN — LORAZEPAM 1 MG: 2 INJECTION INTRAMUSCULAR; INTRAVENOUS at 01:37

## 2020-02-06 RX ADMIN — HYDROMORPHONE HYDROCHLORIDE 0.2 MG: 1 INJECTION, SOLUTION INTRAMUSCULAR; INTRAVENOUS; SUBCUTANEOUS at 05:51

## 2020-02-06 RX ADMIN — METOCLOPRAMIDE 10 MG: 5 INJECTION, SOLUTION INTRAMUSCULAR; INTRAVENOUS at 20:17

## 2020-02-06 RX ADMIN — HYDROCODONE BITARTRATE AND ACETAMINOPHEN 1 TABLET: 7.5; 325 TABLET ORAL at 12:06

## 2020-02-06 RX ADMIN — DOCUSATE SODIUM 100 MG: 100 CAPSULE, LIQUID FILLED ORAL at 08:52

## 2020-02-06 RX ADMIN — METOCLOPRAMIDE 10 MG: 5 INJECTION, SOLUTION INTRAMUSCULAR; INTRAVENOUS at 00:23

## 2020-02-06 RX ADMIN — HYDROMORPHONE HYDROCHLORIDE 0.2 MG: 1 INJECTION, SOLUTION INTRAMUSCULAR; INTRAVENOUS; SUBCUTANEOUS at 16:17

## 2020-02-06 RX ADMIN — HYDROMORPHONE HYDROCHLORIDE 0.2 MG: 1 INJECTION, SOLUTION INTRAMUSCULAR; INTRAVENOUS; SUBCUTANEOUS at 03:20

## 2020-02-06 RX ADMIN — HYDROCODONE BITARTRATE AND ACETAMINOPHEN 1 TABLET: 7.5; 325 TABLET ORAL at 00:22

## 2020-02-06 RX ADMIN — SODIUM CHLORIDE, PRESERVATIVE FREE 10 ML: 5 INJECTION INTRAVENOUS at 20:17

## 2020-02-06 RX ADMIN — METOCLOPRAMIDE 10 MG: 5 INJECTION, SOLUTION INTRAMUSCULAR; INTRAVENOUS at 05:35

## 2020-02-06 RX ADMIN — DOCUSATE SODIUM 100 MG: 100 CAPSULE, LIQUID FILLED ORAL at 20:17

## 2020-02-06 RX ADMIN — POTASSIUM CHLORIDE, DEXTROSE MONOHYDRATE AND SODIUM CHLORIDE 50 ML/HR: 150; 5; 450 INJECTION, SOLUTION INTRAVENOUS at 10:44

## 2020-02-06 RX ADMIN — HEPARIN SODIUM 5000 UNITS: 5000 INJECTION, SOLUTION INTRAVENOUS; SUBCUTANEOUS at 05:35

## 2020-02-06 RX ADMIN — PIPERACILLIN AND TAZOBACTAM 3.38 G: 3; .375 INJECTION, POWDER, LYOPHILIZED, FOR SOLUTION INTRAVENOUS at 08:52

## 2020-02-06 RX ADMIN — BISACODYL 10 MG: 5 TABLET, COATED ORAL at 08:52

## 2020-02-06 RX ADMIN — PIPERACILLIN AND TAZOBACTAM 3.38 G: 3; .375 INJECTION, POWDER, LYOPHILIZED, FOR SOLUTION INTRAVENOUS at 16:17

## 2020-02-06 RX ADMIN — BUPROPION HYDROCHLORIDE 75 MG: 75 TABLET, FILM COATED ORAL at 12:40

## 2020-02-06 RX ADMIN — HYDROCODONE BITARTRATE AND ACETAMINOPHEN 1 TABLET: 7.5; 325 TABLET ORAL at 22:53

## 2020-02-06 RX ADMIN — HYDROCODONE BITARTRATE AND ACETAMINOPHEN 1 TABLET: 7.5; 325 TABLET ORAL at 18:54

## 2020-02-06 RX ADMIN — HYDROMORPHONE HYDROCHLORIDE 0.2 MG: 1 INJECTION, SOLUTION INTRAMUSCULAR; INTRAVENOUS; SUBCUTANEOUS at 13:47

## 2020-02-06 RX ADMIN — HYDROMORPHONE HYDROCHLORIDE 0.2 MG: 1 INJECTION, SOLUTION INTRAMUSCULAR; INTRAVENOUS; SUBCUTANEOUS at 07:48

## 2020-02-06 RX ADMIN — WARFARIN SODIUM 7.5 MG: 7.5 TABLET ORAL at 18:23

## 2020-02-06 RX ADMIN — HYDROMORPHONE HYDROCHLORIDE 0.2 MG: 1 INJECTION, SOLUTION INTRAMUSCULAR; INTRAVENOUS; SUBCUTANEOUS at 10:46

## 2020-02-06 NOTE — PLAN OF CARE
Problem: Patient Care Overview  Goal: Plan of Care Review  Outcome: Ongoing (interventions implemented as appropriate)  Flowsheets (Taken 2/5/2020 2000)  Plan of Care Reviewed With: patient  Note:   Pt was less anxious overnight. Residuals checked. Waffle mattress and boots applied. Pt ambulated in room again overnight. Pain controlled. VSS on room air. Will continue to monitor

## 2020-02-06 NOTE — PLAN OF CARE
Patient continues to complain of abdominal/incisional pain. Controlled with current regimen. Wound care performed per order. Ambulating without difficulty, standby assist. No new complaints. Vitals within normal limits.

## 2020-02-06 NOTE — PROGRESS NOTES
"  Pharmacy Consult  -  Warfarin    Brigida Rodriguez is a  40 y.o. male   Height - 193 cm (76\")  Weight - (!) 169 kg (373 lb 3.2 oz)    Consulting Provider: - Hospitalist  Indication: - Hx of PE and LE DVT  Goal INR: 2-3   Home Regimen:   - warfarin 10 mg Sunday, Monday, Tuesday, Wednesday, Thursday, Saturday               - warfarin 12.5 mg on Fridays     Bridge Therapy: none, patient is on SQH but not full anticoagulation    Drug-Drug Interactions with current regimen:   Trazodone- increases bleed risk              Pantoprazole - may increase INR              Zosyn - may increase bleeding risk              Metronidazole (2/1-2/3)    Warfarin Dosing During Admission:    Date  2/3 2/4 2/5 2/6        INR  1.38 1.55 1.88 2.22        Dose  10mg 10mg 7.5mg 7.5mg             Education Provided: Patient is on warfarin prior to admission.  Education provided  on 2/3 verbally and in writing .  Discussed effects of warfarin, importance of checking INR, drug-drug and drug-food interactions, and signs/symptoms of bleeding and clotting.  Patient verbalized understanding through teach back.  All pertinent questions were answered.        Discharge Follow up:   Following Provider - [facility] anticoagulation clinic   Follow up time range or appointment: 2-3 days after discharge      Labs:    Results from last 7 days   Lab Units 02/06/20  0523 02/05/20  0653 02/04/20  0848 02/04/20  0723 02/03/20  0348 02/02/20  0704 02/01/20  0320 01/31/20  0748 01/31/20  0541 01/30/20  1734   INR  2.22* 1.88* 1.55*  --  1.38* 1.43* 1.44* 1.22*  --  1.40*   APTT seconds  --   --   --   --   --   --   --  30.9  --  44.0*   HEMOGLOBIN g/dL 13.2 12.4*  --  11.9* 12.2* 12.4* 14.3  --  16.7 15.6   HEMATOCRIT % 38.9 37.4*  --  36.3* 37.9 37.2* 42.5  --  50.5 46.5   PLATELETS 10*3/mm3 292 280  --  202 208 197 205  --  246 209     Results from last 7 days   Lab Units 02/06/20  0523 02/05/20  0653 02/04/20  0723  02/03/20  0634   SODIUM mmol/L 137 139 138  -- "  139   POTASSIUM mmol/L 4.2 4.2 4.2   < > 3.5  3.5   CHLORIDE mmol/L 99 102 101  --  99   CO2 mmol/L 24.0 26.0 24.0  --  26.0   BUN mg/dL 7 7 9  --  11   CREATININE mg/dL 0.97 0.79 0.89  --  0.93   CALCIUM mg/dL 9.2 9.1 8.7  --  8.9   BILIRUBIN mg/dL 1.4*  --  1.8*  --  2.2*   ALK PHOS U/L 65  --  45  --  43   ALT (SGPT) U/L 76*  --  22  --  13   AST (SGOT) U/L 70*  --  28  --  18   GLUCOSE mg/dL 94 102* 86  --  84    < > = values in this interval not displayed.     Current dietary intake: 0% of documented meals, Dietary Nutrition Supplements Boost Breeze (Clear Liquid) TID   Diet Order   Procedures    Diet Full Liquid       Assessment/Plan:   Warfarin dosing for a history of DVT/PE.   Goal INR; 2-3  2/6 INR - 2.22, increased from 1.88  2/6 H/H - 13.2/38.9    Vitamin K 10mg and K Centra administered 1/30 (expect Vitamin K effect for 5-7days)  Warfarin doses held 1/30, 1/31, 2/1, 2/2 - restarted 2/3   Continue warfarin 7.5mg daily  D/C heparin sq q8h  Monitor for s/sx of bleeding, dietary intake, drug/drug interactions, and clinical status.  Follow daily INR and adjust accordingly.     Thank you,  Kenn Maynard [facility]  2/6/2020  7:28 AM

## 2020-02-06 NOTE — PLAN OF CARE
The patient refused to take medications orally. Starting in the afternoon, medications that could be crushed were, and put down the NG tube. He complained of pain throughout the shift and anxiety, PRN medications were administered. He worked with PT today. No acute issues arose during the shift, will continue POC.

## 2020-02-06 NOTE — PROGRESS NOTES
Continued Stay Note  [facility]     Patient Name: Brigida Rodriguez  MRN: [id number]  Today's Date: 2/6/2020    Admit Date: 1/28/2020    Discharge Plan     Row Name 02/06/20 1527       Plan    Plan  update    Patient/Family in Agreement with Plan  yes    Plan Comments  Spoke with patient at bedside regarding discharge plan.  Patient was sleeping, groggy, denies needs.  CM following.  Patient plan is to discharge home with HH via car with family to transport.      Final Discharge Disposition Code  06 - home with home health care        Discharge Codes    No documentation.       Expected Discharge Date and Time     Expected Discharge Date Expected Discharge Time    Feb 6, 2020             Raquel Barba RN

## 2020-02-06 NOTE — PROGRESS NOTES
"Patient Name:  Brigida Rodriguez  YOB: 1980  [id number]    Surgery Progress Note    Date of visit: 2/6/2020    Subjective   Subjective: Finally had some formed stool from colostomy. Less nausea, drinking more.         Objective     Objective:     /96 (BP Location: Left arm, Patient Position: Lying)   Pulse 90   Temp 97.8 °F (36.6 °C) (Oral)   Resp 18   Ht 193 cm (76\")   Wt (!) 169 kg (373 lb 3.2 oz)   SpO2 96%   BMI 45.43 kg/m²     Intake/Output Summary (Last 24 hours) at 2/6/2020 0719  Last data filed at 2/6/2020 0517  Gross per 24 hour   Intake 973 ml   Output 3655 ml   Net -2682 ml       CV:  Rhythm  regular and rate regular   L:  Clear  to auscultation bilaterally   Abd:  Bowel sounds positive , soft, less tender. Ostomy viable, with solid stool output. TAMARA serous, scant  Ext:  No cyanosis, clubbing, edema    Recent labs that are back at this time have been reviewed.        Assessment/Plan     Assessment/ Plan:    Hospital Problem List     * (Principal) Diverticulitis - Doing better. D/C TAMARA , and D/C NG. Increase mobility. May be ready for discharge by early next week.      Chronic congestive heart failure. 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

## 2020-02-06 NOTE — NURSING NOTE
"Patient's bowels are awake. Flatus and output noted. NG-tube has been d/c/. Abdomin is soft to palpation.     Appliance change performed. Patient has bilateral vickey stomal creasing and will need to wear convexity. Placed him in a [place] New Image 2 3/4\" cut at 44mm. Peristomal skin is intact. Discussed care needs and stomal care.     Patient was tearful at this time and the thought of having an ostomy is starting to set in. Emotional support provided. Encourage patient to talk about and discuss feelings r/t his surgery and stoma as needed. Will continue to follow daily for stoma support. Thanks   "

## 2020-02-07 LAB
ALBUMIN SERPL-MCNC: 3.9 G/DL (ref 3.5–5.2)
ALBUMIN/GLOB SERPL: 1 G/DL
ALP SERPL-CCNC: 86 U/L (ref 39–117)
ALT SERPL W P-5'-P-CCNC: 86 U/L (ref 1–41)
ANION GAP SERPL CALCULATED.3IONS-SCNC: 11 MMOL/L (ref 5–15)
AST SERPL-CCNC: 58 U/L (ref 1–40)
BILIRUB SERPL-MCNC: 1.2 MG/DL (ref 0.2–1.2)
BUN BLD-MCNC: 6 MG/DL (ref 6–20)
BUN/CREAT SERPL: 5.4 (ref 7–25)
CALCIUM SPEC-SCNC: 9.4 MG/DL (ref 8.6–10.5)
CHLORIDE SERPL-SCNC: 102 MMOL/L (ref 98–107)
CO2 SERPL-SCNC: 22 MMOL/L (ref 22–29)
CREAT BLD-MCNC: 1.12 MG/DL (ref 0.76–1.27)
DEPRECATED RDW RBC AUTO: 46.2 FL (ref 37–54)
ERYTHROCYTE [DISTWIDTH] IN BLOOD BY AUTOMATED COUNT: 13.6 % (ref 12.3–15.4)
GFR SERPL CREATININE-BSD FRML MDRD: 88 ML/MIN/1.73
GLOBULIN UR ELPH-MCNC: 4.1 GM/DL
GLUCOSE BLD-MCNC: 100 MG/DL (ref 65–99)
HCT VFR BLD AUTO: 37.7 % (ref 37.5–51)
HGB BLD-MCNC: 12.5 G/DL (ref 13–17.7)
INR PPP: 2.1 (ref 0.85–1.16)
MCH RBC QN AUTO: 30.6 PG (ref 26.6–33)
MCHC RBC AUTO-ENTMCNC: 33.2 G/DL (ref 31.5–35.7)
MCV RBC AUTO: 92.2 FL (ref 79–97)
PLATELET # BLD AUTO: 300 10*3/MM3 (ref 140–450)
PMV BLD AUTO: 10.7 FL (ref 6–12)
POTASSIUM BLD-SCNC: 4 MMOL/L (ref 3.5–5.2)
PROT SERPL-MCNC: 8 G/DL (ref 6–8.5)
PROTHROMBIN TIME: 22.6 SECONDS (ref 11.2–14.3)
RBC # BLD AUTO: 4.09 10*6/MM3 (ref 4.14–5.8)
SODIUM BLD-SCNC: 135 MMOL/L (ref 136–145)
WBC NRBC COR # BLD: 9.14 10*3/MM3 (ref 3.4–10.8)

## 2020-02-07 PROCEDURE — 25010000002 PIPERACILLIN SOD-TAZOBACTAM PER 1 G: Performed by: INTERNAL MEDICINE

## 2020-02-07 PROCEDURE — 25010000002 LORAZEPAM PER 2 MG: Performed by: SURGERY

## 2020-02-07 PROCEDURE — 99232 SBSQ HOSP IP/OBS MODERATE 35: CPT | Performed by: NURSE PRACTITIONER

## 2020-02-07 PROCEDURE — 25010000002 METOCLOPRAMIDE PER 10 MG: Performed by: SURGERY

## 2020-02-07 PROCEDURE — 85027 COMPLETE CBC AUTOMATED: CPT | Performed by: SURGERY

## 2020-02-07 PROCEDURE — 25010000002 PIPERACILLIN SOD-TAZOBACTAM PER 1 G: Performed by: SURGERY

## 2020-02-07 PROCEDURE — 80053 COMPREHEN METABOLIC PANEL: CPT | Performed by: INTERNAL MEDICINE

## 2020-02-07 PROCEDURE — 25010000002 HYDROMORPHONE PER 4 MG: Performed by: SURGERY

## 2020-02-07 PROCEDURE — 25010000002 METHYLNALTREXONE 12 MG/0.6ML SOLUTION: Performed by: SURGERY

## 2020-02-07 PROCEDURE — 85610 PROTHROMBIN TIME: CPT | Performed by: SURGERY

## 2020-02-07 PROCEDURE — 25010000002 PROMETHAZINE PER 50 MG: Performed by: SURGERY

## 2020-02-07 RX ORDER — ALUMINA, MAGNESIA, AND SIMETHICONE 2400; 2400; 240 MG/30ML; MG/30ML; MG/30ML
15 SUSPENSION ORAL EVERY 6 HOURS PRN
Status: DISCONTINUED | OUTPATIENT
Start: 2020-02-07 | End: 2020-02-10 | Stop reason: HOSPADM

## 2020-02-07 RX ORDER — WARFARIN SODIUM 5 MG/1
10 TABLET ORAL
Status: DISCONTINUED | OUTPATIENT
Start: 2020-02-07 | End: 2020-02-10 | Stop reason: HOSPADM

## 2020-02-07 RX ORDER — CHOLECALCIFEROL (VITAMIN D3) 125 MCG
10 CAPSULE ORAL NIGHTLY
Status: DISCONTINUED | OUTPATIENT
Start: 2020-02-07 | End: 2020-02-10 | Stop reason: HOSPADM

## 2020-02-07 RX ADMIN — DOCUSATE SODIUM 100 MG: 100 CAPSULE, LIQUID FILLED ORAL at 20:55

## 2020-02-07 RX ADMIN — DULOXETINE 30 MG: 30 CAPSULE, DELAYED RELEASE ORAL at 08:25

## 2020-02-07 RX ADMIN — BISACODYL 10 MG: 5 TABLET, COATED ORAL at 08:25

## 2020-02-07 RX ADMIN — BUPROPION HYDROCHLORIDE 75 MG: 75 TABLET, FILM COATED ORAL at 00:08

## 2020-02-07 RX ADMIN — HYDROCODONE BITARTRATE AND ACETAMINOPHEN 1 TABLET: 7.5; 325 TABLET ORAL at 16:04

## 2020-02-07 RX ADMIN — TAZOBACTAM SODIUM AND PIPERACILLIN SODIUM 3.38 G: 375; 3 INJECTION, SOLUTION INTRAVENOUS at 00:08

## 2020-02-07 RX ADMIN — METOCLOPRAMIDE 10 MG: 5 INJECTION, SOLUTION INTRAMUSCULAR; INTRAVENOUS at 17:00

## 2020-02-07 RX ADMIN — TAZOBACTAM SODIUM AND PIPERACILLIN SODIUM 3.38 G: 375; 3 INJECTION, SOLUTION INTRAVENOUS at 15:53

## 2020-02-07 RX ADMIN — METHYLNALTREXONE BROMIDE 4 MG: 12 INJECTION, SOLUTION SUBCUTANEOUS at 11:36

## 2020-02-07 RX ADMIN — ALUMINUM HYDROXIDE, MAGNESIUM HYDROXIDE, AND DIMETHICONE 15 ML: 400; 400; 40 SUSPENSION ORAL at 22:23

## 2020-02-07 RX ADMIN — METOCLOPRAMIDE 10 MG: 5 INJECTION, SOLUTION INTRAMUSCULAR; INTRAVENOUS at 05:05

## 2020-02-07 RX ADMIN — HYDROMORPHONE HYDROCHLORIDE 0.2 MG: 1 INJECTION, SOLUTION INTRAMUSCULAR; INTRAVENOUS; SUBCUTANEOUS at 07:31

## 2020-02-07 RX ADMIN — SODIUM CHLORIDE, PRESERVATIVE FREE 10 ML: 5 INJECTION INTRAVENOUS at 20:58

## 2020-02-07 RX ADMIN — METOCLOPRAMIDE 10 MG: 5 INJECTION, SOLUTION INTRAMUSCULAR; INTRAVENOUS at 11:35

## 2020-02-07 RX ADMIN — METOCLOPRAMIDE 10 MG: 5 INJECTION, SOLUTION INTRAMUSCULAR; INTRAVENOUS at 00:08

## 2020-02-07 RX ADMIN — SODIUM CHLORIDE, PRESERVATIVE FREE 10 ML: 5 INJECTION INTRAVENOUS at 08:29

## 2020-02-07 RX ADMIN — TAZOBACTAM SODIUM AND PIPERACILLIN SODIUM 3.38 G: 375; 3 INJECTION, SOLUTION INTRAVENOUS at 08:28

## 2020-02-07 RX ADMIN — HYDROCODONE BITARTRATE AND ACETAMINOPHEN 1 TABLET: 7.5; 325 TABLET ORAL at 20:54

## 2020-02-07 RX ADMIN — HYDROMORPHONE HYDROCHLORIDE 0.2 MG: 1 INJECTION, SOLUTION INTRAMUSCULAR; INTRAVENOUS; SUBCUTANEOUS at 13:12

## 2020-02-07 RX ADMIN — BUPROPION HYDROCHLORIDE 75 MG: 75 TABLET, FILM COATED ORAL at 05:05

## 2020-02-07 RX ADMIN — HYDROCODONE BITARTRATE AND ACETAMINOPHEN 1 TABLET: 7.5; 325 TABLET ORAL at 10:17

## 2020-02-07 RX ADMIN — FAMOTIDINE 20 MG: 20 TABLET ORAL at 10:17

## 2020-02-07 RX ADMIN — HYDROCODONE BITARTRATE AND ACETAMINOPHEN 1 TABLET: 7.5; 325 TABLET ORAL at 06:38

## 2020-02-07 RX ADMIN — MELATONIN TAB 5 MG 10 MG: 5 TAB at 20:55

## 2020-02-07 RX ADMIN — LORAZEPAM 1 MG: 2 INJECTION INTRAMUSCULAR; INTRAVENOUS at 01:53

## 2020-02-07 RX ADMIN — BUPROPION HYDROCHLORIDE 75 MG: 75 TABLET, FILM COATED ORAL at 17:00

## 2020-02-07 RX ADMIN — TRAZODONE HYDROCHLORIDE 50 MG: 50 TABLET ORAL at 20:55

## 2020-02-07 RX ADMIN — BUPROPION HYDROCHLORIDE 75 MG: 75 TABLET, FILM COATED ORAL at 11:35

## 2020-02-07 RX ADMIN — DOCUSATE SODIUM 100 MG: 100 CAPSULE, LIQUID FILLED ORAL at 08:25

## 2020-02-07 RX ADMIN — HYDROMORPHONE HYDROCHLORIDE 0.2 MG: 1 INJECTION, SOLUTION INTRAMUSCULAR; INTRAVENOUS; SUBCUTANEOUS at 00:08

## 2020-02-07 RX ADMIN — PROMETHAZINE HYDROCHLORIDE 12.5 MG: 25 INJECTION INTRAMUSCULAR; INTRAVENOUS at 20:56

## 2020-02-07 RX ADMIN — WARFARIN SODIUM 10 MG: 5 TABLET ORAL at 17:01

## 2020-02-07 RX ADMIN — PANTOPRAZOLE SODIUM 40 MG: 40 TABLET, DELAYED RELEASE ORAL at 05:05

## 2020-02-07 RX ADMIN — FAMOTIDINE 20 MG: 20 TABLET ORAL at 00:17

## 2020-02-07 NOTE — PROGRESS NOTES
Continued Stay Note  [facility]     Patient Name: Brigida Rodriguez  MRN: [id number]  Today's Date: 2/7/2020    Admit Date: 1/28/2020    Discharge Plan     Row Name 02/07/20 1359       Plan    Plan  update    Patient/Family in Agreement with Plan  yes    Plan Comments   Spoke with patient at bedside regarding discharge plan.  Patient reports that he has not been able to sleep much since he has been in the hospital and wants to be sure the physician ordered something to sleep.  Offered to draw shades in the room however patient indicates that it would  make the room more depressing than it already is.  Patient father at bedside visiting.  Per RN, medication was ordered, patient advised.  No new discharge needs verbalized.  CM following.  Patient plan is to discharge home with HH via car with family to transport.      Final Discharge Disposition Code  06 - home with home health care        Discharge Codes    No documentation.       Expected Discharge Date and Time     Expected Discharge Date Expected Discharge Time    Feb 10, 2020             Raquel Barba RN

## 2020-02-07 NOTE — PROGRESS NOTES
"  Pharmacy Consult  -  Warfarin    Brigida Rodriguez is a  40 y.o. male   Height - 193 cm (76\")  Weight - (!) 161 kg (354 lb 9.6 oz)    Consulting Provider: - Hospitalist  Indication: - Hx of PE and LE DVT  Goal INR: 2-3   Home Regimen:   - warfarin 10 mg Sunday, Monday, Tuesday, Wednesday, Thursday, Saturday               - warfarin 12.5 mg on Fridays     Bridge Therapy: none, patient is on SQH but not full anticoagulation    Drug-Drug Interactions with current regimen:   Trazodone- increases bleed risk              Pantoprazole - may increase INR              Zosyn - may increase bleeding risk              Metronidazole (2/1-2/3)    Warfarin Dosing During Admission:    Date  2/3 2/4 2/5 2/6 2/7       INR  1.38 1.55 1.88 2.22 2.1       Dose  10mg 10mg 7.5mg 7.5mg  10mg           Education Provided: Patient is on warfarin prior to admission.  Education provided  on 2/3 verbally and in writing .  Discussed effects of warfarin, importance of checking INR, drug-drug and drug-food interactions, and signs/symptoms of bleeding and clotting.  Patient verbalized understanding through teach back.  All pertinent questions were answered.        Discharge Follow up:   Following Provider - [facility] anticoagulation clinic   Follow up time range or appointment: 2-3 days after discharge      Labs:    Results from last 7 days   Lab Units 02/07/20  0826 02/06/20  0523 02/05/20  0653 02/04/20  0848 02/04/20  0723 02/03/20  0348 02/02/20  0704 02/01/20  0320   INR  2.10* 2.22* 1.88* 1.55*  --  1.38* 1.43* 1.44*   HEMOGLOBIN g/dL 12.5* 13.2 12.4*  --  11.9* 12.2* 12.4* 14.3   HEMATOCRIT % 37.7 38.9 37.4*  --  36.3* 37.9 37.2* 42.5   PLATELETS 10*3/mm3 300 292 280  --  202 208 197 205     Results from last 7 days   Lab Units 02/06/20  0523 02/05/20  0653 02/04/20  0723  02/03/20  0634   SODIUM mmol/L 137 139 138  --  139   POTASSIUM mmol/L 4.2 4.2 4.2   < > 3.5  3.5   CHLORIDE mmol/L 99 102 101  --  99   CO2 mmol/L 24.0 26.0 24.0  --  " 26.0   BUN mg/dL 7 7 9  --  11   CREATININE mg/dL 0.97 0.79 0.89  --  0.93   CALCIUM mg/dL 9.2 9.1 8.7  --  8.9   BILIRUBIN mg/dL 1.4*  --  1.8*  --  2.2*   ALK PHOS U/L 65  --  45  --  43   ALT (SGPT) U/L 76*  --  22  --  13   AST (SGOT) U/L 70*  --  28  --  18   GLUCOSE mg/dL 94 102* 86  --  84    < > = values in this interval not displayed.     Current dietary intake: 0% of documented meals, Dietary Nutrition Supplements Boost Breeze (Clear Liquid) TID   Diet Order   Procedures    Diet Full Liquid       Assessment/Plan:   Warfarin dosing for a history of DVT/PE.   Goal INR; 2-3  2/7 INR - 2.1, decreased from 2.22  2/7 H/H - 12.5/37.7    Vitamin K 10mg and K Centra administered 1/30 (expect Vitamin K effect for 5-7days)  Warfarin doses held 1/30, 1/31, 2/1, 2/2 - restarted 2/3   Increase to warfarin 10mg daily  Monitor for s/sx of bleeding, dietary intake, drug/drug interactions, and clinical status.  Follow daily INR and adjust accordingly.     Discharge plan: resume admission regimen    Thank you,  Kenn Maynard [facility]  2/7/2020  10:03 AM

## 2020-02-07 NOTE — PROGRESS NOTES
"Patient Name:  Brigida Rodriguez  YOB: 1980  [id number]    Surgery Progress Note    Date of visit: 2/7/2020    Subjective   Subjective: Did better yesterday. No nausea, feeling better. Stoma a bit more productive         Objective     Objective:     /70 (BP Location: Left leg, Patient Position: Sitting)   Pulse 110   Temp 97.9 °F (36.6 °C) (Oral)   Resp 18   Ht 193 cm (76\")   Wt (!) 161 kg (354 lb 9.6 oz)   SpO2 98%   BMI 43.16 kg/m²     Intake/Output Summary (Last 24 hours) at 2/7/2020 0737  Last data filed at 2/7/2020 0351  Gross per 24 hour   Intake 2712 ml   Output 1750 ml   Net 962 ml       CV:  Rhythm  regular and rate regular   L:  Clear  to auscultation bilaterally   Abd:  Bowel sounds positive , soft, minimally tender. Wounds granulating well, I went over with the nursing staff appropriate technique, and performed the dressing change myself. Stoma pink, viable. There is some superficial sloughing on the superior side, but the stoma is viable below this, and the stoma is viable and patent from the skin level below the fascia.    Ext:  No cyanosis, clubbing, edema    Recent labs that are back at this time have been reviewed.        Assessment/Plan     Assessment/ Plan:    Hospital Problem List     * (Principal) Diverticulitis - Improving. Begin looking for placement option. I would anticipate that he may be ready for discharge to rehab by Monday.      Chronic congestive heart failure. 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

## 2020-02-07 NOTE — PROGRESS NOTES
"                  Clinical Nutrition     Nutrition Assessment  Reason for Visit:   Follow-up protocol      Patient Name: Brigida Rodriguez  YOB: 1980  MRN: [id number]  Date of Encounter: 02/07/20 10:12 AM  Admission date: 1/28/2020    Nutrition Assessment     Admission Diagnosis  Perforated diverticulum of sigmoid colon    Additional applicable diagnosis, conditions, procedures  Perforated diverticulum of sigmoid colon  (1/31) S/p S/P exploratory laparotomy with sigmoid colectomy/end colostomy/enterotomy repair   Peritonitis (CMS/HCC)  Ileus-improved  NGT to LWS-removed  Elevated LFTs      Applicable PMH/ PSxH  Chronic congestive heart failure. 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 He states he dislikes peach Boost breeze. Pt states he was eating well and had not lost weight PTA. RD discussed importance of nutrition  Adequacy and alternate ONS selections, pt expressed understanding.     Anthropometrics     Height: 193 cm (76\")  Last filed wt: Weight: (!) 161 kg (354 lb 9.6 oz) (02/07/20 0500)  Weight Method: Standing scale    BMI: BMI (Calculated): 45.4  Obese Class III extreme obesity: > or equal to 40kg/m2    Ideal Body Weight (IBW) (kg): 93.24    Last 15 Recorded Weights   Weight Weight (kg) Weight (lbs) Weight Method   2/4/2020 169.282 kg 373 lb 3.2 oz Bed scale   2/3/2020 169.282 kg 373 lb 3.2 oz Bed scale   2/2/2020 165.563 kg 365 lb -   2/1/2020 165.9 kg 365 lb 11.9 oz Bed scale   1/30/2020 170.099 kg 375 lb Stated   1/28/2020 170.099 kg 375 lb -   1/28/2020 170.099 kg 375 lb Stated   11/13/2019 173.274 kg 382 lb - "   11/6/2019 173.444 kg 382 lb 6 oz -   11/4/2019 172.367 kg 380 lb -   10/7/2019 173.183 kg 381 lb 12.8 oz -   9/4/2019 171.46 kg 378 lb -   8/20/2019 172.73 kg 380 lb 12.8 oz -       Labs reviewed     Results from last 7 days   Lab Units 02/06/20  0523   SODIUM mmol/L 137   POTASSIUM mmol/L 4.2   CHLORIDE mmol/L 99   CO2 mmol/L 24.0   BUN mg/dL 7   CREATININE mg/dL 0.97   CALCIUM mg/dL 9.2   BILIRUBIN mg/dL 1.4*   ALK PHOS U/L 65   ALT (SGPT) U/L 76*   AST (SGOT) U/L 70*   GLUCOSE mg/dL 94       Results from last 7 days   Lab Units 02/02/20  2101 02/02/20  1805 02/02/20  1134 02/02/20  0741 02/01/20 2020 02/01/20  1608   GLUCOSE mg/dL 82 89 80 79 86 96     Lab Results   Lab Value Date/Time    HGBA1C 5.60 01/31/2020 0541       Medications reviewed     GTT: D5+NaCl@100mL/hr     Other:  Dulcolax, colace, Reglan, relistor, Protonix, abx, coumadin     Current Nutrition Prescription     PO: Diet Full Liquid    Nutrition Supplement:       Dietary Nutrition Supplements Boost Breeze (Clear Liquid) TID     Intake:  13% x 6 meal     Nutrition Diagnosis     2/3, revised 2/5, 2/7  Problem Inadequate oral intake   Etiology Clinical condition; Altered GI function/GI status/poor appetite    Signs/Symptoms Slow GI progress post GI surgery/NPO/Clear liquids 7 days, now on full liquids with inadequate PO intake          Nutrition Intervention     1.  Encouraged PO intake  2. ONS adjusted, mao end Ensure apple with breakfast x 2, and Boost Plus with lunch and dinner meals  3. Alternate options for full liquid discussed  4. Pt has had minimal or poor nutrition intake since admission, if PO intake does not improve nutrition support should be initiated until PO adequacy improves      Goal:     General: Nutrition to support treatment   PO: tolerate PO intake, increase PO intake  EN/PN: Initiate nutrition support if current PO intake trend continues     Monitoring/Evaluation:   GI status  POC/GOC  PO intake       Will Continue to follow  per protocol      Deepa Taylor RDN, LD, CNSC  Time Spent: 40min

## 2020-02-07 NOTE — PROGRESS NOTES
[facility] Medicine Services  PROGRESS NOTE    Patient Name: Brigida Rodriguez  : 1980  MRN: [id number]    Date of Admission: 2020  Primary Care Physician: Elias Tom DO    Subjective   Subjective     CC:  Abdominal pain    HPI:  Patient is sitting up in bed in NAD eating breakfast. Tolerating diet. Pain better per patient. Has been up moving per nurse. Will try and walk in halls today. No acute events overnight per nursing.     Review of Systems  Gen- No fevers, chills  CV- No chest pain, palpitations  Resp- No cough, dyspnea  GI-  +abd pain          Objective   Objective     Vital Signs:   Temp:  [97.7 °F (36.5 °C)-98.4 °F (36.9 °C)] 97.7 °F (36.5 °C)  Heart Rate:  [] 103  Resp:  [16-18] 18  BP: (139-173)/(70-92) 158/78        Physical Exam:  Constitutional -no acute distress, non toxic, in bed  HEENT-NCAT, mucous membranes moist  CV-RRR, S1 S2 normal, no m/r/g  Resp-CTAB, no wheezes, rhonchi or rales room air 99%  Abd-soft, non-tender to light palpation, non-distended, bowel sounds present, obese, colostomy LLQ with brown liquid stool   Ext-No lower extremity cyanosis, clubbing or edema bilaterally   Psych-normal affect   Skin- No rash on exposed UE or LE bilaterally, IZA picc line, vertical abd dressing cdi did not remove         Results Reviewed:  Results from last 7 days   Lab Units 20  0820  0653   WBC 10*3/mm3 9.14 8.59 8.60   HEMOGLOBIN g/dL 12.5* 13.2 12.4*   HEMATOCRIT % 37.7 38.9 37.4*   PLATELETS 10*3/mm3 300 292 280   INR  2.10* 2.22* 1.88*     Results from last 7 days   Lab Units 20  0826 20  0523 20  0653 20  0723  20  0320   SODIUM mmol/L 135* 137 139 138   < > 140   POTASSIUM mmol/L 4.0 4.2 4.2 4.2   < > 3.7   CHLORIDE mmol/L 102 99 102 101   < > 98   CO2 mmol/L 22.0 24.0 26.0 24.0   < > 28.0   BUN mg/dL 6 7 7 9   < > 24*   CREATININE mg/dL 1.12 0.97 0.79 0.89   < > 1.09    GLUCOSE mg/dL 100* 94 102* 86   < > 117*   CALCIUM mg/dL 9.4 9.2 9.1 8.7   < > 8.7   ALT (SGPT) U/L 86* 76*  --  22   < > 10   AST (SGOT) U/L 58* 70*  --  28   < > 15   PROBNP pg/mL  --   --   --   --   --  62.3    < > = values in this interval not displayed.     Estimated Creatinine Clearance: 143.8 mL/min (by C-G formula based on SCr of 1.12 mg/dL).    Microbiology Results Abnormal     None          Imaging Results (Last 24 Hours)     ** No results found for the last 24 hours. **          Results for orders placed during the hospital encounter of 01/28/20   Adult Transthoracic Echo Complete W/ Cont if Necessary Per Protocol    Narrative · Mild mitral valve regurgitation is present.  · Mild tricuspid valve regurgitation is present.  · Estimated EF = 65%.  · Left ventricular systolic function is normal.  · Normal right ventricular cavity size, wall thickness, systolic function   and septal motion noted.  · Left ventricular diastolic function is normal.  · No evidence of pulmonary hypertension is present.  · There is no evidence of pericardial effusion.  · No significant structural valvular abnormality demonstrated.          I have reviewed the medications:  Scheduled Meds:    bisacodyl 10 mg Rectal Daily   Or      bisacodyl 10 mg Oral Daily   buPROPion 75 mg Oral Q6H   docusate sodium 100 mg Oral BID   DULoxetine 30 mg Oral Daily   methylnaltrexone 4 mg Subcutaneous Every Other Day   metoclopramide 10 mg Intravenous Q6H   pantoprazole 40 mg Oral Q AM   piperacillin-tazobactam 3.375 g Intravenous Q8H   sodium chloride 10 mL Intravenous Q12H   sodium chloride 10 mL Intravenous Q12H   traZODone 50 mg Oral Nightly   warfarin 10 mg Oral Daily     Continuous Infusions:    Pharmacy to dose warfarin      PRN Meds:.•  acetaminophen **OR** acetaminophen **OR** acetaminophen  •  diphenhydrAMINE  •  famotidine  •  HYDROcodone-acetaminophen  •  HYDROmorphone  •  LORazepam  •  naloxone  •  ondansetron **OR** ondansetron  •   oxyCODONE-acetaminophen  •  Pharmacy to dose warfarin  •  phenol  •  potassium & sodium phosphates **OR** potassium & sodium phosphates  •  potassium chloride  •  potassium chloride  •  promethazine **OR** promethazine  •  sodium chloride  •  sodium chloride  •  sodium chloride    Assessment/Plan   Assessment & Plan     Active Hospital Problems    Diagnosis  POA   • **Diverticulitis [K57.92]  Yes   • S/P exploratory laparotomy with sigmoid colectomy/end colostomy/enterotomy repair 1/31/20 [Z98.890]  Not Applicable   • Active Tobacco use [Z72.0]  Yes   • Peritonitis (CMS/HCC) [K65.9]  No   • Perforated diverticulum of sigmoid colon [K57.80]  No   • Morbidly obese (CMS/HCC) [E66.01]  Yes   • Recurrent major depressive disorder (CMS/HCC) [F33.9]  Yes   • 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 Data deficit (CMS/HCC) [I50.9]  Yes      Resolved Hospital Problems   No resolved problems to display.        Brief Hospital Course to date:  Brigida Rodriguez is a 40 y.o. male with history of diastolic heart failure, remote PE status post IVC filter, GERD, tobacco abuse, anxiety and depression who was admitted on 1/28/2020 with abdominal pain, vomiting and constipation.  Patient had a recent episode of diverticulitis in November 2019.    Diverticulitis with perforation  -Status post exploratory laparotomy and sigmoid colectomy with end colostomy placement per Dr. Valenzuela  -Continue Zosyn, pain and nausea control  - mobilize    Elevated LFTs  - repeat CMP am  -- bilirubin trending down     Ileus  -improved    History of DVT/PE/occluded IVC filter  -Coumadin restarted, INR 2.1 today, repeat INR in the morning. Pharmacy dosing    Morbid obesity    Tobacco abuse  - cessation  - continue wellbutrin    Depression/anxiety  -prn ativan  -continue wellbutrin, cymbalta,  trazadone      DVT Prophylaxis: Heparin    Disposition: I expect the patient to be discharged TBD  CODE STATUS:   Code Status and Medical Interventions:   Ordered at: 01/28/20 0410     Level Of Support Discussed With:    Patient     Code Status:    CPR     Medical Interventions (Level of Support Prior to Arrest):    Full         Electronically signed by SILVIA Molina, 02/07/20, 11:41 AM.

## 2020-02-07 NOTE — PLAN OF CARE
Problem: Patient Care Overview  Goal: Plan of Care Review  Flowsheets (Taken 2/7/2020 0419)  Progress: improving  Plan of Care Reviewed With: patient  Outcome Summary: PT rested throughout the night.  C/O pain relieved with prn pain meds.  PT denies any other concerns or complaints at this time.

## 2020-02-07 NOTE — PLAN OF CARE
Patient expressing frustration and sadness with current health situation. Reports difficulty sleeping, medications ordered for tonight. Pain controlled with current regimen. Wound packed by Dr. Valenzuela this morning. Abdominal pad reinforced this afternoon due to serous drainage. Vitals within normal limits.

## 2020-02-07 NOTE — PROGRESS NOTES
[facility] Medicine Services  PROGRESS NOTE    Patient Name: Brigida Rodriguez  : 1980  MRN: [id number]    Date of Admission: 2020  Primary Care Physician: Elias Tom DO    Subjective   Subjective     CC:  Abdominal pain    HPI:  Still having some abdominal pain but resting comfortably this afternoon    Review of Systems  Gen- No fevers, chills  CV- No chest pain, palpitations  Resp- No cough, dyspnea  GI-  +abd pain          Objective   Objective     Vital Signs:   Temp:  [97.8 °F (36.6 °C)-98.6 °F (37 °C)] 98 °F (36.7 °C)  Heart Rate:  [85-96] 85  Resp:  [18] 18  BP: (141-162)/(78-96) 162/78        Physical Exam:  Constitutional -no acute distress, non toxic, in bed  HEENT-NCAT, mucous membranes moist  CV-RRR, S1 S2 normal, no m/r/g  Resp-CTAB, no wheezes, rhonchi or rales  Abd-soft, non-tender to light palpation, non-distended, bowel sounds present, obese  Ext-No lower extremity cyanosis, clubbing or edema bilaterally   Psych-normal affect   Skin- No rash on exposed UE or LE bilaterally        Results Reviewed:  Results from last 7 days   Lab Units 2053 20  0848 20  0723  20  0541   WBC 10*3/mm3 8.59 8.60  --  7.04   < > 16.29*   HEMOGLOBIN g/dL 13.2 12.4*  --  11.9*   < > 16.7   HEMATOCRIT % 38.9 37.4*  --  36.3*   < > 50.5   PLATELETS 10*3/mm3 292 280  --  202   < > 246   INR  2.22* 1.88* 1.55*  --    < >  --    PROCALCITONIN ng/mL  --   --   --   --   --  2.99*    < > = values in this interval not displayed.     Results from last 7 days   Lab Units 20  0653 20  0723  20  0634  20  0320   SODIUM mmol/L 137 139 138  --  139   < > 140   POTASSIUM mmol/L 4.2 4.2 4.2   < > 3.5  3.5   < > 3.7   CHLORIDE mmol/L 99 102 101  --  99   < > 98   CO2 mmol/L 24.0 26.0 24.0  --  26.0   < > 28.0   BUN mg/dL 7 7 9  --  11   < > 24*   CREATININE mg/dL 0.97 0.79 0.89  --  0.93   < > 1.09    GLUCOSE mg/dL 94 102* 86  --  84   < > 117*   CALCIUM mg/dL 9.2 9.1 8.7  --  8.9   < > 8.7   ALT (SGPT) U/L 76*  --  22  --  13  --  10   AST (SGOT) U/L 70*  --  28  --  18  --  15   PROBNP pg/mL  --   --   --   --   --   --  62.3    < > = values in this interval not displayed.     Estimated Creatinine Clearance: 171.8 mL/min (by C-G formula based on SCr of 0.97 mg/dL).    Microbiology Results Abnormal     None          Imaging Results (Last 24 Hours)     ** No results found for the last 24 hours. **          Results for orders placed during the hospital encounter of 01/28/20   Adult Transthoracic Echo Complete W/ Cont if Necessary Per Protocol    Narrative · Mild mitral valve regurgitation is present.  · Mild tricuspid valve regurgitation is present.  · Estimated EF = 65%.  · Left ventricular systolic function is normal.  · Normal right ventricular cavity size, wall thickness, systolic function   and septal motion noted.  · Left ventricular diastolic function is normal.  · No evidence of pulmonary hypertension is present.  · There is no evidence of pericardial effusion.  · No significant structural valvular abnormality demonstrated.          I have reviewed the medications:  Scheduled Meds:    bisacodyl 10 mg Rectal Daily   Or      bisacodyl 10 mg Oral Daily   buPROPion 75 mg Oral Q6H   docusate sodium 100 mg Oral BID   DULoxetine 30 mg Oral Daily   methylnaltrexone 4 mg Subcutaneous Every Other Day   metoclopramide 10 mg Intravenous Q6H   pantoprazole 40 mg Oral Q AM   [START ON 2/7/2020] piperacillin-tazobactam 3.375 g Intravenous Q8H   sodium chloride 10 mL Intravenous Q12H   sodium chloride 10 mL Intravenous Q12H   traZODone 50 mg Oral Nightly   warfarin 7.5 mg Oral Daily     Continuous Infusions:    dextrose 5 % and sodium chloride 0.45 % with KCl 20 mEq/L 50 mL/hr Last Rate: 50 mL/hr (02/06/20 1044)   Pharmacy to dose warfarin       PRN Meds:.•  acetaminophen **OR** acetaminophen **OR** acetaminophen  •   diphenhydrAMINE  •  famotidine  •  HYDROcodone-acetaminophen  •  HYDROmorphone  •  LORazepam  •  naloxone  •  ondansetron **OR** ondansetron  •  oxyCODONE-acetaminophen  •  Pharmacy to dose warfarin  •  phenol  •  potassium & sodium phosphates **OR** potassium & sodium phosphates  •  potassium chloride  •  potassium chloride  •  promethazine **OR** promethazine  •  sodium chloride  •  sodium chloride  •  sodium chloride    Assessment/Plan   Assessment & Plan     Active Hospital Problems    Diagnosis  POA   • **Diverticulitis [K57.92]  Yes   • S/P exploratory laparotomy with sigmoid colectomy/end colostomy/enterotomy repair 1/31/20 [Z98.890]  Not Applicable   • Active Tobacco use [Z72.0]  Yes   • Peritonitis (CMS/HCC) [K65.9]  No   • Perforated diverticulum of sigmoid colon [K57.80]  No   • Morbidly obese (CMS/HCC) [E66.01]  Yes   • Recurrent major depressive disorder (CMS/HCC) [F33.9]  Yes   • 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 Data deficit (CMS/HCC) [I50.9]  Yes      Resolved Hospital Problems   No resolved problems to display.        Brief Hospital Course to date:  Brigida Rodriguez is a 40 y.o. male with history of diastolic heart failure, remote PE status post IVC filter, GERD, tobacco abuse, anxiety and depression who was admitted on 1/28/2020 with abdominal pain, vomiting and constipation.  Patient had a recent episode of diverticulitis in November 2019.    Diverticulitis with perforation  -Status post exploratory laparotomy and sigmoid colectomy with end colostomy placement per Dr. Valenzuela  -Continue Zosyn, pain and nausea control  - mobilize    Elevated LFTs  - repeat CMP am    Ileus  -improved    History of DVT/PE/occluded IVC filter  -Coumadin restarted, INR 2.2 today, repeat INR in the morning. Pharmacy dosing    Morbid obesity    Tobacco  abuse  - cessation  - continue wellbutrin    Depression/anxiety  -prn ativan  -continue wellbutrin, cymbalta, trazadone      DVT Prophylaxis: Heparin    Disposition: I expect the patient to be discharged TBD  CODE STATUS:   Code Status and Medical Interventions:   Ordered at: 01/28/20 0410     Level Of Support Discussed With:    Patient     Code Status:    CPR     Medical Interventions (Level of Support Prior to Arrest):    Full         Electronically signed by Jagdish Stephens MD, 02/06/20, 8:08 PM.

## 2020-02-07 NOTE — NURSING NOTE
"At this time colostomy appliance (Domingo New image 2 3/4\" convexity) is intact. Stoma is viable and functioning well. Still not much of an appetite. Patient ambulating well. POC discussed with patient. Will plan for follow-up stomal education on Sunday or Monday in anticipation of discharge on Monday. Will provide supplies when appropriate. Possibly HH. Please contact [facility] nurse if needs arise. Thanks   "

## 2020-02-08 LAB
ALBUMIN SERPL-MCNC: 3.6 G/DL (ref 3.5–5.2)
ALBUMIN/GLOB SERPL: 0.9 G/DL
ALP SERPL-CCNC: 88 U/L (ref 39–117)
ALT SERPL W P-5'-P-CCNC: 77 U/L (ref 1–41)
ANION GAP SERPL CALCULATED.3IONS-SCNC: 14 MMOL/L (ref 5–15)
AST SERPL-CCNC: 44 U/L (ref 1–40)
BASOPHILS # BLD AUTO: 0.03 10*3/MM3 (ref 0–0.2)
BASOPHILS NFR BLD AUTO: 0.4 % (ref 0–1.5)
BILIRUB SERPL-MCNC: 1.1 MG/DL (ref 0.2–1.2)
BUN BLD-MCNC: 7 MG/DL (ref 6–20)
BUN/CREAT SERPL: 6.7 (ref 7–25)
CALCIUM SPEC-SCNC: 9 MG/DL (ref 8.6–10.5)
CHLORIDE SERPL-SCNC: 100 MMOL/L (ref 98–107)
CO2 SERPL-SCNC: 20 MMOL/L (ref 22–29)
CREAT BLD-MCNC: 1.04 MG/DL (ref 0.76–1.27)
DEPRECATED RDW RBC AUTO: 46 FL (ref 37–54)
EOSINOPHIL # BLD AUTO: 0.27 10*3/MM3 (ref 0–0.4)
EOSINOPHIL NFR BLD AUTO: 3.4 % (ref 0.3–6.2)
ERYTHROCYTE [DISTWIDTH] IN BLOOD BY AUTOMATED COUNT: 13.7 % (ref 12.3–15.4)
GFR SERPL CREATININE-BSD FRML MDRD: 96 ML/MIN/1.73
GLOBULIN UR ELPH-MCNC: 4.1 GM/DL
GLUCOSE BLD-MCNC: 106 MG/DL (ref 65–99)
HCT VFR BLD AUTO: 37.1 % (ref 37.5–51)
HGB BLD-MCNC: 12.3 G/DL (ref 13–17.7)
IMM GRANULOCYTES # BLD AUTO: 0.07 10*3/MM3 (ref 0–0.05)
IMM GRANULOCYTES NFR BLD AUTO: 0.9 % (ref 0–0.5)
INR PPP: 2.2 (ref 0.85–1.16)
LYMPHOCYTES # BLD AUTO: 1.41 10*3/MM3 (ref 0.7–3.1)
LYMPHOCYTES NFR BLD AUTO: 17.8 % (ref 19.6–45.3)
MCH RBC QN AUTO: 30.4 PG (ref 26.6–33)
MCHC RBC AUTO-ENTMCNC: 33.2 G/DL (ref 31.5–35.7)
MCV RBC AUTO: 91.6 FL (ref 79–97)
MONOCYTES # BLD AUTO: 0.75 10*3/MM3 (ref 0.1–0.9)
MONOCYTES NFR BLD AUTO: 9.5 % (ref 5–12)
NEUTROPHILS # BLD AUTO: 5.39 10*3/MM3 (ref 1.7–7)
NEUTROPHILS NFR BLD AUTO: 68 % (ref 42.7–76)
NRBC BLD AUTO-RTO: 0 /100 WBC (ref 0–0.2)
PLATELET # BLD AUTO: 288 10*3/MM3 (ref 140–450)
PMV BLD AUTO: 10.5 FL (ref 6–12)
POTASSIUM BLD-SCNC: 3.8 MMOL/L (ref 3.5–5.2)
PROT SERPL-MCNC: 7.7 G/DL (ref 6–8.5)
PROTHROMBIN TIME: 23.5 SECONDS (ref 11.2–14.3)
RBC # BLD AUTO: 4.05 10*6/MM3 (ref 4.14–5.8)
SODIUM BLD-SCNC: 134 MMOL/L (ref 136–145)
WBC NRBC COR # BLD: 7.92 10*3/MM3 (ref 3.4–10.8)

## 2020-02-08 PROCEDURE — 85025 COMPLETE CBC W/AUTO DIFF WBC: CPT | Performed by: SURGERY

## 2020-02-08 PROCEDURE — 25010000002 PIPERACILLIN SOD-TAZOBACTAM PER 1 G: Performed by: SURGERY

## 2020-02-08 PROCEDURE — 99232 SBSQ HOSP IP/OBS MODERATE 35: CPT | Performed by: INTERNAL MEDICINE

## 2020-02-08 PROCEDURE — 80053 COMPREHEN METABOLIC PANEL: CPT | Performed by: NURSE PRACTITIONER

## 2020-02-08 PROCEDURE — 25010000002 HYDROMORPHONE PER 4 MG: Performed by: SURGERY

## 2020-02-08 PROCEDURE — 85610 PROTHROMBIN TIME: CPT | Performed by: SURGERY

## 2020-02-08 PROCEDURE — 25010000002 PROMETHAZINE PER 50 MG: Performed by: SURGERY

## 2020-02-08 PROCEDURE — 25010000002 METOCLOPRAMIDE PER 10 MG: Performed by: SURGERY

## 2020-02-08 RX ADMIN — BUPROPION HYDROCHLORIDE 75 MG: 75 TABLET, FILM COATED ORAL at 23:07

## 2020-02-08 RX ADMIN — METOCLOPRAMIDE 10 MG: 5 INJECTION, SOLUTION INTRAMUSCULAR; INTRAVENOUS at 12:07

## 2020-02-08 RX ADMIN — METOCLOPRAMIDE 10 MG: 5 INJECTION, SOLUTION INTRAMUSCULAR; INTRAVENOUS at 23:09

## 2020-02-08 RX ADMIN — HYDROCODONE BITARTRATE AND ACETAMINOPHEN 1 TABLET: 7.5; 325 TABLET ORAL at 10:35

## 2020-02-08 RX ADMIN — MELATONIN TAB 5 MG 10 MG: 5 TAB at 23:07

## 2020-02-08 RX ADMIN — BUPROPION HYDROCHLORIDE 75 MG: 75 TABLET, FILM COATED ORAL at 05:10

## 2020-02-08 RX ADMIN — PANTOPRAZOLE SODIUM 40 MG: 40 TABLET, DELAYED RELEASE ORAL at 05:10

## 2020-02-08 RX ADMIN — DOCUSATE SODIUM 100 MG: 100 CAPSULE, LIQUID FILLED ORAL at 08:49

## 2020-02-08 RX ADMIN — TAZOBACTAM SODIUM AND PIPERACILLIN SODIUM 3.38 G: 375; 3 INJECTION, SOLUTION INTRAVENOUS at 08:51

## 2020-02-08 RX ADMIN — HYDROCODONE BITARTRATE AND ACETAMINOPHEN 1 TABLET: 7.5; 325 TABLET ORAL at 23:07

## 2020-02-08 RX ADMIN — METOCLOPRAMIDE 10 MG: 5 INJECTION, SOLUTION INTRAMUSCULAR; INTRAVENOUS at 18:14

## 2020-02-08 RX ADMIN — HYDROMORPHONE HYDROCHLORIDE 0.2 MG: 1 INJECTION, SOLUTION INTRAMUSCULAR; INTRAVENOUS; SUBCUTANEOUS at 02:46

## 2020-02-08 RX ADMIN — DOCUSATE SODIUM 100 MG: 100 CAPSULE, LIQUID FILLED ORAL at 23:08

## 2020-02-08 RX ADMIN — METOCLOPRAMIDE 10 MG: 5 INJECTION, SOLUTION INTRAMUSCULAR; INTRAVENOUS at 05:10

## 2020-02-08 RX ADMIN — WARFARIN SODIUM 10 MG: 5 TABLET ORAL at 18:44

## 2020-02-08 RX ADMIN — PROMETHAZINE HYDROCHLORIDE 12.5 MG: 25 INJECTION INTRAMUSCULAR; INTRAVENOUS at 02:47

## 2020-02-08 RX ADMIN — HYDROMORPHONE HYDROCHLORIDE 0.2 MG: 1 INJECTION, SOLUTION INTRAMUSCULAR; INTRAVENOUS; SUBCUTANEOUS at 15:01

## 2020-02-08 RX ADMIN — BUPROPION HYDROCHLORIDE 75 MG: 75 TABLET, FILM COATED ORAL at 18:14

## 2020-02-08 RX ADMIN — SODIUM CHLORIDE, PRESERVATIVE FREE 10 ML: 5 INJECTION INTRAVENOUS at 08:52

## 2020-02-08 RX ADMIN — OXYCODONE HYDROCHLORIDE AND ACETAMINOPHEN 2 TABLET: 5; 325 TABLET ORAL at 15:32

## 2020-02-08 RX ADMIN — DULOXETINE 30 MG: 30 CAPSULE, DELAYED RELEASE ORAL at 08:49

## 2020-02-08 RX ADMIN — PROMETHAZINE HYDROCHLORIDE 12.5 MG: 25 INJECTION INTRAMUSCULAR; INTRAVENOUS at 23:08

## 2020-02-08 RX ADMIN — BISACODYL 10 MG: 5 TABLET, COATED ORAL at 08:49

## 2020-02-08 RX ADMIN — TAZOBACTAM SODIUM AND PIPERACILLIN SODIUM 3.38 G: 375; 3 INJECTION, SOLUTION INTRAVENOUS at 15:35

## 2020-02-08 RX ADMIN — TAZOBACTAM SODIUM AND PIPERACILLIN SODIUM 3.38 G: 375; 3 INJECTION, SOLUTION INTRAVENOUS at 01:46

## 2020-02-08 RX ADMIN — OXYCODONE HYDROCHLORIDE AND ACETAMINOPHEN 2 TABLET: 5; 325 TABLET ORAL at 05:07

## 2020-02-08 RX ADMIN — BUPROPION HYDROCHLORIDE 75 MG: 75 TABLET, FILM COATED ORAL at 12:07

## 2020-02-08 RX ADMIN — TRAZODONE HYDROCHLORIDE 50 MG: 50 TABLET ORAL at 23:07

## 2020-02-08 NOTE — PLAN OF CARE
Patient has no new complaints. Continues to complain of incisional pain. Wound packing removed and replaced per order, dressing changed, abdominal pad applied. Patient does not tolerate dressing changes well, despite pre-medication. Colostomy bag producing stool.Tolerating new diet with no nausea or emesis. Reporting spike in appetite. Vitals within normal limits.

## 2020-02-08 NOTE — PROGRESS NOTES
[facility] Medicine Services  PROGRESS NOTE    Patient Name: Brigida Rodriguez  : 1980  MRN: [id number]    Date of Admission: 2020  Primary Care Physician: Elias Tom DO    Subjective   Subjective     CC:  Abdominal pain    HPI:  No having occasional abdominal pain, and just needed a pain pill a few minutes ago.  Eating well ate some fruit for lunch.  Stool in ostomy    Review of Systems  Gen- No fevers, chills  CV- No chest pain, palpitations  Resp- No cough, dyspnea  GI- No N/V/D, + abd pain              Objective   Objective     Vital Signs:   Temp:  [97.5 °F (36.4 °C)-98 °F (36.7 °C)] 97.9 °F (36.6 °C)  Heart Rate:  [80-99] 84  Resp:  [16-18] 16  BP: (165-173)/(84-95) 169/87        Physical Exam:  Constitutional -no acute distress, non toxic, eating up in chair  HEENT-NCAT, mucous membranes moist  CV-RRR, S1 S2 normal, no m/r/g  Resp-CTAB, no wheezes, rhonchi or rales  Abd-soft, non-tender to light palpation, non-distended, normo active bowel sounds, stool in ostomy, clean dressing over midline incision, obese  Ext-No lower extremity cyanosis, clubbing or edema bilaterally  Neuro-alert and oriented, speech clear, moves all extremities   Psych-normal affect   Skin- No rash on exposed UE or LE bilaterally          Results Reviewed:  Results from last 7 days   Lab Units 20  0820  0523   WBC 10*3/mm3 7.92 9.14 8.59   HEMOGLOBIN g/dL 12.3* 12.5* 13.2   HEMATOCRIT % 37.1* 37.7 38.9   PLATELETS 10*3/mm3 288 300 292   INR  2.20* 2.10* 2.22*     Results from last 7 days   Lab Units 2038 20  0820  0523   SODIUM mmol/L 134* 135* 137   POTASSIUM mmol/L 3.8 4.0 4.2   CHLORIDE mmol/L 100 102 99   CO2 mmol/L 20.0* 22.0 24.0   BUN mg/dL 7 6 7   CREATININE mg/dL 1.04 1.12 0.97   GLUCOSE mg/dL 106* 100* 94   CALCIUM mg/dL 9.0 9.4 9.2   ALT (SGPT) U/L 77* 86* 76*   AST (SGOT) U/L 44* 58* 70*     Estimated Creatinine  Clearance: 157.6 mL/min (by C-G formula based on SCr of 1.04 mg/dL).    Microbiology Results Abnormal     None          Imaging Results (Last 24 Hours)     ** No results found for the last 24 hours. **          Results for orders placed during the hospital encounter of 01/28/20   Adult Transthoracic Echo Complete W/ Cont if Necessary Per Protocol    Narrative · Mild mitral valve regurgitation is present.  · Mild tricuspid valve regurgitation is present.  · Estimated EF = 65%.  · Left ventricular systolic function is normal.  · Normal right ventricular cavity size, wall thickness, systolic function   and septal motion noted.  · Left ventricular diastolic function is normal.  · No evidence of pulmonary hypertension is present.  · There is no evidence of pericardial effusion.  · No significant structural valvular abnormality demonstrated.          I have reviewed the medications:  Scheduled Meds:    bisacodyl 10 mg Rectal Daily   Or      bisacodyl 10 mg Oral Daily   buPROPion 75 mg Oral Q6H   docusate sodium 100 mg Oral BID   DULoxetine 30 mg Oral Daily   melatonin 10 mg Oral Nightly   methylnaltrexone 4 mg Subcutaneous Every Other Day   metoclopramide 10 mg Intravenous Q6H   pantoprazole 40 mg Oral Q AM   piperacillin-tazobactam 3.375 g Intravenous Q8H   sodium chloride 10 mL Intravenous Q12H   sodium chloride 10 mL Intravenous Q12H   traZODone 50 mg Oral Nightly   warfarin 10 mg Oral Daily     Continuous Infusions:    Pharmacy to dose warfarin      PRN Meds:.•  acetaminophen **OR** acetaminophen **OR** acetaminophen  •  aluminum-magnesium hydroxide-simethicone  •  diphenhydrAMINE  •  HYDROcodone-acetaminophen  •  HYDROmorphone  •  LORazepam  •  naloxone  •  ondansetron **OR** ondansetron  •  oxyCODONE-acetaminophen  •  Pharmacy to dose warfarin  •  phenol  •  potassium & sodium phosphates **OR** potassium & sodium phosphates  •  potassium chloride  •  potassium chloride  •  promethazine **OR** promethazine  •  sodium  chloride  •  sodium chloride  •  sodium chloride    Assessment/Plan   Assessment & Plan     Active Hospital Problems    Diagnosis  POA   • **Diverticulitis [K57.92]  Yes   • S/P exploratory laparotomy with sigmoid colectomy/end colostomy/enterotomy repair 1/31/20 [Z98.890]  Not Applicable   • Active Tobacco use [Z72.0]  Yes   • Peritonitis (CMS/[facility]) [K65.9]  No   • Perforated diverticulum of sigmoid colon [K57.80]  No   • Morbidly obese (CMS/HCC) [E66.01]  Yes   • Recurrent major depressive disorder (CMS/HCC) [F33.9]  Yes   • 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 Data deficit (CMS/[facility]) [I50.9]  Yes      Resolved Hospital Problems   No resolved problems to display.        Brief Hospital Course to date:  Brigida Rodriguez is a 40 y.o. male with history of diastolic heart failure, remote PE status post IVC filter, GERD, tobacco abuse, anxiety and depression who was admitted on 1/28/2020 with abdominal pain, vomiting and constipation.  Patient had a recent episode of diverticulitis in November 2019.    Diverticulitis with perforation  -Status post exploratory laparotomy and sigmoid colectomy with end colostomy placement.  -Discussed treatment plan with general surgery Dr. Valenzuela  -Continue Zosyn, pain and nausea control  -Still receiving Relistor and Reglan  - mobilize    Elevated LFTs  -Mild elevation, stable, ALT 77, AST 44 and improving, total bilirubin normal at 1.1    Ileus  -improved    History of DVT/PE/occluded IVC filter  -INR therapeutic at 2.2    Morbid obesity    Tobacco abuse  - cessation  - continue wellbutrin    Depression/anxiety  -prn ativan  -continue wellbutrin, cymbalta, trazadone      DVT Prophylaxis: Coumadin    Disposition: I expect the patient to be discharged TBD  CODE STATUS:   Code Status and Medical Interventions:   Ordered  at: 01/28/20 0410     Level Of Support Discussed With:    Patient     Code Status:    CPR     Medical Interventions (Level of Support Prior to Arrest):    Full         Electronically signed by Jagdish Stephens MD, 02/08/20, 1:30 PM.

## 2020-02-08 NOTE — PROGRESS NOTES
"Patient Name:  Brigida Rodriguez  YOB: 1980  [id number]    Surgery Progress Note    Date of visit: 2/8/2020    Subjective   Subjective: Feels better. Slept better last night, tolerating PO, ostomy functional.         Objective     Objective:     /95 (BP Location: Left leg, Patient Position: Sitting)   Pulse 99   Temp 98 °F (36.7 °C) (Oral)   Resp 16   Ht 193 cm (76\")   Wt (!) 165 kg (364 lb)   SpO2 94%   BMI 44.31 kg/m²     Intake/Output Summary (Last 24 hours) at 2/8/2020 0923  Last data filed at 2/8/2020 0851  Gross per 24 hour   Intake 336 ml   Output --   Net 336 ml       CV:  Rhythm  regular and rate regular   L:  Clear  to auscultation bilaterally   Abd:  Bowel sounds positive , soft, ostomy with stool output, viable. Wounds c/d/i  Ext:  No cyanosis, clubbing, edema    Recent labs that are back at this time have been reviewed.        Assessment/Plan     Assessment/ Plan:    Hospital Problem List     * (Principal) Diverticulitis - Doing well. Nearly ready for discharge. Ambulated 300ft, so not a candidate for rehab. Will need home health for home dressing changes and ostomy supplies. Aim for discharge in 24-48 hours. Will follow up with me in 2 weeks after discharge.      Chronic congestive heart failure. 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

## 2020-02-08 NOTE — PROGRESS NOTES
"  Pharmacy Consult  -  Warfarin    Brigida Rodriguez is a  40 y.o. male   Height - 193 cm (76\")  Weight - (!) 165 kg (364 lb)    Consulting Provider: - Hospitalist  Indication: - Hx of PE and LE DVT  Goal INR: 2-3   Home Regimen:   - warfarin 10 mg Sunday, Monday, Tuesday, Wednesday, Thursday, Saturday               - warfarin 12.5 mg on Fridays     Bridge Therapy: none, patient is on SQH but not full anticoagulation    Drug-Drug Interactions with current regimen:   Trazodone- increases bleed risk              Pantoprazole - may increase INR              Zosyn - may increase bleeding risk              Metronidazole (2/1-2/3)    Warfarin Dosing During Admission:    Date  2/3 2/4 2/5 2/6 2/7 2/8      INR  1.38 1.55 1.88 2.22 2.1 2.2      Dose  10mg 10mg 7.5mg 7.5mg  10mg (10mg)          Education Provided: Patient is on warfarin prior to admission.  Education provided  on 2/3 verbally and in writing .  Discussed effects of warfarin, importance of checking INR, drug-drug and drug-food interactions, and signs/symptoms of bleeding and clotting.  Patient verbalized understanding through teach back.  All pertinent questions were answered.        Discharge Follow up:   Following Provider - [facility] anticoagulation clinic   Follow up time range or appointment: 2-3 days after discharge      Labs:    Results from last 7 days   Lab Units 02/08/20  0538 02/07/20  0826 02/06/20  0523 02/05/20  0653 02/04/20  0848 02/04/20  0723 02/03/20  0348 02/02/20  0704   INR  2.20* 2.10* 2.22* 1.88* 1.55*  --  1.38* 1.43*   HEMOGLOBIN g/dL 12.3* 12.5* 13.2 12.4*  --  11.9* 12.2* 12.4*   HEMATOCRIT % 37.1* 37.7 38.9 37.4*  --  36.3* 37.9 37.2*   PLATELETS 10*3/mm3 288 300 292 280  --  202 208 197     Results from last 7 days   Lab Units 02/08/20  0538 02/07/20  0826 02/06/20  0523   SODIUM mmol/L 134* 135* 137   POTASSIUM mmol/L 3.8 4.0 4.2   CHLORIDE mmol/L 100 102 99   CO2 mmol/L 20.0* 22.0 24.0   BUN mg/dL 7 6 7   CREATININE mg/dL 1.04 1.12 " 0.97   CALCIUM mg/dL 9.0 9.4 9.2   BILIRUBIN mg/dL 1.1 1.2 1.4*   ALK PHOS U/L 88 86 65   ALT (SGPT) U/L 77* 86* 76*   AST (SGOT) U/L 44* 58* 70*   GLUCOSE mg/dL 106* 100* 94     Current dietary intake: 0% of documented meals, Dietary Nutrition Supplements Boost Breeze (Clear Liquid) TID   Diet Order   Procedures    Diet Regular; Cardiac       Assessment/Plan:   Warfarin dosing for a history of DVT/PE.   Goal INR; 2-3  2/8 INR - 2.2, increased from 2.1  2/8 H/H - 12.3/37.1    Vitamin K 10mg and K Centra administered 1/30 (expect Vitamin K effect for 5-7days)  Warfarin doses held 1/30, 1/31, 2/1, 2/2 - restarted 2/3   Continue warfarin 10mg daily  Monitor for s/sx of bleeding, dietary intake, drug/drug interactions, and clinical status.  Follow daily INR and adjust accordingly.     Discharge plan: resume admission regimen    Thank you,  Jami Hsieh [facility]  2/8/2020  11:09 AM

## 2020-02-08 NOTE — PLAN OF CARE
Problem: Patient Care Overview  Goal: Plan of Care Review  Outcome: Ongoing (interventions implemented as appropriate)  Flowsheets  Taken 2/8/2020 0353  Progress: no change  Taken 2/8/2020 0000  Plan of Care Reviewed With: patient  Note:   Pt was able to rest for about 4 hours during the night with PRN meds..he complained of moderate pain.  VSS.  Pt tolerated dressing change with significant pain.

## 2020-02-09 LAB
ANION GAP SERPL CALCULATED.3IONS-SCNC: 11 MMOL/L (ref 5–15)
BUN BLD-MCNC: 8 MG/DL (ref 6–20)
BUN/CREAT SERPL: 8.4 (ref 7–25)
CALCIUM SPEC-SCNC: 9 MG/DL (ref 8.6–10.5)
CHLORIDE SERPL-SCNC: 100 MMOL/L (ref 98–107)
CO2 SERPL-SCNC: 23 MMOL/L (ref 22–29)
CREAT BLD-MCNC: 0.95 MG/DL (ref 0.76–1.27)
DEPRECATED RDW RBC AUTO: 46.3 FL (ref 37–54)
ERYTHROCYTE [DISTWIDTH] IN BLOOD BY AUTOMATED COUNT: 13.6 % (ref 12.3–15.4)
GFR SERPL CREATININE-BSD FRML MDRD: 106 ML/MIN/1.73
GLUCOSE BLD-MCNC: 116 MG/DL (ref 65–99)
HCT VFR BLD AUTO: 37.1 % (ref 37.5–51)
HGB BLD-MCNC: 12.5 G/DL (ref 13–17.7)
INR PPP: 2.4 (ref 0.85–1.16)
MCH RBC QN AUTO: 30.9 PG (ref 26.6–33)
MCHC RBC AUTO-ENTMCNC: 33.7 G/DL (ref 31.5–35.7)
MCV RBC AUTO: 91.8 FL (ref 79–97)
PLATELET # BLD AUTO: 295 10*3/MM3 (ref 140–450)
PMV BLD AUTO: 10.2 FL (ref 6–12)
POTASSIUM BLD-SCNC: 4.1 MMOL/L (ref 3.5–5.2)
PROTHROMBIN TIME: 25.2 SECONDS (ref 11.2–14.3)
RBC # BLD AUTO: 4.04 10*6/MM3 (ref 4.14–5.8)
SODIUM BLD-SCNC: 134 MMOL/L (ref 136–145)
WBC NRBC COR # BLD: 7.64 10*3/MM3 (ref 3.4–10.8)

## 2020-02-09 PROCEDURE — 25010000002 HYDROMORPHONE PER 4 MG: Performed by: SURGERY

## 2020-02-09 PROCEDURE — 80048 BASIC METABOLIC PNL TOTAL CA: CPT | Performed by: SURGERY

## 2020-02-09 PROCEDURE — 99232 SBSQ HOSP IP/OBS MODERATE 35: CPT | Performed by: NURSE PRACTITIONER

## 2020-02-09 PROCEDURE — 85027 COMPLETE CBC AUTOMATED: CPT | Performed by: SURGERY

## 2020-02-09 PROCEDURE — 25010000002 METOCLOPRAMIDE PER 10 MG: Performed by: SURGERY

## 2020-02-09 PROCEDURE — 25010000002 METHYLNALTREXONE 12 MG/0.6ML SOLUTION: Performed by: SURGERY

## 2020-02-09 PROCEDURE — 25010000002 PIPERACILLIN SOD-TAZOBACTAM PER 1 G: Performed by: SURGERY

## 2020-02-09 PROCEDURE — 85610 PROTHROMBIN TIME: CPT | Performed by: SURGERY

## 2020-02-09 PROCEDURE — 25010000002 PROMETHAZINE PER 50 MG: Performed by: SURGERY

## 2020-02-09 RX ADMIN — METHYLNALTREXONE BROMIDE 4 MG: 12 INJECTION, SOLUTION SUBCUTANEOUS at 08:43

## 2020-02-09 RX ADMIN — TAZOBACTAM SODIUM AND PIPERACILLIN SODIUM 3.38 G: 375; 3 INJECTION, SOLUTION INTRAVENOUS at 22:56

## 2020-02-09 RX ADMIN — DOCUSATE SODIUM 100 MG: 100 CAPSULE, LIQUID FILLED ORAL at 08:43

## 2020-02-09 RX ADMIN — SODIUM CHLORIDE, PRESERVATIVE FREE 10 ML: 5 INJECTION INTRAVENOUS at 23:02

## 2020-02-09 RX ADMIN — TRAZODONE HYDROCHLORIDE 50 MG: 50 TABLET ORAL at 22:53

## 2020-02-09 RX ADMIN — HYDROCODONE BITARTRATE AND ACETAMINOPHEN 1 TABLET: 7.5; 325 TABLET ORAL at 14:49

## 2020-02-09 RX ADMIN — PROMETHAZINE HYDROCHLORIDE 12.5 MG: 25 INJECTION INTRAMUSCULAR; INTRAVENOUS at 16:04

## 2020-02-09 RX ADMIN — HYDROMORPHONE HYDROCHLORIDE 0.2 MG: 1 INJECTION, SOLUTION INTRAMUSCULAR; INTRAVENOUS; SUBCUTANEOUS at 04:29

## 2020-02-09 RX ADMIN — BUPROPION HYDROCHLORIDE 75 MG: 75 TABLET, FILM COATED ORAL at 12:23

## 2020-02-09 RX ADMIN — HYDROMORPHONE HYDROCHLORIDE 0.2 MG: 1 INJECTION, SOLUTION INTRAMUSCULAR; INTRAVENOUS; SUBCUTANEOUS at 12:23

## 2020-02-09 RX ADMIN — PANTOPRAZOLE SODIUM 40 MG: 40 TABLET, DELAYED RELEASE ORAL at 07:26

## 2020-02-09 RX ADMIN — HYDROMORPHONE HYDROCHLORIDE 0.2 MG: 1 INJECTION, SOLUTION INTRAMUSCULAR; INTRAVENOUS; SUBCUTANEOUS at 16:04

## 2020-02-09 RX ADMIN — BUPROPION HYDROCHLORIDE 75 MG: 75 TABLET, FILM COATED ORAL at 06:22

## 2020-02-09 RX ADMIN — TAZOBACTAM SODIUM AND PIPERACILLIN SODIUM 3.38 G: 375; 3 INJECTION, SOLUTION INTRAVENOUS at 17:40

## 2020-02-09 RX ADMIN — HYDROCODONE BITARTRATE AND ACETAMINOPHEN 1 TABLET: 7.5; 325 TABLET ORAL at 08:51

## 2020-02-09 RX ADMIN — DULOXETINE 30 MG: 30 CAPSULE, DELAYED RELEASE ORAL at 08:44

## 2020-02-09 RX ADMIN — BUPROPION HYDROCHLORIDE 75 MG: 75 TABLET, FILM COATED ORAL at 22:56

## 2020-02-09 RX ADMIN — OXYCODONE HYDROCHLORIDE AND ACETAMINOPHEN 2 TABLET: 5; 325 TABLET ORAL at 23:01

## 2020-02-09 RX ADMIN — BUPROPION HYDROCHLORIDE 75 MG: 75 TABLET, FILM COATED ORAL at 17:40

## 2020-02-09 RX ADMIN — TAZOBACTAM SODIUM AND PIPERACILLIN SODIUM 3.38 G: 375; 3 INJECTION, SOLUTION INTRAVENOUS at 01:15

## 2020-02-09 RX ADMIN — BISACODYL 10 MG: 5 TABLET, COATED ORAL at 08:43

## 2020-02-09 RX ADMIN — METOCLOPRAMIDE 10 MG: 5 INJECTION, SOLUTION INTRAMUSCULAR; INTRAVENOUS at 06:22

## 2020-02-09 RX ADMIN — HYDROCODONE BITARTRATE AND ACETAMINOPHEN 1 TABLET: 7.5; 325 TABLET ORAL at 19:54

## 2020-02-09 RX ADMIN — PROMETHAZINE HYDROCHLORIDE 12.5 MG: 25 INJECTION INTRAMUSCULAR; INTRAVENOUS at 04:29

## 2020-02-09 RX ADMIN — TAZOBACTAM SODIUM AND PIPERACILLIN SODIUM 3.38 G: 375; 3 INJECTION, SOLUTION INTRAVENOUS at 08:42

## 2020-02-09 RX ADMIN — WARFARIN SODIUM 10 MG: 5 TABLET ORAL at 17:40

## 2020-02-09 RX ADMIN — SODIUM CHLORIDE, PRESERVATIVE FREE 10 ML: 5 INJECTION INTRAVENOUS at 08:44

## 2020-02-09 RX ADMIN — MELATONIN TAB 5 MG 10 MG: 5 TAB at 22:53

## 2020-02-09 RX ADMIN — METOCLOPRAMIDE 10 MG: 5 INJECTION, SOLUTION INTRAMUSCULAR; INTRAVENOUS at 12:23

## 2020-02-09 NOTE — PROGRESS NOTES
"  Pharmacy Consult  -  Warfarin    Brigida Rodriguez is a  40 y.o. male   Height - 193 cm (76\")  Weight - (!) 165 kg (364 lb)    Consulting Provider: - Hospitalist  Indication: - Hx of PE and LE DVT  Goal INR: 2-3   Home Regimen:   - warfarin 10 mg Sunday, Monday, Tuesday, Wednesday, Thursday, Saturday               - warfarin 12.5 mg on Fridays     Bridge Therapy: none, patient is on SQH but not full anticoagulation    Drug-Drug Interactions with current regimen:   Trazodone- increases bleed risk              Pantoprazole - may increase INR              Zosyn - may increase bleeding risk    Warfarin Dosing During Admission:    Date  2/3 2/4 2/5 2/6 2/7 2/8 2/9     INR  1.38 1.55 1.88 2.22 2.1 2.2 2.4     Dose  10mg 10mg 7.5mg 7.5mg  10mg 10mg (10mg)         Education Provided: Patient is on warfarin prior to admission.  Education provided  on 2/3 verbally and in writing .  Discussed effects of warfarin, importance of checking INR, drug-drug and drug-food interactions, and signs/symptoms of bleeding and clotting.  Patient verbalized understanding through teach back.  All pertinent questions were answered.        Discharge Follow up:   Following Provider - [facility] anticoagulation clinic   Follow up time range or appointment: 2-3 days after discharge      Labs:    Results from last 7 days   Lab Units 02/09/20  0706 02/08/20  0538 02/07/20  0826 02/06/20  0523 02/05/20  0653 02/04/20  0848 02/04/20  0723 02/03/20  0348   INR  2.40* 2.20* 2.10* 2.22* 1.88* 1.55*  --  1.38*   HEMOGLOBIN g/dL 12.5* 12.3* 12.5* 13.2 12.4*  --  11.9* 12.2*   HEMATOCRIT % 37.1* 37.1* 37.7 38.9 37.4*  --  36.3* 37.9   PLATELETS 10*3/mm3 295 288 300 292 280  --  202 208     Results from last 7 days   Lab Units 02/09/20  0706 02/08/20  0538 02/07/20  0826 02/06/20  0523   SODIUM mmol/L 134* 134* 135* 137   POTASSIUM mmol/L 4.1 3.8 4.0 4.2   CHLORIDE mmol/L 100 100 102 99   CO2 mmol/L 23.0 20.0* 22.0 24.0   BUN mg/dL 8 7 6 7   CREATININE mg/dL " 0.95 1.04 1.12 0.97   CALCIUM mg/dL 9.0 9.0 9.4 9.2   BILIRUBIN mg/dL  --  1.1 1.2 1.4*   ALK PHOS U/L  --  88 86 65   ALT (SGPT) U/L  --  77* 86* 76*   AST (SGOT) U/L  --  44* 58* 70*   GLUCOSE mg/dL 116* 106* 100* 94     Current dietary intake: 25-50% of documented meals, Diet regular, Cardiac  Diet Order   Procedures    Diet Regular; Cardiac       Assessment/Plan:   Warfarin dosing for a history of DVT/PE.   Goal INR; 2-3  2/9 INR - 2.4, increased from 2.2  2/9 H/H - 12.5/37.1    Vitamin K 10mg and K Centra administered 1/30 (expect Vitamin K effect for 5-7days)  Warfarin doses held 1/30, 1/31, 2/1, 2/2 - restarted 2/3   Continue warfarin 10mg daily  Monitor for s/sx of bleeding, dietary intake, drug/drug interactions, and clinical status.  Follow daily INR and adjust accordingly.     Discharge plan: Consider reducing to warfarin 10mg daily    Thank you,  Jami Hsieh [facility]  2/9/2020  9:26 AM

## 2020-02-09 NOTE — PLAN OF CARE
Problem: Patient Care Overview  Goal: Plan of Care Review  Outcome: Ongoing (interventions implemented as appropriate)  Flowsheets (Taken 2/9/2020 7486)  Plan of Care Reviewed With: patient  Note:   Pt a/o, VSS, RA. C/o pain addressed with PRN medications. Abdominal  dressing changed according to  MD order. No S/S of infection noted.  Continue monitoring.

## 2020-02-09 NOTE — NURSING NOTE
Inpatient Ostomy Therapy Note    Date of Surgery: 1/30/20      Days Post Procedure:  10 Days Post-Op    Surgeon:  Taye Valenzuela MD    Ostomy:  Colostomy- RLQ    Appliance Type:  Domingo and 2 Piece    Appliance Size:  Size: 2-3/4    Stoma Description: Viable, Red, Moist and Other: slough superior side    Stoma Size:  32 X 48 mm    Peristomal Skin:  Intact    Last Appliance Change:  2/9/20    Accessories:  Cristofer Ring    Education:  Diet, Showering, Emptying, Changing Appliance, Ordering Supplies, Outpatient Followup, Treating Constipation and Peristomal Skin Issues    Dressing Change:  No    Supplies Provided:  No    Education Folder Provided:  Yes    Plan of Care:  WO Visit    Teaching provided to:  Patient    Comments:  WO nurse f/u for colostomy. Appliance beginning to leak at medial edge. Stoma red with slough superior side; minimal protrusion above skin level; located in abdominal crease upon sitting. Peristomal skin intact. Appliance change performed by pt with assistance from WO, using Domingo #[id number] with convex wafer and thin Cristofer ring from 2:00 - 10:00. Pt performed 75% of change procedure; pt became teary at end of change. Encouragement provided..    Summary:  Education reviewed, including ordering of supplies. WO nurse will f/u and provide discharge supplies prior to discharge. Please contact WO nurse as needed for concerns.     SILVIA Lim - 02/09/20, 3:16 PM

## 2020-02-09 NOTE — PROGRESS NOTES
[facility] Medicine Services  PROGRESS NOTE    Patient Name: Brigida Rodriguez  : 1980  MRN: [id number]    Date of Admission: 2020  Primary Care Physician: Elias Tom DO    Subjective   Subjective     CC:  Abdominal pain    HPI:  Feels well today, cont to have appropriate postoperative pain. Asking if this is a reversible surgery. Father in room. NAD. States he doesn't feel comfortable going home today with lack of knowledge and scared to pack his own wound, speaking to pain associated with it. Asking about outpt wound care. Eating well. Having stool from ostomy.     Review of Systems  Gen- No fevers, chills  CV- No chest pain, palpitations  Resp- No cough, dyspnea  GI- No N/V/D, + abd pain              Objective   Objective     Vital Signs:   Temp:  [97.3 °F (36.3 °C)-98.3 °F (36.8 °C)] 97.3 °F (36.3 °C)  Heart Rate:  [] 76  Resp:  [16-18] 18  BP: (157-179)/(84-97) 157/85        Physical Exam:  Constitutional -no acute distress, non toxic, up in chair   HEENT-NCAT, mucous membranes moist  CV-RRR, S1 S2 normal, no m/r/g  Resp-CTAB, no wheezes, rhonchi or rales  Abd-soft, non-tender to light palpation, non-distended, normo active bowel sounds, stool in ostomy, clean dressing over midline incision, obese  Ext-No lower extremity cyanosis, clubbing or edema bilaterally  Neuro-alert and oriented, speech clear, moves all extremities   Psych-normal affect   Skin- No rash on exposed UE or LE bilaterally          Results Reviewed:  Results from last 7 days   Lab Units 20  0720  0538 20  08   WBC 10*3/mm3 7.64 7.92 9.14   HEMOGLOBIN g/dL 12.5* 12.3* 12.5*   HEMATOCRIT % 37.1* 37.1* 37.7   PLATELETS 10*3/mm3 295 288 300   INR  2.40* 2.20* 2.10*     Results from last 7 days   Lab Units 20  0706 20  0538 20  0826 20  0523   SODIUM mmol/L 134* 134* 135* 137   POTASSIUM mmol/L 4.1 3.8 4.0 4.2   CHLORIDE mmol/L 100 100 102  99   CO2 mmol/L 23.0 20.0* 22.0 24.0   BUN mg/dL 8 7 6 7   CREATININE mg/dL 0.95 1.04 1.12 0.97   GLUCOSE mg/dL 116* 106* 100* 94   CALCIUM mg/dL 9.0 9.0 9.4 9.2   ALT (SGPT) U/L  --  77* 86* 76*   AST (SGOT) U/L  --  44* 58* 70*     Estimated Creatinine Clearance: 172.5 mL/min (by C-G formula based on SCr of 0.95 mg/dL).    Microbiology Results Abnormal     None          Imaging Results (Last 24 Hours)     ** No results found for the last 24 hours. **          Results for orders placed during the hospital encounter of 01/28/20   Adult Transthoracic Echo Complete W/ Cont if Necessary Per Protocol    Narrative · Mild mitral valve regurgitation is present.  · Mild tricuspid valve regurgitation is present.  · Estimated EF = 65%.  · Left ventricular systolic function is normal.  · Normal right ventricular cavity size, wall thickness, systolic function   and septal motion noted.  · Left ventricular diastolic function is normal.  · No evidence of pulmonary hypertension is present.  · There is no evidence of pericardial effusion.  · No significant structural valvular abnormality demonstrated.          I have reviewed the medications:  Scheduled Meds:    bisacodyl 10 mg Rectal Daily   Or      bisacodyl 10 mg Oral Daily   buPROPion 75 mg Oral Q6H   docusate sodium 100 mg Oral BID   DULoxetine 30 mg Oral Daily   melatonin 10 mg Oral Nightly   methylnaltrexone 4 mg Subcutaneous Every Other Day   metoclopramide 10 mg Intravenous Q6H   pantoprazole 40 mg Oral Q AM   piperacillin-tazobactam 3.375 g Intravenous Q8H   sodium chloride 10 mL Intravenous Q12H   sodium chloride 10 mL Intravenous Q12H   traZODone 50 mg Oral Nightly   warfarin 10 mg Oral Daily     Continuous Infusions:    Pharmacy to dose warfarin      PRN Meds:.•  acetaminophen **OR** acetaminophen **OR** acetaminophen  •  aluminum-magnesium hydroxide-simethicone  •  diphenhydrAMINE  •  HYDROcodone-acetaminophen  •  HYDROmorphone  •  naloxone  •  ondansetron **OR**  ondansetron  •  oxyCODONE-acetaminophen  •  Pharmacy to dose warfarin  •  phenol  •  potassium & sodium phosphates **OR** potassium & sodium phosphates  •  potassium chloride  •  potassium chloride  •  promethazine **OR** promethazine  •  sodium chloride  •  sodium chloride  •  sodium chloride    Assessment/Plan   Assessment & Plan     Active Hospital Problems    Diagnosis  POA   • **Diverticulitis [K57.92]  Yes   • S/P exploratory laparotomy with sigmoid colectomy/end colostomy/enterotomy repair 1/31/20 [Z98.890]  Not Applicable   • Active Tobacco use [Z72.0]  Yes   • Peritonitis (CMS/HCC) [K65.9]  No   • Perforated diverticulum of sigmoid colon [K57.80]  No   • Morbidly obese (CMS/HCC) [E66.01]  Yes   • Recurrent major depressive disorder (CMS/HCC) [F33.9]  Yes   • 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 Data deficit (CMS/HCC) [I50.9]  Yes      Resolved Hospital Problems   No resolved problems to display.        Brief Hospital Course to date:  Brigida Rodriguez is a 40 y.o. male with history of diastolic heart failure, remote PE status post IVC filter, GERD, tobacco abuse, anxiety and depression who was admitted on 1/28/2020 with abdominal pain, vomiting and constipation.  Patient had a recent episode of diverticulitis in November 2019.    Diverticulitis with perforation  -Status post exploratory laparotomy and sigmoid colectomy with end colostomy placement.  -Discussed treatment plan with general surgery Dr. Valenzuela  -Continue Zosyn, pain and nausea control  - stopped Relistor and Reglan today, monitor output  - mobilize  --ok for DC per surgery, follow up with Dr Valenzuela in 2 weeks  --patient does not feel comfortable with ostomy and wound packing at this time, I will have nursing, CM and wound therapy work with him today and tomorrow to get him ready for  "DC, additionally ensure he has supplies for home.     Elevated LFTs  -Mild elevation, stable, ALT 77, AST 44 and improving, total bilirubin normal at 1.1    Ileus  -improved    History of DVT/PE/occluded IVC filter  -INR therapeutic at 2.4    Morbid obesity    Tobacco abuse  - cessation  - continue wellbutrin    Depression/anxiety  -prn ativan  -continue wellbutrin, cymbalta, trazadone      DVT Prophylaxis: Coumadin    Disposition: I expect the patient to be discharged likely tomorrow. Will ask CM to explore patients options for outpatient wound care for wound packing, and asking nursing to \"teach\" patient to empty his ostomy.     CODE STATUS:   Code Status and Medical Interventions:   Ordered at: 01/28/20 0410     Level Of Support Discussed With:    Patient     Code Status:    CPR     Medical Interventions (Level of Support Prior to Arrest):    Full         Electronically signed by SILVIA Varner, 02/09/20, 3:19 PM.      "

## 2020-02-09 NOTE — PROGRESS NOTES
"Patient Name:  Brigida Rodriguez  YOB: 1980  [id number]    Surgery Progress Note    Date of visit: 2/9/2020    Subjective   Subjective: Feeling better, tolerating diet, ostomy functional.         Objective     Objective:     /97 (BP Location: Left leg, Patient Position: Lying)   Pulse 106   Temp 98.3 °F (36.8 °C) (Oral)   Resp 18   Ht 193 cm (76\")   Wt (!) 165 kg (364 lb)   SpO2 96%   BMI 44.31 kg/m²     Intake/Output Summary (Last 24 hours) at 2/9/2020 0821  Last data filed at 2/8/2020 1700  Gross per 24 hour   Intake 1595 ml   Output --   Net 1595 ml       CV:  Rhythm  regular and rate regular   L:  Clear  to auscultation bilaterally   Abd:  Bowel sounds positive , soft, minimally tender. Dressings c/d/i, Ostomy viable with stool output.  Ext:  No cyanosis, clubbing, edema    Recent labs that are back at this time have been reviewed.        Assessment/Plan     Assessment/ Plan:    Hospital Problem List     * (Principal) Diverticulitis - Doing well after [facility]. OK to go home from my standpoint with home health for dressing changes, supplies and ostomy supplies. RTC with me in 2 weeks.      Chronic congestive heart failure. 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    Morbidly obese (CMS/HCC)        Peritonitis (CMS/HCC)    Perforated diverticulum of sigmoid colon    S/P exploratory laparotomy with sigmoid colectomy/end colostomy/enterotomy repair 1/31/20    Active Tobacco use              Taye Valenzuela MD  2/9/2020  8:21 AM      "

## 2020-02-09 NOTE — PLAN OF CARE
Problem: Patient Care Overview  Goal: Plan of Care Review  Outcome: Ongoing (interventions implemented as appropriate)  Flowsheets  Taken 2/9/2020 0139 by Thea Delgado, RN  Progress: improving  Taken 2/9/2020 0000 by Thea Delgado, RN  Plan of Care Reviewed With: patient  Taken 2/7/2020 0411 by Domingo Keenan RN  Outcome Summary: PT rested throughout the night.  C/O pain relieved with prn pain meds.  PT denies any other concerns or complaints at this time.

## 2020-02-09 NOTE — NURSING NOTE
2030 pt wishes to wait on his evening medications as they make him a little sleepy.  He requests to call out when he is ready for medications

## 2020-02-10 VITALS
TEMPERATURE: 98.3 F | DIASTOLIC BLOOD PRESSURE: 85 MMHG | RESPIRATION RATE: 19 BRPM | SYSTOLIC BLOOD PRESSURE: 174 MMHG | HEART RATE: 75 BPM | BODY MASS INDEX: 38.36 KG/M2 | HEIGHT: 76 IN | WEIGHT: 315 LBS | OXYGEN SATURATION: 97 %

## 2020-02-10 PROBLEM — K57.92 DIVERTICULITIS: Status: RESOLVED | Noted: 2020-01-28 | Resolved: 2020-02-10

## 2020-02-10 PROBLEM — K65.9 PERITONITIS: Status: RESOLVED | Noted: 2020-01-30 | Resolved: 2020-02-10

## 2020-02-10 LAB
INR PPP: 2.46 (ref 0.85–1.16)
PROTHROMBIN TIME: 25.7 SECONDS (ref 11.2–14.3)

## 2020-02-10 PROCEDURE — 99239 HOSP IP/OBS DSCHRG MGMT >30: CPT | Performed by: PHYSICIAN ASSISTANT

## 2020-02-10 PROCEDURE — 25010000002 HYDROMORPHONE PER 4 MG: Performed by: SURGERY

## 2020-02-10 PROCEDURE — 85610 PROTHROMBIN TIME: CPT | Performed by: SURGERY

## 2020-02-10 PROCEDURE — 25010000002 PROMETHAZINE PER 50 MG: Performed by: SURGERY

## 2020-02-10 RX ORDER — ACETAMINOPHEN 325 MG/1
650 TABLET ORAL EVERY 4 HOURS PRN
Start: 2020-02-10 | End: 2022-05-19

## 2020-02-10 RX ORDER — OXYCODONE AND ACETAMINOPHEN 7.5; 325 MG/1; MG/1
TABLET ORAL
Qty: 18 TABLET | Refills: 0 | Status: SHIPPED | OUTPATIENT
Start: 2020-02-10 | End: 2020-02-14 | Stop reason: SDUPTHER

## 2020-02-10 RX ORDER — WARFARIN SODIUM 5 MG/1
10 TABLET ORAL NIGHTLY
Qty: 70 TABLET | Refills: 5
Start: 2020-02-10 | End: 2020-08-04

## 2020-02-10 RX ORDER — LISINOPRIL 10 MG/1
5 TABLET ORAL DAILY
Qty: 90 TABLET | Refills: 3
Start: 2020-02-10 | End: 2020-11-09

## 2020-02-10 RX ORDER — PANTOPRAZOLE SODIUM 40 MG/1
40 TABLET, DELAYED RELEASE ORAL
Qty: 30 TABLET | Refills: 0 | Status: SHIPPED | OUTPATIENT
Start: 2020-02-10 | End: 2020-03-05 | Stop reason: SDUPTHER

## 2020-02-10 RX ORDER — PSEUDOEPHEDRINE HCL 30 MG
100 TABLET ORAL 2 TIMES DAILY
Qty: 60 EACH | Refills: 0 | Status: SHIPPED | OUTPATIENT
Start: 2020-02-10 | End: 2020-07-16

## 2020-02-10 RX ORDER — HYDROXYZINE PAMOATE 25 MG/1
25 CAPSULE ORAL 3 TIMES DAILY PRN
Qty: 90 CAPSULE | Refills: 0 | Status: SHIPPED | OUTPATIENT
Start: 2020-02-10 | End: 2022-05-19

## 2020-02-10 RX ADMIN — OXYCODONE HYDROCHLORIDE AND ACETAMINOPHEN 2 TABLET: 5; 325 TABLET ORAL at 05:04

## 2020-02-10 RX ADMIN — HYDROMORPHONE HYDROCHLORIDE 0.2 MG: 1 INJECTION, SOLUTION INTRAMUSCULAR; INTRAVENOUS; SUBCUTANEOUS at 04:48

## 2020-02-10 RX ADMIN — BUPROPION HYDROCHLORIDE 75 MG: 75 TABLET, FILM COATED ORAL at 11:53

## 2020-02-10 RX ADMIN — SODIUM CHLORIDE, PRESERVATIVE FREE 10 ML: 5 INJECTION INTRAVENOUS at 08:19

## 2020-02-10 RX ADMIN — DULOXETINE 30 MG: 30 CAPSULE, DELAYED RELEASE ORAL at 08:19

## 2020-02-10 RX ADMIN — HYDROCODONE BITARTRATE AND ACETAMINOPHEN 1 TABLET: 7.5; 325 TABLET ORAL at 08:19

## 2020-02-10 RX ADMIN — OXYCODONE HYDROCHLORIDE AND ACETAMINOPHEN 2 TABLET: 5; 325 TABLET ORAL at 11:56

## 2020-02-10 RX ADMIN — SODIUM CHLORIDE, PRESERVATIVE FREE 10 ML: 5 INJECTION INTRAVENOUS at 08:20

## 2020-02-10 RX ADMIN — PROMETHAZINE HYDROCHLORIDE 12.5 MG: 25 INJECTION INTRAMUSCULAR; INTRAVENOUS at 04:48

## 2020-02-10 RX ADMIN — POLYETHYLENE GLYCOL 3350 17 G: 17 POWDER, FOR SOLUTION ORAL at 11:15

## 2020-02-10 RX ADMIN — BUPROPION HYDROCHLORIDE 75 MG: 75 TABLET, FILM COATED ORAL at 04:48

## 2020-02-10 RX ADMIN — PANTOPRAZOLE SODIUM 40 MG: 40 TABLET, DELAYED RELEASE ORAL at 04:48

## 2020-02-10 RX ADMIN — DOCUSATE SODIUM 100 MG: 100 CAPSULE, LIQUID FILLED ORAL at 08:19

## 2020-02-10 NOTE — PAYOR COMM NOTE
"Ref# [id number]  Laurel Andrade RN, BSN  Phone # [phone]  Fax # [phone]  Brigida Rodriguez (40 y.o. Male)     Date of Birth Social Security Number Address Home Phone MRN    1980  [id number] [facility] [address] [phone] [id number]    Christianity Marital Status          None Single       Admission Date Admission Type Admitting Provider Attending Provider Department, Room/Bed    1/28/20 Emergency Flori Mercado MD  [facility] 6B, N633/1    Discharge Date Discharge Disposition Discharge Destination        2/10/2020 Home or Self Care              Attending Provider:  (none)   Allergies:  No Known Allergies    Isolation:  None   Infection:  None   Code Status:  CPR    Ht:  193 cm (76\")   Wt:  160 kg (353 lb 3.2 oz)    Admission Cmt:  None   Principal Problem:  Diverticulitis [K57.92]                 Active Insurance as of 1/28/2020     Primary Coverage     Payor Plan Insurance Group Employer/Plan Group    [employer] [place] [employer] [place]      Payor Plan Address Payor Plan Phone Number Payor Plan Fax Number Effective Dates    [address] [address] [phone]  5/2/2019 - None Entered    [facility] [address]       Subscriber Name Subscriber Birth Date Member ID       BRIGIDA RODRIGUEZ 1980 [id number]                 Discharge Order (From admission, onward)     Start     Ordered    02/10/20 1211  Discharge patient  Once     Expected Discharge Date:  02/10/20    Expected Discharge Time:  Midday    Discharge Disposition:  Home or Self Care    Physician of Record for Attribution - Please select from Treatment Team:  EDDIE POE [[id number]]    Review needed by CMO to determine Physician of Record:  No       Question Answer Comment   Physician of Record for Attribution - Please select from Treatment Team EDDIE POE    Review needed by CMO to determine Physician of Record No        02/10/20 1221                                                           Marchik, " Fern MORALES PA-C   Physician Assistant   Hospitalist          Discharge Summary   Incomplete        Date of Service:  02/10/20 1245   Creation Time:  02/10/20 1245                             Incomplete                                     Expand widget buttonCollapse widget button        Show:Clear all      ManualTemplateCopied    Added by:          Fern Del Rio PA-C Hover for detailscustomization button                                                                                                                                                                                                                          untitled image         [facility] Medicine Services    DISCHARGE SUMMARY         Patient Name: Brigida Rodriguez    : 1980    MRN: [id number]         Date of Admission: 2020 12:22 AM    Date of Discharge: 2/10/2020    Primary Care Physician: Elias Tom DO                   Consults                    Date and Time       Order Name       Status       Description               2020 192     Inpatient General Surgery Consult     Completed                                Hospital Course            Presenting Problem:     Diverticulitis [K57.92]                 Active Hospital Problems             Diagnosis           POA       •     S/P exploratory laparotomy with sigmoid colectomy/end colostomy/enterotomy repair 20 [Z98.890]           Not Applicable       •     Active Tobacco use [Z72.0]           Yes       •     Perforated diverticulum of sigmoid colon [K57.80]           No       •     Morbidly obese (CMS/HCC) [E66.01]           Yes       •     Recurrent major depressive disorder (CMS/HCC) [F33.9]           Yes       •     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 Data deficit (CMS/[facility]) [I50.9]           Yes               Resolved Hospital Problems             Diagnosis     Date Resolved     POA       •     **Diverticulitis [K57.92]     02/10/2020     Yes       •     Peritonitis (CMS/[facility]) [K65.9]     02/10/2020     No            Hospital Course:    Brigida Rodriguez is a 40 y.o. male with PMH significant for HTN, tobacco abuse, anxiety/depression, prior PE s/p IVC filter on chronic Coumadin and diverticulosis. He presented to [facility] ED on 1/28/20 with complaints of progressively worsening abdominal pain x 3 days. Labs were unremarkable. CT abdomen/pelvis showed acute sigmoid diverticulitis without evidence of abscess. He was admitted to the hospital medicine service. He was started on IV Flagyl/Levaquin and started on IV fluids.          Due to worsening abdominal pain, a CT abdomen/pelvis was repeated on 1/29 and revealed new bowel perforation. General surgery was consulted and Dr. Valenzuela followed the patient. Anticoagulation was reversed and her performed exploratory laparotomy, sigmoid colectomy with end colostomy and enterotomy repair. He tolerated the procedure well and has had no postoperative complications.                                        Discharge Follow Up Recommendations for outpatient labs/diagnostics:                  Day of Discharge            HPI:                Review of Systems               Vital Signs:     Temp:  [97.3 °F (36.3 °C)-98.3 °F (36.8 °C)] 98.3 °F (36.8 °C)    Heart Rate:  [] 75    Resp:  [18-19] 19    BP: (151-174)/(85-94) 174/85          Physical Exam:                 Pertinent  and/or Most Recent Results                         Results from last 7 days       Lab     Units     02/09/20    0706     02/08/20    0538     02/07/20    0826     02/06/20    0523     02/05/20    0653     02/04/20    0723     02/04/20    0058       WBC     10*3/mm3     7.64     7.92     9.14      8.59     8.60     7.04      --        HEMOGLOBIN     g/dL     12.5*     12.3*     12.5*     13.2     12.4*     11.9*      --        HEMATOCRIT     %     37.1*     37.1*     37.7     38.9     37.4*     36.3*      --        PLATELETS     10*3/mm3     295     288     300     292     280     202      --        SODIUM     mmol/L     134*     134*     135*     137     139     138      --        POTASSIUM     mmol/L     4.1     3.8     4.0     4.2     4.2     4.2     4.7       CHLORIDE     mmol/L     100     100     102     99     102     101      --        CO2     mmol/L     23.0     20.0*     22.0     24.0     26.0     24.0      --        BUN     mg/dL     8     7     6     7     7     9      --        CREATININE     mg/dL     0.95     1.04     1.12     0.97     0.79     0.89      --        GLUCOSE     mg/dL     116*     106*     100*     94     102*     86      --        CALCIUM     mg/dL     9.0     9.0     9.4     9.2     9.1     8.7      --                           Results from last 7 days       Lab     Units     02/10/20    0702     02/09/20    0706     02/08/20    0538     02/07/20    0826     02/06/20    0523     02/05/20    0653     02/04/20    0848     02/04/20    0723       BILIRUBIN     mg/dL      --       --      1.1     1.2     1.4*      --       --      1.8*       ALK PHOS     U/L      --       --      88     86     65      --       --      45       ALT (SGPT)     U/L      --       --      77*     86*     76*      --       --      22       AST (SGOT)     U/L      --       --      44*     58*     70*      --       --      28       PROTIME     Seconds     25.7*     25.2*     23.5*     22.6*     23.7*     20.8*     17.9*      --        INR           2.46*     2.40*     2.20*     2.10*     2.22*     1.88*     1.55*      --                        Invalid input(s): TG, LDLCALC, LDLREALC                                       Brief Urine Lab Results  (Last result in the past 365 days)                            Color               Clarity               Blood               Leuk Est               Nitrite               Protein               CREAT               Urine HCG                               01/28/20 0250     Yellow     Clear     TraceAbnormal     Negative     Negative     Negative                                                                 Microbiology Results Abnormal                None                                      Imaging Results (All)                    Procedure       Component       Value       Units       Date/Time               XR Chest 1 View [[id number]]     Collected:  02/03/20 0842                   Updated:  02/03/20 1822             Narrative:                EXAMINATION: XR CHEST 1 VW-          INDICATION: Postop perforated sigmoid diverticulum with exploratory lap,    sigmoid colectomy, and colostomy.; R10.9-Unspecified abdominal pain;    K57.32-Diverticulitis of large intestine without perforation or abscess    without bleeding; K57.92-Diverticulitis of intestine, part unspecified,    without perforation or abscess without bleeding.          COMPARISON: 01/28/2020.         FINDINGS: Portable chest reveals mild increased markings seen at the    lung bases bilaterally with small bilateral pleural effusions.    Nasogastric tube placed with tip below the level of the diaphragm. The    upper lung fields are clear.                      Impression:                Mild increased markings seen at the lung bases bilaterally    with small bilateral pleural effusions and nasogastric tube below the    level of the diaphragm. Remainder of the chest is unremarkable.         D:  02/03/2020    E:  02/03/2020         This report was finalized on 2/3/2020 6:19 PM by Dr. Diana Brown MD.                  CT Abdomen Pelvis With & Without Contrast [[id number]]     Collected:  01/29/20 1825                   Updated:  01/29/20 1849             Narrative:                EXAMINATION: CT ABDOMEN/PELVIS  WWO CONTRAST - 01/29/2020         INDICATION: R10.9-Unspecified abdominal pain; K57.32-Diverticulitis of    large intestine without perforation or abscess without bleeding.         TECHNIQUE: CT scan of the abdomen and pelvis was performed prior to and    following intravenous contrast.         The radiation dose reduction device was turned on for each scan per the    ALARA (As Low as Reasonably Achievable) protocol.         COMPARISON: 01/28/2020.         FINDINGS: There is bibasilar atelectasis. There is a small amount of    ascites. The liver and spleen are normal. The stomach is fluid-filled    and distended. There is no pancreatic or adrenal mass. There is no renal    mass or obstruction. There are numerous small bowel loops which are    distended, fluid filled and showing air-fluid levels. In contrast, the    large intestine is decompressed. There is no pelvic mass or fluid. There    is persistent sigmoid diverticulitis. There is no pelvic or inguinal    lymphadenopathy. There are extensive bilateral subcutaneous venous    varicosities. Inferior vena cava filter is noted. The inferior vena cava    is markedly narrowed just above the filter.                  Impression:                When compared to the examination of the previous day, small    bowel loops are more distended with air-fluid levels and the large    intestine is decompressed. There are again findings of acute sigmoid    diverticulitis. These findings are worrisome for mechanical small bowel    obstruction. There is small amount of ascites which is also a new    finding.         DICTATED:   01/29/2020    EDITED/ls :   01/29/2020               This report was finalized on 1/29/2020 6:46 PM by Dr. Michael Balbuena MD.                  CT Angiogram Abdomen Pelvis [[id number]]     Collected:  01/28/20 0215                   Updated:  01/28/20 0217             Narrative:                CTA Abdomen Pelvis         INDICATION:     Severe upper abdominal  pain         TECHNIQUE:     CT angiogram of the abdomen and pelvis with 100 cc of Isovue-300 IV contrast. 3-D reconstructions were obtained and reviewed. Radiation dose reduction techniques included automated exposure control or exposure modulation based on body size. Count of    known CT and cardiac nuc med studies performed in previous 12 months: 1.          COMPARISON:     None available.         FINDINGS:     AORTA: The aorta is normal. No evidence of dissection or atherosclerotic disease. No evidence of aneurysm. Major mesenteric vessels are widely patent. No evidence of mesenteric ischemia. The renal arteries are widely patent. There is an inferior vena    cava filter. The IVC above the filter is patent. The renal veins are patent. The lower inferior vena cava shows no contrast enhancement consistent with chronic occlusion. There are multiple collaterals subcutaneously. The iliac vessels are widely patent.                  Impression:                No evidence of abdominal aortic occlusive disease. 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 The cava above the filter and the renal veins are patent.         Signer Name: Olman Boucher MD     Signed: 1/28/2020 2:15 AM     Workstation Name: [id number]      [facility]             CT Abdomen Pelvis Without Contrast [[id number]]     Collected:  01/28/20 0122                   Updated:  01/28/20 0124             Narrative:                CT Abdomen Pelvis WO         INDICATION:     Diffuse abdominal pain on arrival         TECHNIQUE:     CT of the abdomen and pelvis without IV contrast. Coronal and sagittal reconstructions were obtained.  Radiation dose reduction techniques included automated exposure control or exposure modulation based on body size. Count of known CT and cardiac nuc    med studies performed in previous 12 months: 0.          COMPARISON:     None available.         FINDINGS:    Abdomen:  The liver, spleen and pancreas are normal in size. No evidence of kidney stone disease. No adrenal masses. No retroperitoneal adenopathy. There is an IVC filter in the inferior vena cava is collapsed above the filter and there are numerous    venous collaterals over the abdominal wall suggesting chronic occlusion of the IVC. No distended bowel loops are seen.         Pelvis: Inflammatory changes are seen in the sigmoid colon consistent with acute diverticulitis. No evidence of abscess. No fluid collections. No adenopathy.                  Impression:                Acute sigmoid diverticulitis without evidence of an abscess. 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                        Signer Name: Olman Boucher MD     Signed: 1/28/2020 1:22 AM     Workstation Name: [facility]      [facility]             XR Chest 1 View [[id number]]     Collected:  01/28/20 0056                   Updated:  01/28/20 0058             Narrative:                CR Chest 1 Vw         INDICATION:     Upper abdominal pain prior to arrival          COMPARISON:      None available.         FINDINGS:    Single portable AP view(s) of the chest.  The heart and mediastinal contours are normal. The lungs are clear. No pneumothorax or pleural effusion.                  Impression:                No acute cardiopulmonary findings.         Signer Name: Olman Boucher MD     Signed: 1/28/2020 12:56 AM     Workstation Name: [facility]      [facility]                                                      Results for orders placed during the hospital encounter of 01/28/20       Adult Transthoracic Echo Complete W/ Cont if Necessary Per Protocol             Narrative     · Mild mitral valve regurgitation is present.    · Mild tricuspid valve regurgitation is present.    · Estimated EF = 65%.    · Left ventricular systolic function is normal.    · Normal right ventricular cavity size, wall  thickness, systolic function     and septal motion noted.    · Left ventricular diastolic function is normal.    · No evidence of pulmonary hypertension is present.    · There is no evidence of pericardial effusion.    · No significant structural valvular abnormality demonstrated.                      Plan for Follow-up of Pending Labs/Results:             Discharge Details                           Discharge Medications                      New Medications                    Instructions       Start Date         acetaminophen 325 MG tablet    Commonly known as:  TYLENOL          650 mg, Oral, Every 4 Hours PRN                  docusate sodium 100 MG capsule          100 mg, Oral, 2 Times Daily                  oxyCODONE-acetaminophen 7.5-325 MG per tablet    Commonly known as:  PERCOCET          Take one tablet by mouth every 4-6 hours as needed for moderate or severe pain                  pantoprazole 40 MG EC tablet    Commonly known as:  PROTONIX          40 mg, Oral, Every Morning Before Breakfast                                          Changes to Medications                    Instructions       Start Date         hydrOXYzine pamoate 25 MG capsule    Commonly known as:  VISTARIL    What changed:  reasons to take this          25 mg, Oral, 3 Times Daily PRN                  lisinopril 10 MG tablet    Commonly known as:  PRINIVIL,ZESTRIL    What changed:  how much to take          5 mg, Oral, Daily                  warfarin 5 MG tablet    Commonly known as:  COUMADIN    What changed:      •how much to take      •how to take this      •when to take this      •additional instructions            10 mg, Oral, Nightly                                          Continue These Medications                    Instructions       Start Date         acyclovir 400 MG tablet    Commonly known as:  ZOVIRAX          400 mg, Oral, Daily PRN, Take no more than 5 doses a day.                  buPROPion  MG 12 hr  tablet    Commonly known as:  WELLBUTRIN SR          150 mg, Oral, 2 Times Daily                  carvedilol 6.25 MG tablet    Commonly known as:  COREG          6.25 mg, Oral, 2 Times Daily With Meals                  DULoxetine 30 MG capsule    Commonly known as:  CYMBALTA          TAKE ONE CAPSULE BY MOUTH DAILY                  traZODone 50 MG tablet    Commonly known as:  DESYREL          50 mg, Oral, Nightly                                Stop These Medications          enoxaparin 150 MG/ML injection    Commonly known as:  LOVENOX            polyethylene glycol 236 g solution    Commonly known as:  GoLYTELY                                No Known Allergies              Discharge Disposition:    Home or Self Care         Diet:    Hospital:          Diet Order       Procedures       •     Diet Regular; Cardiac                 Activity:          Activity Instructions                Other Activity Instructions               No lifting more than 5 lbs until cleared by your surgeon (usually 4-6 weeks)     Do not scrub, soak or submerge your incisions     Contact your surgeon if you develop increased pain, redness, swelling or purulent/foul-smelling discharge                           Restrictions or Other Recommendations:                 CODE STATUS:            Code Status and Medical Interventions:       Ordered at: 01/28/20 0410         Level Of Support Discussed With:             Patient         Code Status:             CPR         Medical Interventions (Level of Support Prior to Arrest):             Full                 No future appointments.                Additional Instructions for the Follow-ups that You Need to Schedule                Discharge Follow-up with PCP       As directed                   Currently Documented PCP:      Elias Tom DO      PCP Phone Number:      [phone]                  Follow Up Details:  PCP in 1 week                         Discharge Follow-up with Specified  Provider: Anticoagulation clinic before end of the week for INR check (Coumadin dose changed this admission)       As directed                      To:  Anticoagulation clinic before end of the week for INR check (Coumadin dose changed this admission)                         Discharge Follow-up with Specified Provider: Dr. Valenzuela in 2 weeks       As directed                      To:  Dr. Valenzuela in 2 weeks                                       Time Spent on Discharge:     minutes         Electronically signed by Fern Del Rio PA-C, 02/10/20, 12:45 PM.                                              ED to Hosp-Admission (Discharged) on 1/28/2020                                Detailed Report

## 2020-02-10 NOTE — PLAN OF CARE
Problem: Patient Care Overview  Goal: Plan of Care Review  Outcome: Ongoing (interventions implemented as appropriate)  Patient has been sitting up in chair and continues to take percocet and norcos due to abdominal pain, wants to be premedicated for abdominal dressing changed in the morning.  Expresses concerns about changing the dressing when he gets discharged,  wound care saw him today but may need more education prior to discharge.

## 2020-02-10 NOTE — PROGRESS NOTES
Case Management Discharge Note      Final Note: Spoke with patient at bedside regarding discharge plan.  Patient father at bedside to transport patient home.  Discussed wiht patient that HH would not be able to come out every day, patient agreeable and has family who can be there for instruction.  Per RN, patient has participated in ostomy dressing changes as well as wound care and patient has been sent with supplies.  Patient still wants [facility].  No other discharge needs verbalized.  Called [facility] and spoke to Christophe who accepted referral.  Patient plan is to discharge home with [facility] today via car with family to transport           Destination      No service has been selected for the patient.      Durable Medical Equipment      No service has been selected for the patient.      Dialysis/Infusion      No service has been selected for the patient.      Home Medical Care - Selection Complete      Service Provider Request Status Selected Services Address Phone Number Fax Number    [facility] Health [facility] [address] [facility] [address] [phone]      Therapy      No service has been selected for the patient.      Community Resources      No service has been selected for the patient.             Final Discharge Disposition Code: 06 - home with home health care

## 2020-02-10 NOTE — PROGRESS NOTES
"Patient Name:  Brigida Rodriguez  YOB: 1980  [id number]    Surgery Progress Note    Date of visit: 2/10/2020    Subjective   Subjective: Feeling OK, concerned about dressing and ostomy changes.         Objective     Objective:     /86 (BP Location: Left leg, Patient Position: Lying)   Pulse 78   Temp 98 °F (36.7 °C) (Oral)   Resp 18   Ht 193 cm (76\")   Wt (!) 160 kg (353 lb 3.2 oz)   SpO2 97%   BMI 42.99 kg/m²     Intake/Output Summary (Last 24 hours) at 2/10/2020 0649  Last data filed at 2/9/2020 2256  Gross per 24 hour   Intake 50 ml   Output 25 ml   Net 25 ml       CV:  Rhythm  regular and rate regular   L:  Clear  to auscultation bilaterally   Abd:  Bowel sounds positive , soft, nontender. Wound c/d/i, Ostomy viable with stool output.  Ext:  No cyanosis, clubbing, edema    Recent labs that are back at this time have been reviewed.        Assessment/Plan     Assessment/ Plan:    Hospital Problem List     * (Principal) Diverticulitis - Doing well. OK for discharge from my perspective once home health arranged and teaching completed. RTC with me in 2 weeks.      Chronic congestive heart failure. 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

## 2020-02-10 NOTE — PROGRESS NOTES
"                  Clinical Nutrition     Nutrition Assessment  Reason for Visit:   Follow-up protocol      Patient Name: Brigida Rodriguez  YOB: 1980  MRN: [id number]  Date of Encounter: 02/10/20 9:52 AM  Admission date: 1/28/2020    Nutrition Assessment     Admission Diagnosis  Perforated diverticulum of sigmoid colon    Additional applicable diagnosis, conditions, procedures  Perforated diverticulum of sigmoid colon  (1/31) S/p S/P exploratory laparotomy with sigmoid colectomy/end colostomy/enterotomy repair   Peritonitis (CMS/HCC)  Ileus-improved  NGT to LWS-removed  Elevated LFTs      Applicable PMH/ PSxH  Chronic congestive heart failure. 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 Dr. Valenzuela noted today-OK for discharge from my perspective once home health arranged and teaching completed. RD notes pt providing preferences for PO intake via dietary messages.     Anthropometrics     Height: 193 cm (76\")  Last filed wt: Weight: (!) 160 kg (353 lb 3.2 oz) (02/10/20 0616)  Weight Method: Bed scale    BMI: BMI (Calculated): 45.4  Obese Class III extreme obesity: > or equal to 40kg/m2    Ideal Body Weight (IBW) (kg): 93.24    Last 15 Recorded Weights   Weight Weight (kg) Weight (lbs) Weight Method   2/4/2020 169.282 kg 373 lb 3.2 oz Bed scale   2/3/2020 169.282 kg 373 lb 3.2 oz Bed scale   2/2/2020 165.563 kg 365 lb -   2/1/2020 165.9 kg 365 lb 11.9 oz Bed scale   1/30/2020 170.099 kg 375 lb Stated   1/28/2020 170.099 kg 375 lb -   1/28/2020 170.099 kg 375 lb Stated   11/13/2019 173.274 kg 382 lb -   11/6/2019 173.444 kg 382 lb 6 oz -   11/4/2019 172.367 kg 380 lb - "   10/7/2019 173.183 kg 381 lb 12.8 oz -   9/4/2019 171.46 kg 378 lb -   8/20/2019 172.73 kg 380 lb 12.8 oz -       Labs reviewed     Results from last 7 days   Lab Units 02/09/20  0706 02/08/20  0538   SODIUM mmol/L 134* 134*   POTASSIUM mmol/L 4.1 3.8   CHLORIDE mmol/L 100 100   CO2 mmol/L 23.0 20.0*   BUN mg/dL 8 7   CREATININE mg/dL 0.95 1.04   CALCIUM mg/dL 9.0 9.0   BILIRUBIN mg/dL  --  1.1   ALK PHOS U/L  --  88   ALT (SGPT) U/L  --  77*   AST (SGOT) U/L  --  44*   GLUCOSE mg/dL 116* 106*           Lab Results   Lab Value Date/Time    HGBA1C 5.60 01/31/2020 0541       Medications reviewed     GTT: D5+NaCl@100mL/hr     Other:  Dulcolax, colace, Reglan, relistor, Protonix, abx, coumadin     Current Nutrition Prescription     PO: Diet Regular; Cardiac    Nutrition Supplement:   Boost Breeze (Clear Liquid) BID  Boost Plus BID    Intake:  70%% x 6 meals     Nutrition Diagnosis     2/3, revised 2/5, 2/7, 2/10  Problem Inadequate oral intake   Etiology Clinical condition; Altered GI function/GI status/poor appetite    Signs/Symptoms Slow GI progress post GI surgery/NPO/Clear liquids 7 days, now on full liquids with inadequate PO intake      Status: improving     Nutrition Intervention     1.  Will continue to send ONS.     Goal:     General: Nutrition to support treatment   PO: Continue positive trend     Monitoring/Evaluation:     PO intake       Will Continue to follow per protocol      Deepa Taylor, MYLES, LD, CNSC  Time Spent: 30min

## 2020-02-10 NOTE — DISCHARGE SUMMARY
[facility] Medicine Services  DISCHARGE SUMMARY    Patient Name: Brigida Rodriguez  : 1980  MRN: [id number]    Date of Admission: 2020 12:22 AM  Date of Discharge: 2/10/2020  Primary Care Physician: Elias Tom DO    Consults     Date and Time Order Name Status Description    2020 1920 Inpatient General Surgery Consult Completed         Hospital Course     Presenting Problem:   Diverticulitis [K57.92]    Active Hospital Problems    Diagnosis  POA   • S/P exploratory laparotomy with sigmoid colectomy/end colostomy/enterotomy repair 20 [Z98.890]  Not Applicable   • Active Tobacco use [Z72.0]  Yes   • Perforated diverticulum of sigmoid colon [K57.80]  No   • Morbidly obese (CMS/[facility]) [E66.01]  Yes   • Recurrent major depressive disorder (CMS/[facility]) [F33.9]  Yes   • 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 Data deficit (CMS/[facility]) [I50.9]  Yes      Resolved Hospital Problems    Diagnosis Date Resolved POA   • **Diverticulitis [K57.92] 02/10/2020 Yes   • Peritonitis (CMS/[facility]) [K65.9] 02/10/2020 No      Hospital Course:  Brigida Rodriguez is a 40 y.o. male with PMH significant for HTN, tobacco abuse, anxiety/depression, prior PE s/p IVC filter on chronic Coumadin and diverticulosis. He presented to [facility] ED on 20 with complaints of progressively worsening abdominal pain x 3 days. Labs were unremarkable. CT abdomen/pelvis showed acute sigmoid diverticulitis without evidence of abscess. He was admitted to the hospital medicine service. He was started on IV Flagyl/Levaquin and started on IV fluids.     Due to worsening abdominal pain, a CT abdomen/pelvis was repeated on  and revealed new bowel perforation. General surgery was consulted and Dr. Valenzuela followed the patient.  Anticoagulation was reversed and her performed exploratory laparotomy, sigmoid colectomy with end colostomy and enterotomy repair. He transferred to the ICU postoperatively.     Anticoagulation was resumed on 2/3 with heparin gtt. He has since been restarted on Coumadin. INR is therapeutic on day of discharge. Home Coumadin dose has been decreased to 10mg PO daily - patient reports he intends quit smoking long-term.     NGT discontinued on 2/4. Diet has been slowly advanced. He is tolerating a regular diet on day of discharge.   WOC team followed the patient during his hospitalization for wound and ostomy care. At discharge, he will continue BID wet-to-dry dressing changes.     Mr. Rodriguez is improved and stable for discharge. He will return home on 2/10/20    Follow up with [facility] Anticoagulation clinic before end of week  Follow up with PCP in 1 week  Follow up with Dr. Valenzuela in 2 weeks    Day of Discharge     HPI:   Laying in bed. Pain is controlled. Intermittent nausea, no vomiting. Ostomy functioning well. He is feeling more comfortable with wound and ostomy care and feels ready to return home today.     Review of Systems  Gen- No fevers, chills  CV- No chest pain, palpitations  Resp- No cough, dyspnea  GI- as above     Vital Signs:   Temp:  [97.3 °F (36.3 °C)-98.3 °F (36.8 °C)] 98.3 °F (36.8 °C)  Heart Rate:  [] 75  Resp:  [18-19] 19  BP: (151-174)/(85-94) 174/85     Physical Exam:  Constitutional: No acute distress, awake, alert  HENT: NCAT, mucous membranes moist  Respiratory: Clear to auscultation bilaterally, respiratory effort normal   Cardiovascular: RRR, no murmurs, rubs, or gallops, palpable pedal pulses bilaterally  Gastrointestinal: Positive bowel sounds, soft, appropriately tender. Obese abdomen. Midline abdominal incision is dressed - did not remove dressing for exam. Inferior abdominal wound is packed - packing not removed for exam.   Musculoskeletal: No bilateral ankle edema  Psychiatric:  Appropriate affect, cooperative  Neurologic: Oriented x 3, strength symmetric in all extremities, Cranial Nerves grossly intact to confrontation, speech clear  Skin: No rashes    Pertinent  and/or Most Recent Results     Results from last 7 days   Lab Units 02/09/20  0706 02/08/20  0538 02/07/20  0826 02/06/20  0523 02/05/20  0653 02/04/20  0723 02/04/20  0058   WBC 10*3/mm3 7.64 7.92 9.14 8.59 8.60 7.04  --    HEMOGLOBIN g/dL 12.5* 12.3* 12.5* 13.2 12.4* 11.9*  --    HEMATOCRIT % 37.1* 37.1* 37.7 38.9 37.4* 36.3*  --    PLATELETS 10*3/mm3 295 288 300 292 280 202  --    SODIUM mmol/L 134* 134* 135* 137 139 138  --    POTASSIUM mmol/L 4.1 3.8 4.0 4.2 4.2 4.2 4.7   CHLORIDE mmol/L 100 100 102 99 102 101  --    CO2 mmol/L 23.0 20.0* 22.0 24.0 26.0 24.0  --    BUN mg/dL 8 7 6 7 7 9  --    CREATININE mg/dL 0.95 1.04 1.12 0.97 0.79 0.89  --    GLUCOSE mg/dL 116* 106* 100* 94 102* 86  --    CALCIUM mg/dL 9.0 9.0 9.4 9.2 9.1 8.7  --      Results from last 7 days   Lab Units 02/10/20  0702 02/09/20  0706 02/08/20  0538 02/07/20  0826 02/06/20  0523 02/05/20  0653 02/04/20  0848 02/04/20  0723   BILIRUBIN mg/dL  --   --  1.1 1.2 1.4*  --   --  1.8*   ALK PHOS U/L  --   --  88 86 65  --   --  45   ALT (SGPT) U/L  --   --  77* 86* 76*  --   --  22   AST (SGOT) U/L  --   --  44* 58* 70*  --   --  28   PROTIME Seconds 25.7* 25.2* 23.5* 22.6* 23.7* 20.8* 17.9*  --    INR  2.46* 2.40* 2.20* 2.10* 2.22* 1.88* 1.55*  --      Brief Urine Lab Results  (Last result in the past 365 days)      Color   Clarity   Blood   Leuk Est   Nitrite   Protein   CREAT   Urine HCG        01/28/20 0250 Yellow Clear Trace Negative Negative Negative             Microbiology Results Abnormal     None        Imaging Results (All)     Procedure Component Value Units Date/Time    XR Chest 1 View [[id number]] Collected:  02/03/20 0842     Updated:  02/03/20 1822    Narrative:       EXAMINATION: XR CHEST 1 VW-      INDICATION: Postop perforated sigmoid  diverticulum with exploratory lap,  sigmoid colectomy, and colostomy.; R10.9-Unspecified abdominal pain;  K57.32-Diverticulitis of large intestine without perforation or abscess  without bleeding; K57.92-Diverticulitis of intestine, part unspecified,  without perforation or abscess without bleeding.      COMPARISON: 01/28/2020.     FINDINGS: Portable chest reveals mild increased markings seen at the  lung bases bilaterally with small bilateral pleural effusions.  Nasogastric tube placed with tip below the level of the diaphragm. The  upper lung fields are clear.           Impression:       Mild increased markings seen at the lung bases bilaterally  with small bilateral pleural effusions and nasogastric tube below the  level of the diaphragm. Remainder of the chest is unremarkable.     D:  02/03/2020  E:  02/03/2020     This report was finalized on 2/3/2020 6:19 PM by Dr. Diana Brown MD.       CT Abdomen Pelvis With & Without Contrast [[id number]] Collected:  01/29/20 1825     Updated:  01/29/20 1849    Narrative:       EXAMINATION: CT ABDOMEN/PELVIS WWO CONTRAST - 01/29/2020     INDICATION: R10.9-Unspecified abdominal pain; K57.32-Diverticulitis of  large intestine without perforation or abscess without bleeding.     TECHNIQUE: CT scan of the abdomen and pelvis was performed prior to and  following intravenous contrast.     The radiation dose reduction device was turned on for each scan per the  ALARA (As Low as Reasonably Achievable) protocol.     COMPARISON: 01/28/2020.     FINDINGS: There is bibasilar atelectasis. There is a small amount of  ascites. The liver and spleen are normal. The stomach is fluid-filled  and distended. There is no pancreatic or adrenal mass. There is no renal  mass or obstruction. There are numerous small bowel loops which are  distended, fluid filled and showing air-fluid levels. In contrast, the  large intestine is decompressed. There is no pelvic mass or fluid. There  is  persistent sigmoid diverticulitis. There is no pelvic or inguinal  lymphadenopathy. There are extensive bilateral subcutaneous venous  varicosities. Inferior vena cava filter is noted. The inferior vena cava  is markedly narrowed just above the filter.       Impression:       When compared to the examination of the previous day, small  bowel loops are more distended with air-fluid levels and the large  intestine is decompressed. There are again findings of acute sigmoid  diverticulitis. These findings are worrisome for mechanical small bowel  obstruction. There is small amount of ascites which is also a new  finding.     DICTATED:   01/29/2020  EDITED/ls :   01/29/2020      This report was finalized on 1/29/2020 6:46 PM by Dr. Michael Balbuena MD.       CT Angiogram Abdomen Pelvis [[id number]] Collected:  01/28/20 0215     Updated:  01/28/20 0217    Narrative:       CTA Abdomen Pelvis    INDICATION:   Severe upper abdominal pain    TECHNIQUE:   CT angiogram of the abdomen and pelvis with 100 cc of Isovue-300 IV contrast. 3-D reconstructions were obtained and reviewed. Radiation dose reduction techniques included automated exposure control or exposure modulation based on body size. Count of  known CT and cardiac nuc med studies performed in previous 12 months: 1.     COMPARISON:   None available.    FINDINGS:   AORTA: The aorta is normal. No evidence of dissection or atherosclerotic disease. No evidence of aneurysm. Major mesenteric vessels are widely patent. No evidence of mesenteric ischemia. The renal arteries are widely patent. There is an inferior vena  cava filter. The IVC above the filter is patent. The renal veins are patent. The lower inferior vena cava shows no contrast enhancement consistent with chronic occlusion. There are multiple collaterals subcutaneously. The iliac vessels are widely patent.      Impression:       No evidence of abdominal aortic occlusive disease. 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 The cava above the filter and the renal veins are patent.    Signer Name: Olman Boucher MD   Signed: 1/28/2020 2:15 AM   Workstation Name: [facility]    Radiology [facility]      CT Abdomen Pelvis Without Contrast [[id number]] Collected:  01/28/20 0122     Updated:  01/28/20 0124    Narrative:       CT Abdomen Pelvis WO    INDICATION:   Diffuse abdominal pain on arrival    TECHNIQUE:   CT of the abdomen and pelvis without IV contrast. Coronal and sagittal reconstructions were obtained.  Radiation dose reduction techniques included automated exposure control or exposure modulation based on body size. Count of known CT and cardiac nuc  med studies performed in previous 12 months: 0.     COMPARISON:   None available.    FINDINGS:  Abdomen: The liver, spleen and pancreas are normal in size. No evidence of kidney stone disease. No adrenal masses. No retroperitoneal adenopathy. There is an IVC filter in the inferior vena cava is collapsed above the filter and there are numerous  venous collaterals over the abdominal wall suggesting chronic occlusion of the IVC. No distended bowel loops are seen.    Pelvis: Inflammatory changes are seen in the sigmoid colon consistent with acute diverticulitis. No evidence of abscess. No fluid collections. No adenopathy.      Impression:       Acute sigmoid diverticulitis without evidence of an abscess. 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    Signer Name: Olman Boucher MD   Signed: 1/28/2020 1:22 AM   Workstation [facility]: [facility]    Radiology [facility]      XR Chest 1 View [[id number]] Collected:  01/28/20 0056     Updated:  01/28/20 0058    Narrative:       CR Chest 1 Vw    INDICATION:   Upper abdominal pain prior to arrival     COMPARISON:    None available.    FINDINGS:  Single portable AP view(s) of the chest.  The heart and mediastinal contours are normal. The lungs are clear. No pneumothorax or  pleural effusion.      Impression:       No acute cardiopulmonary findings.    Signer Name: Olman Boucher MD   Signed: 1/28/2020 12:56 AM   Workstation Name: [id number]    [facility]        Results for orders placed during the hospital encounter of 01/28/20   Adult Transthoracic Echo Complete W/ Cont if Necessary Per Protocol    Narrative · Mild mitral valve regurgitation is present.  · Mild tricuspid valve regurgitation is present.  · Estimated EF = 65%.  · Left ventricular systolic function is normal.  · Normal right ventricular cavity size, wall thickness, systolic function   and septal motion noted.  · Left ventricular diastolic function is normal.  · No evidence of pulmonary hypertension is present.  · There is no evidence of pericardial effusion.  · No significant structural valvular abnormality demonstrated.        Discharge Details        Discharge Medications      New Medications      Instructions Start Date   acetaminophen 325 MG tablet  Commonly known as:  TYLENOL   650 mg, Oral, Every 4 Hours PRN      docusate sodium 100 MG capsule   100 mg, Oral, 2 Times Daily      oxyCODONE-acetaminophen 7.5-325 MG per tablet  Commonly known as:  PERCOCET   Take one tablet by mouth every 4-6 hours as needed for moderate or severe pain      pantoprazole 40 MG EC tablet  Commonly known as:  PROTONIX   40 mg, Oral, Every Morning Before Breakfast         Changes to Medications      Instructions Start Date   hydrOXYzine pamoate 25 MG capsule  Commonly known as:  VISTARIL  What changed:  reasons to take this   25 mg, Oral, 3 Times Daily PRN      lisinopril 10 MG tablet  Commonly known as:  PRINIVIL,ZESTRIL  What changed:  how much to take   5 mg, Oral, Daily      warfarin 5 MG tablet  Commonly known as:  COUMADIN  What changed:    · how much to take  · how to take this  · when to take this  · additional instructions   10 mg, Oral, Nightly         Continue These Medications      Instructions Start  Date   acyclovir 400 MG tablet  Commonly known as:  ZOVIRAX   400 mg, Oral, Daily PRN, Take no more than 5 doses a day.      buPROPion  MG 12 hr tablet  Commonly known as:  WELLBUTRIN SR   150 mg, Oral, 2 Times Daily      carvedilol 6.25 MG tablet  Commonly known as:  COREG   6.25 mg, Oral, 2 Times Daily With Meals      DULoxetine 30 MG capsule  Commonly known as:  CYMBALTA   TAKE ONE CAPSULE BY MOUTH DAILY      traZODone 50 MG tablet  Commonly known as:  DESYREL   50 mg, Oral, Nightly         Stop These Medications    enoxaparin 150 MG/ML injection  Commonly known as:  LOVENOX     polyethylene glycol 236 g solution  Commonly known as:  GoLYTELY          No Known Allergies    Discharge Disposition:  Home or Self Care    Diet:  Hospital:  Diet Order   Procedures   • Diet Regular; Cardiac     Activity:  Activity Instructions     Other Activity Instructions      No lifting more than 5 lbs until cleared by your surgeon (usually 4-6 weeks)   Do not scrub, soak or submerge your incisions   Contact your surgeon if you develop increased pain, redness, swelling or purulent/foul-smelling discharge        CODE STATUS:    Code Status and Medical Interventions:   Ordered at: 01/28/20 0410     Level Of Support Discussed With:    Patient     Code Status:    CPR     Medical Interventions (Level of Support Prior to Arrest):    Full     No future appointments.    Additional Instructions for the Follow-ups that You Need to Schedule     Discharge Follow-up with PCP   As directed       Currently Documented PCP:    Elias Tom DO    PCP Phone Number:    [phone]     Follow Up Details:  PCP in 1 week         Discharge Follow-up with Specified Provider: Anticoagulation clinic before end of the week for INR check (Coumadin dose changed this admission)   As directed      To:  Anticoagulation clinic before end of the week for INR check (Coumadin dose changed this admission)         Discharge Follow-up with Specified  Provider: Dr. Valenzuela in 2 weeks   As directed      To:  Dr. Valenzuela in 2 weeks             Time Spent on Discharge: 45 minutes    Electronically signed by Fern Del Rio PA-C, 02/10/20, 12:45 PM.

## 2020-02-10 NOTE — NURSING NOTE
"Appliance looks good. Stomal education and appliance change was performed yesterday. Patient needs to wear a convexity appliance to prevent stool leaking. He is currently wearing a [place] image 2 3/4\" convexity. Discharge supplies provided and reviewed care needs and discharge instructions. Questions answered and discussed diet. Will follow-up tomorrow unless patient is discharged.  Contact [facility] nurse if needs arise. Thanks   "

## 2020-02-10 NOTE — DISCHARGE INSTR - APPOINTMENTS
Taye Valenzuela MD   General Surgery [phone]  [address] [facility] [address]  [facility] [address]     Next Steps: Follow up on 2/26/2020      Instructions: please arrive @9:00 for a 9:30 appointment          [facility]  [address] [facility] [address] [phone]    Appointment: February 12th @8:15

## 2020-02-11 ENCOUNTER — NURSE TRIAGE (OUTPATIENT)
Dept: CALL CENTER | Facility: HOSPITAL | Age: 40
End: 2020-02-11

## 2020-02-11 ENCOUNTER — READMISSION MANAGEMENT (OUTPATIENT)
Dept: CALL CENTER | Facility: HOSPITAL | Age: 40
End: 2020-02-11

## 2020-02-11 NOTE — TELEPHONE ENCOUNTER
Unable to reach surgeon or PCP tonight.  Advised to call surgeon first thing this AM and tell them exactly what he told me, they may need to change to a different pain medication and give him something for anxiety.    Reason for Disposition  • [1] SEVERE post-op pain (e.g., excruciating, pain scale 8-10) AND [2] not controlled with pain medications    Additional Information  • Negative: Sounds like a life-threatening emergency to the triager  • Negative: Chest pain  • Negative: Difficulty breathing  • Negative: Surgical incision symptoms and questions  • Negative: [1] Discomfort (pain, burning or stinging) when passing urine AND [2] male  • Negative: [1] Discomfort (pain, burning or stinging) when passing urine AND [2] female  • Negative: Constipation  • Negative: New or worsening leg (calf, thigh) pain  • Negative: New or worsening leg swelling  • Negative: Dizziness is severe, or persists > 24 hours after surgery  • Negative: Pain, redness, swelling, or pus at IV Site  • Negative: Symptoms arising from use of a urinary catheter ([place] or Coude)  • Negative: Cast problems or questions  • Negative: Medication question  • Negative: [1] Widespread rash AND [2] bright red, sunburn-like  • Negative: [1] SEVERE headache AND [2] after spinal (epidural) anesthesia  • Negative: [1] Vomiting AND [2] persists > 4 hours  • Negative: [1] Vomiting AND [2] abdomen looks much more swollen than usual  • Negative: [1] Drinking very little AND [2] dehydration suspected (e.g., no urine > 12 hours, very dry mouth, very lightheaded)  • Negative: Patient sounds very sick or weak to the triager  • Negative: Sounds like a serious complication to the triager  • Negative: Fever > 100.4 F (38.0 C)  • Negative: [1] Caller has URGENT question AND [2] triager unable to answer question  • Negative: [1] Headache AND [2] after spinal (epidural) anesthesia AND [3] not severe  • Negative: Fever present > 3 days (72 hours)  • Negative: [1]  "MILD-MODERATE post-op pain (e.g., pain scale 1-7) AND [2] not controlled with pain medications  • Negative: [1] Caller has NON-URGENT question AND [2] triager unable to answer question    Answer Assessment - Initial Assessment Questions  1. SYMPTOM: \"What's the main symptom you're concerned about?\" (e.g., pain, fever, vomiting)      Pain and anxiety  2. ONSET: \"When did pain  start?\"      Date of surgery  3. SURGERY: \"What surgery was performed?\"      colostomy  4. DATE of SURGERY: \"When was surgery performed?\"       01/30/2020  5. ANESTHESIA: \" What type of anesthesia did you have?\" (e.g., general, spinal, epidural, local)      general  6. PAIN: \"Is there any pain?\" If so, ask: \"How bad is it?\"  (Scale 1-10; or mild, moderate, severe)      Moderate to severe at times  7. FEVER: \"Do you have a fever?\" If so, ask: \"What is your temperature, how was it measured, and when did it start?\"      denies  8. VOMITING: \"Is there any vomiting?\" If yes, ask: \"How many times?\"      denies  9. BLEEDING: \"Is there any bleeding?\" If so, ask: \"How much?\" and \"Where?\"      denies  10. OTHER SYMPTOMS: \"Do you have any other symptoms?\" (e.g., drainage from wound, painful urination, constipation)        Anxiety    Protocols used: POST-OP SYMPTOMS AND QUESTIONS-ADULT-AH      "

## 2020-02-11 NOTE — OUTREACH NOTE
Prep Survey      Responses   Facility patient discharged from?  [place]   Is patient eligible?  Yes   Discharge diagnosis  diverticulitis,  sigmoid colectomy/end colostomy/enterotomy   Does the patient have one of the following disease processes/diagnoses(primary or secondary)?  General Surgery   Does the patient have Home health ordered?  Yes   What is the Home health agency?   [facility]   Is there a DME ordered?  No   Comments regarding appointments  see AVS   Medication alerts for this patient  coumadin, lisinopril, vistarill   Prep survey completed?  Yes          Kelli Mac RN

## 2020-02-12 ENCOUNTER — APPOINTMENT (OUTPATIENT)
Dept: PHARMACY | Facility: HOSPITAL | Age: 40
End: 2020-02-12

## 2020-02-12 ENCOUNTER — TELEPHONE (OUTPATIENT)
Dept: CALL CENTER | Facility: HOSPITAL | Age: 40
End: 2020-02-12

## 2020-02-12 NOTE — TELEPHONE ENCOUNTER
Hospital discharge follow up call completed.  Patient says he is doing well but he is concerned he does not have enough pain medication left.  Instructed him to call Dr. Valenzuela since he was the MD that did the procedure.  Reviewed signs and symptoms of when to call MD or go to ER.  Reviewed follow up appts, will see Dr. Tom on 2/18 at 1:15 pm.

## 2020-02-13 ENCOUNTER — ANTICOAGULATION VISIT (OUTPATIENT)
Dept: PHARMACY | Facility: HOSPITAL | Age: 40
End: 2020-02-13

## 2020-02-13 ENCOUNTER — READMISSION MANAGEMENT (OUTPATIENT)
Dept: CALL CENTER | Facility: HOSPITAL | Age: 40
End: 2020-02-13

## 2020-02-13 DIAGNOSIS — Z86.711 HISTORY OF PULMONARY EMBOLISM: ICD-10-CM

## 2020-02-13 LAB
INR PPP: 3 (ref 0.91–1.09)
PROTHROMBIN TIME: 35.5 SECONDS (ref 10–13.8)

## 2020-02-13 PROCEDURE — G0463 HOSPITAL OUTPT CLINIC VISIT: HCPCS

## 2020-02-13 PROCEDURE — 36416 COLLJ CAPILLARY BLOOD SPEC: CPT

## 2020-02-13 PROCEDURE — 85610 PROTHROMBIN TIME: CPT

## 2020-02-13 NOTE — PROGRESS NOTES
Anticoagulation Clinic Progress Note  Indication: Hx of PE and LE DVT (2010, 2014)  Referring Provider: Vaishali  Initial Warfarin Start Date: 2010  Planned Duration of Therapy: lifelong  Goal INR: 2-3 (verified Dr Tom 9/19/19)  Current Drug Interactions:   Other: d/c Eliquis 9/6/19 due to itching    Diet: iceburg salad 1/3/2020  Alcohol: Social   Tobacco: trying to quit smoking, started Chantix 9/16/19  OTC Pain Medication: recommended Tylenol prn    Anticoagulation Clinic INR History:  Date 9/19 10/16 11/7 11/19 12/3 12/9 1/3/2020 2/13   Total Weekly Dose 80mg 80 mg 77.5 mg 77.5mg 57.5 mg 80mg 72.5mg 65mg   INR 2.6 3.4 3.1 2.7 1.5 2.1 2.6 3.0   Notes     S/p c'scope, librado greens 1 boost Dec cig use stopped smoking; recent admission      Warfarin Dosing During Admission 1/28:     Date  2/3 2/4 2/5 2/6 2/7 2/8 2/9 2/10     INR  1.38 1.55 1.88 2.22 2.1 2.2 2.4 2.46     Dose  10mg 10mg 7.5mg 7.5mg  10mg 10mg 10mg 10mg           Clinic Interview:  Tablet Strength: 5mg  Verbal Release Authorization signed on 9/19/19-- may speak with Jammie Rodriguez (mother) John Rodriguez (father)  Patient contact info:  [phone] (Mobile)    Patient Findings     Positives:  Change in health, Change in medications, Change in diet/appetite, Hospital admission   Negatives:  Signs/symptoms of thrombosis, Signs/symptoms of bleeding, Laboratory test error suspected, Change in alcohol use, Change in activity, Upcoming invasive procedure, Emergency department visit, Upcoming dental procedure, Missed doses, Extra doses, Bruising, Other complaints   Comments:  Patient recently admitted to [facility] for diverticulitis s/p exploratory laparotmoy and sigmoid colectomy with end colostomy.Patient is in pain today. Describes it as surgical pain currently about a 7 or an 8 out of 10. When asked about his diet he says he has had chicken and turkey, salad and stir acevedo wth broccoli yesterday. Has been taking percocet on scheudle every 4 hours. He said he takes  tylenol inbetween and stays under 4g daily but does get close to 4g limit. No bleeding from surgical site or colostomy. He will be seen by  RN will be back on Monday(Jennifer?) she will be coming on Mondays and Fridays. He has stopped smoking permanently, not taking any NRT, and is not having any cravings. He is not interested NRT.      Plan:  1. INR is therapeutic today 3.0. Instructed Mr Rodriguez to take 7.5mg warfarin tonight and decrease maintence dose to 10mg warfarin daily.  2. He said he will be seen by  RN on Mondays and Fridays. Will ask RN to get INR on Monday 2/17 to ensure WNL  3. Verbal and written information provided. Brigida Rodriguez expresses understanding by teach back and has no further questions at this time.    Natalio Ziegler, [facility]  02/13/20   9:56 AM     Will likely need 10mg daily except 7.5mg 2 days/week moving forward    Tara Barger, PharmD.  02/13/20   4:42 PM

## 2020-02-13 NOTE — OUTREACH NOTE
General Surgery Week 1 Survey      Responses   Facility patient discharged from?  [place]   Does the patient have one of the following disease processes/diagnoses(primary or secondary)?  General Surgery   Is there a successful TCM telephone encounter documented?  No   Week 1 attempt successful?  Yes   Revoke  Decline to participate [Declined to participate. His number is entered 2x. States wrong number.]          Alphonso Holloway RN

## 2020-02-14 ENCOUNTER — TELEPHONE (OUTPATIENT)
Dept: FAMILY MEDICINE CLINIC | Facility: CLINIC | Age: 40
End: 2020-02-14

## 2020-02-14 ENCOUNTER — OFFICE VISIT (OUTPATIENT)
Dept: FAMILY MEDICINE CLINIC | Facility: CLINIC | Age: 40
End: 2020-02-14

## 2020-02-14 VITALS
OXYGEN SATURATION: 99 % | HEIGHT: 76 IN | DIASTOLIC BLOOD PRESSURE: 82 MMHG | BODY MASS INDEX: 38.36 KG/M2 | SYSTOLIC BLOOD PRESSURE: 132 MMHG | HEART RATE: 104 BPM | WEIGHT: 315 LBS

## 2020-02-14 DIAGNOSIS — G89.18 POST-OPERATIVE PAIN: Primary | ICD-10-CM

## 2020-02-14 DIAGNOSIS — K57.32 SIGMOID DIVERTICULITIS: ICD-10-CM

## 2020-02-14 DIAGNOSIS — K65.9 PERITONITIS (HCC): ICD-10-CM

## 2020-02-14 DIAGNOSIS — G89.18 POST-OP PAIN: Primary | ICD-10-CM

## 2020-02-14 DIAGNOSIS — Z86.711 HISTORY OF PULMONARY EMBOLISM: ICD-10-CM

## 2020-02-14 DIAGNOSIS — I10 ESSENTIAL HYPERTENSION: ICD-10-CM

## 2020-02-14 DIAGNOSIS — Z98.890 S/P EXPLORATORY LAPAROTOMY: ICD-10-CM

## 2020-02-14 DIAGNOSIS — K57.80 PERFORATED DIVERTICULUM: ICD-10-CM

## 2020-02-14 DIAGNOSIS — F41.9 ANXIETY: ICD-10-CM

## 2020-02-14 PROCEDURE — 99214 OFFICE O/P EST MOD 30 MIN: CPT | Performed by: INTERNAL MEDICINE

## 2020-02-14 RX ORDER — OXYCODONE AND ACETAMINOPHEN 7.5; 325 MG/1; MG/1
TABLET ORAL
Qty: 20 TABLET | Refills: 0 | Status: SHIPPED | OUTPATIENT
Start: 2020-02-14 | End: 2020-07-16

## 2020-02-14 NOTE — TELEPHONE ENCOUNTER
Patient called and said that he was told by Dr. Tom if he wasn't able to get ahold of his surgeon for his pain medicine so he did leave a voicemail. He is needing an Rx for pain. Please adviseNasrin Moss on [address] confirmed    Patient Callback # [phone]

## 2020-02-14 NOTE — PROGRESS NOTES
Chief Complaint   Patient presents with   • Abdominal Pain     [facility] ED f/u        HPI:  Brigida Rodriguez is a 40 y.o. male who presents today for hospital follow-up diverticulitis complicated by bowel perforation.  He is status post ex lap with colectomy/colostomy.  He is following with Dr. Valenzuela for general surgery.  Patient reports his pain medication was reduced from 15 mg of Percocet down to 5 mg Percocet.  He reports he has not discussed this with his surgeon although his pain is unbearable at current dose, per patient.  He has no other acute complaints or concerns.  He has nursing coming out for wound checks and to change over his back.  Reports his depression is somewhat worsening since procedure.  He continues on previous medicine.  Taking lisinopril and Coreg for hypertension.  No blood pressure checks at home.  Admission admission 1/28/2020  Date of discharge 2/10/2020  ROS:  Constitutional: no fevers, night sweats or unexplained weight loss  Eyes: no vision changes  ENT: no runny nose, ear pain, sore throat  Cardio: no chest pain, palpitations  Pulm: no shortness of breath, wheezing, or cough  GI: + abdominal pain +changes in bowel movements  : no difficulty urinating  MSK: no difficulty ambulating, no joint pain  Neuro: no weakness, dizziness or headache  Psych: no trouble sleeping  Endo: no change in appetite      Past Medical History:   Diagnosis Date   • Anxiety    • Depression    • GERD (gastroesophageal reflux disease)    • H/O blood clots    • Heart failure (CMS/HCC)    • Hypertension    • Presence of IVC filter    • Pulmonary embolism (CMS/HCC)     10 YEARS AGO      Family History   Problem Relation Age of Onset   • Diabetes Mother    • Obesity Maternal Grandmother    • Diabetes Maternal Grandmother    • Cancer Father         prostrate   • No Known Problems Sister    • No Known Problems Brother    • No Known Problems Maternal Grandfather    • No Known Problems Paternal Grandmother    • No  Known Problems Paternal Grandfather       Social History     Socioeconomic History   • Marital status: Single     Spouse name: Not on file   • Number of children: Not on file   • Years of education: Not on file   • Highest education level: Not on file   Tobacco Use   • Smoking status: Current Every Day Smoker     Packs/day: 0.50     Years: 20.00     Pack years: 10.00     Types: Cigarettes   • Smokeless tobacco: Never Used   Substance and Sexual Activity   • Alcohol use: Yes     Alcohol/week: 6.0 standard drinks     Types: 6 Cans of beer per week   • Drug use: Yes     Types: Marijuana     Comment: everyday smoker   • Sexual activity: Yes     Partners: Female     Birth control/protection: Condom   Social History Narrative    Caffeine 0      No Known Allergies   Immunization History   Administered Date(s) Administered   • FLUARIX/FLUZONE/AFLURIA/FLULAVAL QUAD 11/04/2019        PE:  Vitals:    02/14/20 1306   BP: 132/82   Pulse: 104   SpO2: 99%      Body mass index is 43.82 kg/m².    Gen Appearance: NAD  HEENT: Normocephalic, PERRLA, no thyromegaly, trache midline  Heart: RRR, normal S1 and S2, no murmur  Lungs: CTA b/l, no wheezing, no crackles  Abdomen: Soft, colostomy in place, bandaged  MSK: Moves all extremities well, normal gait, no peripheral edema  Pulses: Palpable and equal b/l  Lymph nodes: No palpable lymphadenopathy   Neuro: No focal deficits      Current Outpatient Medications   Medication Sig Dispense Refill   • acetaminophen (TYLENOL) 325 MG tablet Take 2 tablets by mouth Every 4 (Four) Hours As Needed for Mild Pain , Moderate Pain  or Headache (Do not exceed 4,000mg in 24 hours).     • acyclovir (ZOVIRAX) 400 MG tablet Take 1 tablet by mouth Daily As Needed (outbreaks). Take no more than 5 doses a day. 30 tablet 2   • buPROPion SR (WELLBUTRIN SR) 150 MG 12 hr tablet Take 1 tablet by mouth 2 (Two) Times a Day. 60 tablet 5   • carvedilol (COREG) 6.25 MG tablet Take 6.25 mg by mouth 2 (Two) Times a Day With  Meals.     • docusate sodium 100 MG capsule Take 100 mg by mouth 2 (Two) Times a Day. 60 each 0   • DULoxetine (CYMBALTA) 30 MG capsule TAKE ONE CAPSULE BY MOUTH DAILY 30 capsule 1   • hydrOXYzine pamoate (VISTARIL) 25 MG capsule Take 1 capsule by mouth 3 (Three) Times a Day As Needed for Anxiety. 90 capsule 0   • lisinopril (PRINIVIL,ZESTRIL) 10 MG tablet Take 0.5 tablets by mouth Daily. 90 tablet 3   • oxyCODONE-acetaminophen (PERCOCET) 7.5-325 MG per tablet Take one tablet by mouth every 4-6 hours as needed for moderate or severe pain 18 tablet 0   • pantoprazole (PROTONIX) 40 MG EC tablet Take 1 tablet by mouth Every Morning Before Breakfast. 30 tablet 0   • traZODone (DESYREL) 50 MG tablet Take 50 mg by mouth Every Night.     • warfarin (COUMADIN) 5 MG tablet Take 2 tablets by mouth Every Night. 70 tablet 5     No current facility-administered medications for this visit.       Current outpatient and discharge medications have been reconciled for the patient.  Reviewed by: DO Brigida Ayala was seen today for abdominal pain.    Diagnoses and all orders for this visit:    Post-operative pain  Recommend discussing with Dr. Valenzuela for pain control.  Will provide short-term prescription if needed although will be weaning down quickly but recommend calling Dr. Valenzuela first.  Will have patient sign controlled substance agreement if needed.  UDS today.  S/P exploratory laparotomy with sigmoid colectomy/end colostomy/enterotomy repair 1/31/20  See above.  History of DVT/ PE on home coumadin with IVC filter in place  Continue warfarin, follow-up with INR clinic.  Essential hypertension  Stable today.  Continue current blood pressure medication.  Anxiety/depression  Continue current medication.       No follow-ups on file.     Please note that portions of this document were completed with a voice recognition program. Efforts were made to edit the dictations, but occasionally words are mis-transcribed.

## 2020-02-15 NOTE — TELEPHONE ENCOUNTER
Rx was approved by Dr. Tom and sent in. It did however require a PA.   Key: [id number]    Awaiting approval on oxyCODONE-Acetaminophen 7.5-325MG tablets

## 2020-02-17 ENCOUNTER — ANTICOAGULATION VISIT (OUTPATIENT)
Dept: PHARMACY | Facility: HOSPITAL | Age: 40
End: 2020-02-17

## 2020-02-17 DIAGNOSIS — Z86.711 HISTORY OF PULMONARY EMBOLISM: ICD-10-CM

## 2020-02-17 LAB — INR PPP: 3

## 2020-02-17 NOTE — PROGRESS NOTES
Anticoagulation Clinic Progress Note  Indication: Hx of PE and LE DVT (2010, 2014)  Referring Provider: Vaishali  Initial Warfarin Start Date: 2010  Planned Duration of Therapy: lifelong  Goal INR: 2-3 (verified Dr Tom 9/19/19)  Current Drug Interactions:   Other: d/c Eliquis 9/6/19 due to itching    Diet: iceburg salad 1/3/2020  Alcohol: Social   Tobacco: stopped  OTC Pain Medication: recommended Tylenol prn    Anticoagulation Clinic INR History:  Date 9/19 10/16 11/7 11/19 12/3 12/9 1/3/2020 2/13   Total Weekly Dose 80mg 80 mg 77.5 mg 77.5mg 57.5 mg 80mg 72.5mg 65mg   INR 2.6 3.4 3.1 2.7 1.5 2.1 2.6 3.0   Notes     S/p c'scope, librado greens 1 boost Dec cig use stopped smoking; recent admission      Date 2/17          Total Weekly Dose 67.5mg          INR 3.0          Notes               Warfarin Dosing During Admission 1/28:  Date  2/3 2/4 2/5 2/6 2/7 2/8 2/9 2/10     INR  1.38 1.55 1.88 2.22 2.1 2.2 2.4 2.46     Dose  10mg 10mg 7.5mg 7.5mg  10mg 10mg 10mg 10mg       Clinic Interview:  Tablet Strength: 5mg  Verbal Release Authorization signed on 9/19/19-- may speak with Jammie Rodriguez (mother) John Rodriguez (father)  Patient contact info:  [phone] (Mobile)    Patient Findings   Negatives:  Signs/symptoms of thrombosis, Signs/symptoms of bleeding, Laboratory test error suspected, Change in health, Change in alcohol use, Change in activity, Upcoming invasive procedure, Emergency department visit, Upcoming dental procedure, Missed doses, Extra doses, Change in medications, Change in diet/appetite, Hospital admission, Bruising, Other complaints   Comments:  Patient recently admitted to [facility] for diverticulitis s/p exploratory laparotmoy and sigmoid colectomy with end colostomy. Has been taking percocet on schedule every 4 hours and is no longer taking APAP to supplement. No bleeding from surgical site or colostomy. She reports that he has not been smoking. He has stopped smoking permanently and is not taking any NRT.      Plan:  1. INR is therapeutic today 3.0. He has recently stopped smoking, which can require dose reduction of warfarin. Patient also reports he isn't eating GLV at this time. Instructed Mr Michael's HH RN to decrease his dose to 10mg warfarin daily except 7.5mg MonThur.  2. His HH RN, will be at his home on Mondays and Fridays. Will get INR on Monday 2/24 to determine if recent dose adjustment is appropriate or need to increase his dose back to 10mg daily except 7.5mg once weekly.  3. Verbal and written information provided. Brigida Rodriguez expresses understanding by teach back and has no further questions at this time.    Grace Coyle, PharmD  02/17/20   10:25 AM

## 2020-02-19 DIAGNOSIS — G89.18 POST-OPERATIVE PAIN: ICD-10-CM

## 2020-02-20 ENCOUNTER — OFFICE VISIT (OUTPATIENT)
Dept: FAMILY MEDICINE CLINIC | Facility: CLINIC | Age: 40
End: 2020-02-20

## 2020-02-20 VITALS
DIASTOLIC BLOOD PRESSURE: 82 MMHG | SYSTOLIC BLOOD PRESSURE: 132 MMHG | HEIGHT: 76 IN | HEART RATE: 105 BPM | OXYGEN SATURATION: 99 % | BODY MASS INDEX: 38.36 KG/M2 | WEIGHT: 315 LBS

## 2020-02-20 DIAGNOSIS — Z98.890 S/P EXPLORATORY LAPAROTOMY: Primary | ICD-10-CM

## 2020-02-20 DIAGNOSIS — G89.18 POST-OP PAIN: ICD-10-CM

## 2020-02-20 PROCEDURE — 99213 OFFICE O/P EST LOW 20 MIN: CPT | Performed by: INTERNAL MEDICINE

## 2020-02-20 RX ORDER — OXYCODONE HYDROCHLORIDE 5 MG/1
5 TABLET ORAL 2 TIMES DAILY PRN
Qty: 14 TABLET | Refills: 0 | Status: SHIPPED | OUTPATIENT
Start: 2020-02-20 | End: 2020-02-27

## 2020-02-20 NOTE — PROGRESS NOTES
Chief Complaint   Patient presents with   • Post-op Problem     f/u        HPI:  Brigida Rodriguez is a 40 y.o. male who presents today for postop pain status post exploratory laparotomy.  He has follow-up with a surgeon next week.  Currently taking oxycodone 7.5 mg every 5-7 hours.  Typically 2-3 times per day.  He reports his pain is well controlled on this regimen.  He has no other acute points or signs today.  He is taking stool softener daily.    ROS:  Constitutional: no fevers, night sweats or unexplained weight loss  Eyes: no vision changes  ENT: no runny nose, ear pain, sore throat  Cardio: no chest pain, palpitations  Pulm: no shortness of breath, wheezing, or cough  GI: + abdominal pain - changes in bowel movements  : no difficulty urinating  MSK: no difficulty ambulating, no joint pain  Neuro: no weakness, dizziness or headache  Psych: no trouble sleeping  Endo: no change in appetite      Past Medical History:   Diagnosis Date   • Anxiety    • Depression    • GERD (gastroesophageal reflux disease)    • H/O blood clots    • Heart failure (CMS/HCC)    • Hypertension    • Presence of IVC filter    • Pulmonary embolism (CMS/HCC)     10 YEARS AGO      Family History   Problem Relation Age of Onset   • Diabetes Mother    • Obesity Maternal Grandmother    • Diabetes Maternal Grandmother    • Cancer Father         prostrate   • No Known Problems Sister    • No Known Problems Brother    • No Known Problems Maternal Grandfather    • No Known Problems Paternal Grandmother    • No Known Problems Paternal Grandfather       Social History     Socioeconomic History   • Marital status: Single     Spouse name: Not on file   • Number of children: Not on file   • Years of education: Not on file   • Highest education level: Not on file   Tobacco Use   • Smoking status: Current Every Day Smoker     Packs/day: 0.50     Years: 20.00     Pack years: 10.00     Types: Cigarettes   • Smokeless tobacco: Never Used   Substance  and Sexual Activity   • Alcohol use: Yes     Alcohol/week: 6.0 standard drinks     Types: 6 Cans of beer per week   • Drug use: Yes     Types: Marijuana     Comment: everyday smoker   • Sexual activity: Yes     Partners: Female     Birth control/protection: Condom   Social History Narrative    Caffeine 0      No Known Allergies   Immunization History   Administered Date(s) Administered   • FLUARIX/FLUZONE/AFLURIA/FLULAVAL QUAD 11/04/2019        PE:  Vitals:    02/20/20 1145   BP: 132/82   Pulse: 105   SpO2: 99%      Body mass index is 43.82 kg/m².    Gen Appearance: NAD  HEENT: Normocephalic, PERRLA, no thyromegaly, trache midline  Heart: RRR, normal S1 and S2, no murmur  Lungs: CTA b/l, no wheezing, no crackles  Abdomen: Soft, diffusely tender, non-distended, no guarding and BSx4, colostomy bag in place  MSK: Moves all extremities well, normal gait, no peripheral edema  Pulses: Palpable and equal b/l  Lymph nodes: No palpable lymphadenopathy   Neuro: No focal deficits      Current Outpatient Medications   Medication Sig Dispense Refill   • acetaminophen (TYLENOL) 325 MG tablet Take 2 tablets by mouth Every 4 (Four) Hours As Needed for Mild Pain , Moderate Pain  or Headache (Do not exceed 4,000mg in 24 hours).     • acyclovir (ZOVIRAX) 400 MG tablet Take 1 tablet by mouth Daily As Needed (outbreaks). Take no more than 5 doses a day. 30 tablet 2   • buPROPion SR (WELLBUTRIN SR) 150 MG 12 hr tablet Take 1 tablet by mouth 2 (Two) Times a Day. 60 tablet 5   • carvedilol (COREG) 6.25 MG tablet Take 6.25 mg by mouth 2 (Two) Times a Day With Meals.     • docusate sodium 100 MG capsule Take 100 mg by mouth 2 (Two) Times a Day. 60 each 0   • DULoxetine (CYMBALTA) 30 MG capsule TAKE ONE CAPSULE BY MOUTH DAILY 30 capsule 1   • hydrOXYzine pamoate (VISTARIL) 25 MG capsule Take 1 capsule by mouth 3 (Three) Times a Day As Needed for Anxiety. 90 capsule 0   • lisinopril (PRINIVIL,ZESTRIL) 10 MG tablet Take 0.5 tablets by mouth  Daily. 90 tablet 3   • oxyCODONE (ROXICODONE) 5 MG immediate release tablet Take 1 tablet by mouth 2 (Two) Times a Day As Needed for Severe Pain  for up to 7 days. Alternate with tylenol 1000mg 14 tablet 0   • oxyCODONE-acetaminophen (PERCOCET) 7.5-325 MG per tablet Take one tablet by mouth every 4-6 hours as needed for moderate or severe pain 20 tablet 0   • pantoprazole (PROTONIX) 40 MG EC tablet Take 1 tablet by mouth Every Morning Before Breakfast. 30 tablet 0   • traZODone (DESYREL) 50 MG tablet Take 50 mg by mouth Every Night.     • warfarin (COUMADIN) 5 MG tablet Take 2 tablets by mouth Every Night. 70 tablet 5     No current facility-administered medications for this visit.         Brigida was seen today for post-op problem.  Weaning down to 5 mg as needed twice daily.  Recommend alternating with Tylenol thousand milligrams.  Would not recommend any opiate therapy beyond 2 weeks.  Follow-up with general surgery as scheduled.    Diagnoses and all orders for this visit:    S/P exploratory laparotomy with sigmoid colectomy/end colostomy/enterotomy repair 1/31/20  -     oxyCODONE (ROXICODONE) 5 MG immediate release tablet; Take 1 tablet by mouth 2 (Two) Times a Day As Needed for Severe Pain  for up to 7 days. Alternate with tylenol 1000mg    Post-op pain  -     oxyCODONE (ROXICODONE) 5 MG immediate release tablet; Take 1 tablet by mouth 2 (Two) Times a Day As Needed for Severe Pain  for up to 7 days. Alternate with tylenol 1000mg         Return in about 3 months (around 5/20/2020) for Annual.     Please note that portions of this document were completed with a voice recognition program. Efforts were made to edit the dictations, but occasionally words are mis-transcribed.

## 2020-02-24 ENCOUNTER — ANTICOAGULATION VISIT (OUTPATIENT)
Dept: PHARMACY | Facility: HOSPITAL | Age: 40
End: 2020-02-24

## 2020-02-24 DIAGNOSIS — Z86.711 HISTORY OF PULMONARY EMBOLISM: ICD-10-CM

## 2020-02-24 LAB — INR PPP: 2.7

## 2020-02-24 NOTE — PROGRESS NOTES
Anticoagulation Clinic Progress Note    Indication: Hx of PE and LE DVT (2010, 2014)  Referring Provider: Vaishali  Initial Warfarin Start Date: 2010  Planned Duration of Therapy: lifelong  Goal INR: 2.0-3.0 (verified Dr Tom 9/19/19)  Current Drug Interactions:   Other: d/c Eliquis 9/6/19 due to itching    Diet: iceburg salad 1/3/2020  Alcohol: Social   Tobacco: stopped  OTC Pain Medication: recommended Tylenol prn    Anticoagulation Clinic INR History:  Date 9/19 10/16 11/7 11/19 12/3 12/9 1/3/2020 2/13   Total Weekly Dose 80mg 80 mg 77.5 mg 77.5mg 57.5 mg 80mg 72.5mg 65mg   INR 2.6 3.4 3.1 2.7 1.5 2.1 2.6 3.0   Notes     S/p c'scope, librado greens 1 boost Decr cig use stopped smoking; recent admission      Date 2/17 2/24    Total Weekly Dose 67.5mg 65mg 65mg   INR 3.0 2.7    Notes          Warfarin Dosing During Admission 1/28:  Date  2/3 2/4 2/5 2/6 2/7 2/8 2/9 2/10     INR  1.38 1.55 1.88 2.22 2.1 2.2 2.4 2.46     Dose  10mg 10mg 7.5mg 7.5mg  10mg 10mg 10mg 10mg       Phone Interview:  Tablet Strength: 5mg tablet  Verbal Release Authorization signed on 9/19/19-- may speak with Jammie Rodriguez (mother) John Rodriguez (father)  Patient contact info: [phone] (Mobile)    Patient Findings   Negatives:  Signs/symptoms of thrombosis, Signs/symptoms of bleeding, Laboratory test error suspected, Change in health, Change in alcohol use, Change in activity, Upcoming invasive procedure, Emergency department visit, Upcoming dental procedure, Missed doses, Extra doses, Change in medications, Change in diet/appetite, Hospital admission, Bruising, Other complaints   Comments:  HH RN denies all findings for patient. Confirms patient has continued not smoking.     Plan:  1. INR is therapeutic today at 2.7.  Instructed Mr Rodriguez's HH RN to have patient continue warfarin 10mg warfarin daily except 7.5mg MonThurs.  2. HH RN to repeat INR in 1 week  3. Verbal information provided over the phone. Brigida BUITRAGO RN RBV dosing  instructions, expresses understanding by teach back, and has no further questions at this time.    Owen Boston, JENNIFER  2/24/2020  15:45       I, Tara Barger, [facility], have reviewed the note in full and agree with the assessment and plan.  02/24/20  4:55 PM

## 2020-02-25 ENCOUNTER — TELEPHONE (OUTPATIENT)
Dept: FAMILY MEDICINE CLINIC | Facility: CLINIC | Age: 40
End: 2020-02-25

## 2020-02-25 DIAGNOSIS — B00.9 HERPES: ICD-10-CM

## 2020-02-25 RX ORDER — ACYCLOVIR 400 MG/1
400 TABLET ORAL DAILY PRN
Qty: 30 TABLET | Refills: 2 | Status: SHIPPED | OUTPATIENT
Start: 2020-02-25 | End: 2020-06-04 | Stop reason: SDUPTHER

## 2020-02-25 RX ORDER — ACYCLOVIR 400 MG/1
400 TABLET ORAL DAILY PRN
Qty: 30 TABLET | Refills: 2 | Status: SHIPPED | OUTPATIENT
Start: 2020-02-25 | End: 2020-02-25 | Stop reason: SDUPTHER

## 2020-02-25 NOTE — TELEPHONE ENCOUNTER
Pharmacy called and needs clarficatoin on acyclovir (ZOVIRAX) 400 MG tablet directions say take one tablet by mouth daily but then it says don't take more then 5 doses a day and they need clarification on that callback at [phone]

## 2020-02-28 DIAGNOSIS — G89.18 POST-OP PAIN: ICD-10-CM

## 2020-02-28 DIAGNOSIS — Z98.890 S/P EXPLORATORY LAPAROTOMY: ICD-10-CM

## 2020-03-02 ENCOUNTER — OFFICE VISIT (OUTPATIENT)
Dept: FAMILY MEDICINE CLINIC | Facility: CLINIC | Age: 40
End: 2020-03-02

## 2020-03-02 ENCOUNTER — ANTICOAGULATION VISIT (OUTPATIENT)
Dept: PHARMACY | Facility: HOSPITAL | Age: 40
End: 2020-03-02

## 2020-03-02 VITALS
DIASTOLIC BLOOD PRESSURE: 80 MMHG | BODY MASS INDEX: 38.36 KG/M2 | HEIGHT: 76 IN | OXYGEN SATURATION: 98 % | WEIGHT: 315 LBS | HEART RATE: 103 BPM | SYSTOLIC BLOOD PRESSURE: 122 MMHG

## 2020-03-02 DIAGNOSIS — Z86.711 HISTORY OF PULMONARY EMBOLISM: ICD-10-CM

## 2020-03-02 DIAGNOSIS — M79.604 ACUTE PAIN OF RIGHT LOWER EXTREMITY: ICD-10-CM

## 2020-03-02 DIAGNOSIS — Z86.711 HISTORY OF PULMONARY EMBOLISM: Primary | ICD-10-CM

## 2020-03-02 DIAGNOSIS — M79.89 SWELLING OF RIGHT LOWER EXTREMITY: ICD-10-CM

## 2020-03-02 LAB — INR PPP: 2.4

## 2020-03-02 PROCEDURE — 99214 OFFICE O/P EST MOD 30 MIN: CPT | Performed by: INTERNAL MEDICINE

## 2020-03-02 NOTE — PROGRESS NOTES
Chief Complaint   Patient presents with   • Leg Pain     R leg - x 3-4 days - w/ swelling        HPI:  Brigida Rodriguez is a 40 y.o. male who presents today for acute onset right leg    ROS:  Constitutional: no fevers, night sweats or unexplained weight loss  Eyes: no vision changes  ENT: no runny nose, ear pain, sore throat  Cardio: no chest pain, palpitations  Pulm: no shortness of breath, wheezing, or cough  GI: no abdominal pain or changes in bowel movements  : no difficulty urinating  MSK: no difficulty ambulating, no joint pain  Neuro: no weakness, dizziness or headache  Psych: no trouble sleeping  Endo: no change in appetite      Past Medical History:   Diagnosis Date   • Anxiety    • Depression    • GERD (gastroesophageal reflux disease)    • H/O blood clots    • Heart failure (CMS/HCC)    • Hypertension    • Presence of IVC filter    • Pulmonary embolism (CMS/HCC)     10 YEARS AGO      Family History   Problem Relation Age of Onset   • Diabetes Mother    • Obesity Maternal Grandmother    • Diabetes Maternal Grandmother    • Cancer Father         prostrate   • No Known Problems Sister    • No Known Problems Brother    • No Known Problems Maternal Grandfather    • No Known Problems Paternal Grandmother    • No Known Problems Paternal Grandfather       Social History     Socioeconomic History   • Marital status: Single     Spouse name: Not on file   • Number of children: Not on file   • Years of education: Not on file   • Highest education level: Not on file   Tobacco Use   • Smoking status: Current Every Day Smoker     Packs/day: 0.50     Years: 20.00     Pack years: 10.00     Types: Cigarettes   • Smokeless tobacco: Never Used   Substance and Sexual Activity   • Alcohol use: Yes     Alcohol/week: 6.0 standard drinks     Types: 6 Cans of beer per week   • Drug use: Yes     Types: Marijuana     Comment: everyday smoker   • Sexual activity: Yes     Partners: Female     Birth control/protection: Condom    Social History Narrative    Caffeine 0      No Known Allergies   Immunization History   Administered Date(s) Administered   • FLUARIX/FLUZONE/AFLURIA/FLULAVAL QUAD 11/04/2019        PE:  Vitals:    03/02/20 1317   BP: 122/80   Pulse: 103   SpO2: 98%      Body mass index is 43.82 kg/m².    Gen Appearance: NAD  HEENT: Normocephalic, PERRLA, no thyromegaly, trache midline  Heart: RRR, normal S1 and S2, no murmur  Lungs: CTA b/l, no wheezing, no crackles  Abdomen: Soft, non-tender, non-distended, no guarding and BSx4  MSK: Moves all extremities well, normal gait, no peripheral edema, swelling right lower extremity, superficial veins palpated and painful.  Pulses: Palpable and equal b/l  Lymph nodes: No palpable lymphadenopathy   Neuro: No focal deficits      Current Outpatient Medications   Medication Sig Dispense Refill   • acetaminophen (TYLENOL) 325 MG tablet Take 2 tablets by mouth Every 4 (Four) Hours As Needed for Mild Pain , Moderate Pain  or Headache (Do not exceed 4,000mg in 24 hours).     • acyclovir (ZOVIRAX) 400 MG tablet Take 1 tablet by mouth Daily As Needed (PRN). Take no more than 5 doses during flare up. 30 tablet 2   • buPROPion SR (WELLBUTRIN SR) 150 MG 12 hr tablet Take 1 tablet by mouth 2 (Two) Times a Day. 60 tablet 5   • carvedilol (COREG) 6.25 MG tablet Take 6.25 mg by mouth 2 (Two) Times a Day With Meals.     • docusate sodium 100 MG capsule Take 100 mg by mouth 2 (Two) Times a Day. 60 each 0   • DULoxetine (CYMBALTA) 30 MG capsule TAKE ONE CAPSULE BY MOUTH DAILY 30 capsule 1   • hydrOXYzine pamoate (VISTARIL) 25 MG capsule Take 1 capsule by mouth 3 (Three) Times a Day As Needed for Anxiety. 90 capsule 0   • lisinopril (PRINIVIL,ZESTRIL) 10 MG tablet Take 0.5 tablets by mouth Daily. 90 tablet 3   • oxyCODONE-acetaminophen (PERCOCET) 7.5-325 MG per tablet Take one tablet by mouth every 4-6 hours as needed for moderate or severe pain 20 tablet 0   • pantoprazole (PROTONIX) 40 MG EC tablet Take 1  tablet by mouth Every Morning Before Breakfast. 30 tablet 0   • traZODone (DESYREL) 50 MG tablet Take 50 mg by mouth Every Night.     • warfarin (COUMADIN) 5 MG tablet Take 2 tablets by mouth Every Night. 70 tablet 5     No current facility-administered medications for this visit.         Brigida was seen today for leg pain.  Recommend checking right lower extremity ultrasound to rule out DVT, most likely superficial thrombophlebitis based on history and exam.  Currently taking warfarin, did not miss any dosing.  He has IVC filter in place as well.  Pain cream sent to pharmacy.    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  Requires further work-up.  Swelling of left lower extremity  -     Duplex Venous Lower Extremity - Left CAR; Future  New problem.  Requires further work-up.       No follow-ups on file.     Please note that portions of this document were completed with a voice recognition program. Efforts were made to edit the dictations, but occasionally words are mis-transcribed.

## 2020-03-02 NOTE — TELEPHONE ENCOUNTER
"Attempted to contact, no answer LVM to return our  Call to make an upcoming appt,     \"okay for Hub to relay\"  "

## 2020-03-02 NOTE — PROGRESS NOTES
Anticoagulation Clinic Progress Note    Indication: Hx of PE and LE DVT (2010, 2014)  Referring Provider: Vaishali  Initial Warfarin Start Date: 2010  Planned Duration of Therapy: lifelong  Goal INR: 2.0-3.0 (verified Dr Tom 9/19/19)  Current Drug Interactions:   Other: d/c Eliquis 9/6/19 due to itching    Diet: iceburg salad 1/3/2020  Alcohol: Social   Tobacco: stopped  OTC Pain Medication: recommended Tylenol prn    Anticoagulation Clinic INR History:  Date 9/19 10/16 11/7 11/19 12/3 12/9 1/3/2020 2/13 2/17 2/24 3/2    Total Weekly Dose 80mg 80 mg 77.5 mg 77.5mg 57.5 mg 80mg 72.5mg 65mg 67.5 mg 65 mg 65 mg    INR 2.6 3.4 3.1 2.7 1.5 2.1 2.6 3.0 3 2.7 2.4    Notes     S/p c'scope, librado greens 1 boost Decr cig use stopped smoking; recent admission          Date       Total Weekly Dose      INR      Notes          Warfarin Dosing During Admission 1/28:  Date  2/3 2/4 2/5 2/6 2/7 2/8 2/9 2/10   INR  1.38 1.55 1.88 2.22 2.1 2.2 2.4 2.46   Dose  10mg 10mg 7.5mg 7.5mg  10mg 10mg 10mg 10mg     Phone Interview:  Tablet Strength: 5mg tablet  Verbal Release Authorization signed on 9/19/19-- may speak with Jammie Rodriguez (mother) John Rodriguez (father)  Patient contact info: [phone] (Mobile)    Patient Findings:  Negatives:  Signs/symptoms of thrombosis, Signs/symptoms of bleeding, Laboratory test error suspected, Change in health, Change in alcohol use, Change in activity, Upcoming invasive procedure, Emergency department visit, Upcoming dental procedure, Missed doses, Extra doses, Change in medications, Change in diet/appetite, Hospital admission, Bruising, Other complaints   Comments:  Still not smoking. Mr. Rodriguez and HH RN otherwise deny any other changes since last encounter.     Plan:  1. INR is therapeutic today at 2.4. Instructed  Michael's HH RN, Perla, to have patient continue warfarin 10mg warfarin oral daily except 7.5mg MonThurs.  2.  RN to repeat INR on Monday, 3/9.   3. Verbal information provided over  the phone. Brigida BUITRAGO RN RBV dosing instructions, expresses understanding by teach back, and has no further questions at this time.    Josselin Cavazos CPhT  3/2/2020  10:56     I, Nishi Hooper, PharmD, have reviewed the note in full and agree with the assessment and plan.  03/02/20  4:54 PM

## 2020-03-03 ENCOUNTER — HOSPITAL ENCOUNTER (OUTPATIENT)
Dept: CARDIOLOGY | Facility: HOSPITAL | Age: 40
Discharge: HOME | End: 2020-03-03
Admitting: INTERNAL MEDICINE

## 2020-03-03 VITALS — BODY MASS INDEX: 38.36 KG/M2 | HEIGHT: 76 IN | WEIGHT: 315 LBS

## 2020-03-03 DIAGNOSIS — Z86.711 HISTORY OF PULMONARY EMBOLISM: ICD-10-CM

## 2020-03-03 DIAGNOSIS — M79.604 ACUTE PAIN OF RIGHT LOWER EXTREMITY: ICD-10-CM

## 2020-03-03 DIAGNOSIS — M79.89 SWELLING OF RIGHT LOWER EXTREMITY: ICD-10-CM

## 2020-03-03 LAB
BH CV ECHO MEAS - BSA(HAYCOCK): 3 M^2
BH CV ECHO MEAS - BSA: 2.8 M^2
BH CV ECHO MEAS - BZI_BMI: 43.8 KILOGRAMS/M^2
BH CV ECHO MEAS - BZI_METRIC_HEIGHT: 193 CM
BH CV ECHO MEAS - BZI_METRIC_WEIGHT: 163.3 KG
BH CV LOW VAS LEFT COMMON FEMORAL SPONT: 1
BH CV LOWER VASCULAR LEFT COMMON FEMORAL AUGMENT: NORMAL
BH CV LOWER VASCULAR LEFT COMMON FEMORAL COMPRESS: NORMAL
BH CV LOWER VASCULAR LEFT COMMON FEMORAL THROMBUS: NORMAL
BH CV LOWER VASCULAR RIGHT COMMON FEMORAL AUGMENT: NORMAL
BH CV LOWER VASCULAR RIGHT COMMON FEMORAL COMPRESS: NORMAL
BH CV LOWER VASCULAR RIGHT COMMON FEMORAL PHASIC: NORMAL
BH CV LOWER VASCULAR RIGHT COMMON FEMORAL SPONT: NORMAL
BH CV LOWER VASCULAR RIGHT DISTAL FEMORAL AUGMENT: NORMAL
BH CV LOWER VASCULAR RIGHT DISTAL FEMORAL COMPRESS: NORMAL
BH CV LOWER VASCULAR RIGHT GASTRONEMIUS COMPRESS: NORMAL
BH CV LOWER VASCULAR RIGHT GREATER SAPH AK COMPRESS: NORMAL
BH CV LOWER VASCULAR RIGHT GREATER SAPH BK COMPRESS: NORMAL
BH CV LOWER VASCULAR RIGHT LESSER SAPH COMPRESS: NORMAL
BH CV LOWER VASCULAR RIGHT MID FEMORAL AUGMENT: NORMAL
BH CV LOWER VASCULAR RIGHT MID FEMORAL COMPRESS: NORMAL
BH CV LOWER VASCULAR RIGHT MID FEMORAL PHASIC: NORMAL
BH CV LOWER VASCULAR RIGHT MID FEMORAL SPONT: NORMAL
BH CV LOWER VASCULAR RIGHT PERONEAL AUGMENT: NORMAL
BH CV LOWER VASCULAR RIGHT PERONEAL COMPRESS: NORMAL
BH CV LOWER VASCULAR RIGHT POSTERIOR TIBIAL AUGMENT: NORMAL
BH CV LOWER VASCULAR RIGHT POSTERIOR TIBIAL COMPRESS: NORMAL
BH CV LOWER VASCULAR RIGHT PROFUNDA FEMORAL AUGMENT: NORMAL
BH CV LOWER VASCULAR RIGHT PROFUNDA FEMORAL COMPRESS: NORMAL
BH CV LOWER VASCULAR RIGHT PROXIMAL FEMORAL AUGMENT: NORMAL
BH CV LOWER VASCULAR RIGHT PROXIMAL FEMORAL COMPRESS: NORMAL
BH CV LOWER VASCULAR RIGHT SAPHENOFEMORAL JUNCTION AUGMENT: NORMAL
BH CV LOWER VASCULAR RIGHT SAPHENOFEMORAL JUNCTION COMPRESS: NORMAL
BH CV LOWER VASCULAR RIGHT SAPHENOFEMORAL JUNCTION PHASIC: NORMAL
BH CV LOWER VASCULAR RIGHT SAPHENOFEMORAL JUNCTION SPONT: NORMAL

## 2020-03-03 PROCEDURE — 93971 EXTREMITY STUDY: CPT | Performed by: INTERNAL MEDICINE

## 2020-03-03 PROCEDURE — 93971 EXTREMITY STUDY: CPT

## 2020-03-03 RX ORDER — OXYCODONE HYDROCHLORIDE 5 MG/1
5 TABLET ORAL 2 TIMES DAILY PRN
Qty: 14 TABLET | Refills: 0 | OUTPATIENT
Start: 2020-03-03 | End: 2020-03-10

## 2020-03-04 ENCOUNTER — TELEPHONE (OUTPATIENT)
Dept: FAMILY MEDICINE CLINIC | Facility: CLINIC | Age: 40
End: 2020-03-04

## 2020-03-04 NOTE — TELEPHONE ENCOUNTER
PATIENT CALLED AND STATED THAT HE WAS SUPPOSED TO HAVE TWO CREAMS CALLED IN FOR HIS VARICOS VEINS AND HE HAS NOT HEARD ANYTHING BACK YET. HE DOES NOT KNOW THE NAMES OF THE CREAMS. PLEASE ADVISE.     PHARMACY IS [facility] ON [address]     PATIENT CALL BACK [phone]

## 2020-03-04 NOTE — TELEPHONE ENCOUNTER
Contacted patient he is requesting a compounded cream to be called in for his varicose veins. He stated that he discussed in last office. Please advise?

## 2020-03-05 DIAGNOSIS — K57.80 PERFORATED DIVERTICULUM: Primary | ICD-10-CM

## 2020-03-05 RX ORDER — PANTOPRAZOLE SODIUM 40 MG/1
40 TABLET, DELAYED RELEASE ORAL
Qty: 30 TABLET | Refills: 0 | Status: SHIPPED | OUTPATIENT
Start: 2020-03-05 | End: 2020-04-03

## 2020-03-05 NOTE — TELEPHONE ENCOUNTER
PT CALLED STATES HE NEEDS A REFILL ON THE FOLLOWING MEDICATION:    STOOL SOFTENER 100 MG     pantoprazole (PROTONIX) 40 MG EC tablet    CONFIRMED PHARMACY:  [facility] [address] [place] [address] [address] AT [facility] [phone] [facility] [phone]    CONTACT: [phone]

## 2020-03-09 ENCOUNTER — ANTICOAGULATION VISIT (OUTPATIENT)
Dept: PHARMACY | Facility: HOSPITAL | Age: 40
End: 2020-03-09

## 2020-03-09 DIAGNOSIS — F41.1 GENERALIZED ANXIETY DISORDER: ICD-10-CM

## 2020-03-09 DIAGNOSIS — F33.9 RECURRENT MAJOR DEPRESSIVE DISORDER, REMISSION STATUS UNSPECIFIED (HCC): ICD-10-CM

## 2020-03-09 DIAGNOSIS — Z86.711 HISTORY OF PULMONARY EMBOLISM: ICD-10-CM

## 2020-03-09 LAB — INR PPP: 2.4

## 2020-03-09 RX ORDER — DULOXETIN HYDROCHLORIDE 30 MG/1
CAPSULE, DELAYED RELEASE ORAL
Qty: 30 CAPSULE | Refills: 5 | Status: SHIPPED | OUTPATIENT
Start: 2020-03-09 | End: 2020-06-04 | Stop reason: SDUPTHER

## 2020-03-09 NOTE — PROGRESS NOTES
Anticoagulation Clinic Progress Note    Indication: Hx of PE and LE DVT (2010, 2014)  Referring Provider: Vaishali  Initial Warfarin Start Date: 2010  Planned Duration of Therapy: lifelong  Goal INR: 2.0-3.0 (verified Dr Tom 9/19/19)  Current Drug Interactions:   Other: d/c Eliquis 9/6/19 due to itching    Diet: iceburg salad 1/3/2020  Alcohol: Social   Tobacco: stopped  OTC Pain Medication: recommended Tylenol prn    Anticoagulation Clinic INR History:  Date 9/19 10/16 11/7 11/19 12/3 12/9 1/3/2020 2/13 2/17 2/24 3/2    Total Weekly Dose 80mg 80 mg 77.5 mg 77.5mg 57.5 mg 80mg 72.5mg 65mg 67.5 mg 65 mg 65 mg    INR 2.6 3.4 3.1 2.7 1.5 2.1 2.6 3.0 3 2.7 2.4    Notes     S/p c'scope, librado greens 1 boost Decr cig use stopped smoking; recent admission          Date       Total Weekly Dose      INR      Notes          Warfarin Dosing During Admission 1/28:  Date  2/3 2/4 2/5 2/6 2/7 2/8 2/9 2/10   INR  1.38 1.55 1.88 2.22 2.1 2.2 2.4 2.46   Dose  10mg 10mg 7.5mg 7.5mg  10mg 10mg 10mg 10mg     Phone Interview:  Tablet Strength: 5mg tablet  Verbal Release Authorization signed on 9/19/19-- may speak with Jammie Rodriguez (mother) John Rodriguez (father)  Patient contact info: [phone] (Mobile)    Patient Findings   Negatives:  Signs/symptoms of thrombosis, Signs/symptoms of bleeding, Laboratory test error suspected, Change in health, Change in alcohol use, Change in activity, Upcoming invasive procedure, Emergency department visit, Upcoming dental procedure, Missed doses, Extra doses, Change in medications, Change in diet/appetite, Hospital admission, Bruising, Other complaints   Comments:  Saw Dr. Tom for RLE swelling- no blood clot.      Plan:  1. INR is therapeutic today at 2.4. Instructed Mr Rodriguez's HH RN, Perla, to have patient continue warfarin 10mg warfarin oral daily except 7.5mg MonThurs.  2. HH RN to repeat INR on Monday, 3/16.   3. Verbal information provided over the phone. Brigida Rodriguez HH RN RBV  dosing instructions, expresses understanding by teach back, and has no further questions at this time.    Grace Coyle, PharmD  3/9/2020  11:21

## 2020-03-09 NOTE — TELEPHONE ENCOUNTER
Called patient.  No answer.  Would like to review ultrasound results with patient if he returns call.

## 2020-03-16 ENCOUNTER — ANTICOAGULATION VISIT (OUTPATIENT)
Dept: PHARMACY | Facility: HOSPITAL | Age: 40
End: 2020-03-16

## 2020-03-16 DIAGNOSIS — Z86.711 HISTORY OF PULMONARY EMBOLISM: ICD-10-CM

## 2020-03-16 LAB — INR PPP: 1.9

## 2020-03-16 NOTE — PROGRESS NOTES
Anticoagulation Clinic Progress Note    Indication: Hx of PE and LE DVT (2010, 2014)  Referring Provider: Vaishali  Initial Warfarin Start Date: 2010  Planned Duration of Therapy: lifelong  Goal INR: 2.0-3.0 (verified Dr Tom 9/19/19)  Current Drug Interactions:   Other: d/c Eliquis 9/6/19 due to itching    Diet: iceburg salad 1/3/2020  Alcohol: Social   Tobacco: stopped  OTC Pain Medication: recommended Tylenol prn    Anticoagulation Clinic INR History:  Date 9/19 10/16 11/7 11/19 12/3 12/9 1/3/2020 2/13 2/17 2/24 3/2 3/16   Total Weekly Dose 80mg 80 mg 77.5 mg 77.5mg 57.5 mg 80mg 72.5mg 65mg 67.5 mg 65 mg 65 mg 65mg   INR 2.6 3.4 3.1 2.7 1.5 2.1 2.6 3.0 3 2.7 2.4 1.9   Notes     S/p c'scope, librado greens 1 boost Decr cig use stopped smoking; recent admission     Green beans     Date              Total Weekly Dose             INR             Notes                 Warfarin Dosing During Admission 1/28:  Date  2/3 2/4 2/5 2/6 2/7 2/8 2/9 2/10   INR  1.38 1.55 1.88 2.22 2.1 2.2 2.4 2.46   Dose  10mg 10mg 7.5mg 7.5mg  10mg 10mg 10mg 10mg     Phone Interview:  Tablet Strength: 5mg tablet  Verbal Release Authorization signed on 9/19/19-- may speak with Jammie Rodriguez (mother) John Rodrgiuez (father)  Patient contact info: [phone] (Mobile)    Patient Findings:  Positives:  Change in diet/appetite   Negatives:  Signs/symptoms of thrombosis, Signs/symptoms of bleeding, Laboratory test error suspected, Change in health, Change in alcohol use, Change in activity, Upcoming invasive procedure, Emergency department visit, Upcoming dental procedure, Missed doses, Extra doses, Change in medications, Hospital admission, Bruising, Other complaints   Comments:  Patient ate some green beans last night, Perla (IFTIKHAR) otherwise denies any changes with patient.     Plan:  1. INR is slightly sub therapeutic today at 1.9. Instructed Mr Rodriguez's HH RN, Perla, to have patient continue boost toinght's dose to warfarin 10mg then tomorrow  resume warfarin 10mg oral daily except 7.5mg on MonThurs until recheck.  2.  RN to repeat INR in one week on 3/23 in clinic. Patient is being discharged from  on Friday, 3/20.   3. Verbal information provided over the phone. Brigida Rodriguez  RN RBV dosing instructions, expresses understanding by teach back, and has no further questions at this time.    Josselin Cavazos, Pharmacy Technician  3/16/2020  11:18     I, Nishi Hooper, PharmD, have reviewed the note in full and agree with the assessment and plan.  03/16/20  16:51

## 2020-03-21 DIAGNOSIS — Z86.711 HISTORY OF PULMONARY EMBOLISM: ICD-10-CM

## 2020-03-21 RX ORDER — WARFARIN SODIUM 5 MG/1
TABLET ORAL
Qty: 70 TABLET | Refills: 4 | OUTPATIENT
Start: 2020-03-21

## 2020-03-23 ENCOUNTER — APPOINTMENT (OUTPATIENT)
Dept: PHARMACY | Facility: HOSPITAL | Age: 40
End: 2020-03-23

## 2020-03-23 ENCOUNTER — TELEPHONE (OUTPATIENT)
Dept: FAMILY MEDICINE CLINIC | Facility: CLINIC | Age: 40
End: 2020-03-23

## 2020-03-23 NOTE — TELEPHONE ENCOUNTER
Patient states that he would like to have a prescription for a home INR kit.  Please advise.  He can be reached at [phone]

## 2020-03-23 NOTE — TELEPHONE ENCOUNTER
He sees the anticoagulation clinic. I believe they can coordinate this as long as Dr. Tom signs off when he returns.

## 2020-03-24 ENCOUNTER — ANTICOAGULATION VISIT (OUTPATIENT)
Dept: PHARMACY | Facility: HOSPITAL | Age: 40
End: 2020-03-24

## 2020-03-24 DIAGNOSIS — Z86.711 HISTORY OF PULMONARY EMBOLISM: ICD-10-CM

## 2020-03-24 LAB
INR PPP: 1.8 (ref 0.91–1.09)
PROTHROMBIN TIME: 21.3 SECONDS (ref 10–13.8)

## 2020-03-24 PROCEDURE — 36416 COLLJ CAPILLARY BLOOD SPEC: CPT

## 2020-03-24 PROCEDURE — 85610 PROTHROMBIN TIME: CPT

## 2020-03-24 PROCEDURE — G0463 HOSPITAL OUTPT CLINIC VISIT: HCPCS

## 2020-03-24 NOTE — PROGRESS NOTES
Anticoagulation Clinic Progress Note    Indication: Hx of PE and LE DVT (2010, 2014)  Referring Provider: Vaishali  Initial Warfarin Start Date: 2010  Planned Duration of Therapy: lifelong  Goal INR: 2.0-3.0 (verified Dr Tom 9/19/19)  Current Drug Interactions: Cymbalta and Pantoprazole  Other: d/c Eliquis 9/6/19 due to itching    Diet: Avoid GLV 3/24/2020  Alcohol: None  Tobacco: cessation 2/13/2020  OTC Pain Medication: recommended Tylenol prn    Anticoagulation Clinic INR History:  Date 9/19 10/16 11/7 11/19 12/3 12/9 1/3/2020 2/3-2/10 2/13 2/17 2/24 3/2 3/16   Total Weekly Dose 80mg 80 mg 77.5 mg 77.5mg 57.5 mg 80mg 72.5mg hosp: admission 65mg 67.5 mg 65 mg 65 mg 65mg   INR 2.6 3.4 3.1 2.7 1.5 2.1 2.6  3.0 3 2.7 2.4 1.9   Notes     S/p c'scope, librado greens 1 boost Decr cig use Diverticulitis; end colostomy stopped smoking; recent admission     Green beans (HH)     Date  3/24            Total Weekly Dose 67.5mg            INR 1.8            Notes                 Warfarin Dosing During Admission 1/28:  Date  2/3 2/4 2/5 2/6 2/7 2/8 2/9 2/10   INR  1.38 1.55 1.88 2.22 2.1 2.2 2.4 2.46   Dose  10mg 10mg 7.5mg 7.5mg  10mg 10mg 10mg 10mg     Phone Interview:  Tablet Strength: 5mg tablet  Verbal Release Authorization signed on 9/19/19-- may speak with Jammie Rodriguez (mother) John Rodriguez (father)  Patient contact info: [phone] (Mobile)    Patient Findings   Negatives:  Signs/symptoms of thrombosis, Signs/symptoms of bleeding, Laboratory test error suspected, Change in health, Change in alcohol use, Change in activity, Upcoming invasive procedure, Emergency department visit, Upcoming dental procedure, Missed doses, Extra doses, Change in medications, Change in diet/appetite, Hospital admission, Bruising, Other complaints   Comments:  Mr. Rodriguez reports that he has lost 20lbs since his colostomy bag placement in 2/2020. He reports his appetite is lower and is primarily eating fruit and turkey sandwiches. At this time,  he continues nicotine cessation. He is nervous about COVID-19 exposure, and would prefer to go to [facility] until he is able to possibly get a home monitor.     Plan:  1. INR remains slightly subtherapeutic today at 1.8. Instructed Mr Rodriguez to boost tonight's dose to warfarin 12.5mg then increase maintenance dose to 10mg daily. Of note: patient has colostomy bag and diet has changed. May start to see need for increase in warfarin dosing requirements as his dose was higher prior to admission in 2/2020.   2. Pt to repeat INR in 4/1 at [facility] lab. Placed standing order. Patient was discharged from  RN on Friday. And prefers to now go to a lab for INR check during COVID-19 pandemic. Pt also would like for home monitor POCT device. Will submit information today.   3. Verbal and written information provided in the clinic. Brigida Rodriguez RBV dosing instructions, expresses understanding by teach back, and has no further questions at this time.    Grace Coyle, PharmD  3/24/2020  09:03     Addendum: awaiting INR from lab today. Is in process. Please discuss curbside POCT with the patient at next encounter.

## 2020-04-01 ENCOUNTER — LAB (OUTPATIENT)
Dept: LAB | Facility: HOSPITAL | Age: 40
End: 2020-04-01

## 2020-04-01 DIAGNOSIS — Z86.711 HISTORY OF PULMONARY EMBOLISM: ICD-10-CM

## 2020-04-01 LAB
INR PPP: 2.23 (ref 0.85–1.16)
PROTHROMBIN TIME: 24.4 SECONDS (ref 11.5–14)

## 2020-04-01 PROCEDURE — 85610 PROTHROMBIN TIME: CPT

## 2020-04-01 PROCEDURE — 36415 COLL VENOUS BLD VENIPUNCTURE: CPT

## 2020-04-02 ENCOUNTER — ANTICOAGULATION VISIT (OUTPATIENT)
Dept: PHARMACY | Facility: HOSPITAL | Age: 40
End: 2020-04-02

## 2020-04-02 DIAGNOSIS — Z86.711 HISTORY OF PULMONARY EMBOLISM: ICD-10-CM

## 2020-04-02 NOTE — PROGRESS NOTES
"Anticoagulation Clinic Progress Note    Indication: Hx of PE and LE DVT (2010, 2014)  Referring Provider: Vaishali  Initial Warfarin Start Date: 2010  Planned Duration of Therapy: lifelong  Goal INR: 2.0-3.0 (verified Dr Tom 9/19/19)  Current Drug Interactions: Cymbalta and Pantoprazole  Other: d/c Eliquis 9/6/19 due to itching    Diet: Avoid GLV 3/24/2020  Alcohol: None  Tobacco: cessation 2/13/2020  OTC Pain Medication: recommended Tylenol prn    Anticoagulation Clinic INR History:  Date 9/19 10/16 11/7 11/19 12/3 12/9 1/3/2020 2/3-2/10 2/13 2/17 2/24 3/2 3/16   Total Weekly Dose 80mg 80 mg 77.5 mg 77.5mg 57.5 mg 80mg 72.5mg hosp: admission 65mg 67.5 mg 65 mg 65 mg 65mg   INR 2.6 3.4 3.1 2.7 1.5 2.1 2.6  3.0 3 2.7 2.4 1.9   Notes     S/p c'scope, librado greens 1 boost Decr cig use Diverticulitis; end colostomy stopped smoking; recent admission     Green beans (HH)     Date  3/24 4/1           Total Weekly Dose 67.5mg 72.5 mg 70mg          INR 1.8 2.23           Notes  Extra dose               Warfarin Dosing During Admission 1/28:  Date  2/3 2/4 2/5 2/6 2/7 2/8 2/9 2/10   INR  1.38 1.55 1.88 2.22 2.1 2.2 2.4 2.46   Dose  10mg 10mg 7.5mg 7.5mg  10mg 10mg 10mg 10mg     Phone Interview:  Tablet Strength: 5mg tablet  Verbal Release Authorization signed on 9/19/19-- may speak with Jammie Rodriguez (mother) John Rodriguez (father)  Patient contact info: [phone] (Mobile)    Patient Findings   Comments:  Patient states he is trying to lose weight by \"not eating as much\". He isn't increasing his intake of vegetables/GLV to lose weight.  Patient states he took 12.5mg on Monday that was different from instructions from prior progress note of 10mg daily. This and diet change could reflect the increase in INR. No other changes in medications.     Plan:  1. INR remains slightly therapeutic today at 2.23. Instructed Mr Rodriguez to take 10mg daily.    Of note: patient has colostomy bag and diet has changed. May start to see need for " increase in warfarin dosing requirements as his dose was higher prior to admission in 2/2020.   2. Pt to repeat INR in 1 week on 4/8.  3. Verbal and written information provided in the clinic. Brigida Rodriguez RBV dosing instructions, expresses understanding by teach back, and has no further questions at this time.  4. Addendum 4/9/2020: Dr. Tom has signed a referral form and it has been faxed to [facility] as of 3/30/2020.    Susan Avilez, PharmD  Pharmacy Resident   4/2/2020  09:23

## 2020-04-03 DIAGNOSIS — K57.80 PERFORATED DIVERTICULUM: ICD-10-CM

## 2020-04-03 RX ORDER — PANTOPRAZOLE SODIUM 40 MG/1
TABLET, DELAYED RELEASE ORAL
Qty: 30 TABLET | Refills: 0 | Status: SHIPPED | OUTPATIENT
Start: 2020-04-03 | End: 2020-04-30

## 2020-04-10 ENCOUNTER — ANTICOAGULATION VISIT (OUTPATIENT)
Dept: PHARMACY | Facility: HOSPITAL | Age: 40
End: 2020-04-10

## 2020-04-10 DIAGNOSIS — Z86.711 HISTORY OF PULMONARY EMBOLISM: ICD-10-CM

## 2020-04-10 LAB
INR PPP: 2.8 (ref 0.91–1.09)
PROTHROMBIN TIME: 33.7 SECONDS (ref 10–13.8)

## 2020-04-10 PROCEDURE — 36416 COLLJ CAPILLARY BLOOD SPEC: CPT

## 2020-04-10 PROCEDURE — 85610 PROTHROMBIN TIME: CPT

## 2020-04-10 PROCEDURE — G0463 HOSPITAL OUTPT CLINIC VISIT: HCPCS

## 2020-04-10 NOTE — PROGRESS NOTES
Anticoagulation Clinic Progress Note    Indication: Hx of PE and LE DVT (2010, 2014)  Referring Provider: Vaishali  Initial Warfarin Start Date: 2010  Planned Duration of Therapy: lifelong  Goal INR: 2.0-3.0 (verified Dr Tom 9/19/19)  Current Drug Interactions: Cymbalta and Pantoprazole  Other: d/c Eliquis 9/6/19 due to itching    Diet: Avoid GLV 3/24/2020  Alcohol: None  Tobacco: cessation 2/13/2020  OTC Pain Medication: recommended Tylenol prn    Anticoagulation Clinic INR History:  Date 9/19 10/16 11/7 11/19 12/3 12/9 1/3/2020 2/3-2/10 2/13 2/17 2/24 3/2 3/16   Total Weekly Dose 80mg 80 mg 77.5 mg 77.5mg 57.5 mg 80mg 72.5mg hosp: admission 65mg 67.5 mg 65 mg 65 mg 65mg   INR 2.6 3.4 3.1 2.7 1.5 2.1 2.6  3.0 3 2.7 2.4 1.9   Notes     S/p c'scope, librado greens 1 boost Decr cig use Diverticulitis; end colostomy stopped smoking; recent admission     Green beans (HH)     Date  3/24 4/1 4/10          Total Weekly Dose 67.5mg 72.5 mg 70mg          INR 1.8 2.23 2.8          Notes  Extra dose               Warfarin Dosing During Admission 1/28:  Date  2/3 2/4 2/5 2/6 2/7 2/8 2/9 2/10   INR  1.38 1.55 1.88 2.22 2.1 2.2 2.4 2.46   Dose  10mg 10mg 7.5mg 7.5mg  10mg 10mg 10mg 10mg     Phone Interview:  Tablet Strength: 5mg tablet  Verbal Release Authorization signed on 9/19/19-- may speak with Jammie Michael (mother) John Michael (father)  Patient contact info: [phone] (Mobile)    Patient Findings     Negatives:  Signs/symptoms of thrombosis, Signs/symptoms of bleeding, Laboratory test error suspected, Change in health, Change in alcohol use, Change in activity, Upcoming invasive procedure, Emergency department visit, Upcoming dental procedure, Missed doses, Extra doses, Change in medications, Change in diet/appetite, Hospital admission, Bruising, Other complaints   Comments:  Mr Rodriguez's diet has remained the same.            Plan:  1. INR remains therapeutic today at 2.8. Instructed Mr Rodriguez to continue warfarin 10mg  daily.    Of note: patient has colostomy bag and diet has changed. May start to see need for increase in warfarin dosing requirements as his dose was higher prior to admission in 2/2020.   2. Pt to repeat INR in 10 days on 4/21 to ensure WNL considering large increase on a lower dose  3. Verbal and written information provided in the clinic. Brigida Rodriguez RBV dosing instructions, expresses understanding by teach back, and has no further questions at this time.  4. Addendum 4/9/2020: Dr. Tom has signed a referral form and it has been faxed to [facility] as of 3/30/2020.      Tara Barger, BrandenD.  04/10/20   10:02

## 2020-04-21 ENCOUNTER — APPOINTMENT (OUTPATIENT)
Dept: PHARMACY | Facility: HOSPITAL | Age: 40
End: 2020-04-21

## 2020-04-23 ENCOUNTER — APPOINTMENT (OUTPATIENT)
Dept: PHARMACY | Facility: HOSPITAL | Age: 40
End: 2020-04-23

## 2020-04-30 DIAGNOSIS — K57.80 PERFORATED DIVERTICULUM: ICD-10-CM

## 2020-04-30 RX ORDER — PANTOPRAZOLE SODIUM 40 MG/1
TABLET, DELAYED RELEASE ORAL
Qty: 30 TABLET | Refills: 0 | Status: SHIPPED | OUTPATIENT
Start: 2020-04-30 | End: 2020-06-04 | Stop reason: SDUPTHER

## 2020-05-04 ENCOUNTER — TELEPHONE (OUTPATIENT)
Dept: PHARMACY | Facility: HOSPITAL | Age: 40
End: 2020-05-04

## 2020-05-06 ENCOUNTER — ANTICOAGULATION VISIT (OUTPATIENT)
Dept: PHARMACY | Facility: HOSPITAL | Age: 40
End: 2020-05-06

## 2020-05-06 DIAGNOSIS — Z86.711 HISTORY OF PULMONARY EMBOLISM: ICD-10-CM

## 2020-05-06 LAB
INR PPP: 3.4 (ref 0.91–1.09)
PROTHROMBIN TIME: 40.6 SECONDS (ref 10–13.8)

## 2020-05-06 PROCEDURE — G0463 HOSPITAL OUTPT CLINIC VISIT: HCPCS

## 2020-05-06 PROCEDURE — 85610 PROTHROMBIN TIME: CPT

## 2020-05-06 PROCEDURE — 36416 COLLJ CAPILLARY BLOOD SPEC: CPT

## 2020-05-06 NOTE — PROGRESS NOTES
Anticoagulation Clinic Progress Note    Indication: Hx of PE and LE DVT (2010, 2014)  Referring Provider: Vaishali  Initial Warfarin Start Date: 2010  Planned Duration of Therapy: lifelong  Goal INR: 2.0-3.0 (verified Dr Tom 9/19/19)  Current Drug Interactions: Cymbalta and Pantoprazole  Other: d/c Eliquis 9/6/19 due to itching    Diet: green beans 3-4x/week 5/6/2020  Alcohol: None  Tobacco: cessation 2/13/2020  OTC Pain Medication: recommended Tylenol prn    Anticoagulation Clinic INR History:  Date 9/19 10/16 11/7 11/19 12/3 12/9 1/3/2020 2/3-2/10 2/13 2/17 2/24 3/2 3/16   Total Weekly Dose 80mg 80 mg 77.5 mg 77.5mg 57.5 mg 80mg 72.5mg hosp: admission 65mg 67.5 mg 65 mg 65 mg 65mg   INR 2.6 3.4 3.1 2.7 1.5 2.1 2.6  3.0 3 2.7 2.4 1.9   Notes     S/p c'scope, librado greens 1 boost Decr cig use Diverticulitis; end colostomy stopped smoking; recent admission     Green beans (HH)     Date  3/24 4/1 4/10 5/6         Total Weekly Dose 67.5mg 72.5 mg 70mg 70mg         INR 1.8 2.23 2.8 3.4         Notes  Extra dose  Less GLV             Warfarin Dosing During Admission 1/28:  Date  2/3 2/4 2/5 2/6 2/7 2/8 2/9 2/10   INR  1.38 1.55 1.88 2.22 2.1 2.2 2.4 2.46   Dose  10mg 10mg 7.5mg 7.5mg  10mg 10mg 10mg 10mg     Phone Interview:  Tablet Strength: 5mg tablet  Verbal Release Authorization signed on 9/19/19-- may speak with Jammie Michael (mother) John Rodriguez (father)  Patient contact info: [phone] (Mobile)    Patient Findings   Positives:  Change in medications   Negatives:  Signs/symptoms of thrombosis, Signs/symptoms of bleeding, Laboratory test error suspected, Change in health, Change in alcohol use, Change in activity, Upcoming invasive procedure, Emergency department visit, Upcoming dental procedure, Missed doses, Extra doses, Change in diet/appetite, Hospital admission, Bruising, Other complaints   Comments:  He confirms taking 10mg daily. He did start taking a MVI gummies (likely no Vit K). He reports that he was  not eating GLV however, he reports that he typically eats lettuce and green beans.      Plan:  1. INR is SUPRA therapeutic today. Instructed Mr Rodriguez to decrease dose to 5mg tonight and then continue warfarin 10mg daily.  He plans to resume green bean intake.  2. Pt to repeat INR 5/20/2020  3. Verbal and written information provided in the clinic. Brigida Rodriguez RBV dosing instructions, expresses understanding by teach back, and has no further questions at this time.  4. Addendum 4/9/2020: Dr. Tom has signed a referral form and it has been faxed to [facility] as of 3/30/2020.      Grace Coyle, PharmD  05/06/2020  11:05 AM

## 2020-05-20 ENCOUNTER — ANTICOAGULATION VISIT (OUTPATIENT)
Dept: PHARMACY | Facility: HOSPITAL | Age: 40
End: 2020-05-20

## 2020-05-20 DIAGNOSIS — Z86.711 HISTORY OF PULMONARY EMBOLISM: ICD-10-CM

## 2020-05-20 LAB
INR PPP: 3.1 (ref 0.91–1.09)
PROTHROMBIN TIME: 36.6 SECONDS (ref 10–13.8)

## 2020-05-20 PROCEDURE — 36416 COLLJ CAPILLARY BLOOD SPEC: CPT

## 2020-05-20 PROCEDURE — G0463 HOSPITAL OUTPT CLINIC VISIT: HCPCS

## 2020-05-20 PROCEDURE — 85610 PROTHROMBIN TIME: CPT

## 2020-05-20 NOTE — PROGRESS NOTES
Anticoagulation Clinic Progress Note    Indication: Hx of PE and LE DVT (2010, 2014)  Referring Provider: Vaishali  Initial Warfarin Start Date: 2010  Planned Duration of Therapy: lifelong  Goal INR: 2.0-3.0 (verified Dr Tom 9/19/19)  Current Drug Interactions: Cymbalta and Pantoprazole  Other: d/c Eliquis 9/6/19 due to itching    Diet: green beans 3-4x/week 5/6/2020  Alcohol: None  Tobacco: cessation 2/13/2020  OTC Pain Medication: recommended Tylenol prn    Anticoagulation Clinic INR History:  Date 9/19 10/16 11/7 11/19 12/3 12/9 1/3/2020 2/3-2/10 2/13 2/17 2/24 3/2 3/16   Total Weekly Dose 80mg 80 mg 77.5 mg 77.5mg 57.5 mg 80mg 72.5mg hosp: admission 65mg 67.5 mg 65 mg 65 mg 65mg   INR 2.6 3.4 3.1 2.7 1.5 2.1 2.6  3.0 3 2.7 2.4 1.9   Notes     S/p c'scope, librado greens 1 boost Decr cig use Diverticulitis; end colostomy stopped smoking; recent admission     Green beans (HH)     Date  3/24 4/1 4/10 5/6 5/20        Total Weekly Dose 67.5mg 72.5 mg 70mg 70mg 70mg 65 mg       INR 1.8 2.23 2.8 3.4 3.1        Notes  Extra dose  Less GLV Less GLV            Warfarin Dosing During Admission 1/28:  Date  2/3 2/4 2/5 2/6 2/7 2/8 2/9 2/10   INR  1.38 1.55 1.88 2.22 2.1 2.2 2.4 2.46   Dose  10mg 10mg 7.5mg 7.5mg  10mg 10mg 10mg 10mg     Phone Interview:  Tablet Strength: 5mg tablet  Verbal Release Authorization signed on 9/19/19-- may speak with Jammie Michael (mother) John Rodriguez (father)  Patient contact info: [phone] (Mobile)    Negatives:  Signs/symptoms of thrombosis, Signs/symptoms of bleeding, Laboratory test error suspected, Change in health, Change in alcohol use, Change in activity, Upcoming invasive procedure, Emergency department visit, Upcoming dental procedure, Missed doses, Extra doses, Change in medications, Change in diet/appetite, Hospital admission, Bruising, Other complaints   Comments:  He confirms taking 10mg daily. He continues taking a MVI gummies (likely no Vit K). He reports that he was not eating  GLV however, he reports that he typically eats lettuce and green beans. Mr Rodriguez has been decreasing his intake of GLV because it has been resulting in high output from his ostomy bag.  He states he will likely eat less GLV in te efuture due to this.  Will decrease his weekly regimen to 65mg/week by  Including one day of 5mg (~7% decrease).  Chose to use 5mg once a week for ease of use since he has 5mg tablets.       Plan:  1. INR is slightly supratherapeutic today at 3.1. Instructed Mr Rodriguez to decrease dose to 5mg tonight, and then start new regimen of warfarin 10mg daily, except 5mg on Wednesdays.    2. Pt to repeat INR 6/3/2020  3. Verbal and written information provided in the clinic. Brigida Rodriguez RBV dosing instructions, expresses understanding by teach back, and has no further questions at this time.  4. Addendum 4/9/2020: Dr. Tom has signed a referral form and it has been faxed to [facility] as of 3/30/2020.    Susan Avilez, PharmD  Pharmacy Resident   5/20/2020  10:48

## 2020-05-29 RX ORDER — CARVEDILOL 6.25 MG/1
6.25 TABLET ORAL 2 TIMES DAILY WITH MEALS
Qty: 60 TABLET | Refills: 5 | Status: SHIPPED | OUTPATIENT
Start: 2020-05-29 | End: 2020-10-03 | Stop reason: SDUPTHER

## 2020-06-03 ENCOUNTER — APPOINTMENT (OUTPATIENT)
Dept: PHARMACY | Facility: HOSPITAL | Age: 40
End: 2020-06-03

## 2020-06-04 ENCOUNTER — TELEPHONE (OUTPATIENT)
Dept: PHARMACY | Facility: HOSPITAL | Age: 40
End: 2020-06-04

## 2020-06-04 DIAGNOSIS — F33.9 RECURRENT MAJOR DEPRESSIVE DISORDER, REMISSION STATUS UNSPECIFIED (HCC): ICD-10-CM

## 2020-06-04 DIAGNOSIS — K57.80 PERFORATED DIVERTICULUM: ICD-10-CM

## 2020-06-04 DIAGNOSIS — B00.9 HERPES: ICD-10-CM

## 2020-06-04 DIAGNOSIS — F41.1 GENERALIZED ANXIETY DISORDER: ICD-10-CM

## 2020-06-04 RX ORDER — PANTOPRAZOLE SODIUM 40 MG/1
40 TABLET, DELAYED RELEASE ORAL
Qty: 30 TABLET | Refills: 0 | Status: SHIPPED | OUTPATIENT
Start: 2020-06-04 | End: 2020-07-30

## 2020-06-04 RX ORDER — DULOXETIN HYDROCHLORIDE 30 MG/1
30 CAPSULE, DELAYED RELEASE ORAL DAILY
Qty: 30 CAPSULE | Refills: 5 | Status: SHIPPED | OUTPATIENT
Start: 2020-06-04 | End: 2020-10-03 | Stop reason: SDUPTHER

## 2020-06-04 RX ORDER — ACYCLOVIR 400 MG/1
400 TABLET ORAL DAILY PRN
Qty: 30 TABLET | Refills: 2 | Status: SHIPPED | OUTPATIENT
Start: 2020-06-04 | End: 2020-10-03 | Stop reason: SDUPTHER

## 2020-06-05 ENCOUNTER — APPOINTMENT (OUTPATIENT)
Dept: PHARMACY | Facility: HOSPITAL | Age: 40
End: 2020-06-05

## 2020-06-08 ENCOUNTER — ANTICOAGULATION VISIT (OUTPATIENT)
Dept: PHARMACY | Facility: HOSPITAL | Age: 40
End: 2020-06-08

## 2020-06-08 DIAGNOSIS — Z86.711 HISTORY OF PULMONARY EMBOLISM: ICD-10-CM

## 2020-06-08 LAB
INR PPP: 2 (ref 0.91–1.09)
PROTHROMBIN TIME: 23.9 SECONDS (ref 10–13.8)

## 2020-06-08 PROCEDURE — 36416 COLLJ CAPILLARY BLOOD SPEC: CPT

## 2020-06-08 PROCEDURE — 85610 PROTHROMBIN TIME: CPT

## 2020-06-08 PROCEDURE — G0463 HOSPITAL OUTPT CLINIC VISIT: HCPCS

## 2020-06-08 NOTE — PROGRESS NOTES
Anticoagulation Clinic Progress Note    Indication: Hx of PE and LE DVT (2010, 2014)  Referring Provider: Vaishali  Initial Warfarin Start Date: 2010  Planned Duration of Therapy: lifelong  Goal INR: 2.0-3.0 (verified Dr Tom 9/19/19)  Current Drug Interactions: Cymbalta and Pantoprazole  Other: d/c Eliquis 9/6/19 due to itching    Diet: green beans 3-4x/week 5/6/2020  Alcohol: None  Tobacco: cessation 2/13/2020  OTC Pain Medication: recommended Tylenol prn    Anticoagulation Clinic INR History:  Date 9/19 10/16 11/7 11/19 12/3 12/9 1/3/2020 2/3-2/10 2/13 2/17 2/24 3/2 3/16   Total Weekly Dose 80mg 80 mg 77.5 mg 77.5mg 57.5 mg 80mg 72.5mg hosp: admission 65mg 67.5 mg 65 mg 65 mg 65mg   INR 2.6 3.4 3.1 2.7 1.5 2.1 2.6  3.0 3 2.7 2.4 1.9   Notes     S/p c'scope, librado greens 1 boost Decr cig use Diverticulitis; end colostomy stopped smoking; recent admission     Green beans (HH)     Date  3/24 4/1 4/10 5/6 5/20 6/8       Total Weekly Dose 67.5mg 72.5 mg 70mg 70mg 70mg 55mg?       INR 1.8 2.23 2.8 3.4 3.1 2.0       Notes  Extra dose  Less GLV Less GLV Missed x1           Warfarin Dosing During Admission 1/28:  Date  2/3 2/4 2/5 2/6 2/7 2/8 2/9 2/10   INR  1.38 1.55 1.88 2.22 2.1 2.2 2.4 2.46   Dose  10mg 10mg 7.5mg 7.5mg  10mg 10mg 10mg 10mg     Phone Interview:  Tablet Strength: 5mg tablet  Verbal Release Authorization signed on 9/19/19-- may speak with Jammie Rodriguez (mother) John Rodriguez (father)  Patient contact info: [phone] (Mobile)    Patient Findings   Positives:  Missed doses   Negatives:  Signs/symptoms of thrombosis, Signs/symptoms of bleeding, Laboratory test error suspected, Change in health, Change in alcohol use, Change in activity, Upcoming invasive procedure, Emergency department visit, Upcoming dental procedure, Extra doses, Change in medications, Change in diet/appetite, Hospital admission, Bruising, Other complaints   Comments:  Mr. Rodriguez reports that he picked up another MVI yesterday - gummies,  No vit K in gummies. He reports the is eating alittle more greens (green beans/ lettuce)     Plan:  1. INR is therapeutic today despite missed dose of warfarin. Instructed Mr Rodriguez to continue warfarin 10mg daily, except 5mg on Wednesdays.    2. Pt to repeat INR 6/15/2020 to ensure WNL  3. Verbal and written information provided in the clinic. Brigida Rodriguez RBV dosing instructions, expresses understanding by teach back, and has no further questions at this time.      Grace Coyle, PharmD  6/8/2020  09:37

## 2020-06-15 ENCOUNTER — APPOINTMENT (OUTPATIENT)
Dept: PHARMACY | Facility: HOSPITAL | Age: 40
End: 2020-06-15

## 2020-06-19 ENCOUNTER — OFFICE VISIT (OUTPATIENT)
Dept: FAMILY MEDICINE CLINIC | Facility: CLINIC | Age: 40
End: 2020-06-19

## 2020-06-19 ENCOUNTER — ANTICOAGULATION VISIT (OUTPATIENT)
Dept: PHARMACY | Facility: HOSPITAL | Age: 40
End: 2020-06-19

## 2020-06-19 VITALS
DIASTOLIC BLOOD PRESSURE: 68 MMHG | HEIGHT: 76 IN | OXYGEN SATURATION: 99 % | WEIGHT: 315 LBS | HEART RATE: 99 BPM | BODY MASS INDEX: 38.36 KG/M2 | SYSTOLIC BLOOD PRESSURE: 128 MMHG

## 2020-06-19 DIAGNOSIS — I50.22 CHRONIC SYSTOLIC CONGESTIVE HEART FAILURE (HCC): ICD-10-CM

## 2020-06-19 DIAGNOSIS — I10 ESSENTIAL HYPERTENSION: ICD-10-CM

## 2020-06-19 DIAGNOSIS — E55.9 VITAMIN D DEFICIENCY: ICD-10-CM

## 2020-06-19 DIAGNOSIS — Z98.890 S/P EXPLORATORY LAPAROTOMY: ICD-10-CM

## 2020-06-19 DIAGNOSIS — Z86.711 HISTORY OF PULMONARY EMBOLISM: ICD-10-CM

## 2020-06-19 DIAGNOSIS — Z00.00 PREVENTATIVE HEALTH CARE: Primary | ICD-10-CM

## 2020-06-19 LAB
INR PPP: 2.5 (ref 0.91–1.09)
PROTHROMBIN TIME: 30.6 SECONDS (ref 10–13.8)

## 2020-06-19 PROCEDURE — G0463 HOSPITAL OUTPT CLINIC VISIT: HCPCS

## 2020-06-19 PROCEDURE — 85610 PROTHROMBIN TIME: CPT

## 2020-06-19 PROCEDURE — 36416 COLLJ CAPILLARY BLOOD SPEC: CPT

## 2020-06-19 PROCEDURE — 99396 PREV VISIT EST AGE 40-64: CPT | Performed by: INTERNAL MEDICINE

## 2020-06-19 NOTE — PROGRESS NOTES
Chief Complaint   Patient presents with   • Annual Exam       HPI:  Brigida Rodriguez is a 40 y.o. male who presents today for annual physical.  No acute complaints or signs today.  Chronic medical conditions are stable.    ROS:  Constitutional: no fevers, night sweats or unexplained weight loss  Eyes: no vision changes  ENT: no runny nose, ear pain, sore throat  Cardio: no chest pain, palpitations  Pulm: no shortness of breath, wheezing, or cough  GI: no abdominal pain or changes in bowel movements  : no difficulty urinating  MSK: no difficulty ambulating, no joint pain  Neuro: no weakness, dizziness or headache  Psych: no trouble sleeping  Endo: no change in appetite      Past Medical History:   Diagnosis Date   • Anxiety    • Depression    • GERD (gastroesophageal reflux disease)    • H/O blood clots    • Heart failure (CMS/HCC)    • Hypertension    • Presence of IVC filter    • Pulmonary embolism (CMS/HCC)     10 YEARS AGO      Family History   Problem Relation Age of Onset   • Diabetes Mother    • Obesity Maternal Grandmother    • Diabetes Maternal Grandmother    • Cancer Father         prostrate   • No Known Problems Sister    • No Known Problems Brother    • No Known Problems Maternal Grandfather    • No Known Problems Paternal Grandmother    • No Known Problems Paternal Grandfather       Social History     Socioeconomic History   • Marital status: Single     Spouse name: Not on file   • Number of children: Not on file   • Years of education: Not on file   • Highest education level: Not on file   Tobacco Use   • Smoking status: Current Every Day Smoker     Packs/day: 0.50     Years: 20.00     Pack years: 10.00     Types: Cigarettes   • Smokeless tobacco: Never Used   Substance and Sexual Activity   • Alcohol use: Yes     Alcohol/week: 6.0 standard drinks     Types: 6 Cans of beer per week   • Drug use: Yes     Types: Marijuana     Comment: everyday smoker   • Sexual activity: Yes     Partners: Female      Birth control/protection: Condom   Social History Narrative    Caffeine 0      No Known Allergies   Immunization History   Administered Date(s) Administered   • FLUARIX/FLUZONE/AFLURIA/FLULAVAL QUAD 11/04/2019        PE:  Vitals:    06/19/20 0817   BP: 128/68   Pulse: 99   SpO2: 99%      Body mass index is 42.14 kg/m².    Gen Appearance: NAD  HEENT: Normocephalic, PERRLA, no thyromegaly, trache midline  Heart: RRR, normal S1 and S2, no murmur  Lungs: CTA b/l, no wheezing, no crackles  Abdomen: Soft, non-tender, non-distended, colostomy in place, no guarding and BSx4  MSK: Moves all extremities well, normal gait, no peripheral edema  Pulses: Palpable and equal b/l  Lymph nodes: No palpable lymphadenopathy   Neuro: No focal deficits      Current Outpatient Medications   Medication Sig Dispense Refill   • acetaminophen (TYLENOL) 325 MG tablet Take 2 tablets by mouth Every 4 (Four) Hours As Needed for Mild Pain , Moderate Pain  or Headache (Do not exceed 4,000mg in 24 hours).     • acyclovir (Zovirax) 400 MG tablet Take 1 tablet by mouth Daily As Needed (PRN). Take no more than 5 doses during flare up. 30 tablet 2   • buPROPion SR (WELLBUTRIN SR) 150 MG 12 hr tablet Take 1 tablet by mouth 2 (Two) Times a Day. 60 tablet 5   • carvedilol (COREG) 6.25 MG tablet Take 1 tablet by mouth 2 (Two) Times a Day With Meals. 60 tablet 5   • docusate sodium 100 MG capsule Take 100 mg by mouth 2 (Two) Times a Day. 60 each 0   • DULoxetine (CYMBALTA) 30 MG capsule Take 1 capsule by mouth Daily. 30 capsule 5   • hydrOXYzine pamoate (VISTARIL) 25 MG capsule Take 1 capsule by mouth 3 (Three) Times a Day As Needed for Anxiety. 90 capsule 0   • lisinopril (PRINIVIL,ZESTRIL) 10 MG tablet Take 0.5 tablets by mouth Daily. 90 tablet 3   • Multiple Vitamins-Minerals (HM MULTIVITAMIN ADULT GUMMY PO) Take  by mouth.     • oxyCODONE-acetaminophen (PERCOCET) 7.5-325 MG per tablet Take one tablet by mouth every 4-6 hours as needed for moderate or  severe pain 20 tablet 0   • pantoprazole (PROTONIX) 40 MG EC tablet Take 1 tablet by mouth Every Morning Before Breakfast. 30 tablet 0   • traZODone (DESYREL) 50 MG tablet Take 50 mg by mouth Every Night.     • warfarin (COUMADIN) 5 MG tablet Take 2 tablets by mouth Every Night. 70 tablet 5     No current facility-administered medications for this visit.         Brigida was seen today for annual exam.    Diagnoses and all orders for this visit:    Preventative health care  Counseled on healthy weight, nutrition, physical activity, cancer screening, and immunizations.  Checking screening blood work.     S/P exploratory laparotomy with sigmoid colectomy/end colostomy/enterotomy repair 1/31/20  F/u with surgery as scheduled.  Essential hypertension  Stable. CCM.   Chronic systolic congestive heart failure (CMS/HCC)  F/u with cards.        Return in about 6 months (around 12/19/2020).     Please note that portions of this document were completed with a voice recognition program. Efforts were made to edit the dictations, but occasionally words are mis-transcribed.

## 2020-06-19 NOTE — PROGRESS NOTES
Anticoagulation Clinic Progress Note    Indication: Hx of PE and LE DVT (2010, 2014)  Referring Provider: Vaishali  Initial Warfarin Start Date: 2010  Planned Duration of Therapy: lifelong  Goal INR: 2.0-3.0 (verified Dr Tom 9/19/19)  Current Drug Interactions: Cymbalta and Pantoprazole  Other: d/c Eliquis 9/6/19 due to itching    Diet: green beans 3-4x/week 5/6/2020  Alcohol: None  Tobacco: cessation 2/13/2020  OTC Pain Medication: recommended Tylenol prn    Anticoagulation Clinic INR History:  Date 9/19 10/16 11/7 11/19 12/3 12/9 1/3/2020 2/3-2/10 2/13 2/17 2/24 3/2 3/16   Total Weekly Dose 80mg 80 mg 77.5 mg 77.5mg 57.5 mg 80mg 72.5mg hosp: admission 65mg 67.5 mg 65 mg 65 mg 65mg   INR 2.6 3.4 3.1 2.7 1.5 2.1 2.6  3.0 3 2.7 2.4 1.9   Notes     S/p c'scope, librado greens 1 boost Decr cig use Diverticulitis; end colostomy stopped smoking; recent admission     Green beans (HH)     Date  3/24 4/1 4/10 5/6 5/20 6/8 6/19      Total Weekly Dose 67.5mg 72.5 mg 70mg 70mg 70mg 55mg? 65mg      INR 1.8 2.23 2.8 3.4 3.1 2.0 2.5      Notes  Extra dose  Less GLV Less GLV Missed x1           Warfarin Dosing During Admission 1/28:  Date  2/3 2/4 2/5 2/6 2/7 2/8 2/9 2/10   INR  1.38 1.55 1.88 2.22 2.1 2.2 2.4 2.46   Dose  10mg 10mg 7.5mg 7.5mg  10mg 10mg 10mg 10mg     Phone Interview:  Tablet Strength: 5mg tablet  Verbal Release Authorization signed on 9/19/19-- may speak with Jammie Michael (mother) John Rodriguez (father)  Patient contact info: [phone] (Mobile)    Patient Findings     Negatives:  Signs/symptoms of thrombosis, Signs/symptoms of bleeding, Laboratory test error suspected, Change in health, Change in alcohol use, Change in activity, Upcoming invasive procedure, Emergency department visit, Upcoming dental procedure, Missed doses, Extra doses, Change in medications, Change in diet/appetite, Hospital admission, Bruising, Other complaints    No changes from appt with Dr Tom.            Plan:  1. INR is therapeutic  today. Instructed Mr Rodriguez to continue warfarin 10mg daily, except 5mg on Wednesdays.    2. Pt to repeat INR ~2 weeks ensure WNL  3. Verbal and written information provided in the clinic. Brigida Rodriguez RBV dosing instructions, expresses understanding by teach back, and has no further questions at this time.    Tara Barger, BrandenD.  06/19/20   13:47

## 2020-07-07 ENCOUNTER — APPOINTMENT (OUTPATIENT)
Dept: PHARMACY | Facility: HOSPITAL | Age: 40
End: 2020-07-07

## 2020-07-09 ENCOUNTER — APPOINTMENT (OUTPATIENT)
Dept: PHARMACY | Facility: HOSPITAL | Age: 40
End: 2020-07-09

## 2020-07-09 DIAGNOSIS — Z86.711 HISTORY OF PULMONARY EMBOLISM: Primary | ICD-10-CM

## 2020-07-16 ENCOUNTER — CONSULT (OUTPATIENT)
Dept: CARDIOLOGY | Facility: CLINIC | Age: 40
End: 2020-07-16

## 2020-07-16 VITALS
OXYGEN SATURATION: 98 % | HEIGHT: 76 IN | TEMPERATURE: 96.6 F | WEIGHT: 315 LBS | SYSTOLIC BLOOD PRESSURE: 122 MMHG | HEART RATE: 64 BPM | BODY MASS INDEX: 38.36 KG/M2 | DIASTOLIC BLOOD PRESSURE: 82 MMHG

## 2020-07-16 DIAGNOSIS — I50.22 CHRONIC SYSTOLIC CONGESTIVE HEART FAILURE (HCC): Primary | ICD-10-CM

## 2020-07-16 PROCEDURE — 99214 OFFICE O/P EST MOD 30 MIN: CPT | Performed by: INTERNAL MEDICINE

## 2020-07-16 PROCEDURE — 93000 ELECTROCARDIOGRAM COMPLETE: CPT | Performed by: INTERNAL MEDICINE

## 2020-07-16 NOTE — PROGRESS NOTES
[facility] at [facility]  Office visit  Brigida Rodriguez  1980    There is no work phone number on file.    VISIT DATE:  7/16/2020    PCP: Elias Tom,   [id number] [facility] [address]    CC:  No chief complaint on file.      Previous cardiac studies and procedures:  2010: Right lower extremity DVT with massive PE requiring TPA, IVC filter placement    2014 left lower extremity DVT while on warfarin    2016 echo at : EF 40 to 50%.  Normal RV size and function, no pulmonary pretension.    February 2020 echo  · Mild mitral valve regurgitation is present.  · Mild tricuspid valve regurgitation is present.  · Estimated EF = 65%.  · Left ventricular systolic function is normal.  · Normal right ventricular cavity size, wall thickness, systolic function and septal motion noted.  · Left ventricular diastolic function is normal.  · No evidence of pulmonary hypertension is present.  · There is no evidence of pericardial effusion.  · No significant structural valvular abnormality demonstrated.    March 2020 lower extremity duplex  · Sub-acute left lower extremity deep vein thrombosis noted in the common femoral.  · Sub-acute right lower extremity deep vein thrombosis noted in the distal femoral.    ASSESSMENT:   Diagnosis Plan   1. Chronic systolic congestive heart failure (CMS/HCC)         PLAN:  Preoperative cardiac or stratification: Low risk for major perioperative cardiovascular complications.  No further medication titration or cardiac testing indicated prior to surgery.  Due to history of recurrent bilateral lower extremity DVT with evidence of residual chronic DVT would consider heparin/Lovenox bridging postoperatively when feasible from a bleeding risk standpoint until INR is therapeutic.    Heart failure with reduced ejection fraction, chronic: Recovery of LV systolic function on current medical therapy.  Currently euvolemic and compensated.  [place] heart class I.   "Continue combination of carvedilol and lisinopril.    Recurrent bilateral lower extremity edema complicated by massive PE status post TPA and IVC filter placement 2010: Recurrent left lower extremity DVT occurred while patient was subtherapeutic on his Coumadin and not particularly adherent to his regimen.  He is currently compliant with all medical therapy.    Subjective  40-year-old gentleman with emergent Suarez's procedure in January of this year for bowel perforation, general surgery now considering colostomy takedown.  He is able to walk a mile and a half on the treadmill each day without limiting dyspnea on exertion.  Has some right upper quadrant sharp pains with exercise which resolved with rest.  No exertional chest discomfort.  No sustained palpitations.  No presyncope or syncope.  No PND orthopnea.  Stable mild bilateral lower extreme edema which worsens during the day and some associated varicosities.  Blood pressures run less than 130/80 mmHg.  History of cocaine use, now abstinent.  Continues to steadily lose weight with dietary modifications.  He is compliant with medical therapy.    PHYSICAL EXAMINATION:  Vitals:    07/16/20 0846   BP: 122/82   BP Location: Left arm   Patient Position: Sitting   Pulse: 64   Temp: 96.6 °F (35.9 °C)   SpO2: 98%   Weight: (!) 154 kg (340 lb 6.4 oz)   Height: 193 cm (76\")     General Appearance:    Alert, cooperative, no distress, appears stated age   Head:    Normocephalic, without obvious abnormality, atraumatic   Eyes:    conjunctiva/corneas clear   Nose:   Nares normal, septum midline, mucosa normal, no drainage   Throat:   Lips, teeth and gums normal   Neck:   Supple, symmetrical, trachea midline, no carotid    bruit or JVD   Lungs:     Clear to auscultation bilaterally, respirations unlabored   Chest Wall:    No tenderness or deformity    Heart:    Regular rate and rhythm, S1 and S2 normal, no murmur, rub   or gallop, normal carotid impulse bilaterally without " bruit.   Abdomen:     Soft, non-tender   Extremities:   Extremities normal, atraumatic, no cyanosis, trivial to 1+ bilateral ankle edema   Pulses:   2+ and symmetric all extremities   Skin:   Skin color, texture, turgor normal, no rashes or lesions       Diagnostic Data:    ECG 12 Lead  Date/Time: 7/16/2020 8:55 AM  Performed by: Imtiaz Martinez III, MD  Authorized by: Imtiaz Martinez III, MD   Comparison: compared with previous ECG from 1/28/2020  Similar to previous ECG  Rhythm: sinus rhythm    Clinical impression: normal ECG          No results found for: CHLPL, TRIG, HDL, LDLDIRECT  Lab Results   Component Value Date    GLUCOSE 116 (H) 02/09/2020    BUN 8 02/09/2020    CREATININE 0.95 02/09/2020     (L) 02/09/2020    K 4.1 02/09/2020     02/09/2020    CO2 23.0 02/09/2020     Lab Results   Component Value Date    HGBA1C 5.60 01/31/2020     Lab Results   Component Value Date    WBC 7.64 02/09/2020    HGB 12.5 (L) 02/09/2020    HCT 37.1 (L) 02/09/2020     02/09/2020       Allergies  No Known Allergies    Current Medications    Current Outpatient Medications:   •  acetaminophen (TYLENOL) 325 MG tablet, Take 2 tablets by mouth Every 4 (Four) Hours As Needed for Mild Pain , Moderate Pain  or Headache (Do not exceed 4,000mg in 24 hours)., Disp: , Rfl:   •  acyclovir (Zovirax) 400 MG tablet, Take 1 tablet by mouth Daily As Needed (PRN). Take no more than 5 doses during flare up., Disp: 30 tablet, Rfl: 2  •  buPROPion SR (WELLBUTRIN SR) 150 MG 12 hr tablet, Take 1 tablet by mouth 2 (Two) Times a Day., Disp: 60 tablet, Rfl: 5  •  carvedilol (COREG) 6.25 MG tablet, Take 1 tablet by mouth 2 (Two) Times a Day With Meals., Disp: 60 tablet, Rfl: 5  •  DULoxetine (CYMBALTA) 30 MG capsule, Take 1 capsule by mouth Daily., Disp: 30 capsule, Rfl: 5  •  hydrOXYzine pamoate (VISTARIL) 25 MG capsule, Take 1 capsule by mouth 3 (Three) Times a Day As Needed for Anxiety., Disp: 90 capsule, Rfl: 0  •  lisinopril  (PRINIVIL,ZESTRIL) 10 MG tablet, Take 0.5 tablets by mouth Daily., Disp: 90 tablet, Rfl: 3  •  Multiple Vitamins-Minerals (HM MULTIVITAMIN ADULT GUMMY PO), Take  by mouth., Disp: , Rfl:   •  pantoprazole (PROTONIX) 40 MG EC tablet, Take 1 tablet by mouth Every Morning Before Breakfast., Disp: 30 tablet, Rfl: 0  •  traZODone (DESYREL) 50 MG tablet, Take 50 mg by mouth Every Night., Disp: , Rfl:   •  warfarin (COUMADIN) 5 MG tablet, Take 2 tablets by mouth Every Night., Disp: 70 tablet, Rfl: 5          ROS  Review of Systems   Constitution: Negative for malaise/fatigue.   Cardiovascular: Positive for leg swelling. Negative for chest pain, dyspnea on exertion, irregular heartbeat, orthopnea, palpitations and paroxysmal nocturnal dyspnea.   Respiratory: Negative for shortness of breath and wheezing.    Musculoskeletal: Negative for neck pain.   Neurological: Negative for headaches.         SOCIAL HX  Social History     Socioeconomic History   • Marital status: Single     Spouse name: Not on file   • Number of children: Not on file   • Years of education: Not on file   • Highest education level: Not on file   Tobacco Use   • Smoking status: Current Every Day Smoker     Packs/day: 0.50     Years: 20.00     Pack years: 10.00     Types: Cigarettes   • Smokeless tobacco: Never Used   Substance and Sexual Activity   • Alcohol use: Yes     Alcohol/week: 6.0 standard drinks     Types: 6 Cans of beer per week   • Drug use: Yes     Types: Marijuana     Comment: everyday smoker   • Sexual activity: Yes     Partners: Female     Birth control/protection: Condom   Social History Narrative    Caffeine 0       FAMILY HX  Family History   Problem Relation Age of Onset   • Diabetes Mother    • Obesity Maternal Grandmother    • Diabetes Maternal Grandmother    • Cancer Father         prostrate   • No Known Problems Sister    • No Known Problems Brother    • No Known Problems Maternal Grandfather    • No Known Problems Paternal Grandmother     • No Known Problems Paternal Grandfather              Imtiaz Martinez III, MD, FACC      Answers for HPI/ROS submitted by the patient on 6/24/2020   Hypertension  What is the primary reason for your visit?: High Blood Pressure  Chronicity: chronic  Onset: more than 1 year ago  Progression since onset: gradually improving  Condition status: controlled  anxiety: Yes  peripheral edema: Yes  sweats: No  CAD risks: obesity, smoking/tobacco exposure, stress  Compliance problems: no compliance problems

## 2020-07-30 ENCOUNTER — TELEPHONE (OUTPATIENT)
Dept: PHARMACY | Facility: HOSPITAL | Age: 40
End: 2020-07-30

## 2020-07-30 DIAGNOSIS — F33.9 RECURRENT MAJOR DEPRESSIVE DISORDER, REMISSION STATUS UNSPECIFIED (HCC): ICD-10-CM

## 2020-07-30 DIAGNOSIS — K57.80 PERFORATED DIVERTICULUM: ICD-10-CM

## 2020-07-30 RX ORDER — PANTOPRAZOLE SODIUM 40 MG/1
TABLET, DELAYED RELEASE ORAL
Qty: 30 TABLET | Refills: 2 | Status: SHIPPED | OUTPATIENT
Start: 2020-07-30 | End: 2020-10-03 | Stop reason: SDUPTHER

## 2020-07-30 RX ORDER — BUPROPION HYDROCHLORIDE 150 MG/1
TABLET, EXTENDED RELEASE ORAL
Qty: 60 TABLET | Refills: 2 | Status: SHIPPED | OUTPATIENT
Start: 2020-07-30 | End: 2020-10-03 | Stop reason: SDUPTHER

## 2020-07-31 ENCOUNTER — ANTICOAGULATION VISIT (OUTPATIENT)
Dept: PHARMACY | Facility: HOSPITAL | Age: 40
End: 2020-07-31

## 2020-07-31 DIAGNOSIS — Z86.711 HISTORY OF PULMONARY EMBOLISM: ICD-10-CM

## 2020-07-31 LAB — INR PPP: 2

## 2020-07-31 PROCEDURE — G0463 HOSPITAL OUTPT CLINIC VISIT: HCPCS

## 2020-07-31 NOTE — PROGRESS NOTES
Anticoagulation Clinic Progress Note    Indication: Hx of PE and LE DVT (2010, 2014)  Referring Provider: Vaishali  Initial Warfarin Start Date: 2010  Planned Duration of Therapy: lifelong  Goal INR: 2.0-3.0 (verified Dr Vaishali 9/19/19)  Will likely need lovenox perioperatively (Vaishali 7/29/20)  Current Drug Interactions: Cymbalta and Pantoprazole  Other: d/c Eliquis 9/6/19 due to itching    Diet: green beans 1-2x/week, salads daily (iceburg lettuce, carrots, and red cabbage mixture)  Alcohol: None  Tobacco: cessation 2/13/2020  OTC Pain Medication: recommended Tylenol prn    Anticoagulation Clinic INR History:  Date 9/19 10/16 11/7 11/19 12/3 12/9 1/3/2020 2/3-2/10 2/13 2/17 2/24 3/2 3/16   Total Weekly Dose 80mg 80 mg 77.5 mg 77.5mg 57.5 mg 80mg 72.5mg hosp: admission 65mg 67.5 mg 65 mg 65 mg 65mg   INR 2.6 3.4 3.1 2.7 1.5 2.1 2.6  3.0 3 2.7 2.4 1.9   Notes     S/p c'scope, librado greens 1 boost Decr cig use Diverticulitis; end colostomy stopped smoking; recent admission     Green beans (HH)     Date  3/24 4/1 4/10 5/6 5/20 6/8 6/19 7/31     Total Weekly Dose 67.5mg 72.5 mg 70mg 70mg 70mg 55mg? 65mg 65mg     INR 1.8 2.23 2.8 3.4 3.1 2.0 2.5 2.0     Notes  Extra dose  Less GLV Less GLV Missed x1  incr GLV         Warfarin Dosing During Admission 1/28:  Date  2/3 2/4 2/5 2/6 2/7 2/8 2/9 2/10   INR  1.38 1.55 1.88 2.22 2.1 2.2 2.4 2.46   Dose  10mg 10mg 7.5mg 7.5mg  10mg 10mg 10mg 10mg     Phone Interview:  Tablet Strength: 5mg tablet  Verbal Release Authorization signed on 9/19/19-- may speak with Jammie Rodriguez (mother) John Rodriguez (father)  Patient contact info: [phone] (Mobile)    Patient Findings   Positives:  Upcoming invasive procedure, Change in diet/appetite   Negatives:  Signs/symptoms of thrombosis, Signs/symptoms of bleeding, Laboratory test error suspected, Change in health, Change in alcohol use, Change in activity, Emergency department visit, Upcoming dental procedure, Missed doses, Extra doses, Change in  medications, Hospital admission, Bruising, Other complaints   Comments:  Has started eating salads every day which consist of red cabbage, carrots, and iceburg lettuce starting this past week. Has reduced green beans to 1-2x a week. Plans to continue this amount of intake and will notify us if he changes it. May have colostomy bag removed soon, but hasn't been scheduled. Dr. Tom has already discussed some perioperative plans with patient in terms of anticoagulation. From appointment on 7/29:  Low risk for major perioperative cardiovascular complications.  No further medication titration or cardiac testing indicated prior to surgery.  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 Will need to see what surgeon wants to do for removal and coordinate care with Vaishali Ley. Patient knows to update us when this is scheduled.     Other pertinent findings from f/u with Dr. Tom on 7/29: Heart failure with reduced ejection fraction, chronic: Recovery of LV systolic function on current medical therapy.  Currently euvolemic and compensated.  [place] heart class I.  Continue combination of carvedilol and lisinopril.   General surgery now considering colostomy takedown [after emergent aiken's procedure for bowel perforation in Jan 2020].  He is able to walk a mile and a half on the treadmill each day without limiting dyspnea on exertion.  Has some right upper quadrant sharp pains with exercise which resolved with rest. Stable mild bilateral lower extreme edema which worsens during the day and some associated varicosities.  Blood pressures run less than 130/80 mmHg.  History of cocaine use, now abstinent.  Continues to steadily lose weight with dietary modifications           Plan:  1. INR is therapeutic today. Instructed Mr Rodriguez to start warfarin 10mg  daily, except 7.5mg on Wednesdays due to increased GLV intake (increase of 3.9%)  2. Pt to repeat INR 2 weeks ensure WNL, 8/14  3. Verbal and written information provided in the clinic. Brigida Rodriguez RBV dosing instructions, expresses understanding by teach back, and has no further questions at this time.    Doretha Joshi, Pharmacy Intern   07/31/20   09:10    I, Tara Barger, [facility], have reviewed the note in full and agree with the assessment and plan.  07/31/20  15:55

## 2020-08-04 DIAGNOSIS — Z86.711 HISTORY OF PULMONARY EMBOLISM: ICD-10-CM

## 2020-08-04 RX ORDER — WARFARIN SODIUM 5 MG/1
TABLET ORAL
Qty: 70 TABLET | Refills: 4 | Status: SHIPPED | OUTPATIENT
Start: 2020-08-04 | End: 2020-10-03 | Stop reason: SDUPTHER

## 2020-08-14 ENCOUNTER — APPOINTMENT (OUTPATIENT)
Dept: PHARMACY | Facility: HOSPITAL | Age: 40
End: 2020-08-14

## 2020-08-17 ENCOUNTER — TELEPHONE (OUTPATIENT)
Dept: PHARMACY | Facility: HOSPITAL | Age: 40
End: 2020-08-17

## 2020-08-17 ENCOUNTER — APPOINTMENT (OUTPATIENT)
Dept: PHARMACY | Facility: HOSPITAL | Age: 40
End: 2020-08-17

## 2020-08-17 NOTE — TELEPHONE ENCOUNTER
Patient LVM cancelling appt for today. This is the second time in <1 week he has Same Day cancelled appt.     Patient said he will call clinic later to reschedule appt

## 2020-08-31 ENCOUNTER — ANTICOAGULATION VISIT (OUTPATIENT)
Dept: PHARMACY | Facility: HOSPITAL | Age: 40
End: 2020-08-31

## 2020-08-31 DIAGNOSIS — Z86.711 HISTORY OF PULMONARY EMBOLISM: ICD-10-CM

## 2020-08-31 LAB
INR PPP: 2.3 (ref 0.91–1.09)
PROTHROMBIN TIME: 27.2 SECONDS (ref 10–13.8)

## 2020-08-31 PROCEDURE — 36416 COLLJ CAPILLARY BLOOD SPEC: CPT

## 2020-08-31 PROCEDURE — 85610 PROTHROMBIN TIME: CPT

## 2020-08-31 PROCEDURE — G0463 HOSPITAL OUTPT CLINIC VISIT: HCPCS

## 2020-08-31 NOTE — PROGRESS NOTES
Anticoagulation Clinic Progress Note    Indication: Hx of PE and LE DVT (2010, 2014)  Referring Provider: Vaishali  Initial Warfarin Start Date: 2010  Planned Duration of Therapy: lifelong  Goal INR: 2.0-3.0 (verified Dr Vaishali 9/19/19)  Will likely need lovenox perioperatively (Vaishali 7/29/20)  Current Drug Interactions: Cymbalta and Pantoprazole  Other: d/c Eliquis 9/6/19 due to itching    Diet: green beans 1-2x/week, salads daily (iceburg lettuce, carrots, and red cabbage mixture)  Alcohol: None  Tobacco: cessation 2/13/2020  OTC Pain Medication: recommended Tylenol prn    Anticoagulation Clinic INR History:  Date 9/19 10/16 11/7 11/19 12/3 12/9 1/3/2020 2/3-2/10 2/13 2/17 2/24 3/2 3/16   Total Weekly Dose 80mg 80 mg 77.5 mg 77.5mg 57.5 mg 80mg 72.5mg hosp: admission 65mg 67.5 mg 65 mg 65 mg 65mg   INR 2.6 3.4 3.1 2.7 1.5 2.1 2.6  3.0 3 2.7 2.4 1.9   Notes     S/p c'scope, librado greens 1 boost Decr cig use Diverticulitis; end colostomy stopped smoking; recent admission     Green beans (HH)     Date  3/24 4/1 4/10 5/6 5/20 6/8 6/19 7/31 8/31    Total Weekly Dose 67.5mg 72.5 mg 70mg 70mg 70mg 55mg? 65mg 65mg 67.5mg    INR 1.8 2.23 2.8 3.4 3.1 2.0 2.5 2.0 2.3    Notes  Extra dose  Less GLV Less GLV Missed x1  incr GLV         Warfarin Dosing During Admission 1/28:  Date  2/3 2/4 2/5 2/6 2/7 2/8 2/9 2/10   INR  1.38 1.55 1.88 2.22 2.1 2.2 2.4 2.46   Dose  10mg 10mg 7.5mg 7.5mg  10mg 10mg 10mg 10mg     Phone Interview:  Tablet Strength: 5mg tablet  Verbal Release Authorization signed on 9/19/19-- may speak with Jammie Rodriguez (mother) John Rodriguez (father)  Patient contact info: [phone] (Mobile)    Patient Findings   Positives:  Upcoming invasive procedure, Change in medications   Negatives:  Signs/symptoms of thrombosis, Signs/symptoms of bleeding, Laboratory test error suspected, Change in health, Change in alcohol use, Change in activity, Emergency department visit, Upcoming dental procedure, Missed doses, Extra  "doses, Change in diet/appetite, Hospital admission, Bruising, Other complaints   Comments:  From previous note: \"May have colostomy bag removed soon, but hasn't been scheduled. Dr. Tom has already discussed some perioperative plans with patient in terms of anticoagulation. From appointment on 7/29:  Low risk for major perioperative cardiovascular complications.  No further medication titration or cardiac testing indicated prior to surgery.  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 Will need to see what surgeon wants to do for removal and coordinate care with Vaishali Ley. Patient knows to update us when this is scheduled. \"       Per Patient-- he has appt with Dr Taye Valenzuela this month-- believes he will likely scheudle colonscopy first then proceed with reversal of colostomy bag prodecure. Aware to call clinic when these are scheduled       Plan:  1. INR is therapeutic today. Instructed Mr Rodriguez to contine warfarin 10mg daily, except 7.5mg on Wednesdays   2. Pt to repeat INR 3 weeks or sooner, pending procedures  3. Verbal and written information provided in the clinic. Brigida Rodriguez RBV dosing instructions, expresses understanding by teach back, and has no further questions at this time.      Tara Barger, PharmD.  08/31/20   08:24      "

## 2020-09-21 ENCOUNTER — TELEPHONE (OUTPATIENT)
Dept: PHARMACY | Facility: HOSPITAL | Age: 40
End: 2020-09-21

## 2020-09-21 ENCOUNTER — APPOINTMENT (OUTPATIENT)
Dept: PHARMACY | Facility: HOSPITAL | Age: 40
End: 2020-09-21

## 2020-09-21 NOTE — TELEPHONE ENCOUNTER
Patient LVM prior to clinic hours this morning requesting same-day appointment cancellation    LVM for patient to reschedule

## 2020-09-22 ENCOUNTER — APPOINTMENT (OUTPATIENT)
Dept: PHARMACY | Facility: HOSPITAL | Age: 40
End: 2020-09-22

## 2020-09-22 NOTE — TELEPHONE ENCOUNTER
"Patient LVM prior to clinic hours to cancel today's appt. He says he is \"going through some personal things\" and will call clinic back to reschedule  "

## 2020-10-03 DIAGNOSIS — Z86.711 HISTORY OF PULMONARY EMBOLISM: ICD-10-CM

## 2020-10-03 DIAGNOSIS — F33.9 RECURRENT MAJOR DEPRESSIVE DISORDER, REMISSION STATUS UNSPECIFIED (HCC): ICD-10-CM

## 2020-10-03 DIAGNOSIS — B00.9 HERPES: ICD-10-CM

## 2020-10-03 DIAGNOSIS — K57.80 PERFORATED DIVERTICULUM: ICD-10-CM

## 2020-10-03 DIAGNOSIS — F41.1 GENERALIZED ANXIETY DISORDER: ICD-10-CM

## 2020-10-05 RX ORDER — BUPROPION HYDROCHLORIDE 150 MG/1
150 TABLET, EXTENDED RELEASE ORAL 2 TIMES DAILY
Qty: 60 TABLET | Refills: 2 | Status: SHIPPED | OUTPATIENT
Start: 2020-10-05 | End: 2020-11-09

## 2020-10-05 RX ORDER — CARVEDILOL 6.25 MG/1
6.25 TABLET ORAL 2 TIMES DAILY WITH MEALS
Qty: 60 TABLET | Refills: 5 | Status: SHIPPED | OUTPATIENT
Start: 2020-10-05 | End: 2021-07-05 | Stop reason: SDUPTHER

## 2020-10-05 RX ORDER — PANTOPRAZOLE SODIUM 40 MG/1
40 TABLET, DELAYED RELEASE ORAL
Qty: 30 TABLET | Refills: 2 | Status: SHIPPED | OUTPATIENT
Start: 2020-10-05 | End: 2021-02-12

## 2020-10-05 RX ORDER — ACYCLOVIR 400 MG/1
400 TABLET ORAL DAILY PRN
Qty: 30 TABLET | Refills: 2 | Status: SHIPPED | OUTPATIENT
Start: 2020-10-05 | End: 2021-02-09 | Stop reason: SDUPTHER

## 2020-10-05 RX ORDER — DULOXETIN HYDROCHLORIDE 30 MG/1
30 CAPSULE, DELAYED RELEASE ORAL DAILY
Qty: 30 CAPSULE | Refills: 5 | Status: SHIPPED | OUTPATIENT
Start: 2020-10-05 | End: 2021-07-05 | Stop reason: SDUPTHER

## 2020-10-05 RX ORDER — WARFARIN SODIUM 5 MG/1
TABLET ORAL
Qty: 70 TABLET | Refills: 4 | Status: SHIPPED | OUTPATIENT
Start: 2020-10-05 | End: 2021-01-11

## 2020-10-06 NOTE — TELEPHONE ENCOUNTER
LVM re:overdue PT/INR lab draw    Patient will be due for 2nd letter on 10/16 if INR is not received

## 2020-10-14 ENCOUNTER — ANTICOAGULATION VISIT (OUTPATIENT)
Dept: PHARMACY | Facility: HOSPITAL | Age: 40
End: 2020-10-14

## 2020-10-14 DIAGNOSIS — Z86.711 HISTORY OF PULMONARY EMBOLISM: ICD-10-CM

## 2020-10-14 LAB
INR PPP: 2.5 (ref 0.91–1.09)
PROTHROMBIN TIME: 29.6 SECONDS (ref 10–13.8)

## 2020-10-14 PROCEDURE — 85610 PROTHROMBIN TIME: CPT

## 2020-10-14 PROCEDURE — G0463 HOSPITAL OUTPT CLINIC VISIT: HCPCS

## 2020-10-14 PROCEDURE — 36416 COLLJ CAPILLARY BLOOD SPEC: CPT

## 2020-10-14 NOTE — PROGRESS NOTES
Anticoagulation Clinic Progress Note    Indication: Hx of PE and LE DVT (2010, 2014)  Referring Provider: Vaishali  Initial Warfarin Start Date: 2010  Planned Duration of Therapy: lifelong  Goal INR: 2.0-3.0 (verified Dr Vaishali 9/19/19)  Will likely need lovenox perioperatively (Vaishali 7/29/20)  Current Drug Interactions: Cymbalta and Pantoprazole  Other: d/c Eliquis 9/6/19 due to itching    Diet: green beans 1-2x/week, salads daily (iceburg lettuce, carrots, and red cabbage mixture)  Alcohol: None  Tobacco: Smokes ~5 cigarettes a week  OTC Pain Medication: Recommended Tylenol prn    Anticoagulation Clinic INR History:  Date 9/19 10/16 11/7 11/19 12/3 12/9 1/3/2020 2/3-2/10 2/13 2/17 2/24 3/2 3/16   Total Weekly Dose 80mg 80 mg 77.5 mg 77.5mg 57.5 mg 80mg 72.5mg hosp: admission 65mg 67.5 mg 65 mg 65 mg 65mg   INR 2.6 3.4 3.1 2.7 1.5 2.1 2.6  3.0 3 2.7 2.4 1.9   Notes     S/p c'scope, librado greens 1 boost Decr cig use Diverticulitis; end colostomy stopped smoking; recent admission     Green beans (HH)     Date  3/24 4/1 4/10 5/6 5/20 6/8 6/19 7/31 8/31 10/14   Total Weekly Dose 67.5mg 72.5 mg 70mg 70mg 70mg 55mg? 65mg 65mg 67.5mg 67.5   INR 1.8 2.23 2.8 3.4 3.1 2.0 2.5 2.0 2.3 2.5   Notes  Extra dose  Less GLV Less GLV Missed x1  incr GLV         Warfarin Dosing During Admission 1/28:  Date  2/3 2/4 2/5 2/6 2/7 2/8 2/9 2/10   INR  1.38 1.55 1.88 2.22 2.1 2.2 2.4 2.46   Dose  10mg 10mg 7.5mg 7.5mg  10mg 10mg 10mg 10mg     Phone Interview:  Tablet Strength: 5mg tablet  Verbal Release Authorization signed on 9/19/19-- may speak with Jammie Rodriguez (mother) John Rodriguez (father)  Patient contact info: [phone] (Mobile)    Patient Findings   Positives:  Upcoming invasive procedure   Negatives:  Signs/symptoms of thrombosis, Signs/symptoms of bleeding, Laboratory test error suspected, Change in health, Change in alcohol use, Change in activity, Emergency department visit, Upcoming dental procedure, Missed doses, Extra doses,  Change in medications, Change in diet/appetite, Hospital admission, Bruising, Other complaints   Comments:  Getting colonoscopy soon and possible colostomy bag reversal, has an appointment with  today. Instructed patient to let us know when a date is set. Denies any signs/symptoms of bleeding, changes in diet, and changes in medications since we last saw him.       Plan:  1. INR is therapeutic today at 2.5. Instructed Mr Rodriguez to contine warfarin 10mg daily, except 7.5mg on Wednesdays   2. Repeat INR on 11/16 or sooner, pending procedures  3. Verbal and written information provided in the clinic. Brigida Rodriguez RBV dosing instructions, expresses understanding by teach back, and has no further questions at this time.    Shahida Mancuso, PharmD  Pharmacy Resident   10/14/2020 09:23 EDT

## 2020-10-19 ENCOUNTER — TRANSCRIBE ORDERS (OUTPATIENT)
Dept: ADMINISTRATIVE | Facility: HOSPITAL | Age: 40
End: 2020-10-19

## 2020-10-19 DIAGNOSIS — K57.20 DIVERTICULITIS OF LARGE INTESTINE WITH PERFORATION AND ABSCESS WITHOUT BLEEDING: Primary | ICD-10-CM

## 2020-10-22 ENCOUNTER — HOSPITAL ENCOUNTER (OUTPATIENT)
Dept: CT IMAGING | Facility: HOSPITAL | Age: 40
End: 2020-10-22

## 2020-10-26 ENCOUNTER — APPOINTMENT (OUTPATIENT)
Dept: CT IMAGING | Facility: HOSPITAL | Age: 40
End: 2020-10-26

## 2020-11-03 ENCOUNTER — HOSPITAL ENCOUNTER (OUTPATIENT)
Dept: CT IMAGING | Facility: HOSPITAL | Age: 40
Discharge: HOME | End: 2020-11-03
Admitting: SURGERY

## 2020-11-03 DIAGNOSIS — K57.20 DIVERTICULITIS OF LARGE INTESTINE WITH PERFORATION AND ABSCESS WITHOUT BLEEDING: ICD-10-CM

## 2020-11-03 PROCEDURE — 0 DIATRIZOATE MEGLUMINE & SODIUM PER 1 ML: Performed by: SURGERY

## 2020-11-03 PROCEDURE — 74176 CT ABD & PELVIS W/O CONTRAST: CPT

## 2020-11-03 RX ADMIN — DIATRIZOATE MEGLUMINE AND DIATRIZOATE SODIUM 15 ML: 660; 100 LIQUID ORAL; RECTAL at 14:15

## 2020-11-09 DIAGNOSIS — I10 ESSENTIAL HYPERTENSION: ICD-10-CM

## 2020-11-09 DIAGNOSIS — F33.9 RECURRENT MAJOR DEPRESSIVE DISORDER, REMISSION STATUS UNSPECIFIED (HCC): ICD-10-CM

## 2020-11-09 RX ORDER — BUPROPION HYDROCHLORIDE 150 MG/1
TABLET, EXTENDED RELEASE ORAL
Qty: 60 TABLET | Refills: 1 | Status: SHIPPED | OUTPATIENT
Start: 2020-11-09 | End: 2021-07-05 | Stop reason: SDUPTHER

## 2020-11-09 RX ORDER — LISINOPRIL 10 MG/1
TABLET ORAL
Qty: 30 TABLET | Refills: 2 | Status: SHIPPED | OUTPATIENT
Start: 2020-11-09 | End: 2021-02-12

## 2020-11-10 ENCOUNTER — APPOINTMENT (OUTPATIENT)
Dept: PREADMISSION TESTING | Facility: HOSPITAL | Age: 40
End: 2020-11-10

## 2020-11-16 ENCOUNTER — APPOINTMENT (OUTPATIENT)
Dept: PHARMACY | Facility: HOSPITAL | Age: 40
End: 2020-11-16

## 2020-11-18 ENCOUNTER — TELEPHONE (OUTPATIENT)
Dept: PHARMACY | Facility: HOSPITAL | Age: 40
End: 2020-11-18

## 2020-11-24 ENCOUNTER — TELEPHONE (OUTPATIENT)
Dept: PHARMACY | Facility: HOSPITAL | Age: 40
End: 2020-11-24

## 2020-11-24 NOTE — TELEPHONE ENCOUNTER
Patient called and LVM after hours yesterday requesting to reschedule appointment.    Have called Mr. Rodriguez back and LVM requesting he call back so we can reschedule.    Of note, if he no shows or reschedules at next appointment, he will be due for first letter. Last INR was 10/14 in clinic.

## 2020-12-02 ENCOUNTER — APPOINTMENT (OUTPATIENT)
Dept: PHARMACY | Facility: HOSPITAL | Age: 40
End: 2020-12-02

## 2020-12-04 ENCOUNTER — APPOINTMENT (OUTPATIENT)
Dept: PHARMACY | Facility: HOSPITAL | Age: 40
End: 2020-12-04

## 2020-12-07 ENCOUNTER — ANTICOAGULATION VISIT (OUTPATIENT)
Dept: PHARMACY | Facility: HOSPITAL | Age: 40
End: 2020-12-07

## 2020-12-07 DIAGNOSIS — Z86.711 HISTORY OF PULMONARY EMBOLISM: ICD-10-CM

## 2020-12-07 LAB
INR PPP: 2.1 (ref 0.91–1.09)
PROTHROMBIN TIME: 25.5 SECONDS (ref 10–13.8)

## 2020-12-07 PROCEDURE — 85610 PROTHROMBIN TIME: CPT

## 2020-12-07 PROCEDURE — 36416 COLLJ CAPILLARY BLOOD SPEC: CPT

## 2020-12-07 PROCEDURE — G0463 HOSPITAL OUTPT CLINIC VISIT: HCPCS

## 2020-12-08 ENCOUNTER — APPOINTMENT (OUTPATIENT)
Dept: PREADMISSION TESTING | Facility: HOSPITAL | Age: 40
End: 2020-12-08

## 2020-12-08 PROCEDURE — U0004 COV-19 TEST NON-CDC HGH THRU: HCPCS

## 2020-12-08 PROCEDURE — C9803 HOPD COVID-19 SPEC COLLECT: HCPCS

## 2020-12-09 LAB — SARS-COV-2 RNA RESP QL NAA+PROBE: NOT DETECTED

## 2020-12-17 NOTE — PROGRESS NOTES
Anticoagulation Clinic Progress Note    Indication: Hx of PE and LE DVT (2010, 2014)  Referring Provider: Vaishali  Initial Warfarin Start Date: 2010  Planned Duration of Therapy: lifelong  Goal INR: 2.0-3.0 (verified Dr Vaishali 9/19/19)  Will likely need lovenox perioperatively (Vaishali 7/29/20)  Current Drug Interactions: Cymbalta and Pantoprazole  Other: d/c Eliquis 9/6/19 due to itching    Diet: green beans 1-2x/week, salads daily (iceburg lettuce, carrots, and red cabbage mixture)  Alcohol: None  Tobacco: Smokes ~5 cigarettes a week  OTC Pain Medication: Recommended Tylenol prn    Anticoagulation Clinic INR History:  Date 9/19 10/16 11/7 11/19 12/3 12/9 1/3/2020 2/3-2/10 2/13 2/17 2/24 3/2 3/16   Total Weekly Dose 80mg 80 mg 77.5 mg 77.5mg 57.5 mg 80mg 72.5mg hosp: admission 65mg 67.5 mg 65 mg 65 mg 65mg   INR 2.6 3.4 3.1 2.7 1.5 2.1 2.6  3.0 3 2.7 2.4 1.9   Notes     S/p c'scope, librado greens 1 boost Decr cig use Diverticulitis; end colostomy stopped smoking; recent admission     Green beans (HH)     Date  3/24 4/1 4/10 5/6 5/20 6/8 6/19 7/31 8/31 10/14 12/7   Total Weekly Dose 67.5mg 72.5 mg 70mg 70mg 70mg 55mg? 65mg 65mg 67.5mg 67.5 67.5 mg   INR 1.8 2.23 2.8 3.4 3.1 2.0 2.5 2.0 2.3 2.5 2.1   Notes  Extra dose  Less GLV Less GLV Missed x1  incr GLV          Warfarin Dosing During Admission 1/28:  Date  2/3 2/4 2/5 2/6 2/7 2/8 2/9 2/10   INR  1.38 1.55 1.88 2.22 2.1 2.2 2.4 2.46   Dose  10mg 10mg 7.5mg 7.5mg  10mg 10mg 10mg 10mg     Phone Interview:  Tablet Strength: 5mg tablet  Verbal Release Authorization signed on 9/19/19-- may speak with Jammie Rodriguez (mother) John Rodriguez (father)  Patient contact info: [phone] (Mobile)    Patient Findings  Positives:  Upcoming invasive procedure   Negatives:  Signs/symptoms of thrombosis, Signs/symptoms of bleeding, Laboratory test error suspected, Change in health, Change in alcohol use, Change in activity, Emergency department visit, Upcoming dental procedure, Missed  Please send to cardiology for refill.    doses, Extra doses, Change in medications, Change in diet/appetite, Hospital admission, Bruising, Other complaints   Comments:  Mr. Rodriguez still planning colonoscopy. No date set at this time. He expressed understanding for letting us know when that procedure is scheduled. He is aware he will likely need to do lovenox injections with that hold. Will let us know if he needs refills, unsure at this time. Denies any changes in medications and diet since last visit. Denies signs/symptoms of bleeding or clot.      Plan:  1. INR is therapeutic today at 2.1. Mr. Rodriguez was non-compliant with instructed follow-up. Instructed Mr Rodriguez to contine warfarin 10mg daily, except 7.5mg on Wednesdays until re-check.  2. Repeat INR on 1/4/21 or sooner, pending procedures.   3. Verbal and written information provided in the clinic. Brigida Rodriguez RBV dosing instructions, expresses understanding by teach back, and has no further questions at this time.  4. Denies refills of warfarin at this time.   Per chart, Mr. Rodriguez to follow-up with Dr. Tom on 12/21.     Siobhan Abernathy, PharmD Candidate 2021 12/07/2020  11:37 Grace ROBISON, PharmD, have reviewed the note in full and agree with the assessment and plan.  12/07/20  13:23 EST

## 2020-12-23 ENCOUNTER — OFFICE VISIT (OUTPATIENT)
Dept: FAMILY MEDICINE CLINIC | Facility: CLINIC | Age: 40
End: 2020-12-23

## 2020-12-23 VITALS
OXYGEN SATURATION: 99 % | DIASTOLIC BLOOD PRESSURE: 74 MMHG | HEART RATE: 71 BPM | HEIGHT: 76 IN | WEIGHT: 315 LBS | SYSTOLIC BLOOD PRESSURE: 124 MMHG | BODY MASS INDEX: 38.36 KG/M2

## 2020-12-23 DIAGNOSIS — F33.9 RECURRENT MAJOR DEPRESSIVE DISORDER, REMISSION STATUS UNSPECIFIED (HCC): ICD-10-CM

## 2020-12-23 DIAGNOSIS — Z86.711 HISTORY OF PULMONARY EMBOLISM: ICD-10-CM

## 2020-12-23 DIAGNOSIS — I10 ESSENTIAL HYPERTENSION: ICD-10-CM

## 2020-12-23 DIAGNOSIS — Z98.890 S/P EXPLORATORY LAPAROTOMY: ICD-10-CM

## 2020-12-23 DIAGNOSIS — F41.1 GENERALIZED ANXIETY DISORDER: Primary | ICD-10-CM

## 2020-12-23 PROCEDURE — 99214 OFFICE O/P EST MOD 30 MIN: CPT | Performed by: INTERNAL MEDICINE

## 2020-12-23 PROCEDURE — 90686 IIV4 VACC NO PRSV 0.5 ML IM: CPT | Performed by: INTERNAL MEDICINE

## 2020-12-23 PROCEDURE — 90471 IMMUNIZATION ADMIN: CPT | Performed by: INTERNAL MEDICINE

## 2020-12-28 NOTE — PROGRESS NOTES
Chief Complaint   Patient presents with   • Hypertension     6 month f/u        HPI:  Brigida Rodriguez is a 40 y.o. male who presents today for follow-up.  No acute concerns today.  Current med conditions are stable.  Take medication as prescribed.  Anxiety and depression stable on duloxetine, taking trazodone at night.    ROS:  Constitutional: no fevers, night sweats or unexplained weight loss  Eyes: no vision changes  ENT: no runny nose, ear pain, sore throat  Cardio: no chest pain, palpitations  Pulm: no shortness of breath, wheezing, or cough  GI: no abdominal pain or changes in bowel movements  : no difficulty urinating  MSK: no difficulty ambulating, no joint pain  Neuro: no weakness, dizziness or headache  Psych: no trouble sleeping  Endo: no change in appetite      Past Medical History:   Diagnosis Date   • Anxiety    • Depression    • GERD (gastroesophageal reflux disease)    • H/O blood clots    • Heart failure (CMS/HCC)    • Hypertension    • Presence of IVC filter    • Pulmonary embolism (CMS/HCC)     10 YEARS AGO      Family History   Problem Relation Age of Onset   • Diabetes Mother    • Obesity Maternal Grandmother    • Diabetes Maternal Grandmother    • Cancer Father         prostrate   • No Known Problems Sister    • No Known Problems Brother    • No Known Problems Maternal Grandfather    • No Known Problems Paternal Grandmother    • No Known Problems Paternal Grandfather       Social History     Socioeconomic History   • Marital status:      Spouse name: Not on file   • Number of children: Not on file   • Years of education: Not on file   • Highest education level: Not on file   Tobacco Use   • Smoking status: Current Every Day Smoker     Packs/day: 0.50     Years: 20.00     Pack years: 10.00     Types: Cigarettes   • Smokeless tobacco: Never Used   Substance and Sexual Activity   • Alcohol use: Yes     Alcohol/week: 6.0 standard drinks     Types: 6 Cans of beer per week   • Drug use:  Yes     Types: Marijuana     Comment: everyday smoker   • Sexual activity: Yes     Partners: Female     Birth control/protection: Condom   Social History Narrative    Caffeine 0      No Known Allergies   Immunization History   Administered Date(s) Administered   • Flu Vaccine Quad PF >36MO 12/21/2016, 12/18/2017, 12/07/2018, 11/04/2019   • Flulaval/Fluarix/Fluzone Quad 11/04/2019, 12/23/2020   • Hepatitis A 12/07/2018   • Pneumococcal Polysaccharide (PPSV23) 12/21/2016   • Tdap 12/21/2016        PE:  Vitals:    12/23/20 1429   BP: 124/74   Pulse: 71   SpO2: 99%      Body mass index is 41.63 kg/m².    Gen Appearance: NAD  HEENT: Normocephalic, PERRLA, no thyromegaly, trache midline  Heart: RRR, normal S1 and S2, no murmur  Lungs: CTA b/l, no wheezing, no crackles  Abdomen: Soft, non-tender, non-distended, no guarding and BSx4  MSK: Moves all extremities well, normal gait, no peripheral edema  Pulses: Palpable and equal b/l  Lymph nodes: No palpable lymphadenopathy   Neuro: No focal deficits      Current Outpatient Medications   Medication Sig Dispense Refill   • acetaminophen (TYLENOL) 325 MG tablet Take 2 tablets by mouth Every 4 (Four) Hours As Needed for Mild Pain , Moderate Pain  or Headache (Do not exceed 4,000mg in 24 hours).     • acyclovir (Zovirax) 400 MG tablet Take 1 tablet by mouth Daily As Needed (PRN). Take no more than 5 doses during flare up. 30 tablet 2   • buPROPion SR (WELLBUTRIN SR) 150 MG 12 hr tablet TAKE ONE TABLET BY MOUTH TWICE A DAY 60 tablet 1   • carvedilol (COREG) 6.25 MG tablet Take 1 tablet by mouth 2 (Two) Times a Day With Meals. 60 tablet 5   • DULoxetine (CYMBALTA) 30 MG capsule Take 1 capsule by mouth Daily. 30 capsule 5   • enoxaparin (LOVENOX) 120 MG/0.8ML solution syringe      • hydrOXYzine pamoate (VISTARIL) 25 MG capsule Take 1 capsule by mouth 3 (Three) Times a Day As Needed for Anxiety. 90 capsule 0   • lisinopril (PRINIVIL,ZESTRIL) 10 MG tablet TAKE ONE TABLET BY MOUTH DAILY  30 tablet 2   • Multiple Vitamins-Minerals (HM MULTIVITAMIN ADULT GUMMY PO) Take  by mouth.     • pantoprazole (PROTONIX) 40 MG EC tablet Take 1 tablet by mouth Every Morning Before Breakfast. 30 tablet 2   • traZODone (DESYREL) 50 MG tablet Take 50 mg by mouth Every Night.     • warfarin (COUMADIN) 5 MG tablet Take as instructed by anticoagulation clinic 70 tablet 4     No current facility-administered medications for this visit.         Diagnoses and all orders for this visit:    1. Generalized anxiety disorder (Primary)  Stable.  Continue current medication.  2. Recurrent major depressive disorder, remission status unspecified (CMS/HCC)  See above.  3. History of DVT/ PE on home coumadin with IVC filter in place  Following with anticoagulating.  Will be switching to Lovenox for upcoming GI procedure.  4. S/P exploratory laparotomy with sigmoid colectomy/end colostomy/enterotomy repair 1/31/20  Follow-up with general surgery as scheduled.  5. Essential hypertension  Well-controlled.  Continue current medication.  History of heart failure following with cardiology.  Other orders  -     Fluarix Quad >6 Months ([id number])         No follow-ups on file.     Please note that portions of this document were completed with a voice recognition program. Efforts were made to edit the dictations, but occasionally words are mis-transcribed.

## 2021-01-04 ENCOUNTER — APPOINTMENT (OUTPATIENT)
Dept: PHARMACY | Facility: HOSPITAL | Age: 41
End: 2021-01-04

## 2021-01-05 ENCOUNTER — APPOINTMENT (OUTPATIENT)
Dept: PREADMISSION TESTING | Facility: HOSPITAL | Age: 41
End: 2021-01-05

## 2021-01-05 ENCOUNTER — ANTICOAGULATION VISIT (OUTPATIENT)
Dept: PHARMACY | Facility: HOSPITAL | Age: 41
End: 2021-01-05

## 2021-01-05 ENCOUNTER — TELEPHONE (OUTPATIENT)
Dept: PHARMACY | Facility: HOSPITAL | Age: 41
End: 2021-01-05

## 2021-01-05 DIAGNOSIS — Z86.711 HISTORY OF PULMONARY EMBOLISM: ICD-10-CM

## 2021-01-05 LAB
INR PPP: 1.1 (ref 0.91–1.09)
PROTHROMBIN TIME: 13.7 SECONDS (ref 10–13.8)

## 2021-01-05 PROCEDURE — 85610 PROTHROMBIN TIME: CPT

## 2021-01-05 PROCEDURE — G0463 HOSPITAL OUTPT CLINIC VISIT: HCPCS

## 2021-01-05 PROCEDURE — 36416 COLLJ CAPILLARY BLOOD SPEC: CPT

## 2021-01-05 PROCEDURE — C9803 HOPD COVID-19 SPEC COLLECT: HCPCS

## 2021-01-05 PROCEDURE — U0004 COV-19 TEST NON-CDC HGH THRU: HCPCS

## 2021-01-05 NOTE — PROGRESS NOTES
Anticoagulation Clinic Progress Note    Indication: Hx of PE and LE DVT (2010, 2014)  Referring Provider: Vaishali  Initial Warfarin Start Date: 2010  Planned Duration of Therapy: lifelong  Goal INR: 2.0-3.0 (verified Dr Vaishali 9/19/19)  Will likely need lovenox perioperatively (Vaishali 7/29/20)  Current Drug Interactions: Cymbalta and Pantoprazole  Other: d/c Eliquis 9/6/19 due to itching    Diet: green beans 1-2x/week, salads daily (iceburg lettuce, carrots, and red cabbage mixture)  Alcohol: None  Tobacco: Smokes ~5 cigarettes a week  OTC Pain Medication: Recommended Tylenol prn    Anticoagulation Clinic INR History:  Date 9/19 10/16 11/7 11/19 12/3 12/9 1/3/2020 2/3-2/10 2/13 2/17 2/24 3/2 3/16   Total Weekly Dose 80mg 80 mg 77.5 mg 77.5mg 57.5 mg 80mg 72.5mg hosp: admission 65mg 67.5 mg 65 mg 65 mg 65mg   INR 2.6 3.4 3.1 2.7 1.5 2.1 2.6  3.0 3 2.7 2.4 1.9   Notes     S/p c'scope, librado greens 1 boost Decr cig use Diverticulitis; end colostomy stopped smoking; recent admission     Green beans (HH)     Date  3/24 4/1 4/10 5/6 5/20 6/8 6/19 7/31 8/31 10/14 12/7 1/5     Total Weekly Dose 67.5mg 72.5 mg 70mg 70mg 70mg 55mg? 65mg 65mg 67.5mg 67.5 67.5 mg 27.5mg     INR 1.8 2.23 2.8 3.4 3.1 2.0 2.5 2.0 2.3 2.5 2.1 1.1     Notes  Extra dose  Less GLV Less GLV Missed x1  incr GLV    Hold x4         Warfarin Dosing During Admission 1/28:  Date  2/3 2/4 2/5 2/6 2/7 2/8 2/9 2/10   INR  1.38 1.55 1.88 2.22 2.1 2.2 2.4 2.46   Dose  10mg 10mg 7.5mg 7.5mg  10mg 10mg 10mg 10mg     Phone Interview:  Tablet Strength: 5mg tablet  Verbal Release Authorization signed on 9/19/19-- may speak with Jammie Rodriguez (mother) John Rodriguez (father)  Patient contact info: [phone] (Mobile)    Patient Findings  Positives:  Upcoming invasive procedure, Missed doses   Negatives:  Signs/symptoms of thrombosis, Signs/symptoms of bleeding, Laboratory test error suspected, Change in health, Change in alcohol use, Change in activity, Emergency department  visit, Upcoming dental procedure, Extra doses, Change in medications, Change in diet/appetite, Hospital admission, Bruising, Other complaints   Comments:  Did not contact clinic once procedure was scheduled as directed, stopped taking warfarin 1/1 for colonoscopy 1/8. Reports office wanted him off warfarin x 7 days prior to procedure. Unfortunately, incorrect strength of lovenox was called in by provider, office called in 120mg q12h, patient current weight is 155kg. Reports he has 2 boxes of syringes + 6 additional. Have sent updated RX and patient aware to ONLY use 120mg syringes       Plan:  1. INR is baseline  today at 1.1 as patient began holding warfarin 1/1 for colonoscopy 1/8. Clinic was unaware. Patient has been bridging with lovenox 120mg however considering most recent weight:155kg, sent in new rx for lovenox 150mg q12h. Patient aware to put away the 120mg syringes and not use at this time. Instructed Mr Rodriguez to follow the below plan:     1/1: Hold warfarin 6 days / Lovenox 150mg q12h   1/7: hold warfarin lovenox AM ONLY  1/8: procedure, resume anticoagulation when cleared by Dr Valenzuela  1/9: resume warfarin [2 boosted doses to 15mg] + lovenox 150mg q12h  1/12: Repeat INR    2. Repeat INR on 1/12 follow procedure  3. Verbal and written information provided in the clinic. Brigida Rodriguez RBV dosing instructions, expresses understanding by teach back, and has no further questions at this time.  4. ivent open-- sent plan for approval to Dr Tom  Per chart, Mr. Rodriguez to follow-up with Dr. Tom on 12/21.     Tara Barger, PharmD.  01/05/21   14:37 EST    ADDENDUM:    Plan approved by Dr aVishali Barger, PharmD.  01/07/21   08:26 EST

## 2021-01-05 NOTE — TELEPHONE ENCOUNTER
Dr. Tom,     We were informed today that Brigida Rodriguez is undergoing colonoscopy on 1/8 with Dr Valenzuela at [facility]. Patient reports Dr. Valenzuela directed him to begin holding warfarin 1/1, clinic was unaware. Patient will resume warfarin night of or night after procedure, pending polyp removal    Brigida Rodriguez is a 40 y.o. male on warfarin for Hx of PE and LE DVT (2010, 2014), and considering length of warfarin hold has been bridging with lovenox. Of note, 120mg syringes were sent in; however most recent patient weight 155kg so have sent in new rx for 150mg syringes  for 1mg/kg q12h dosing. [last Scr 2/2020 1.04]    1/1: Hold warfarin 6 days / Lovenox 150mg q12h   1/7: hold warfarin lovenox AM ONLY  1/8: procedure, resume anticoagulation if cleared by Dr Valenzuela  1/9: resume warfarin [2 boosted doses] + lovenox 150mg q12h  1/12: Repeat INR    Please advise if you are agreeable to plan above or if you prefer an alternative approach to Brigida Rodriguez's anticoagulation plan for the upcoming procedure.    Thank you,    Tara Barger, PharmD    [facility] Anticoagulation Team  (t) [phone]  (f) [phone]

## 2021-01-06 LAB — SARS-COV-2 RNA RESP QL NAA+PROBE: NOT DETECTED

## 2021-01-11 DIAGNOSIS — Z86.711 HISTORY OF PULMONARY EMBOLISM: ICD-10-CM

## 2021-01-11 RX ORDER — WARFARIN SODIUM 5 MG/1
TABLET ORAL
Qty: 70 TABLET | Refills: 3 | Status: SHIPPED | OUTPATIENT
Start: 2021-01-11 | End: 2021-07-05 | Stop reason: SDUPTHER

## 2021-01-12 ENCOUNTER — APPOINTMENT (OUTPATIENT)
Dept: PHARMACY | Facility: HOSPITAL | Age: 41
End: 2021-01-12

## 2021-01-13 ENCOUNTER — ANTICOAGULATION VISIT (OUTPATIENT)
Dept: PHARMACY | Facility: HOSPITAL | Age: 41
End: 2021-01-13

## 2021-01-13 DIAGNOSIS — Z86.711 HISTORY OF PULMONARY EMBOLISM: ICD-10-CM

## 2021-01-13 LAB
INR PPP: 1.2 (ref 0.91–1.09)
PROTHROMBIN TIME: 14.5 SECONDS (ref 10–13.8)

## 2021-01-13 PROCEDURE — G0463 HOSPITAL OUTPT CLINIC VISIT: HCPCS

## 2021-01-13 PROCEDURE — 36416 COLLJ CAPILLARY BLOOD SPEC: CPT

## 2021-01-13 PROCEDURE — 85610 PROTHROMBIN TIME: CPT

## 2021-01-13 NOTE — PROGRESS NOTES
Anticoagulation Clinic Progress Note    Indication: Hx of PE and LE DVT (2010, 2014)  Referring Provider: Vaishali  Initial Warfarin Start Date: 2010  Planned Duration of Therapy: lifelong  Goal INR: 2.0-3.0 (verified Dr Vaishali 9/19/19)  Will likely need lovenox perioperatively (Vaishali 7/29/20)  Current Drug Interactions: Cymbalta and Pantoprazole  Other: d/c Eliquis 9/6/19 due to itching    Diet: green beans 1-2x/week, salads daily (iceburg lettuce, carrots, and red cabbage mixture)  Alcohol: None  Tobacco: Smokes ~5 cigarettes a week  OTC Pain Medication: Recommended Tylenol prn    Anticoagulation Clinic INR History:  Date 9/19 10/16 11/7 11/19 12/3 12/9 1/3/2020 2/3-2/10 2/13 2/17 2/24 3/2 3/16   Total Weekly Dose 80mg 80 mg 77.5 mg 77.5mg 57.5 mg 80mg 72.5mg hosp: admission 65mg 67.5 mg 65 mg 65 mg 65mg   INR 2.6 3.4 3.1 2.7 1.5 2.1 2.6  3.0 3 2.7 2.4 1.9   Notes     S/p c'scope, librado greens 1 boost Decr cig use Diverticulitis; end colostomy stopped smoking; recent admission     Green beans (HH)     Date  3/24 4/1 4/10 5/6 5/20 6/8 6/19 7/31 8/31 10/14 12/7 1/5/2021 1/8 1/13    Total Weekly Dose 67.5mg 72.5 mg 70mg 70mg 70mg 55mg? 65mg 65mg 67.5mg 67.5 67.5 mg 27.5mg colonoscopy 45mg    INR 1.8 2.23 2.8 3.4 3.1 2.0 2.5 2.0 2.3 2.5 2.1 1.1  1.2    Notes  Extra dose  Less GLV Less GLV Missed x1  incr GLV    Hold x4 Hold x 7 days missed x1        Warfarin Dosing During Admission 1/28:  Date  2/3 2/4 2/5 2/6 2/7 2/8 2/9 2/10   INR  1.38 1.55 1.88 2.22 2.1 2.2 2.4 2.46   Dose  10mg 10mg 7.5mg 7.5mg  10mg 10mg 10mg 10mg     Phone Interview:  Tablet Strength: 5mg tablet  Verbal Release Authorization signed on 9/19/19-- may speak with Jammie Rodriguez (mother) John Rodriguez (father)  Patient contact info: [phone] (Mobile)    Patient Findings  Positives:  Missed doses   Negatives:  Signs/symptoms of thrombosis, Signs/symptoms of bleeding, Laboratory test error suspected, Change in health, Change in alcohol use, Change in  activity, Upcoming invasive procedure, Emergency department visit, Upcoming dental procedure, Extra doses, Change in medications, Change in diet/appetite, Hospital admission, Bruising, Other complaints   Comments:  Mr. Rodriguez misunderstood and has only been taking lovenox 150mg in the AM dose only. He reports that he also has taken 15mg daily except he missed a dose on Monday night.      Plan:  1. INR is baseline  today at 1.2 following hold for colonscopy. At this time, Mr. Rodriguez will increase dose tonight to 10mg and then continue 10mg daily. Will continue lovenox 150mg q12h. Provided education. Mr. Rodriguez RBV dosing directions until Monday.  2. Mr. Rodriguez to call back with how many syringes they have at home to determine if he needs more called in.  3. Repeat INR Monday 1/18/2021  4. Verbal and written information provided in the clinic. Brigida Rodriguez RBV dosing instructions, expresses understanding by teach back, and has no further questions at this time.    Grace Coyle, PharmD  01/13/21   13:06 EST

## 2021-01-18 ENCOUNTER — APPOINTMENT (OUTPATIENT)
Dept: PHARMACY | Facility: HOSPITAL | Age: 41
End: 2021-01-18

## 2021-01-20 ENCOUNTER — ANTICOAGULATION VISIT (OUTPATIENT)
Dept: PHARMACY | Facility: HOSPITAL | Age: 41
End: 2021-01-20

## 2021-01-20 DIAGNOSIS — Z86.711 HISTORY OF PULMONARY EMBOLISM: ICD-10-CM

## 2021-01-20 LAB
INR PPP: 1.8 (ref 0.91–1.09)
PROTHROMBIN TIME: 22.1 SECONDS (ref 10–13.8)

## 2021-01-20 PROCEDURE — 85610 PROTHROMBIN TIME: CPT

## 2021-01-20 PROCEDURE — G0463 HOSPITAL OUTPT CLINIC VISIT: HCPCS

## 2021-01-20 PROCEDURE — 36416 COLLJ CAPILLARY BLOOD SPEC: CPT

## 2021-01-20 NOTE — PROGRESS NOTES
Anticoagulation Clinic Progress Note    Indication: Hx of PE and LE DVT (2010, 2014)  Referring Provider: Vaishali  Initial Warfarin Start Date: 2010  Planned Duration of Therapy: lifelong  Goal INR: 2.0-3.0 (verified Dr Vaishali 9/19/19)  Will likely need lovenox perioperatively (Vaishali 7/29/20)  Current Drug Interactions: Cymbalta and Pantoprazole  Other: d/c Eliquis 9/6/19 due to itching    Diet: green beans 1-2x/week, salads daily (iceburg lettuce, carrots, and red cabbage mixture)  Alcohol: None  Tobacco: Smokes ~5 cigarettes a week  OTC Pain Medication: Recommended Tylenol prn    Anticoagulation Clinic INR History:  Date 9/19 10/16 11/7 11/19 12/3 12/9 1/3/2020 2/3-2/10 2/13 2/17 2/24 3/2 3/16   Total Weekly Dose 80mg 80 mg 77.5 mg 77.5mg 57.5 mg 80mg 72.5mg hosp: admission 65mg 67.5 mg 65 mg 65 mg 65mg   INR 2.6 3.4 3.1 2.7 1.5 2.1 2.6  3.0 3 2.7 2.4 1.9   Notes     S/p c'scope, librado greens 1 boost Decr cig use Diverticulitis; end colostomy stopped smoking; recent admission     Green beans (HH)     Date  3/24 4/1 4/10 5/6 5/20 6/8 6/19 7/31 8/31 10/14 12/7 1/5/2021 1/8 1/13 1/20   Total Weekly Dose 67.5mg 72.5 mg 70mg 70mg 70mg 55mg? 65mg 65mg 67.5mg 67.5 67.5 mg 27.5mg colonoscopy 45mg 70mg   INR 1.8 2.23 2.8 3.4 3.1 2.0 2.5 2.0 2.3 2.5 2.1 1.1  1.2 1.8   Notes  Extra dose  Less GLV Less GLV Missed x1  incr GLV    Hold x4 Hold x 7 days missed x1        Warfarin Dosing During Admission 1/28:  Date  2/3 2/4 2/5 2/6 2/7 2/8 2/9 2/10   INR  1.38 1.55 1.88 2.22 2.1 2.2 2.4 2.46   Dose  10mg 10mg 7.5mg 7.5mg  10mg 10mg 10mg 10mg     Phone Interview:  Tablet Strength: 5mg tablet  Verbal Release Authorization signed on 9/19/19-- may speak with Jammie Rodriguez (mother) John Rodriguez (father)  Patient contact info: [phone] (Mobile)    Patient Findings    Positives:  Change in diet/appetite   Negatives:  Signs/symptoms of thrombosis, Signs/symptoms of bleeding, Laboratory test error suspected, Change in health, Change in  alcohol use, Change in activity, Upcoming invasive procedure, Emergency department visit, Upcoming dental procedure, Missed doses, Extra doses, Change in medications, Hospital admission, Bruising, Other complaints   Comments:  Patient only used lovenox bride through Saturday. Has been eating a salad daily this week, more than his usual 2x weekly. Does not plan to continue once he runs out, discussed consistency         Plan:  1. INR is subtherapeutic today at 1.8, but improved.  Mr. Rodriguez will increase dose tonight to 10mg and then continue 10mg daily except 7.5mg Wednesdays. Will discontinue lovenox at this time.   2. . Repeat INR in 1 week to ensure WNL  3. Verbal and written information provided in the clinic. Brigida Rodriguez RBV dosing instructions, expresses understanding by teach back, and has no further questions at this time.    Tara Barger, BrandenD.  01/20/21   09:22 EST

## 2021-01-27 ENCOUNTER — ANTICOAGULATION VISIT (OUTPATIENT)
Dept: PHARMACY | Facility: HOSPITAL | Age: 41
End: 2021-01-27

## 2021-01-27 DIAGNOSIS — Z86.711 HISTORY OF PULMONARY EMBOLISM: ICD-10-CM

## 2021-01-27 LAB
INR PPP: 1.7 (ref 0.91–1.09)
PROTHROMBIN TIME: 20.2 SECONDS (ref 10–13.8)

## 2021-01-27 PROCEDURE — G0463 HOSPITAL OUTPT CLINIC VISIT: HCPCS

## 2021-01-27 PROCEDURE — 85610 PROTHROMBIN TIME: CPT

## 2021-01-27 PROCEDURE — 36416 COLLJ CAPILLARY BLOOD SPEC: CPT

## 2021-01-27 NOTE — PROGRESS NOTES
Anticoagulation Clinic Progress Note    Indication: Hx of PE and LE DVT (2010, 2014)  Referring Provider: Vaishali  Initial Warfarin Start Date: 2010  Planned Duration of Therapy: lifelong  Goal INR: 2.0-3.0 (verified Dr Vaishali 9/19/19)  Will likely need lovenox perioperatively (Vaishali 7/29/20)  Current Drug Interactions: Cymbalta and Pantoprazole  Other: d/c Eliquis 9/6/19 due to itching    Diet: green beans,broccoli (3-4X/week), salads daily (iceburg lettuce, carrots, and red cabbage mixture)  Alcohol: None  Tobacco: Smokes ~5 cigarettes a week  OTC Pain Medication: Recommended Tylenol prn    Anticoagulation Clinic INR History:  Date 9/19 10/16 11/7 11/19 12/3 12/9 1/3/2020 2/3-2/10 2/13 2/17 2/24 3/2 3/16   Total Weekly Dose 80mg 80 mg 77.5 mg 77.5mg 57.5 mg 80mg 72.5mg hosp: admission 65mg 67.5 mg 65 mg 65 mg 65mg   INR 2.6 3.4 3.1 2.7 1.5 2.1 2.6  3.0 3 2.7 2.4 1.9   Notes     S/p c'scope, librado greens 1 boost Decr cig use Diverticulitis; end colostomy stopped smoking; recent admission     Green beans (HH)     Date  3/24 4/1 4/10 5/6 5/20 6/8 6/19 7/31 8/31 10/14 12/7 1/5/2021 1/8 1/13 1/20 1/27 2/1   Total Weekly Dose 67.5mg 72.5 mg 70mg 70mg 70mg 55mg? 65mg 65mg 67.5mg 67.5 67.5 mg 27.5mg colonoscopy 45mg 70mg 70mg  75mg   INR 1.8 2.23 2.8 3.4 3.1 2.0 2.5 2.0 2.3 2.5 2.1 1.1  1.2 1.8 1.7    Notes  Extra dose  Less GLV Less GLV Missed x1  incr GLV    Hold x4 Hold x 7 days missed x1          Warfarin Dosing During Admission 1/28:  Date  2/3 2/4 2/5 2/6 2/7 2/8 2/9 2/10   INR  1.38 1.55 1.88 2.22 2.1 2.2 2.4 2.46   Dose  10mg 10mg 7.5mg 7.5mg  10mg 10mg 10mg 10mg     Phone Interview:  Tablet Strength: 5mg tablet  Verbal Release Authorization signed on 9/19/19-- may speak with Jammie Michael (mother) John Rodriguez (father)  Patient contact info: [phone] (Mobile)    Patient Findings    Negatives:  Signs/symptoms of thrombosis, Signs/symptoms of bleeding, Laboratory test error suspected, Change in health, Change in  alcohol use, Change in activity, Upcoming invasive procedure, Emergency department visit, Upcoming dental procedure, Missed doses, Extra doses, Change in medications, Change in diet/appetite, Hospital admission, Bruising, Other complaints   Comments:  Patient stated that he ate broccoli about 3-4X this past week and iceberg salad daily. He plans to continue this increased GLV intake moving forward. Praised patient for attempting to be more healthy and thoroughly counseled him on the importance of consistency of GLV in his diet.       Plan:  1. INR remains subtherapeutic today at 1.7. Instructed Mr. Rodriguez to increase tonight and Fridays dose to 12.5mg with 10mg ROW until recheck.    2. Repeat INR 2/1 (will have received 75mg). If INR therapeutic at this time will consider increasing maintenance dose.  3. Verbal and written information provided in the clinic. Brigida Rodriguez RBV dosing instructions, expresses understanding by teach back, and has no further questions at this time.    Shahida Mancuso, PharmD  Pharmacy Resident   1/27/2021 09:28 EST

## 2021-02-01 ENCOUNTER — APPOINTMENT (OUTPATIENT)
Dept: PHARMACY | Facility: HOSPITAL | Age: 41
End: 2021-02-01

## 2021-02-02 ENCOUNTER — ANTICOAGULATION VISIT (OUTPATIENT)
Dept: PHARMACY | Facility: HOSPITAL | Age: 41
End: 2021-02-02

## 2021-02-02 DIAGNOSIS — Z86.711 HISTORY OF PULMONARY EMBOLISM: ICD-10-CM

## 2021-02-02 LAB
INR PPP: 2.5 (ref 0.91–1.09)
PROTHROMBIN TIME: 30.2 SECONDS (ref 10–13.8)

## 2021-02-02 PROCEDURE — 36416 COLLJ CAPILLARY BLOOD SPEC: CPT

## 2021-02-02 PROCEDURE — 85610 PROTHROMBIN TIME: CPT

## 2021-02-02 PROCEDURE — G0463 HOSPITAL OUTPT CLINIC VISIT: HCPCS

## 2021-02-02 NOTE — PROGRESS NOTES
Anticoagulation Clinic Progress Note    Indication: Hx of PE and LE DVT (2010, 2014)  Referring Provider: Vaishali  Initial Warfarin Start Date: 2010  Planned Duration of Therapy: lifelong  Goal INR: 2.0-3.0 (verified Dr Vaishali 9/19/19)  Will likely need lovenox perioperatively (Vaishali 7/29/20)  Current Drug Interactions: Cymbalta and Pantoprazole  Other: d/c Eliquis 9/6/19 due to itching    Diet: green beans,broccoli (3-4X/week), salads daily (iceburg lettuce, carrots, and red cabbage mixture)  Alcohol: None  Tobacco: Smokes ~5 cigarettes a week  OTC Pain Medication: Recommended Tylenol prn    Anticoagulation Clinic INR History:  Date 9/19 10/16 11/7 11/19 12/3 12/9 1/3/2020 2/3-2/10 2/13 2/17 2/24 3/2 3/16   Total Weekly Dose 80mg 80 mg 77.5 mg 77.5mg 57.5 mg 80mg 72.5mg hosp: admission 65mg 67.5 mg 65 mg 65 mg 65mg   INR 2.6 3.4 3.1 2.7 1.5 2.1 2.6  3.0 3 2.7 2.4 1.9   Notes     S/p c'scope, librado greens 1 boost Decr cig use Diverticulitis; end colostomy stopped smoking; recent admission     Green beans (HH)     Date  3/24 4/1 4/10 5/6 5/20 6/8 6/19 7/31 8/31 10/14 12/7 1/5/2021 1/8 1/13 1/20 1/27 2/2   Total Weekly Dose 67.5mg 72.5 mg 70mg 70mg 70mg 55mg? 65mg 65mg 67.5mg 67.5 67.5 mg 27.5mg colonoscopy 45mg 70mg 70mg  75mg   INR 1.8 2.23 2.8 3.4 3.1 2.0 2.5 2.0 2.3 2.5 2.1 1.1  1.2 1.8 1.7 2.5   Notes  Extra dose  Less GLV Less GLV Missed x1  incr GLV    Hold x4 Hold x 7 days missed x1          Warfarin Dosing During Admission 1/28:  Date  2/3 2/4 2/5 2/6 2/7 2/8 2/9 2/10   INR  1.38 1.55 1.88 2.22 2.1 2.2 2.4 2.46   Dose  10mg 10mg 7.5mg 7.5mg  10mg 10mg 10mg 10mg     Phone Interview:  Tablet Strength: 5mg tablet  Verbal Release Authorization signed on 9/19/19-- may speak with Jammie Michael (mother) John Rodriguez (father)  Patient contact info: [phone] (Mobile)    Patient Findings    Negatives:  Signs/symptoms of thrombosis, Signs/symptoms of bleeding, Laboratory test error suspected, Change in health, Change in  alcohol use, Change in activity, Upcoming invasive procedure, Emergency department visit, Upcoming dental procedure, Missed doses, Extra doses, Change in medications, Change in diet/appetite, Hospital admission, Bruising, Other complaints   Comments:  Patient continued to have broccoli and salads weekly as noted above. He says for the time being he plans to continue this.            Plan:  1. INR remains therapeutic today at 2.5 after boosted dose last week. Given consistent GLV intake, instructed Mr. Rodriguez to increase maintenance dose to 10mg of warfarin daily from 10mg daily except 5mg on Wed.    2. Repeat INR 2/9 (will have received 70mg).  3. Verbal and written information provided in the clinic. Brigida Rodriguez RBV dosing instructions, expresses understanding by teach back, and has no further questions at this time.    Thank you,    Natalio Ziegler PharmD, JOSUÉ  2/2/2021  16:00 EST

## 2021-02-09 ENCOUNTER — APPOINTMENT (OUTPATIENT)
Dept: PHARMACY | Facility: HOSPITAL | Age: 41
End: 2021-02-09

## 2021-02-09 DIAGNOSIS — B00.9 HERPES: ICD-10-CM

## 2021-02-09 RX ORDER — ACYCLOVIR 400 MG/1
400 TABLET ORAL DAILY PRN
Qty: 30 TABLET | Refills: 2 | Status: SHIPPED | OUTPATIENT
Start: 2021-02-09 | End: 2021-07-05 | Stop reason: SDUPTHER

## 2021-02-12 ENCOUNTER — TELEPHONE (OUTPATIENT)
Dept: PHARMACY | Facility: HOSPITAL | Age: 41
End: 2021-02-12

## 2021-02-12 DIAGNOSIS — I10 ESSENTIAL HYPERTENSION: ICD-10-CM

## 2021-02-12 DIAGNOSIS — K57.80 PERFORATED DIVERTICULUM: ICD-10-CM

## 2021-02-12 RX ORDER — LISINOPRIL 10 MG/1
TABLET ORAL
Qty: 90 TABLET | Refills: 3 | Status: SHIPPED | OUTPATIENT
Start: 2021-02-12 | End: 2021-07-05 | Stop reason: SDUPTHER

## 2021-02-12 RX ORDER — PANTOPRAZOLE SODIUM 40 MG/1
TABLET, DELAYED RELEASE ORAL
Qty: 90 TABLET | Refills: 3 | Status: SHIPPED | OUTPATIENT
Start: 2021-02-12 | End: 2021-07-05 | Stop reason: SDUPTHER

## 2021-02-15 ENCOUNTER — APPOINTMENT (OUTPATIENT)
Dept: PHARMACY | Facility: HOSPITAL | Age: 41
End: 2021-02-15

## 2021-03-01 ENCOUNTER — APPOINTMENT (OUTPATIENT)
Dept: PHARMACY | Facility: HOSPITAL | Age: 41
End: 2021-03-01

## 2021-03-04 ENCOUNTER — TELEPHONE (OUTPATIENT)
Dept: PHARMACY | Facility: HOSPITAL | Age: 41
End: 2021-03-04

## 2021-03-04 ENCOUNTER — APPOINTMENT (OUTPATIENT)
Dept: PHARMACY | Facility: HOSPITAL | Age: 41
End: 2021-03-04

## 2021-03-04 NOTE — TELEPHONE ENCOUNTER
Pt called and LVM this morning cancelling his appt for today.    Have called back and LVM asking him to reschedule.

## 2021-03-08 NOTE — TELEPHONE ENCOUNTER
Script sent to pharmacy      scheduled for hysteroscopic polypectomy with myosure on 3/12 with Dr. Griggs

## 2021-03-09 ENCOUNTER — APPOINTMENT (OUTPATIENT)
Dept: PHARMACY | Facility: HOSPITAL | Age: 41
End: 2021-03-09

## 2021-03-17 ENCOUNTER — ANTICOAGULATION VISIT (OUTPATIENT)
Dept: PHARMACY | Facility: HOSPITAL | Age: 41
End: 2021-03-17

## 2021-03-17 DIAGNOSIS — Z86.711 HISTORY OF PULMONARY EMBOLISM: ICD-10-CM

## 2021-03-17 LAB
INR PPP: 1.8 (ref 0.91–1.09)
PROTHROMBIN TIME: 22.1 SECONDS (ref 10–13.8)

## 2021-03-17 PROCEDURE — 36416 COLLJ CAPILLARY BLOOD SPEC: CPT

## 2021-03-17 PROCEDURE — G0463 HOSPITAL OUTPT CLINIC VISIT: HCPCS

## 2021-03-17 PROCEDURE — 85610 PROTHROMBIN TIME: CPT

## 2021-03-17 NOTE — PROGRESS NOTES
Anticoagulation Clinic Progress Note    Indication: Hx of PE and LE DVT (2010, 2014)  Referring Provider: Vaishali  Initial Warfarin Start Date: 2010  Planned Duration of Therapy: lifelong  Goal INR: 2.0-3.0 (verified Dr Vaishali 9/19/19)  Will likely need lovenox perioperatively (Vaishali 7/29/20)  Current Drug Interactions: Cymbalta and Pantoprazole  Other: d/c Eliquis 9/6/19 due to itching    Diet: green beans,broccoli (3-4X/week), salads daily (iceburg lettuce, carrots, and red cabbage mixture) 3/17/2021  Alcohol: None  Tobacco: Smokes ~5 cigarettes a week  OTC Pain Medication: Recommended Tylenol prn    Anticoagulation Clinic INR History:  Date 9/19 10/16 11/7 11/19 12/3 12/9 1/3/2020 2/3-2/10 2/13 2/17 2/24 3/2 3/16   Total Weekly Dose 80mg 80 mg 77.5 mg 77.5mg 57.5 mg 80mg 72.5mg hosp: admission 65mg 67.5 mg 65 mg 65 mg 65mg   INR 2.6 3.4 3.1 2.7 1.5 2.1 2.6  3.0 3 2.7 2.4 1.9   Notes     S/p c'scope, librado greens 1 boost Decr cig use Diverticulitis; end colostomy stopped smoking; recent admission     Green beans (HH)     Date  3/24 4/1 4/10 5/6 5/20 6/8 6/19 7/31 8/31 10/14 12/7 1/5/2021 1/8 1/13 1/20 1/27 2/2   Total Weekly Dose 67.5mg 72.5 mg 70mg 70mg 70mg 55mg? 65mg 65mg 67.5mg 67.5 67.5 mg 27.5mg colonoscopy 45mg 70mg 70mg  75mg   INR 1.8 2.23 2.8 3.4 3.1 2.0 2.5 2.0 2.3 2.5 2.1 1.1  1.2 1.8 1.7 2.5   Notes  Extra dose  Less GLV Less GLV Missed x1  incr GLV    Hold x4 Hold x 7 days missed x1          Warfarin Dosing During Admission 1/28:  Date  2/3 2/4 2/5 2/6 2/7 2/8 2/9 2/10   INR  1.38 1.55 1.88 2.22 2.1 2.2 2.4 2.46   Dose  10mg 10mg 7.5mg 7.5mg  10mg 10mg 10mg 10mg     Date 3/17                Total Weekly Dose 60mg?                INR 1.8                Notes Miss x 1; self adjust dose                    Phone Interview:  Tablet Strength: 5mg tablet  Verbal Release Authorization signed on 9/19/19-- may speak with Jammie Rodriguez (mother) John Rodriguez (father)  Patient contact info: [phone]  (Mobile)    Patient Findings  Positives:  Missed doses, Extra doses   Negatives:  Signs/symptoms of thrombosis, Signs/symptoms of bleeding, Laboratory test error suspected, Change in health, Change in alcohol use, Change in activity, Upcoming invasive procedure, Emergency department visit, Upcoming dental procedure, Change in medications, Change in diet/appetite, Hospital admission, Bruising, Other complaints   Comments:  Mr. Rodriguez reports that he missed a dose of warfarin this last week but cannot ermember which day. He also reports that he has been taking 10 mg daily since his last appointment.     Plan:  1. INR remains therapeutic today at 2.5 after boosted dose last week. Given consistent GLV intake, instructed Mr. Rodriguez to BOOST dose to 12.5mg tonight and then continue maintenance dose to 10mg daily.  2. Repeat INR 3/31  3. Verbal and written information provided in the clinic. Brigida Rodriguez RBV dosing instructions, expresses understanding by teach back, and has no further questions at this time.    Grace Coyle, PharmD  3/17/2021  09:53 EDT

## 2021-03-30 ENCOUNTER — TELEPHONE (OUTPATIENT)
Dept: PHARMACY | Facility: HOSPITAL | Age: 41
End: 2021-03-30

## 2021-03-30 NOTE — TELEPHONE ENCOUNTER
Dr. Tom    We were today, 3/30/21, that Brigida Rodriguez is undergoing colostomy reversal on 1 April 2021 with Caro Rosario MD, at [facility]. Patient reports Dr. Rosario requested that the patient hold warfarin starting Fri, 3/26, and requested he initiate Lovenox 150mg q12h bridge on Sat, 3/27. Patient reports he will also be held inpatient (estimated 6 days) post-procedure at [facility].       Brigida Rodriguez is a 41 y.o. male on warfarin for hx of PE and LE DVT (2010, 2014). Patient has previously bridged with warfarin prior to procedures.     Of note, Patient has already begun the following plan as directed by Dr. Rosario:      3/26: HOLD warfarin  3/27: HOLD warfarin; Lovenox 150mg q12h  3/28: HOLD warfarin; Lovenox 150mg q12h  3/29: HOLD warfarin; Lovenox 150mg q12h  3/30: HOLD warfarin; Lovenox 150mg q12h  3/31: HOLD warfarin; Lovenox AM only  4/1: procedure w/Dr. Rosario; will likely held inpatient at [facility] (estimated 6 days) post-procedure.       We will request patient contact us once discharged so that we can request records from [facility] and determine dosing going forward.          Please advise if you prefer an alternative approach to Brigida Rodriguez's anticoagulation plan for the upcoming procedure.    Thank you,    Owen Boston, [facility] Anticoagulation Team  (t) [phone]  (f) [phone]

## 2021-03-30 NOTE — TELEPHONE ENCOUNTER
Received documentation from [facility]- [facility] colorectal consult. PMH of CHF DVT with IVC filter presenting for reversal of emergent colostomy secondary to diverticulitis colonic perforation in feb 2020. Patient has not experienced any difficulty with managing the colostomy, no drainage, leakage or increased output from colostomy. Plan is for colostomy reversal. Pt was advised to stop warfarin 5 days prior to procedure and bridge with lovenox per [facility] colorectal consult.     Records scanned to chart.

## 2021-03-30 NOTE — TELEPHONE ENCOUNTER
Have confirmed plan with patient. He is agreeable to contact clinic when he is discharged from [facility]

## 2021-03-31 ENCOUNTER — APPOINTMENT (OUTPATIENT)
Dept: PHARMACY | Facility: HOSPITAL | Age: 41
End: 2021-03-31

## 2021-04-14 ENCOUNTER — TELEPHONE (OUTPATIENT)
Dept: FAMILY MEDICINE CLINIC | Facility: CLINIC | Age: 41
End: 2021-04-14

## 2021-04-14 NOTE — TELEPHONE ENCOUNTER
Provider: GUY    Caller: MACK WEINBERG NURSE AT      Phone Number: [phone]    Reason for Call: PATIENT IS GOING TO BE RELEASED 4/15/21 OR 4/16/21 AFTER OSTOMY REVERSAL

## 2021-04-14 NOTE — TELEPHONE ENCOUNTER
Taylor from [facility] called needing to know if you will follow Mr Rodriguez after he is released from .  Please advise

## 2021-04-15 ENCOUNTER — TELEPHONE (OUTPATIENT)
Dept: PHARMACY | Facility: HOSPITAL | Age: 41
End: 2021-04-15

## 2021-04-15 NOTE — TELEPHONE ENCOUNTER
Patient called from his room at  while with Dr Kim. Patient likely to be discharged today and will be set up with  next week. Dr Kim asked that patient have INR checked in clinic tomorrow and  will provide patient with copy of discharge summary to bring which will include med changes, INR and warfarin dosing while admitted. Reports patient will not need bridge therapy and that current INR is 1.7.    Scheduled for apt tomorrow at 9:15AM

## 2021-04-15 NOTE — TELEPHONE ENCOUNTER
Per Taylor with [facility], [facility] will have [facility].   Fax order to [phone] , Call [phone] to ask HH to see patient on a specific day.

## 2021-04-16 ENCOUNTER — ANTICOAGULATION VISIT (OUTPATIENT)
Dept: PHARMACY | Facility: HOSPITAL | Age: 41
End: 2021-04-16

## 2021-04-16 DIAGNOSIS — Z86.711 HISTORY OF PULMONARY EMBOLISM: Primary | ICD-10-CM

## 2021-04-16 LAB
INR PPP: 1.7
INR PPP: 2.2 (ref 0.91–1.09)
INR PPP: 4.3
PROTHROMBIN TIME: 26.8 SECONDS (ref 10–13.8)

## 2021-04-16 PROCEDURE — 36416 COLLJ CAPILLARY BLOOD SPEC: CPT

## 2021-04-16 PROCEDURE — 85610 PROTHROMBIN TIME: CPT

## 2021-04-16 PROCEDURE — G0463 HOSPITAL OUTPT CLINIC VISIT: HCPCS

## 2021-04-16 RX ORDER — OXYCODONE HYDROCHLORIDE 5 MG/1
5 CAPSULE ORAL EVERY 4 HOURS PRN
COMMUNITY
End: 2022-05-19

## 2021-04-16 NOTE — PROGRESS NOTES
Anticoagulation Clinic Progress Note    Indication: Hx of PE and LE DVT (2010, 2014)  Referring Provider: Vaishali  Initial Warfarin Start Date: 2010  Planned Duration of Therapy: lifelong  Goal INR: 2.0-3.0 (verified Dr Vaishali 9/19/19)  Will likely need lovenox perioperatively (Vaishali 7/29/20)  Current Drug Interactions: Cymbalta and Pantoprazole  Other: d/c Eliquis 9/6/19 due to itching    Diet: green beans,broccoli (3-4X/week), salads daily (iceburg lettuce, carrots, and red cabbage mixture) 3/17/2021  Alcohol: None  Tobacco: Smokes ~5 cigarettes a week  OTC Pain Medication: Recommended Tylenol prn    Anticoagulation Clinic INR History:  Date 9/19 10/16 11/7 11/19 12/3 12/9 1/3/2020 2/3-2/10 2/13 2/17 2/24 3/2 3/16   Total Weekly Dose 80mg 80 mg 77.5 mg 77.5mg 57.5 mg 80mg 72.5mg hosp: admission 65mg 67.5 mg 65 mg 65 mg 65mg   INR 2.6 3.4 3.1 2.7 1.5 2.1 2.6  3.0 3 2.7 2.4 1.9   Notes     S/p c'scope, librado greens 1 boost Decr cig use Diverticulitis; end colostomy stopped smoking; recent admission     Green beans (HH)     Date  3/24 4/1 4/10 5/6 5/20 6/8 6/19 7/31 8/31 10/14 12/7 1/5/2021 1/8 1/13 1/20 1/27 2/2   Total Weekly Dose 67.5mg 72.5 mg 70mg 70mg 70mg 55mg? 65mg 65mg 67.5mg 67.5 67.5 mg 27.5mg colonoscopy 45mg 70mg 70mg  75mg   INR 1.8 2.23 2.8 3.4 3.1 2.0 2.5 2.0 2.3 2.5 2.1 1.1  1.2 1.8 1.7 2.5   Notes  Extra dose  Less GLV Less GLV Missed x1  incr GLV    Hold x4 Hold x 7 days missed x1          Warfarin Dosing During Admission 1/28:  Date  2/3 2/4 2/5 2/6 2/7 2/8 2/9 2/10   INR  1.38 1.55 1.88 2.22 2.1 2.2 2.4 2.46   Dose  10mg 10mg 7.5mg 7.5mg  10mg 10mg 10mg 10mg     Date 3/17 4/1-4/15 4/16              Total Weekly Dose 60mg? [facility] admission 42.5mg??              INR 1.8  2.2              Notes Miss x 1; self adjust dose reverse cholestomy; hemorrhage; vit k                   Phone Interview:  Tablet Strength: 5mg tablet  Verbal Release Authorization signed on 9/19/19-- may speak with Jammie Rodriguez  "(mother) John Rodriguez (father)  Patient contact info: [phone] (Mobile)    Patient Findings  Positives:  Upcoming invasive procedure, Hospital admission   Negatives:  Signs/symptoms of thrombosis, Signs/symptoms of bleeding, Laboratory test error suspected, Change in health, Change in alcohol use, Change in activity, Emergency department visit, Upcoming dental procedure, Missed doses, Extra doses, Change in medications, Change in diet/appetite, Bruising, Other complaints   Comments:  Please refer to telephone encounters from yesterday. Patient arrived to clinic with following information from discharge.     \" Hx of diverticulitis s/p emergent sigmoid colectomry and ostomy creation who presented for sscheduled colostomy reversal on 4/1/2021 and was subsequently admitted to  for further work-up evaltuion and treatment. On 4/1 the patient underwent a laparoscropic closure of end-colostmy. No complications. Patient was placed in a floor bed where the diet was slowly advanced which was tolerated well. Bowel function slowly returned. Pt devloped postoperative hemorrhage from his colostomy wound after his warfarin was restarted and his INR chelly as high as 4.3. He was taken back to the OR for wound exploration, evacuation of hematoma, and repair of fascia on 4/8/2021. He tolerated the procedure and returned to the floor. Hi colostomy wound was left opened and paced wet to dry with kerlix daily.INR dose was titrated to 1.7 on 4/15. He was sent home on warfarin 7.5mg daily weith a goal of 2-3.\"     He was instructed to have a GI soft diet, \"anything you can cut with a fork. No lifting more than 5lbs for 45 days. Weekdays after 5, weekends and holiday call [phone] and as for  to page  On call for green service. Monday through Friday 8-5 call [facility]  @ [phone]\"     He reported that he received two blood transfusions. Per the patient he recalls receiving 7.5mg and one day of 5mg this week. No " other dosing of warfarin. Patient also reports that he got Vit K after bleed (via oral and iv administration). He also believes that he took 15mg, then 10mg, and 10mg 3 days prior to INR of 4.3 and bleeds. Per the patient he reprots that he was instructed to take 10mg of warfarin last night instead of 7.5mg. Pt is very competent in history and is a good historian.     He will eat green beans instead of iceburg. Pt would prefer to come to the clinic rather than venipuncture.     Plan:  1. INR is subtherapeutic after reversal of cholestomy, wound hemorrhage while inpatient, and vit K administration. Please refer to patient findings above. Given information above, will slowly re-initiate warfarin, therefore, instructed Mr. Rodriguez to decrease dose tonight to 5mg, 7.5mg tomorrow, and 5mg Sunday. Patient voices understanding of dosing directions.   2. Repeat INR 4/19. Pt understands and prefers close monitoring at this time. He also prefers to come to the clinic for POCT.   3. Verbal and written information provided in the clinic. Brigida Rodriguez RBV dosing instructions, expresses understanding by teach back, and has no further questions at this time.    Grace Coyle, PharmD  4/16/2021  09:41 EDT

## 2021-04-17 ENCOUNTER — NURSE TRIAGE (OUTPATIENT)
Dept: CALL CENTER | Facility: HOSPITAL | Age: 41
End: 2021-04-17

## 2021-04-17 NOTE — TELEPHONE ENCOUNTER
Recent ostomy reversal, discharged two days ago; supposed to be on a bland diet, yet, he ate a bag of gummy bears; wanted to know how bad this is going to affect him, states he is having more gas and diarrhea; advised him to follow the physicians recommendations for a strict diet in order for his intestines to heal properly.    Reason for Disposition  • Nursing judgment    Additional Information  • Negative: Nursing judgment  • Negative: Information only call; adult is not ill or injured  • Negative: Nursing judgment  • Negative: Nursing judgment  • Negative: Nursing judgment  • Negative: Nursing judgment  • Negative: Nursing judgment  • Negative: Nursing judgment  • Negative: Nursing judgment  • Negative: Nursing judgment  • Negative: Nursing judgment  • Negative: Nursing judgment  • Negative: Nursing judgment  • Negative: Nursing judgment  • Negative: Nursing judgment    Protocols used: NO GUIDELINE OR REFERENCE AVAILABLE-ADULT-

## 2021-04-19 ENCOUNTER — ANTICOAGULATION VISIT (OUTPATIENT)
Dept: PHARMACY | Facility: HOSPITAL | Age: 41
End: 2021-04-19

## 2021-04-19 DIAGNOSIS — Z86.711 HISTORY OF PULMONARY EMBOLISM: Primary | ICD-10-CM

## 2021-04-19 LAB
INR PPP: 2 (ref 0.91–1.09)
PROTHROMBIN TIME: 23.5 SECONDS (ref 10–13.8)

## 2021-04-19 PROCEDURE — 36416 COLLJ CAPILLARY BLOOD SPEC: CPT

## 2021-04-19 PROCEDURE — 85610 PROTHROMBIN TIME: CPT

## 2021-04-19 PROCEDURE — G0463 HOSPITAL OUTPT CLINIC VISIT: HCPCS

## 2021-04-19 NOTE — PROGRESS NOTES
Anticoagulation Clinic Progress Note    Indication: Hx of PE and LE DVT (2010, 2014)  Referring Provider: Vaishali  Initial Warfarin Start Date: 2010  Planned Duration of Therapy: lifelong  Goal INR: 2.0-3.0 (verified Dr Vaishali 9/19/19)  Will likely need lovenox perioperatively (Vaishali 7/29/20)  Current Drug Interactions: Cymbalta and Pantoprazole  Other: d/c Eliquis 9/6/19 due to itching    Diet: green beans,broccoli (3-4X/week), salads daily (iceburg lettuce, carrots, and red cabbage mixture) 3/17/2021  Alcohol: None  Tobacco: Smokes ~5 cigarettes a week  OTC Pain Medication: Recommended Tylenol prn    Anticoagulation Clinic INR History:  Date 9/19 10/16 11/7 11/19 12/3 12/9 1/3/2020 2/3-2/10 2/13 2/17 2/24 3/2 3/16   Total Weekly Dose 80mg 80 mg 77.5 mg 77.5mg 57.5 mg 80mg 72.5mg hosp: admission 65mg 67.5 mg 65 mg 65 mg 65mg   INR 2.6 3.4 3.1 2.7 1.5 2.1 2.6  3.0 3 2.7 2.4 1.9   Notes     S/p c'scope, librado greens 1 boost Decr cig use Diverticulitis; end colostomy stopped smoking; recent admission     Green beans (HH)     Date  3/24 4/1 4/10 5/6 5/20 6/8 6/19 7/31 8/31 10/14 12/7 1/5/2021 1/8 1/13 1/20 1/27 2/2   Total Weekly Dose 67.5mg 72.5 mg 70mg 70mg 70mg 55mg? 65mg 65mg 67.5mg 67.5 67.5 mg 27.5mg colonoscopy 45mg 70mg 70mg  75mg   INR 1.8 2.23 2.8 3.4 3.1 2.0 2.5 2.0 2.3 2.5 2.1 1.1  1.2 1.8 1.7 2.5   Notes  Extra dose  Less GLV Less GLV Missed x1  incr GLV    Hold x4 Hold x 7 days missed x1          Warfarin Dosing During Admission 1/28:  Date  2/3 2/4 2/5 2/6 2/7 2/8 2/9 2/10   INR  1.38 1.55 1.88 2.22 2.1 2.2 2.4 2.46   Dose  10mg 10mg 7.5mg 7.5mg  10mg 10mg 10mg 10mg     Date 3/17 4/1-4/15 4/16 4/19             Total Weekly Dose 60mg? [facility] admission 42.5mg?? 47.5mg?             INR 1.8  2.2 2.0             Notes Miss x 1; self adjust dose reverse cholestomy; hemorrhage; vit k                   Phone Interview:  Tablet Strength: 5mg tablet  Verbal Release Authorization signed on 9/19/19-- may speak with  Jammie Rodriguez (mother) John Rodriguez (father)  Patient contact info: [phone] (Mobile)    Patient Findings  Positives:  Change in medications   Negatives:  Signs/symptoms of thrombosis, Signs/symptoms of bleeding, Laboratory test error suspected, Change in health, Change in alcohol use, Change in activity, Upcoming invasive procedure, Emergency department visit, Upcoming dental procedure, Missed doses, Extra doses, Change in diet/appetite, Hospital admission, Bruising, Other complaints   Comments:  Reports his appetite is still low following discharge. Has been taking norco prn and APAP up to q8h. Limits to 4 total doses daily. Has been monitoring surgical site, no bleeding at this time       Plan:  1. INR is therapeutic after reversal of cholestomy, wound hemorrhage while inpatient, and vit K administration. Will slowly re-initiate warfarin, therefore, instructed Mr. Rodriguez take warfarin 7.5mg daily except 5mg Wednesday until recheck. Patient voices understanding of dosing directions.   2. Repeat INR 4/23 per pat preference. Pt understands and prefers close monitoring at this time. He also prefers to come to the clinic for POCT.   3. Verbal and written information provided in the clinic. Brigida Kramer Rodriguez RBV dosing instructions, expresses understanding by teach back, and has no further questions at this time.      Tara Barger, PharmD.  04/19/21   09:29 EDT

## 2021-04-20 NOTE — TELEPHONE ENCOUNTER
Left a detailed voicemail for the patient advising him if sx continue to follow up with his surgeon. Office # given incase patient has questions for our office.      Patient: Orquidea Garcia  Age: 17 year old  Sex: female  MRN: [id number]  Encounter Date: 10/18/2020     [facility] EMERGENCY DEPARTMENT ENCOUNTER   [facility] EMERGENCY SERVICES  [address] [place] [address]  [phone]    History     Chief Complaint   Patient presents with   • Laceration   • Mental Health Problem       HPI  Orquidea Garcia is a 17 year old female who presents to the emergency department for evaluation of suicidal ideations.  Patient is a senior in high school, by her account doing poorly.  She states “on not smart”.  Today after talking about her grades with her parents, lives with her mother, stepfather and 2 younger male siblings.  Her biological father is not a picture and she \"wishes he was dead”.  After talking about her grades she felt despondent and attempted to cut her left wrist with a razor blade.  She caused a small abrasion.  Mother contacted the , apparently she has been googling how to attempt suicide on her school provided computer.  She has 1 friend, feels that she is hard to get along with.  She denies any previous attempts at harming herself.  She does not have a specific method of committing suicide at this time.  She denies any drug or alcohol use.  Had 1 “puff” of marijuana recently.    ALLERGIES:   Allergen Reactions   • Codeine RASH         No current facility-administered medications for this encounter.      Current Outpatient Medications   Medication Sig   • fluoxetine (PROzac) 40 MG capsule Take 1 capsule by mouth daily.   • SPRINTEC 28 0.25-35 MG-MCG per tablet TAKE 1 TABLET BY MOUTH EVERY DAY   • guaiFENesin (MUCINEX) 600 MG 12 hr tablet Take 1 tablet by mouth 2 times daily.   • ketorolac (TORADOL) 10 MG tablet Take 1 tablet by mouth every 6 hours as needed for Pain.   • ibuprofen (MOTRIN) 400 MG tablet Take 1 tablet by mouth every 6 hours as needed for Pain.        Current Discharge Medication List           Past Medical History:   Diagnosis Date   • Generalized anxiety disorder 1/11/2019   • Major depressive disorder, recurrent episode, moderate (CMS/HCC) 1/11/2019       History reviewed. No pertinent surgical history.    Family History   Problem Relation Age of Onset   • Anxiety disorder Mother    • Anxiety disorder Maternal Grandmother    • Depression Paternal Grandfather        Social History     Tobacco Use   • Smoking status: Never Smoker   • Smokeless tobacco: Never Used   Substance Use Topics   • Alcohol use: No     Frequency: Never   • Drug use: No      Patient's allergies, medications, past medical, surgical, family, and social history have been reviewed at length by myself, Rigoberto Urias DO.     Review of Systems   Constitutional: Negative for chills, fatigue and fever.   HENT: Negative for congestion, rhinorrhea, sore throat and trouble swallowing.    Respiratory: Negative for cough and shortness of breath.    Gastrointestinal: Negative for abdominal pain, constipation, diarrhea, nausea and vomiting.   Genitourinary: Negative for difficulty urinating, dysuria, flank pain, hematuria, menstrual problem, pelvic pain, urgency, vaginal bleeding and vaginal discharge.   Musculoskeletal: Negative for arthralgias, back pain and joint swelling.   Skin: Negative for color change and rash.   Neurological: Negative for dizziness, weakness and headaches.   Hematological: Negative for adenopathy. Does not bruise/bleed easily.   Psychiatric/Behavioral: Positive for self-injury, sleep disturbance and suicidal ideas. Negative for confusion.   All other systems reviewed and are negative.      Physical Exam     ED Triage Vitals [10/18/20 2031]   ED Triage Vitals Group      Temp 98.1 °F (36.7 °C)      Heart Rate 80      Resp 18      BP (!) 167/89      SpO2 99 %      EtCO2 mmHg       Height 5' 8\" (1.727 m)      Weight 195 lb 12.3 oz (88.8 kg)      Weight Scale Used Standing scale      BMI (Calculated) 29.77      IBW/kg  (Calculated) 63.9       Physical Exam   Constitutional: She is oriented to person, place, and time. She appears well-developed and well-nourished.   HENT:   Head: Normocephalic and atraumatic.   Eyes: Pupils are equal, round, and reactive to light. EOM are normal.   Neck: Normal range of motion. Neck supple. No tracheal deviation present. No thyromegaly present.   Cardiovascular: Normal rate, regular rhythm, normal heart sounds and intact distal pulses.   Pulmonary/Chest: Effort normal and breath sounds normal. No respiratory distress. She has no wheezes. She has no rales. She exhibits no tenderness.   Abdominal: Soft. Bowel sounds are normal. She exhibits no distension and no mass. There is no abdominal tenderness. There is no rebound and no guarding.   Musculoskeletal: Normal range of motion.   Neurological: She is alert and oriented to person, place, and time.   Skin: Skin is warm and dry. No rash noted.   Superficial 8 cm abrasion to the left volar forearm, no bleeding, barely through the epidermis   Psychiatric:   Flat affect, responds clearly and articulately, admits to thoughts of self-harm without any specific plan.  No hallucinations   Nursing note and vitals reviewed.       ED Course      Procedures    Lab Results     Results for orders placed or performed during the hospital encounter of 10/18/20   Comprehensive Metabolic Panel   Result Value    Fasting Status     Sodium 138    Potassium 3.4    Chloride 102    Carbon Dioxide 26    Anion Gap 13    Glucose 99    BUN 14    Creatinine 0.72    Glomerular Filtration Rate      Comment: GFR not calculated for age less than 18 years    BUN/ Creatinine Ratio 19    Bilirubin, Total 0.3    GOT/AST 17    Alkaline Phosphatase 85    Albumin 3.8    Protein, Total 7.4    Globulin 3.6    A/G Ratio 1.1    GPT/ALT 22    Calcium 9.2   Thyroid Stimulating Hormone Reflex   Result Value    TSH 3.086     Comment: Findings most consistent with euthyroid state, no additional  testing suggested. TSH may be normal in patients with thyroid dysfunction and pituitary disease. Clinical correlation recommended.    (Reflex TSH algorithm is not recommended in hospitalized patients. A variety of drugs, as well as serious acute and chronic illnesses may alter thyroid function tests. Commonly implicated drugs include glucocorticoids, dopamine, carbamazepine, iodine, amiodarone, lithium and heparin.)   Pregnancy Test, Urine   Result Value    Pregnancy, Urine Negative   Acetaminophen Level   Result Value    Acetaminophen <2 (L)   Salicylate Level   Result Value    Salicylate <2.8   Drug Abuse Screen, Urine   Result Value    Amphetamines, Urine Negative     Comment: Cutoff = 500 ng/mL    Barbiturates, Urine Negative     Comment: Cutoff = 200 ng/mL    Benzodiazepines, Urine Negative     Comment: Cutoff = 200 ng/mL    Cocaine/ Metabolite, Urine Negative     Comment: Cutoff = 150 ng/ml    Opiates, Urine Negative     Comment: Cutoff = 300 ng/mL    Phencyclidine, Urine Negative     Comment: Cutoff = 25 ng/mL    Cannabinoids, Urine Negative     Comment: Cutoff = 50 ng/mL   CBC with Automated Differential (performable only)   Result Value    WBC 9.3    RBC 4.41    HGB 13.2    HCT 39.8    MCV 90.2    MCH 29.9    MCHC 33.2    RDW-CV 12.0        NRBC 0    Neutrophil, Percent 72    Lymphocytes, Percent 21    Mono, Percent 7    Eosinophils, Percent 0    Basophils, Percent 0    Immature Granulocytes 0    Absolute Neutrophils 6.6    Absolute Lymphocytes 1.9    Absolute Monocytes 0.7    Absolute Eosinophils  0.0 (L)    Absolute Basophils 0.0    Absolute Immmature Granulocytes 0.0    RDW-SD 39.9   Urinalysis & Reflex Microscopy With Culture If Indicated   Result Value    COLOR, URINALYSIS Yellow    APPEARANCE, URINALYSIS Clear    GLUCOSE, URINALYSIS Negative    BILIRUBIN, URINALYSIS Negative    KETONES, URINALYSIS Negative    SPECIFIC GRAVITY, URINALYSIS >1.030 (H)    OCCULT BLOOD, URINALYSIS Large (A)    PH,  URINALYSIS 6.0    PROTEIN, URINALYSIS Negative    UROBILINOGEN, URINALYSIS 0.2    NITRITE, URINALYSIS Negative    LEUKOCYTE ESTERASE, URINALYSIS Negative   URINALYSIS MICROSCOPIC   Result Value    SQUAMOUS EPITHELIAL, URINALYSIS 11 to 25 (A)    ERYTHROCYTES, URINALYSIS 3 to 5 (A)    LEUKOCYTES, URINALYSIS 6 to 10 (A)    BACTERIA, URINALYSIS Moderate (A)    HYALINE CASTS, URINALYSIS None Seen    MUCUS Present   Alcohol   Result Value    Alcohol None Detected   Rapid SARS-CoV-2 by PCR    Specimen: Nasopharyngeal; Swab   Result Value    Rapid SARS-COV-2 by PCR Not Detected     Comment: SARS-CoV-2 nucleic acid has not been detected.  Testing was performed using the [employer] Xpert Xpress SARS-CoV-2 RT-PCR assay that has been given Emergency Use Authorization (EUA) by the [place] Food and Drug Administration (FDA).  These results are considered definitive and do not need to be confirmed by another method.    Isolation Guidelines      Comment: Do not use this test result as the sole decision-maker for discontinuation of isolation.   Clinical evaluation should be considered for other respiratory illness requiring transmission-based isolation.    •       No fever (<99.0 F/37.2 C) for at least 24 hours without the use of fever-reducing medications    AND  •       Respiratory symptoms have improved or resolved (e.g. cough, shortness of breath)     AND  •       COVID-19 negative test    See COVID-19 Deisolation Resource Guide     All laboratory results above were personally reviewed by myself, Rigoberto Urias DO.          Radiology Results     No orders to display     Any images above were personally reviewed by myself, Rigoberto Urias DO, via direct independent visualization of the image(s) and/or with the corresponding radiologist.    Medications   sodium chloride (NORMAL SALINE) 0.9 % bolus 1,000 mL (0 mLs Intravenous Completed 10/18/20 2143)   acetaminophen (TYLENOL) tablet 650 mg (650 mg Oral Given 10/18/20 2200)          MDM  Number of Diagnoses or Management Options  Diagnosis management comments: 17-year-old female with thoughts of self-harm, superficial abrasion noted to the left volar forearm, no further treatment outside of topical cleansing.  Mother is at the bedside, requesting evaluation for psychiatric placement.  She does not feel that she can be taken safely home at this time.  Will have Behavioral Health evaluation done by tele.      Re-evaluation:     10:55 p.m., medically cleared, awaiting behavioral health intake, advised that this may take hours as they are “busy”.  Will attempt placement phone calls here possible.    1:30 a.m., nurse to nurse communication with [facility] in [place], accepted by Dr. Jermain Gruber, patient will be transported by her parents by private vehicle.  They are aware and accepting of the risks of transport including possibility of car accident, delay in travel, inclement weather.  They have no questions and are accepting of this risk.  The patient is stable for transfer.  Clinical Impression     ED Diagnoses        Final diagnoses    Suicidal ideation          Depression, unspecified depression type          Abrasion of left forearm, initial encounter                Disposition      Transfer to Another Facility  10/19/2020  1:32 AM  Orquidea Garcia should be transferred out to [facility].    Current Discharge Medication List            Medications   sodium chloride (NORMAL SALINE) 0.9 % bolus 1,000 mL (0 mLs Intravenous Completed 10/18/20 2143)   acetaminophen (TYLENOL) tablet 650 mg (650 mg Oral Given 10/18/20 2200)         Follow up:  No follow-up provider specified.    Patient was instructed to return to the emergency department immediately if symptoms worsen or any new unusual symptoms arise.     New Prescriptions:     Summary of your Discharge Medications      You have not been prescribed any medications.           The patient understands that this is a  provisional diagnosis. Provisional diagnosis can and do change. The diagnosis that you are discharged with today is based on the symptoms with which you presented today. If any new symptoms occur or worsen, you should seek immediate attention for re-evaluation.     Rigoberto Urias DO  10/19/20 0134

## 2021-04-23 ENCOUNTER — ANTICOAGULATION VISIT (OUTPATIENT)
Dept: PHARMACY | Facility: HOSPITAL | Age: 41
End: 2021-04-23

## 2021-04-23 DIAGNOSIS — Z86.711 HISTORY OF PULMONARY EMBOLISM: Primary | ICD-10-CM

## 2021-04-23 LAB
INR PPP: 2.2 (ref 0.91–1.09)
PROTHROMBIN TIME: 26.4 SECONDS (ref 10–13.8)

## 2021-04-23 PROCEDURE — G0463 HOSPITAL OUTPT CLINIC VISIT: HCPCS

## 2021-04-23 PROCEDURE — 36416 COLLJ CAPILLARY BLOOD SPEC: CPT

## 2021-04-23 PROCEDURE — 85610 PROTHROMBIN TIME: CPT

## 2021-04-23 NOTE — PROGRESS NOTES
Anticoagulation Clinic Progress Note    Indication: Hx of PE and LE DVT (2010, 2014)  Referring Provider: Vaishali  Initial Warfarin Start Date: 2010  Planned Duration of Therapy: lifelong  Goal INR: 2.0-3.0 (verified Dr Vaishali 9/19/19)  Will likely need lovenox perioperatively (Vaishali 7/29/20)  Current Drug Interactions: Cymbalta and Pantoprazole  Other: d/c Eliquis 9/6/19 due to itching    Diet: green beans,broccoli (3-4X/week), salads daily (iceburg lettuce, carrots, and red cabbage mixture) 3/17/2021  Alcohol: None  Tobacco: Smokes ~5 cigarettes a week  OTC Pain Medication: Recommended Tylenol prn    Anticoagulation Clinic INR History:  Date 9/19 10/16 11/7 11/19 12/3 12/9 1/3/2020 2/3-2/10 2/13 2/17 2/24 3/2 3/16   Total Weekly Dose 80mg 80 mg 77.5 mg 77.5mg 57.5 mg 80mg 72.5mg hosp: admission 65mg 67.5 mg 65 mg 65 mg 65mg   INR 2.6 3.4 3.1 2.7 1.5 2.1 2.6  3.0 3 2.7 2.4 1.9   Notes     S/p c'scope, librado greens 1 boost Decr cig use Diverticulitis; end colostomy stopped smoking; recent admission     Green beans (HH)     Date  3/24 4/1 4/10 5/6 5/20 6/8 6/19 7/31 8/31 10/14 12/7 1/5/2021 1/8 1/13 1/20 1/27 2/2   Total Weekly Dose 67.5mg 72.5 mg 70mg 70mg 70mg 55mg? 65mg 65mg 67.5mg 67.5 67.5 mg 27.5mg colonoscopy 45mg 70mg 70mg  75mg   INR 1.8 2.23 2.8 3.4 3.1 2.0 2.5 2.0 2.3 2.5 2.1 1.1  1.2 1.8 1.7 2.5   Notes  Extra dose  Less GLV Less GLV Missed x1  incr GLV    Hold x4 Hold x 7 days missed x1          Warfarin Dosing During Admission 1/28:  Date  2/3 2/4 2/5 2/6 2/7 2/8 2/9 2/10   INR  1.38 1.55 1.88 2.22 2.1 2.2 2.4 2.46   Dose  10mg 10mg 7.5mg 7.5mg  10mg 10mg 10mg 10mg     Date 3/17 4/1-4/15 4/16 4/19 4/23            Total Weekly Dose 60mg? [facility] admission 42.5mg?? 47.5mg? 45mg            INR 1.8  2.2 2.0 2.2            Notes Miss x 1; self adjust dose reverse cholestomy; hemorrhage; vit k                   Phone Interview:  Tablet Strength: 5mg tablet  Verbal Release Authorization signed on 9/19/19-- may speak  with Jammie Rodriguez (mother) John Rodriguez (father)  Patient contact info: [phone] (Mobile)    Patient Findings    Negatives:  Signs/symptoms of thrombosis, Signs/symptoms of bleeding, Laboratory test error suspected, Change in health, Change in alcohol use, Change in activity, Upcoming invasive procedure, Emergency department visit, Upcoming dental procedure, Missed doses, Extra doses, Change in medications, Change in diet/appetite, Hospital admission, Bruising, Other complaints   Comments:  Reports his appetite is still low following discharge. Has been taking norco prn and APAP up to q8h. Limits to 4 total doses daily. Has been monitoring surgical site, no bleeding at this time     Patient in increased pain today, took oxycodone 5mg prior to appt. Has had zero GLV and thinks he may start slowly adding back to his diet.       Plan:  1. INR is therapeutic after reversal of cholestomy, wound hemorrhage while inpatient, and vit K administration. Instructed Mr. Rodriguez continue dose this week of  warfarin 7.5mg daily except 5mg SunWednesdayFri. Patient voices understanding of dosing directions.   2. Repeat INR 4/28 per pat preference. Pt understands and prefers close monitoring at this time. He also prefers to come to the clinic for POCT.   3. Verbal and written information provided in the clinic. Brigida Rodriguez RBV dosing instructions, expresses understanding by teach back, and has no further questions at this time.    Tara Barger, BrandenD.  04/23/21   09:10 EDT

## 2021-04-28 ENCOUNTER — APPOINTMENT (OUTPATIENT)
Dept: PHARMACY | Facility: HOSPITAL | Age: 41
End: 2021-04-28

## 2021-04-30 ENCOUNTER — APPOINTMENT (OUTPATIENT)
Dept: PHARMACY | Facility: HOSPITAL | Age: 41
End: 2021-04-30

## 2021-05-03 ENCOUNTER — APPOINTMENT (OUTPATIENT)
Dept: PHARMACY | Facility: HOSPITAL | Age: 41
End: 2021-05-03

## 2021-05-07 ENCOUNTER — ANTICOAGULATION VISIT (OUTPATIENT)
Dept: PHARMACY | Facility: HOSPITAL | Age: 41
End: 2021-05-07

## 2021-05-07 ENCOUNTER — LAB (OUTPATIENT)
Dept: LAB | Facility: HOSPITAL | Age: 41
End: 2021-05-07

## 2021-05-07 DIAGNOSIS — Z86.711 HISTORY OF PULMONARY EMBOLISM: Primary | ICD-10-CM

## 2021-05-07 LAB
INR PPP: 2.95 (ref 0.85–1.16)
INR PPP: 4.5 (ref 0.91–1.09)
INR PPP: 4.5 (ref 0.91–1.09)
PROTHROMBIN TIME: 29.5 SECONDS (ref 11.4–14.4)
PROTHROMBIN TIME: 53.5 SECONDS (ref 10–13.8)
PROTHROMBIN TIME: 54 SECONDS (ref 10–13.8)

## 2021-05-07 PROCEDURE — 36416 COLLJ CAPILLARY BLOOD SPEC: CPT

## 2021-05-07 PROCEDURE — 85610 PROTHROMBIN TIME: CPT

## 2021-05-07 PROCEDURE — G0463 HOSPITAL OUTPT CLINIC VISIT: HCPCS | Performed by: PHARMACIST

## 2021-05-07 PROCEDURE — 36415 COLL VENOUS BLD VENIPUNCTURE: CPT

## 2021-05-07 NOTE — PROGRESS NOTES
Anticoagulation Clinic Progress Note    Indication: Hx of PE and LE DVT (2010, 2014)  Referring Provider: Vaishali  Initial Warfarin Start Date: 2010  Planned Duration of Therapy: lifelong  Goal INR: 2.0-3.0 (verified Dr Vaishali 9/19/19)  Will likely need lovenox perioperatively (Vaishali 7/29/20)  Current Drug Interactions: Cymbalta and Pantoprazole  Other: d/c Eliquis 9/6/19 due to itching    Diet: green beans,broccoli (3-4X/week), salads daily (iceburg lettuce, carrots, and red cabbage mixture) 3/17/2021  Alcohol: None  Tobacco: Smokes ~5 cigarettes a week  OTC Pain Medication: Recommended Tylenol prn    Anticoagulation Clinic INR History:  Date 9/19 10/16 11/7 11/19 12/3 12/9 1/3/2020 2/3-2/10 2/13 2/17 2/24 3/2 3/16   Total Weekly Dose 80mg 80 mg 77.5 mg 77.5mg 57.5 mg 80mg 72.5mg hosp: admission 65mg 67.5 mg 65 mg 65 mg 65mg   INR 2.6 3.4 3.1 2.7 1.5 2.1 2.6  3.0 3 2.7 2.4 1.9   Notes     S/p c'scope, librado greens 1 boost Decr cig use Diverticulitis; end colostomy stopped smoking; recent admission     Green beans (HH)     Date  3/24 4/1 4/10 5/6 5/20 6/8 6/19 7/31 8/31 10/14 12/7 1/5/2021 1/8 1/13 1/20 1/27 2/2   Total Weekly Dose 67.5mg 72.5 mg 70mg 70mg 70mg 55mg? 65mg 65mg 67.5mg 67.5 67.5 mg 27.5mg colonoscopy 45mg 70mg 70mg  75mg   INR 1.8 2.23 2.8 3.4 3.1 2.0 2.5 2.0 2.3 2.5 2.1 1.1  1.2 1.8 1.7 2.5   Notes  Extra dose  Less GLV Less GLV Missed x1  incr GLV    Hold x4 Hold x 7 days missed x1          Warfarin Dosing During Admission 1/28:  Date  2/3 2/4 2/5 2/6 2/7 2/8 2/9 2/10   INR  1.38 1.55 1.88 2.22 2.1 2.2 2.4 2.46   Dose  10mg 10mg 7.5mg 7.5mg  10mg 10mg 10mg 10mg     Date 3/17 4/1-4/15 4/16 4/19 4/23 5/7           Total Weekly Dose 60mg? [facility] admission 42.5mg?? 47.5mg? 45mg 45 mg           INR 1.8  2.2 2.0 2.2 2.95 venipuncture           Notes Miss x 1; self adjust dose reverse cholestomy; hemorrhage; vit k    POC 4.5 x2               Phone Interview:  Tablet Strength: 5mg tablet  Verbal Release  Authorization signed on 9/19/19-- may speak with Jammie Rodriguez (mother) John Rodriguez (father)  Patient contact info: [phone] (Mobile); [phone] (Cell) 5/7/21    Patient Findings  Negatives:  Signs/symptoms of thrombosis, Signs/symptoms of bleeding, Laboratory test error suspected, Change in health, Change in alcohol use, Change in activity, Upcoming invasive procedure, Emergency department visit, Upcoming dental procedure, Missed doses, Extra doses, Change in medications, Change in diet/appetite, Hospital admission, Bruising, Other complaints   Comments:  He has switched from tylenol to ibuprofen for his post-op pain. He is currently using ibuprofen 600 mg bid-tid. No signs of bleeding or oozing blood from his surgical site. No blood on toilet paper recently.     He is going to the lab on the seventh floor for a venipuncture lab correlation.     He gave me a new number to call after the lab results.          Plan:  1. INR is therapeutic via venipuncture result from lab at 2.95. His POC resulted at 4.5, as well as repeat. He has undergone reversal of cholestomy with wound hemorrhage and vit K administration. Instructed Mr. Rodriguez continue dose this week of  warfarin 7.5mg daily except 5mg SunWednesdayFri. Patient voices understanding of dosing directions. He is willing to consider alternated between tylenol and ibuprofen q6h for his pain. He thinks the pain is much improved and may be able to do this or reduce   2. Repeat INR 5/13 per pat preference. Pt understands and prefers close monitoring at this time. He also prefers to come to the clinic for POCT.   3. Verbal information provided over the telephone after lab draw.  Brigida Rodriguez RBV dosing instructions, expresses understanding by teach back, and has no further questions at this time.  4. He asks about possibly getting a home monitor. He is willing to pay if it isn't covered entirely by insurance.  5.  Owen PAULINO Working on paperwork for home  monitor.      Owen Vallejo PharmD.  05/07/21   10:56 EDT

## 2021-05-21 ENCOUNTER — ANTICOAGULATION VISIT (OUTPATIENT)
Dept: PHARMACY | Facility: HOSPITAL | Age: 41
End: 2021-05-21

## 2021-05-21 DIAGNOSIS — Z86.711 HISTORY OF PULMONARY EMBOLISM: Primary | ICD-10-CM

## 2021-05-21 LAB
INR PPP: 1.4 (ref 0.91–1.09)
PROTHROMBIN TIME: 16.8 SECONDS (ref 10–13.8)

## 2021-05-21 PROCEDURE — G0463 HOSPITAL OUTPT CLINIC VISIT: HCPCS

## 2021-05-21 PROCEDURE — 36416 COLLJ CAPILLARY BLOOD SPEC: CPT

## 2021-05-21 PROCEDURE — 85610 PROTHROMBIN TIME: CPT

## 2021-05-21 NOTE — PROGRESS NOTES
Anticoagulation Clinic Progress Note    Indication: Hx of PE and LE DVT (2010, 2014)  Referring Provider: Vaishali  Initial Warfarin Start Date: 2010  Planned Duration of Therapy: lifelong  Goal INR: 2.0-3.0 (verified Dr Vaishali 9/19/19)  Will likely need lovenox perioperatively (Vaishali 7/29/20)  Current Drug Interactions: Cymbalta and Pantoprazole  Other: d/c Eliquis 9/6/19 due to itching    Diet: green beans,broccoli (1/week), salads daily (iceburg lettuce, carrots, and red cabbage mixture) 5/21/2021  Alcohol: None  Tobacco: Smokes ~5 cigarettes a week  OTC Pain Medication: Recommended Tylenol prn    Anticoagulation Clinic INR History:  Date 9/19 10/16 11/7 11/19 12/3 12/9 1/3/2020 2/3-2/10 2/13 2/17 2/24 3/2 3/16   Total Weekly Dose 80mg 80 mg 77.5 mg 77.5mg 57.5 mg 80mg 72.5mg hosp: admission 65mg 67.5 mg 65 mg 65 mg 65mg   INR 2.6 3.4 3.1 2.7 1.5 2.1 2.6  3.0 3 2.7 2.4 1.9   Notes     S/p c'scope, librado greens 1 boost Decr cig use Diverticulitis; end colostomy stopped smoking; recent admission     Green beans (HH)     Date  3/24 4/1 4/10 5/6 5/20 6/8 6/19 7/31 8/31 10/14 12/7 1/5/2021 1/8 1/13 1/20 1/27 2/2   Total Weekly Dose 67.5mg 72.5 mg 70mg 70mg 70mg 55mg? 65mg 65mg 67.5mg 67.5 67.5 mg 27.5mg colonoscopy 45mg 70mg 70mg  75mg   INR 1.8 2.23 2.8 3.4 3.1 2.0 2.5 2.0 2.3 2.5 2.1 1.1  1.2 1.8 1.7 2.5   Notes  Extra dose  Less GLV Less GLV Missed x1  incr GLV    Hold x4 Hold x 7 days missed x1          Warfarin Dosing During Admission 1/28:  Date  2/3 2/4 2/5 2/6 2/7 2/8 2/9 2/10   INR  1.38 1.55 1.88 2.22 2.1 2.2 2.4 2.46   Dose  10mg 10mg 7.5mg 7.5mg  10mg 10mg 10mg 10mg     Date 3/17 4/1-4/15 4/16 4/19 4/23 5/7 5/21          Total Weekly Dose 60mg? [facility] admission 42.5mg?? 47.5mg? 45mg 45 mg 45mg          INR 1.8  2.2 2.0 2.2 2.95 venipuncture 1.4          Notes Miss x 1; self adjust dose reverse cholestomy; hemorrhage; vit k    POC 4.5 x2               Phone Interview:  Tablet Strength: 5mg tablet  Verbal Release  Authorization signed on 9/19/19-- may speak with Jammie Rodriguez (mother) John Rodriguez (father)  Patient contact info: [phone] (Mobile); [phone] (Cell) 5/7/21    Patient Findings  Positives:  Change in diet/appetite   Negatives:  Signs/symptoms of thrombosis, Signs/symptoms of bleeding, Laboratory test error suspected, Change in health, Change in alcohol use, Change in activity, Upcoming invasive procedure, Emergency department visit, Upcoming dental procedure, Missed doses, Extra doses, Change in medications, Hospital admission, Bruising, Other complaints   Comments:  Reports eating more GLV (broccoli and green beans). He plans to be consistent with GLV intake and continue broccoli once weekly. Patient denies bleeding or bruising.      Plan:  1. INR is SUBtherapeutic today following noncompliance with follow up date. He has undergone reversal of cholestomy with wound hemorrhage and vit K administration. Given history of higher warfarin dose, will increase maintenance dose to 7.5mg daily until repeat INr.  2. Voices understanding of seeking immediate medical attention if abnormal bleeding or bruising occur.   3. Repeat INR on 5/28. He also prefers to come to the clinic for POCT.   4. Verbal and written information provided in the clinic.  Brigida Rodriguez RBV dosing instructions, expresses understanding by teach back, and has no further questions at this time.  5. He asks about possibly getting a home monitor. He is willing to pay if it isn't covered entirely by insurance.  6.  Owen PAULINO Working on paperwork for home monitor. He confirmed he has sent off to both Alisha and KORI Coyle, PharmD  05/21/21   09:01 EDT

## 2021-05-28 ENCOUNTER — APPOINTMENT (OUTPATIENT)
Dept: PHARMACY | Facility: HOSPITAL | Age: 41
End: 2021-05-28

## 2021-06-01 ENCOUNTER — ANTICOAGULATION VISIT (OUTPATIENT)
Dept: PHARMACY | Facility: HOSPITAL | Age: 41
End: 2021-06-01

## 2021-06-01 DIAGNOSIS — Z86.711 HISTORY OF PULMONARY EMBOLISM: Primary | ICD-10-CM

## 2021-06-01 LAB
INR PPP: 1.5 (ref 0.91–1.09)
PROTHROMBIN TIME: 17.5 SECONDS (ref 10–13.8)

## 2021-06-01 PROCEDURE — G0463 HOSPITAL OUTPT CLINIC VISIT: HCPCS

## 2021-06-01 PROCEDURE — 36416 COLLJ CAPILLARY BLOOD SPEC: CPT

## 2021-06-01 PROCEDURE — 85610 PROTHROMBIN TIME: CPT

## 2021-06-01 NOTE — PROGRESS NOTES
Anticoagulation Clinic Progress Note    Indication: Hx of PE and LE DVT (2010, 2014)  Referring Provider: Vaishali  Initial Warfarin Start Date: 2010  Planned Duration of Therapy: lifelong  Goal INR: 2.0-3.0 (verified Dr Vaishali 9/19/19)  Will likely need lovenox perioperatively (Vaishali 7/29/20)  Current Drug Interactions: Cymbalta and Pantoprazole  Other: d/c Eliquis 9/6/19 due to itching    Diet: green beans,broccoli (1/week), salads daily (iceburg lettuce, carrots, and red cabbage mixture) 5/21/2021  Alcohol: None  Tobacco: Smokes ~5 cigarettes a week  OTC Pain Medication: Recommended Tylenol prn    Anticoagulation Clinic INR History:  Date 9/19 10/16 11/7 11/19 12/3 12/9 1/3/2020 2/3-2/10 2/13 2/17 2/24 3/2 3/16   Total Weekly Dose 80mg 80 mg 77.5 mg 77.5mg 57.5 mg 80mg 72.5mg hosp: admission 65mg 67.5 mg 65 mg 65 mg 65mg   INR 2.6 3.4 3.1 2.7 1.5 2.1 2.6  3.0 3 2.7 2.4 1.9   Notes     S/p c'scope, librado greens 1 boost Decr cig use Diverticulitis; end colostomy stopped smoking; recent admission     Green beans (HH)     Date  3/24 4/1 4/10 5/6 5/20 6/8 6/19 7/31 8/31 10/14 12/7 1/5/2021 1/8 1/13 1/20 1/27 2/2   Total Weekly Dose 67.5mg 72.5 mg 70mg 70mg 70mg 55mg? 65mg 65mg 67.5mg 67.5 67.5 mg 27.5mg colonoscopy 45mg 70mg 70mg  75mg   INR 1.8 2.23 2.8 3.4 3.1 2.0 2.5 2.0 2.3 2.5 2.1 1.1  1.2 1.8 1.7 2.5   Notes  Extra dose  Less GLV Less GLV Missed x1  incr GLV    Hold x4 Hold x 7 days missed x1          Warfarin Dosing During Admission 1/28:  Date  2/3 2/4 2/5 2/6 2/7 2/8 2/9 2/10   INR  1.38 1.55 1.88 2.22 2.1 2.2 2.4 2.46   Dose  10mg 10mg 7.5mg 7.5mg  10mg 10mg 10mg 10mg     Date 3/17 4/1-4/15 4/16 4/19 4/23 5/7 5/21 6/1         Total Weekly Dose 60mg? [facility] admission 42.5mg?? 47.5mg? 45mg 45 mg 45mg 52.5mg         INR 1.8  2.2 2.0 2.2 2.95 venipuncture 1.4 1.5         Notes Miss x 1; self adjust dose reverse cholestomy; hemorrhage; vit k    POC 4.5 x2  Inc GLV; lovenox             Phone Interview:  Tablet Strength:  5mg tablet  Verbal Release Authorization signed on 9/19/19-- may speak with Jammie Rodriguez (mother) John Rodriguez (father)  Patient contact info: [phone] (Mobile); [phone] (Cell) 5/7/21    Patient Findings  Positives:  Change in diet/appetite   Negatives:  Signs/symptoms of thrombosis, Signs/symptoms of bleeding, Laboratory test error suspected, Change in health, Change in alcohol use, Change in activity, Upcoming invasive procedure, Emergency department visit, Upcoming dental procedure, Missed doses, Extra doses, Change in medications, Hospital admission, Bruising, Other complaints   Comments:  Mr. Rodriguez reports he has had an increase in GLV. (broccoli and green beans). Discussed need to keep consistent. He will plan to have Iceburg salad instead of broccoli.      Plan:  1. INR is SUBtherapeutic following increase in GLV. He will plan to keep consistent. Will start lovenox today last dose Thursday night- given INR has been SUBtherapeutic and instructions to use lovenox perioperatively per provider. Will also boost dose to 10mg today and tomorrow and then continue 7.5mg daily.   2. Voices understanding of seeking immediate medical attention if abnormal bleeding or bruising occur.   3. Repeat INR on 6/4/2021. He also prefers to come to the clinic for POCT.   4. Weight 325lbs per patient. Sent in lovenox 150mg q12h #10. Get Scr from [facility]. Discharged 4/15/2021  5. Verbal and written information provided in the clinic.  Brigida Rodriguez RBV dosing instructions, expresses understanding by teach back, and has no further questions at this time.  6. He asks about possibly getting a home monitor. He is willing to pay if it isn't covered entirely by insurance.  7.  Owen PAULINO Working on paperwork for home monitor. He confirmed he has sent off to both Alisha and KORI Coyle, PharmD  06/01/21   09:44 EDT

## 2021-06-04 ENCOUNTER — TELEPHONE (OUTPATIENT)
Dept: PHARMACY | Facility: HOSPITAL | Age: 41
End: 2021-06-04

## 2021-06-04 ENCOUNTER — APPOINTMENT (OUTPATIENT)
Dept: PHARMACY | Facility: HOSPITAL | Age: 41
End: 2021-06-04

## 2021-06-07 ENCOUNTER — APPOINTMENT (OUTPATIENT)
Dept: PHARMACY | Facility: HOSPITAL | Age: 41
End: 2021-06-07

## 2021-06-07 DIAGNOSIS — Z86.711 HISTORY OF PULMONARY EMBOLISM: Primary | ICD-10-CM

## 2021-06-10 ENCOUNTER — APPOINTMENT (OUTPATIENT)
Dept: PHARMACY | Facility: HOSPITAL | Age: 41
End: 2021-06-10

## 2021-07-05 DIAGNOSIS — I10 ESSENTIAL HYPERTENSION: ICD-10-CM

## 2021-07-05 DIAGNOSIS — Z86.711 HISTORY OF PULMONARY EMBOLISM: ICD-10-CM

## 2021-07-05 DIAGNOSIS — F33.9 RECURRENT MAJOR DEPRESSIVE DISORDER, REMISSION STATUS UNSPECIFIED (HCC): ICD-10-CM

## 2021-07-05 DIAGNOSIS — F41.1 GENERALIZED ANXIETY DISORDER: ICD-10-CM

## 2021-07-05 DIAGNOSIS — B00.9 HERPES: ICD-10-CM

## 2021-07-05 DIAGNOSIS — K57.80 PERFORATED DIVERTICULUM: ICD-10-CM

## 2021-07-06 RX ORDER — ACYCLOVIR 400 MG/1
400 TABLET ORAL DAILY PRN
Qty: 30 TABLET | Refills: 2 | Status: SHIPPED | OUTPATIENT
Start: 2021-07-06 | End: 2021-10-18 | Stop reason: SDUPTHER

## 2021-07-06 RX ORDER — CARVEDILOL 6.25 MG/1
6.25 TABLET ORAL 2 TIMES DAILY WITH MEALS
Qty: 60 TABLET | Refills: 5 | Status: SHIPPED | OUTPATIENT
Start: 2021-07-06 | End: 2022-01-14 | Stop reason: SDUPTHER

## 2021-07-06 RX ORDER — DULOXETIN HYDROCHLORIDE 30 MG/1
30 CAPSULE, DELAYED RELEASE ORAL DAILY
Qty: 30 CAPSULE | Refills: 5 | Status: SHIPPED | OUTPATIENT
Start: 2021-07-06 | End: 2021-12-10 | Stop reason: SDUPTHER

## 2021-07-06 RX ORDER — PANTOPRAZOLE SODIUM 40 MG/1
40 TABLET, DELAYED RELEASE ORAL
Qty: 90 TABLET | Refills: 3 | Status: SHIPPED | OUTPATIENT
Start: 2021-07-06 | End: 2022-09-06 | Stop reason: SDUPTHER

## 2021-07-06 RX ORDER — BUPROPION HYDROCHLORIDE 150 MG/1
150 TABLET, EXTENDED RELEASE ORAL 2 TIMES DAILY
Qty: 60 TABLET | Refills: 1 | Status: SHIPPED | OUTPATIENT
Start: 2021-07-06 | End: 2021-12-02 | Stop reason: SDUPTHER

## 2021-07-06 RX ORDER — WARFARIN SODIUM 5 MG/1
TABLET ORAL
Qty: 70 TABLET | Refills: 3 | Status: SHIPPED | OUTPATIENT
Start: 2021-07-06 | End: 2021-12-22 | Stop reason: SDUPTHER

## 2021-07-06 RX ORDER — LISINOPRIL 10 MG/1
10 TABLET ORAL DAILY
Qty: 90 TABLET | Refills: 3 | Status: SHIPPED | OUTPATIENT
Start: 2021-07-06 | End: 2022-04-16 | Stop reason: SDUPTHER

## 2021-07-06 NOTE — TELEPHONE ENCOUNTER
LOV 12/23/20  NOV  Refilled Lisinopril and Ixjlrzha99/12/2021  Zovirax 02/09/2021  Warfarin 01/11/2021  Wellbutrin SR 11/09/2020  Coreg and Cymbalta 10/05/2020

## 2021-07-22 ENCOUNTER — TELEPHONE (OUTPATIENT)
Dept: PHARMACY | Facility: HOSPITAL | Age: 41
End: 2021-07-22

## 2021-07-29 DIAGNOSIS — Z86.711 HISTORY OF PULMONARY EMBOLISM: ICD-10-CM

## 2021-07-29 RX ORDER — WARFARIN SODIUM 5 MG/1
TABLET ORAL
Qty: 70 TABLET | Refills: 3 | OUTPATIENT
Start: 2021-07-29

## 2021-07-29 NOTE — TELEPHONE ENCOUNTER
Confirmed with pharmacy, they received refill on 07/06/2021 with 3 rf. Will deny prescription back to pharmacy.

## 2021-08-04 NOTE — TELEPHONE ENCOUNTER
LVM re: overdue PT/INR lab draw    Patient will be due for 2nd letter on 8/11 if INR is not received

## 2021-08-11 NOTE — TELEPHONE ENCOUNTER
Sending 2x copies of 2nd letter: 1x via USPS regular mail and 1x via USPS certified mail (tracking #: [id number])    Will continue to reach out to patient. Patient would be due for 3rd letter on 8/25 if INR is not received

## 2021-08-18 NOTE — TELEPHONE ENCOUNTER
LVM re: overdue PT/INR lab draw    Patient will be due for 3rd letter on 8/25 if INR is not received

## 2021-08-20 ENCOUNTER — APPOINTMENT (OUTPATIENT)
Dept: PHARMACY | Facility: HOSPITAL | Age: 41
End: 2021-08-20

## 2021-08-20 NOTE — TELEPHONE ENCOUNTER
Patient LVM prior to clinic hours requesting cancellation of appointment and stating he would have INR drawn at lab on [facility] [address]. MERINO for patient acknowledging VM and informing patient he has standing order available at lab. Have requested he have INR drawn as early in day as possible to ensure same day results.      Patient would be due for 3rd letter on 8/25 if INR is not drawn by then

## 2021-08-20 NOTE — TELEPHONE ENCOUNTER
Rx Refill Note  Requested Prescriptions     Pending Prescriptions Disp Refills   • enoxaparin (LOVENOX) 150 MG/ML injection [Pharmacy Med Name: ENOXAPARIN 150 MG/ML SYRINGE]       Sig: INJECT ONE MILLILITER (1 SYRINGE) AS DIRECTED UNDER THE SKIN EVERY 12 HOURS      Last office visit with prescribing clinician: 12/23/2020      Next office visit with prescribing clinician: Visit date not found            Earline Ordonez MA  08/20/21, 08:36 EDT

## 2021-08-25 NOTE — TELEPHONE ENCOUNTER
LVM re: overdue PT/INR lab draw    Patient had previously reported he would have INR drawn at [facility] lab but has yet to do so.      Will send 2x copies of 3rd letter: 1x via USPS regular mail and 1x via USPS certified mail (tracking #: [id number])    Will continue to attempt to reach out to patient. He would be due for discharge letter on 9/8/21 if INR is not received.

## 2021-09-07 NOTE — TELEPHONE ENCOUNTER
LVM re: overdue PT/INR lab draw    Patient will be due for discharge letter tomorrow, 9/8, if INR is not received

## 2021-09-13 NOTE — TELEPHONE ENCOUNTER
3rd letter return received received, torn and unsigned but per USPS website:          Of note, patient messaged PCP reporting positive COVID-19 test and has been asked to quarantine 14 days. Have sent message through [employer] requesting he have INR drawn week of 9/27.

## 2021-09-16 NOTE — TELEPHONE ENCOUNTER
WILBER re: overdue PT/INR lab draw    Sent patient message through [employer] acknowledging quarantine status and requesting he have INR drawn week of 9/27-10/1. Message sent 9/13 at 0826; message viewed by patient 9/13 at 6237

## 2021-09-23 NOTE — TELEPHONE ENCOUNTER
Spoke with patient re: overdue PT/INR. He reports he has been feeling better but still not well re: COVID-19 positive dx. He is agreeable to try to have INR drawn at [facility] early next week when he is able to leave quarantine.     He is curious of progress of [facility]. He was unable to take down their phone number to follow up but will call back later when he is able to take down their number.

## 2021-09-28 ENCOUNTER — TELEPHONE (OUTPATIENT)
Dept: PHARMACY | Facility: HOSPITAL | Age: 41
End: 2021-09-28

## 2021-09-28 NOTE — TELEPHONE ENCOUNTER
Dr. Tom,     We have made multiple unsuccessful attempts to contact Brigida Rodriguez by phone and mail to schedule repeat INR and follow up. We did put this process on hold while patient was quarantined for recently reported COVID-19 positive test result. His last INR with our clinic was SUBtherapeutic at 1.5 on 1 June 2021.    Per clinic policy, if we do not hear back from the patient in the next two weeks, we unfortunately will have to discharge Brigida Rodriguez from our services. He will be transferred back to your office as his referring provider unless we are able to establish a more reliable line of communication. We are more than happy to continue to participate in Brigida Rodriguez's care if your office is able to reach the patient and he is willing to adhere to appropriate follow up.    Please contact us with any questions, or if Brigida Rodriguez requires re-enrollment in the Anticoagulation Clinic. Brigida Rodriguez may be re-admitted one additional time under a new referral.    Please let us know if there is anything else that we can do at this time. Thank you for allowing us to participate in Brigida Rodriguez's care.      Thank you,    Oewn Boston, JENNIFER  9/28/2021  11:53 EDT

## 2021-09-29 NOTE — TELEPHONE ENCOUNTER
Mr. Rodriguez called back- and reports that he is going to [facility] tomorrow or Friday for pt/INR test.    Standing order from 6/7/2021 is still active.

## 2021-10-01 ENCOUNTER — APPOINTMENT (OUTPATIENT)
Dept: PHARMACY | Facility: HOSPITAL | Age: 41
End: 2021-10-01

## 2021-10-04 ENCOUNTER — ANTICOAGULATION VISIT (OUTPATIENT)
Dept: PHARMACY | Facility: HOSPITAL | Age: 41
End: 2021-10-04

## 2021-10-04 DIAGNOSIS — Z86.711 HISTORY OF PULMONARY EMBOLISM: Primary | ICD-10-CM

## 2021-10-04 LAB — INR PPP: 2.1

## 2021-10-04 PROCEDURE — 85610 PROTHROMBIN TIME: CPT

## 2021-10-04 PROCEDURE — G0463 HOSPITAL OUTPT CLINIC VISIT: HCPCS

## 2021-10-04 PROCEDURE — 36416 COLLJ CAPILLARY BLOOD SPEC: CPT

## 2021-10-04 NOTE — PROGRESS NOTES
Anticoagulation Clinic Progress Note     Indication: Hx of PE and LE DVT (2010, 2014)  Referring Provider: Vaishali  Initial Warfarin Start Date: 2010  Planned Duration of Therapy: lifelong  Goal INR: 2.0-3.0 (verified Dr Vaishali 9/19/19)  Will likely need lovenox perioperatively (Vaishali 7/29/20)  Current Drug Interactions: Cymbalta and Pantoprazole  Other: d/c Eliquis 9/6/19 due to itching     Diet: green beans,broccoli (1/week), salads daily (iceburg lettuce, carrots, and red cabbage mixture) 5/21/2021  Alcohol: None  Tobacco: Smokes ~5 cigarettes a week  OTC Pain Medication: Recommended Tylenol prn     Anticoagulation Clinic INR History:  Date 9/19 10/16 11/7 11/19 12/3 12/9 1/3/2020 2/3-2/10 2/13 2/17 2/24 3/2 3/16   Total Weekly Dose 80mg 80 mg 77.5 mg 77.5mg 57.5 mg 80mg 72.5mg hosp: admission 65mg 67.5 mg 65 mg 65 mg 65mg   INR 2.6 3.4 3.1 2.7 1.5 2.1 2.6   3.0 3 2.7 2.4 1.9   Notes         S/p c'scope, librado greens 1 boost Decr cig use Diverticulitis; end colostomy stopped smoking; recent admission        Green beans (HH)      Date  3/24 4/1 4/10 5/6 5/20 6/8 6/19 7/31 8/31 10/14 12/7 1/5/2021 1/8 1/13 1/20 1/27 2/2   Total Weekly Dose 67.5mg 72.5 mg 70mg 70mg 70mg 55mg? 65mg 65mg 67.5mg 67.5 67.5 mg 27.5mg colonoscopy 45mg 70mg 70mg  75mg   INR 1.8 2.23 2.8 3.4 3.1 2.0 2.5 2.0 2.3 2.5 2.1 1.1   1.2 1.8 1.7 2.5   Notes   Extra dose   Less GLV Less GLV Missed x1   incr GLV       Hold x4 Hold x 7 days missed x1               Warfarin Dosing During Admission 1/28:  Date  2/3 2/4 2/5 2/6 2/7 2/8 2/9 2/10   INR  1.38 1.55 1.88 2.22 2.1 2.2 2.4 2.46   Dose  10mg 10mg 7.5mg 7.5mg  10mg 10mg 10mg 10mg      Date 3/17 4/1-4/15 4/16 4/19 4/23 5/7 5/21 6/1    10/4           Total Weekly Dose 60mg? [facility] admission 42.5mg?? 47.5mg? 45mg 45 mg 45mg 52.5mg  noncompliance  57.5mg           INR 1.8   2.2 2.0 2.2 2.95 venipuncture 1.4 1.5    2.1           Notes Miss x 1; self adjust dose reverse cholestomy; hemorrhage; vit k       POC  4.5 x2   Inc GLV; lovenox                     Phone Interview:  Tablet Strength: 5mg tablet  Verbal Release Authorization signed on 9/19/19-- may speak with Jammie Rodriguez (mother) John Rodriguez (father)  Patient contact info: [phone] (Mobile); [phone] (Cell) 5/7/21     Patient Findings  Positives:  Missed doses, Extra doses, Change in medications   Negatives:  Signs/symptoms of thrombosis, Signs/symptoms of bleeding, Laboratory test error suspected, Change in health, Change in alcohol use, Change in activity, Upcoming invasive procedure, Emergency department visit, Upcoming dental procedure, Change in diet/appetite, Hospital admission, Bruising, Other complaints   Comments:  Patient reports he has written down doses. Was able to read back doses for the last ~10 days. Believes he has been averaging 10mg dose ~2xweekly Reports he has not been taking the same dose week to week and self adjusts based on cigarette use. Discussed importance of consistency   Recently resumed melatonin       Plan:  1. INR is therapeutic at 2.1. Patient was noncompliant with requested recheck date and was due for discharge from clinic. Will have patient continue his current dose of warfarin 7.5mg daily except 10mg  MonFri  2. Voices understanding of seeking immediate medical attention if abnormal bleeding or bruising occur.   3. Repeat INR in 2 weeks to ensure WNL, can extend further at that time  4. Verbal and written information provided in the clinic.  Brigida Nia Rodriguez RBV dosing instructions, expresses understanding by teach back, and has no further questions at this time.  6.Previously, Owen PAULINO Working on paperwork for home monitor. He confirmed he has sent off to both Alisha and KORI Barger, Sheron.  10/04/21   10:12 EDT

## 2021-10-06 LAB
INR PPP: 2.1 (ref 0.91–1.09)
PROTHROMBIN TIME: 25.6 SECONDS (ref 10–13.8)

## 2021-10-18 ENCOUNTER — TELEPHONE (OUTPATIENT)
Dept: PHARMACY | Facility: HOSPITAL | Age: 41
End: 2021-10-18

## 2021-10-18 DIAGNOSIS — B00.9 HERPES: ICD-10-CM

## 2021-10-18 RX ORDER — ACYCLOVIR 400 MG/1
400 TABLET ORAL DAILY PRN
Qty: 30 TABLET | Refills: 2 | Status: SHIPPED | OUTPATIENT
Start: 2021-10-18 | End: 2022-01-14 | Stop reason: SDUPTHER

## 2021-10-18 NOTE — TELEPHONE ENCOUNTER
Patient LVM prior to clinic hours requesting to reschedule appt.      LVM for patient to reschedule appt

## 2021-10-18 NOTE — TELEPHONE ENCOUNTER
Rx Refill Note  Requested Prescriptions     Pending Prescriptions Disp Refills   • acyclovir (Zovirax) 400 MG tablet 30 tablet 2     Sig: Take 1 tablet by mouth Daily As Needed (PRN). Take no more than 5 doses during flare up.      Last office visit with prescribing clinician: 12/23/2020      Next office visit with prescribing clinician: Visit date not found            ROB LOPEZ MA  10/18/21, 14:21 EDT

## 2021-10-25 ENCOUNTER — APPOINTMENT (OUTPATIENT)
Dept: PHARMACY | Facility: HOSPITAL | Age: 41
End: 2021-10-25

## 2021-10-25 DIAGNOSIS — F33.9 RECURRENT MAJOR DEPRESSIVE DISORDER, REMISSION STATUS UNSPECIFIED (HCC): ICD-10-CM

## 2021-10-25 RX ORDER — BUPROPION HYDROCHLORIDE 150 MG/1
TABLET, EXTENDED RELEASE ORAL
Qty: 60 TABLET | Refills: 1 | OUTPATIENT
Start: 2021-10-25

## 2021-10-25 NOTE — TELEPHONE ENCOUNTER
Patient called clinic Fri, 10/22, at 1613 to request appt for today. Was very agreeable to 1130 appt.     Patient same-day cancelled today's appt at 1015 and rescheduled to 10/27

## 2021-10-25 NOTE — TELEPHONE ENCOUNTER
Rx Refill Note  Requested Prescriptions     Pending Prescriptions Disp Refills   • buPROPion SR (WELLBUTRIN SR) 150 MG 12 hr tablet [Pharmacy Med Name: buPROPion HCL  MG TABLET] 60 tablet 1     Sig: TAKE ONE TABLET BY MOUTH TWICE A DAY      Last office visit with prescribing clinician: 12/23/2020      Next office visit with prescribing clinician: Visit date not found            Earline Ordonez MA  10/25/21, 09:01 EDT         Lft vm for patient to return call. He needs an appointment before we can refill his meds.

## 2021-10-25 NOTE — TELEPHONE ENCOUNTER
Rx Refill Note  Requested Prescriptions     Pending Prescriptions Disp Refills   • buPROPion SR (WELLBUTRIN SR) 150 MG 12 hr tablet [Pharmacy Med Name: buPROPion HCL  MG TABLET] 60 tablet 1     Sig: TAKE ONE TABLET BY MOUTH TWICE A DAY      Last office visit with prescribing clinician: 12/23/2020      Next office visit with prescribing clinician: Visit date not found            Karina Billingsley MA  10/25/21, 09:46 EDT     Called Pt left  with office number to call back an make an appt.

## 2021-11-08 ENCOUNTER — OFFICE VISIT (OUTPATIENT)
Dept: FAMILY MEDICINE CLINIC | Facility: CLINIC | Age: 41
End: 2021-11-08

## 2021-11-08 VITALS
WEIGHT: 315 LBS | OXYGEN SATURATION: 99 % | DIASTOLIC BLOOD PRESSURE: 90 MMHG | SYSTOLIC BLOOD PRESSURE: 130 MMHG | BODY MASS INDEX: 38.36 KG/M2 | HEART RATE: 109 BPM | HEIGHT: 76 IN

## 2021-11-08 DIAGNOSIS — F41.9 ANXIETY AND DEPRESSION: ICD-10-CM

## 2021-11-08 DIAGNOSIS — F32.A ANXIETY AND DEPRESSION: ICD-10-CM

## 2021-11-08 DIAGNOSIS — I10 ESSENTIAL HYPERTENSION: ICD-10-CM

## 2021-11-08 DIAGNOSIS — Z00.00 PREVENTATIVE HEALTH CARE: Primary | ICD-10-CM

## 2021-11-08 DIAGNOSIS — Z98.890 S/P EXPLORATORY LAPAROTOMY: ICD-10-CM

## 2021-11-08 PROCEDURE — 90686 IIV4 VACC NO PRSV 0.5 ML IM: CPT | Performed by: INTERNAL MEDICINE

## 2021-11-08 PROCEDURE — 99396 PREV VISIT EST AGE 40-64: CPT | Performed by: INTERNAL MEDICINE

## 2021-11-08 PROCEDURE — 90471 IMMUNIZATION ADMIN: CPT | Performed by: INTERNAL MEDICINE

## 2021-11-08 NOTE — PROGRESS NOTES
Chief Complaint   Patient presents with   • Annual Exam       HPI:  Brigida Rodriguez is a 41 y.o. male who presents today for annual physical.  No acute concerns today.    ROS:  Constitutional: no fevers, night sweats or unexplained weight loss  Eyes: no vision changes  ENT: no runny nose, ear pain, sore throat  Cardio: no chest pain, palpitations  Pulm: no shortness of breath, wheezing, or cough  GI: no abdominal pain or changes in bowel movements  : no difficulty urinating  MSK: no difficulty ambulating, no joint pain  Neuro: no weakness, dizziness or headache  Psych: no trouble sleeping  Endo: no change in appetite      Past Medical History:   Diagnosis Date   • Anxiety    • Depression    • GERD (gastroesophageal reflux disease)    • H/O blood clots    • Heart failure (HCC)    • Hypertension    • Presence of IVC filter    • Pulmonary embolism (HCC)     10 YEARS AGO      Family History   Problem Relation Age of Onset   • Diabetes Mother    • Obesity Maternal Grandmother    • Diabetes Maternal Grandmother    • Cancer Father         prostrate   • No Known Problems Sister    • No Known Problems Brother    • No Known Problems Maternal Grandfather    • No Known Problems Paternal Grandmother    • No Known Problems Paternal Grandfather       Social History     Socioeconomic History   • Marital status:    Tobacco Use   • Smoking status: Current Every Day Smoker     Packs/day: 0.50     Years: 20.00     Pack years: 10.00     Types: Cigarettes   • Smokeless tobacco: Never Used   Substance and Sexual Activity   • Alcohol use: Yes     Alcohol/week: 6.0 standard drinks     Types: 6 Cans of beer per week   • Drug use: Yes     Types: Marijuana     Comment: everyday smoker   • Sexual activity: Yes     Partners: Female     Birth control/protection: Condom      No Known Allergies   Immunization History   Administered Date(s) Administered   • COVID-19 (PFIZER) 03/06/2021, 03/30/2021   • Flu Vaccine Quad PF >36MO  12/21/2016, 12/18/2017, 12/07/2018, 11/04/2019   • FluLaval/Fluarix/Fluzone >6 11/04/2019, 12/23/2020, 11/08/2021   • Hepatitis A 12/07/2018   • Pneumococcal Polysaccharide (PPSV23) 12/21/2016   • Tdap 12/21/2016        PE:  Vitals:    11/08/21 1108   BP: 130/90   Pulse: 109   SpO2: 99%      Body mass index is 44.67 kg/m².    Gen Appearance: NAD  HEENT: Normocephalic, PERRLA, no thyromegaly, trache midline  Heart: RRR, normal S1 and S2, no murmur  Lungs: CTA b/l, no wheezing, no crackles  Abdomen: Soft, non-tender, non-distended, no guarding and BSx4  MSK: Moves all extremities well, normal gait, no peripheral edema  Pulses: Palpable and equal b/l  Lymph nodes: No palpable lymphadenopathy   Neuro: No focal deficits      Current Outpatient Medications   Medication Sig Dispense Refill   • acetaminophen (TYLENOL) 325 MG tablet Take 2 tablets by mouth Every 4 (Four) Hours As Needed for Mild Pain , Moderate Pain  or Headache (Do not exceed 4,000mg in 24 hours).     • acyclovir (Zovirax) 400 MG tablet Take 1 tablet by mouth Daily As Needed (PRN). Take no more than 5 doses during flare up. 30 tablet 2   • buPROPion SR (WELLBUTRIN SR) 150 MG 12 hr tablet Take 1 tablet by mouth 2 (Two) Times a Day. 60 tablet 1   • carvedilol (COREG) 6.25 MG tablet Take 1 tablet by mouth 2 (Two) Times a Day With Meals. 60 tablet 5   • DULoxetine (CYMBALTA) 30 MG capsule Take 1 capsule by mouth Daily. 30 capsule 5   • enoxaparin (LOVENOX) 150 MG/ML injection INJECT ONE MILLILITER (1 SYRINGE) AS DIRECTED UNDER THE SKIN EVERY 12 HOURS 10 mL 0   • hydrOXYzine pamoate (VISTARIL) 25 MG capsule Take 1 capsule by mouth 3 (Three) Times a Day As Needed for Anxiety. 90 capsule 0   • lisinopril (PRINIVIL,ZESTRIL) 10 MG tablet Take 1 tablet by mouth Daily. 90 tablet 3   • Multiple Vitamins-Minerals (HM MULTIVITAMIN ADULT GUMMY PO) Take  by mouth.     • oxyCODONE (OXY-IR) 5 MG capsule Take 5 mg by mouth Every 4 (Four) Hours As Needed for Moderate Pain .      • pantoprazole (PROTONIX) 40 MG EC tablet Take 1 tablet by mouth Every Morning Before Breakfast. 90 tablet 3   • traZODone (DESYREL) 50 MG tablet Take 50 mg by mouth Every Night.     • warfarin (COUMADIN) 5 MG tablet TAKE TWO TABLETS BY MOUTH DAILY EXCEPT ON THURSDAY AND FRIDAY THEN TAKE 3 TABLETS 70 tablet 3     No current facility-administered medications for this visit.        Diagnoses and all orders for this visit:    1. Preventative health care (Primary)  Counseled on healthy weight, nutrition, physical activity, cancer screening, and immunizations.  Counseled on obesity and tobacco cessation.    2. Anxiety and depression  -     Ambulatory Referral to Behavioral Health    3. Essential hypertension    4. S/P exploratory laparotomy with sigmoid colectomy/end colostomy/enterotomy repair 1/31/20    Other orders  -     FluLaval/Fluarix/Fluzone >6 Months ([id number])         No follow-ups on file.     Please note that portions of this document were completed with a voice recognition program. Efforts were made to edit the dictations, but occasionally words are mis-transcribed.

## 2021-11-09 ENCOUNTER — TELEPHONE (OUTPATIENT)
Dept: FAMILY MEDICINE CLINIC | Facility: CLINIC | Age: 41
End: 2021-11-09

## 2021-11-09 NOTE — TELEPHONE ENCOUNTER
Pt returned call. Informed of providers recommendations as listed below. Pt stated ok, bye. Attempted to explain provider needs to see to whats going on to order the appropriate atb for patient however pt hung up before this was told to pt.

## 2021-11-09 NOTE — TELEPHONE ENCOUNTER
He needs to make an apt with a dentist. If unable to get into dentist then will need an apt here to evaluate for antibiotics.

## 2021-11-09 NOTE — TELEPHONE ENCOUNTER
Attempted to contact pt to find out a little more information. No answer LVM with office number given.

## 2021-11-09 NOTE — TELEPHONE ENCOUNTER
Pt returned call. Pt woke up this morning and the bottom side of mouth on gums but more so on right cheek is swollen and painful. Unable to eat and stated he can barely close his mouth due to swelling. Denies any dyspnea or shortness of breath. Stated its only that side of inside of mouth. Pt stated his wife said it looked somewhat white in color. LOV 11/8/21   Please advise, thanks

## 2021-11-09 NOTE — TELEPHONE ENCOUNTER
Caller: Brigida Rodriguez    Relationship: Self    Best call back number: [phone]    What medication are you requesting: ANTIBIOTIC     What are your current symptoms: SWOLLEN GUMS    How long have you been experiencing symptoms:     Have you had these symptoms before:    [] Yes  [x] No    Have you been treated for these symptoms before:   [] Yes  [x] No    If a prescription is needed, what is your preferred pharmacy and phone number: [facility] [address] [facility] [phone] [facility] [phone]      Additional notes:

## 2021-11-10 NOTE — TELEPHONE ENCOUNTER
LVM re: overdue PT/INR lab draw    Patient would be due for 2nd letter on 11/22 if INR is not received

## 2021-11-22 NOTE — TELEPHONE ENCOUNTER
LVM re: overdue PT/INR lab draw    Sending 2x copies of 2nd letter: 1x via USPS regular mail and 1x via USPS certified mail (tracking #: [id number] [phone])

## 2021-11-30 NOTE — TELEPHONE ENCOUNTER
Spoke with patient re: overdue PT/INR. He is agreeable to come into clinic on Fri, 12/3 re: overdue PT/INR.     Patient has history of non-compliance with requested follow-up. Have made sure to discuss with patient risks of SUB/SUPRAtherapeutic INR. Patient voiced understanding and is apologetic for being over due    Patient would be due for 3rd letter on or about 12/6 if INR is not received.

## 2021-12-02 DIAGNOSIS — F33.9 RECURRENT MAJOR DEPRESSIVE DISORDER, REMISSION STATUS UNSPECIFIED (HCC): ICD-10-CM

## 2021-12-03 ENCOUNTER — APPOINTMENT (OUTPATIENT)
Dept: PHARMACY | Facility: HOSPITAL | Age: 41
End: 2021-12-03

## 2021-12-03 RX ORDER — BUPROPION HYDROCHLORIDE 150 MG/1
150 TABLET, EXTENDED RELEASE ORAL 2 TIMES DAILY
Qty: 60 TABLET | Refills: 1 | Status: SHIPPED | OUTPATIENT
Start: 2021-12-03 | End: 2022-01-14 | Stop reason: SDUPTHER

## 2021-12-03 NOTE — TELEPHONE ENCOUNTER
Rx Refill Note  Requested Prescriptions     Pending Prescriptions Disp Refills   • buPROPion SR (WELLBUTRIN SR) 150 MG 12 hr tablet 60 tablet 1     Sig: Take 1 tablet by mouth 2 (Two) Times a Day.      Last office visit with prescribing clinician: 11/8/2021      Next office visit with prescribing clinician: Visit date not found     Kimberly Duncan MA  12/03/21, 08:21 EST     Last fill: 07/06/2021

## 2021-12-06 NOTE — TELEPHONE ENCOUNTER
Patient LVM yesterday evening requesting to cancel/reschedule today's appointment.    LVM to reschedule.    Will send 3rd letter if INR is not received by 12/13

## 2021-12-10 DIAGNOSIS — F33.9 RECURRENT MAJOR DEPRESSIVE DISORDER, REMISSION STATUS UNSPECIFIED (HCC): ICD-10-CM

## 2021-12-10 DIAGNOSIS — F41.1 GENERALIZED ANXIETY DISORDER: ICD-10-CM

## 2021-12-10 RX ORDER — DULOXETIN HYDROCHLORIDE 30 MG/1
30 CAPSULE, DELAYED RELEASE ORAL DAILY
Qty: 30 CAPSULE | Refills: 2 | Status: SHIPPED | OUTPATIENT
Start: 2021-12-10 | End: 2022-01-14 | Stop reason: SDUPTHER

## 2021-12-10 NOTE — TELEPHONE ENCOUNTER
Rx Refill Note  Requested Prescriptions     Pending Prescriptions Disp Refills   • DULoxetine (CYMBALTA) 30 MG capsule 30 capsule 5     Sig: Take 1 capsule by mouth Daily.      Last office visit with prescribing clinician: 11/8/2021      Next office visit with prescribing clinician: Visit date not found   }  Kimberly Duncan MA  12/10/21, 08:16 EST     Last fill: 07/06/2021

## 2021-12-13 ENCOUNTER — TELEPHONE (OUTPATIENT)
Dept: PHARMACY | Facility: HOSPITAL | Age: 41
End: 2021-12-13

## 2021-12-13 NOTE — TELEPHONE ENCOUNTER
Dr. Tom,    We have made multiple unsuccessful attempts to contact Brigida Rodriguez by phone and mail to schedule repeat INR and follow up. See telephone encounter from 10/18/21 for list of follow-up attempts. His last INR with our clinic was SUBtherapeutic at 2.1 on 10/4/21.    Per clinic policy, if we do not hear back from the patient in the next two weeks, we unfortunately will have to discharge Brigida Rodriguez from our services. He will be transferred back to your office as his referring provider unless we are able to establish a more reliable line of communication. We are more than happy to continue to participate in Brigida Rodriguez's care if your office is able to reach the patient and he is willing to adhere to appropriate follow up.    Please contact us with any questions, or if Brigida Rodriguez requires re-enrollment in the Anticoagulation Clinic. Brigida Rodriguez may be re-admitted one additional time under a new referral.    Please let us know if there is anything else that we can do at this time. Thank you for allowing us to participate in Brigida Rodriguez's care.      Owen Boston, [facility]  12/13/21  12:07 EST

## 2021-12-13 NOTE — TELEPHONE ENCOUNTER
LVM re: overdue PT/INR lab draw    Sending 2x copies of 3rd letter: 1x via USPS regular mail and 1x via USPS certified mail (tracking #: [phone] [id number])      Will continue to reach out to patient. Would be due for discharge on 1/3/22 if INR is not received. Will also send message to referring provider, Dr. Tom, per protocol.

## 2021-12-20 ENCOUNTER — APPOINTMENT (OUTPATIENT)
Dept: PHARMACY | Facility: HOSPITAL | Age: 41
End: 2021-12-20

## 2021-12-21 DIAGNOSIS — F41.1 GENERALIZED ANXIETY DISORDER: ICD-10-CM

## 2021-12-21 DIAGNOSIS — F33.9 RECURRENT MAJOR DEPRESSIVE DISORDER, REMISSION STATUS UNSPECIFIED (HCC): ICD-10-CM

## 2021-12-21 RX ORDER — DULOXETIN HYDROCHLORIDE 30 MG/1
30 CAPSULE, DELAYED RELEASE ORAL DAILY
Qty: 30 CAPSULE | Refills: 2 | OUTPATIENT
Start: 2021-12-21

## 2021-12-21 NOTE — TELEPHONE ENCOUNTER
Rx Refill Note  Requested Prescriptions     Pending Prescriptions Disp Refills   • DULoxetine (CYMBALTA) 30 MG capsule 30 capsule 2     Sig: Take 1 capsule by mouth Daily.      Last office visit with prescribing clinician: 11/8/2021      Next office visit with prescribing clinician: Visit date not found            ROB LOPEZ MA  12/21/21, 09:03 EST     Medication was sent to pharmacy on 12/10/21 with a 30 day supply with 2 refills. Patient does not need a refill at this time.

## 2021-12-22 ENCOUNTER — ANTICOAGULATION VISIT (OUTPATIENT)
Dept: PHARMACY | Facility: HOSPITAL | Age: 41
End: 2021-12-22

## 2021-12-22 DIAGNOSIS — Z86.711 HISTORY OF PULMONARY EMBOLISM: Primary | ICD-10-CM

## 2021-12-22 LAB
INR PPP: 1.7 (ref 0.91–1.09)
PROTHROMBIN TIME: 20.5 SECONDS (ref 10–13.8)

## 2021-12-22 PROCEDURE — 85610 PROTHROMBIN TIME: CPT

## 2021-12-22 PROCEDURE — G0463 HOSPITAL OUTPT CLINIC VISIT: HCPCS

## 2021-12-22 PROCEDURE — 36416 COLLJ CAPILLARY BLOOD SPEC: CPT

## 2021-12-22 RX ORDER — WARFARIN SODIUM 5 MG/1
TABLET ORAL
Qty: 70 TABLET | Refills: 0 | Status: SHIPPED | OUTPATIENT
Start: 2021-12-22 | End: 2021-12-27

## 2021-12-22 NOTE — PROGRESS NOTES
Anticoagulation Clinic Progress Note     Indication: Hx of PE and LE DVT (2010, 2014)  Referring Provider: Vaishali  Initial Warfarin Start Date: 2010  Planned Duration of Therapy: lifelong  Goal INR: 2.0-3.0 (verified Dr Vaishali 9/19/19)  Will likely need lovenox perioperatively (Vaishali 7/29/20)  Current Drug Interactions: Cymbalta and Pantoprazole  Other: d/c Eliquis 9/6/19 due to itching     Diet: green beans,broccoli (1/week), salads daily (iceburg lettuce, carrots, and red cabbage mixture) 5/21/2021  Alcohol: None  Tobacco: Smokes ~5 cigarettes a week  OTC Pain Medication: Recommended Tylenol prn     Anticoagulation Clinic INR History:  Date 9/19 10/16 11/7 11/19 12/3 12/9 1/3/2020 2/3-2/10 2/13 2/17 2/24 3/2 3/16   Total Weekly Dose 80mg 80 mg 77.5 mg 77.5mg 57.5 mg 80mg 72.5mg hosp: admission 65mg 67.5 mg 65 mg 65 mg 65mg   INR 2.6 3.4 3.1 2.7 1.5 2.1 2.6   3.0 3 2.7 2.4 1.9   Notes         S/p c'scope, librado greens 1 boost Decr cig use Diverticulitis; end colostomy stopped smoking; recent admission        Green beans (HH)      Date  3/24 4/1 4/10 5/6 5/20 6/8 6/19 7/31 8/31 10/14 12/7 1/5/2021 1/8 1/13 1/20 1/27 2/2   Total Weekly Dose 67.5mg 72.5 mg 70mg 70mg 70mg 55mg? 65mg 65mg 67.5mg 67.5 67.5 mg 27.5mg colonoscopy 45mg 70mg 70mg  75mg   INR 1.8 2.23 2.8 3.4 3.1 2.0 2.5 2.0 2.3 2.5 2.1 1.1   1.2 1.8 1.7 2.5   Notes   Extra dose   Less GLV Less GLV Missed x1   incr GLV       Hold x4 Hold x 7 days missed x1               Warfarin Dosing During Admission 1/28:  Date  2/3 2/4 2/5 2/6 2/7 2/8 2/9 2/10   INR  1.38 1.55 1.88 2.22 2.1 2.2 2.4 2.46   Dose  10mg 10mg 7.5mg 7.5mg  10mg 10mg 10mg 10mg      Date 3/17 4/1-4/15 4/16 4/19 4/23 5/7 5/21 6/1    10/4    12/22       Total Weekly Dose 60mg? [facility] admission 42.5mg?? 47.5mg? 45mg 45 mg 45mg 52.5mg  noncompliance  57.5mg  non compliance  50mg       INR 1.8   2.2 2.0 2.2 2.95 venipuncture 1.4 1.5    2.1    1.7       Notes Miss x 1; self adjust dose reverse cholestomy;  hemorrhage; vit k       POC 4.5 x2   Inc GLV; lovenox        1x miss?             Phone Interview:  Tablet Strength: 5mg tablet  Verbal Release Authorization signed on 9/19/19-- may speak with Jammie Rodriguez (mother) John Rodriguez (father)  Patient contact info: [phone] (Mobile); [phone] (Cell) 5/7/21     Patient Findings  Positives:  Missed doses   Negatives:  Signs/symptoms of thrombosis, Signs/symptoms of bleeding, Laboratory test error suspected, Change in health, Change in alcohol use, Change in activity, Upcoming invasive procedure, Emergency department visit, Upcoming dental procedure, Extra doses, Change in medications, Change in diet/appetite, Hospital admission, Bruising, Other complaints   Comments:  Likely he missed a dose this week-discussed adherence           Plan:  1. INR is subtherapeutic at 1.7, possibly due to missed dose. Patient was noncompliant with requested recheck date and was due for discharge from clinic. Will have patient BOOST tonight's dose to 10mg then continue his current dose of warfarin 7.5mg daily except 10mg  MonFri  2. Voices understanding of seeking immediate medical attention if abnormal bleeding or bruising occur.   3. Repeat INR in 2 weeks to ensure WNL, can extend further at that time  4. Verbal and written information provided in the clinic.  Brigida Hodgeryankenn Michael RBV dosing instructions, expresses understanding by teach back, and has no further questions at this time.  6.Previously, Owen PAULINO Working on paperwork for home monitor. He confirmed he has sent off to both Alisha and KORI  ]  Tara Barger, BrandenD.  12/22/21   10:09 EST

## 2021-12-24 DIAGNOSIS — Z86.711 HISTORY OF PULMONARY EMBOLISM: ICD-10-CM

## 2021-12-27 RX ORDER — WARFARIN SODIUM 5 MG/1
TABLET ORAL
Qty: 70 TABLET | Refills: 0 | Status: SHIPPED | OUTPATIENT
Start: 2021-12-27 | End: 2022-03-15 | Stop reason: SDUPTHER

## 2021-12-27 NOTE — TELEPHONE ENCOUNTER
Rx Refill Note  Requested Prescriptions     Pending Prescriptions Disp Refills   • warfarin (COUMADIN) 5 MG tablet [Pharmacy Med Name: WARFARIN SODIUM 5 MG TABLET] 70 tablet 0     Sig: TAKE TWO TABLETS BY MOUTH DAILY EXCEPT ON THURSDAY AND FRIDAY THEN TAKE THREE TABLETS      Last office visit with prescribing clinician: 11/8/2021      Next office visit with prescribing clinician: Visit date not found     None compliant with coag clinic.       Marysol Baldwin MA  12/27/21, 15:23 EST

## 2022-01-05 ENCOUNTER — APPOINTMENT (OUTPATIENT)
Dept: PHARMACY | Facility: HOSPITAL | Age: 42
End: 2022-01-05

## 2022-01-06 ENCOUNTER — ANTICOAGULATION VISIT (OUTPATIENT)
Dept: PHARMACY | Facility: HOSPITAL | Age: 42
End: 2022-01-06

## 2022-01-06 DIAGNOSIS — Z86.711 HISTORY OF PULMONARY EMBOLISM: Primary | ICD-10-CM

## 2022-01-06 LAB
INR PPP: 1.5 (ref 0.91–1.09)
PROTHROMBIN TIME: 18.2 SECONDS (ref 10–13.8)

## 2022-01-06 PROCEDURE — 36416 COLLJ CAPILLARY BLOOD SPEC: CPT

## 2022-01-06 PROCEDURE — G0463 HOSPITAL OUTPT CLINIC VISIT: HCPCS

## 2022-01-06 PROCEDURE — 85610 PROTHROMBIN TIME: CPT

## 2022-01-06 NOTE — PROGRESS NOTES
Anticoagulation Clinic Progress Note     Indication: Hx of PE and LE DVT (2010, 2014)  Referring Provider: Vaishali  Initial Warfarin Start Date: 2010  Planned Duration of Therapy: lifelong  Goal INR: 2.0-3.0 (verified Dr Vaishali 9/19/19)  Will likely need lovenox perioperatively (Vaishali 7/29/20)  Current Drug Interactions: Cymbalta and Pantoprazole  Other: d/c Eliquis 9/6/19 due to itching     Diet: green beans,broccoli (1/week), salads daily (iceburg lettuce, carrots, and red cabbage mixture) 5/21/2021  Alcohol: None  Tobacco: Smokes ~5 cigarettes a week  OTC Pain Medication: Recommended Tylenol prn     Anticoagulation Clinic INR History:  Date 9/19 10/16 11/7 11/19 12/3 12/9 1/3/2020 2/3-2/10 2/13 2/17 2/24 3/2 3/16   Total Weekly Dose 80mg 80 mg 77.5 mg 77.5mg 57.5 mg 80mg 72.5mg hosp: admission 65mg 67.5 mg 65 mg 65 mg 65mg   INR 2.6 3.4 3.1 2.7 1.5 2.1 2.6   3.0 3 2.7 2.4 1.9   Notes         S/p c'scope, librado greens 1 boost Decr cig use Diverticulitis; end colostomy stopped smoking; recent admission        Green beans (HH)      Date  3/24 4/1 4/10 5/6 5/20 6/8 6/19 7/31 8/31 10/14 12/7 1/5/2021 1/8 1/13 1/20 1/27 2/2   Total Weekly Dose 67.5mg 72.5 mg 70mg 70mg 70mg 55mg? 65mg 65mg 67.5mg 67.5 67.5 mg 27.5mg colonoscopy 45mg 70mg 70mg  75mg   INR 1.8 2.23 2.8 3.4 3.1 2.0 2.5 2.0 2.3 2.5 2.1 1.1   1.2 1.8 1.7 2.5   Notes   Extra dose   Less GLV Less GLV Missed x1   incr GLV       Hold x4 Hold x 7 days missed x1               Warfarin Dosing During Admission 1/28:  Date  2/3 2/4 2/5 2/6 2/7 2/8 2/9 2/10   INR  1.38 1.55 1.88 2.22 2.1 2.2 2.4 2.46   Dose  10mg 10mg 7.5mg 7.5mg  10mg 10mg 10mg 10mg      Date 3/17 4/1-4/15 4/16 4/19 4/23 5/7 5/21 6/1    10/4    12/22  1/6     Total Weekly Dose 60mg? [facility] admission 42.5mg?? 47.5mg? 45mg 45 mg 45mg 52.5mg  noncompliance  57.5mg  non compliance  50mg  47.5mg     INR 1.8   2.2 2.0 2.2 2.95 venipuncture 1.4 1.5    2.1    1.7  1.5     Notes Miss x 1; self adjust dose reverse  cholestomy; hemorrhage; vit k       POC 4.5 x2   Inc GLV; lovenox        1x miss?  inc tobacco, 1x miss inc GLV         Phone Interview:  Tablet Strength: 5mg tablet  Verbal Release Authorization signed on 9/19/19-- may speak with Jammie Rodriguez (mother) John Rodriguez (father)  Patient contact info: [phone] (Mobile); [phone] (Cell) 5/7/21     Positives:  Change in alcohol use, Missed doses, Change in diet/appetite   Negatives:  Signs/symptoms of thrombosis, Signs/symptoms of bleeding, Laboratory test error suspected, Change in health, Change in activity, Upcoming invasive procedure, Emergency department visit, Upcoming dental procedure, Extra doses, Change in medications, Hospital admission, Bruising, Other complaints   Comments:  Missed Monday's dose (~17% of weekly total). Has had more salads this week, and increased tobacco use the last 2 weeks. Plans to continue 3 salads a week but will reduce tobacco   Has been smoking 1-1.5 pack per week. Plans to reduce use. Discussed warfarin/tobacco interaction + adherence/consistency     Plan:  1. INR is subtherapeutic at 1.5, see patient findings. Will have patient BOOST tonight's dose to 10mg then continue his current dose of warfarin 7.5mg daily except 10mg  MonFri  2. Voices understanding of seeking immediate medical attention if abnormal bleeding or bruising occur.   3. Repeat INR in ~1.5 weeks  4. Verbal and written information provided in the clinic.  Jaretcharissa Nia Rodriguez RBV dosing instructions, expresses understanding by teach back, and has no further questions at this time.  6.Previously, Owen PAULINO Working on paperwork for home monitor. He confirmed he has sent off to both Alisha and KORI Barger, PharmD.  01/06/22   10:53 EST

## 2022-01-14 DIAGNOSIS — F33.9 RECURRENT MAJOR DEPRESSIVE DISORDER, REMISSION STATUS UNSPECIFIED: ICD-10-CM

## 2022-01-14 DIAGNOSIS — F41.1 GENERALIZED ANXIETY DISORDER: ICD-10-CM

## 2022-01-14 DIAGNOSIS — B00.9 HERPES: ICD-10-CM

## 2022-01-14 NOTE — TELEPHONE ENCOUNTER
Rx Refill Note  Requested Prescriptions     Pending Prescriptions Disp Refills   • carvedilol (COREG) 6.25 MG tablet 60 tablet 5     Sig: Take 1 tablet by mouth 2 (Two) Times a Day With Meals.   • acyclovir (Zovirax) 400 MG tablet 30 tablet 2     Sig: Take 1 tablet by mouth Daily As Needed (PRN). Take no more than 5 doses during flare up.   • buPROPion SR (WELLBUTRIN SR) 150 MG 12 hr tablet 60 tablet 1     Sig: Take 1 tablet by mouth 2 (Two) Times a Day.   • DULoxetine (CYMBALTA) 30 MG capsule 30 capsule 2     Sig: Take 1 capsule by mouth Daily.      Last office visit with prescribing clinician: 11/8/2021      Next office visit with prescribing clinician: Visit date not found            ROB LOPEZ MA  01/14/22, 16:27 EST     Beta-Blockers Protocol Failed     Atypical Antidepressants Protocol Failed       SNRI Protocol Failed

## 2022-01-17 ENCOUNTER — TELEPHONE (OUTPATIENT)
Dept: PHARMACY | Facility: HOSPITAL | Age: 42
End: 2022-01-17

## 2022-01-17 RX ORDER — BUPROPION HYDROCHLORIDE 150 MG/1
150 TABLET, EXTENDED RELEASE ORAL 2 TIMES DAILY
Qty: 60 TABLET | Refills: 1 | Status: SHIPPED | OUTPATIENT
Start: 2022-01-17 | End: 2022-04-16 | Stop reason: SDUPTHER

## 2022-01-17 RX ORDER — DULOXETIN HYDROCHLORIDE 30 MG/1
30 CAPSULE, DELAYED RELEASE ORAL DAILY
Qty: 30 CAPSULE | Refills: 2 | Status: SHIPPED | OUTPATIENT
Start: 2022-01-17 | End: 2022-09-06 | Stop reason: SDUPTHER

## 2022-01-17 RX ORDER — ACYCLOVIR 400 MG/1
400 TABLET ORAL DAILY PRN
Qty: 30 TABLET | Refills: 2 | Status: SHIPPED | OUTPATIENT
Start: 2022-01-17 | End: 2022-09-06 | Stop reason: SDUPTHER

## 2022-01-17 RX ORDER — CARVEDILOL 6.25 MG/1
6.25 TABLET ORAL 2 TIMES DAILY WITH MEALS
Qty: 60 TABLET | Refills: 5 | Status: SHIPPED | OUTPATIENT
Start: 2022-01-17 | End: 2022-04-16 | Stop reason: SDUPTHER

## 2022-01-24 ENCOUNTER — TELEPHONE (OUTPATIENT)
Dept: PHARMACY | Facility: HOSPITAL | Age: 42
End: 2022-01-24

## 2022-01-24 ENCOUNTER — APPOINTMENT (OUTPATIENT)
Dept: PHARMACY | Facility: HOSPITAL | Age: 42
End: 2022-01-24

## 2022-02-11 DIAGNOSIS — K57.80 PERFORATED DIVERTICULUM: ICD-10-CM

## 2022-02-11 RX ORDER — PANTOPRAZOLE SODIUM 40 MG/1
40 TABLET, DELAYED RELEASE ORAL
Qty: 90 TABLET | Refills: 3 | OUTPATIENT
Start: 2022-02-11

## 2022-02-11 NOTE — TELEPHONE ENCOUNTER
Rx Refill Note  Requested Prescriptions     Refused Prescriptions Disp Refills   • pantoprazole (PROTONIX) 40 MG EC tablet 90 tablet 3     Sig: Take 1 tablet by mouth Every Morning Before Breakfast.     Refused By: PIERO GRANADOS     Reason for Refusal: Patient has requested refill too soon      Last office visit with prescribing clinician: 11/8/2021      Next office visit with prescribing clinician: Visit date not found            Piero Granados MA  02/11/22, 08:20 EST

## 2022-02-18 NOTE — TELEPHONE ENCOUNTER
Patient called to request clinic f/u appt for Mon, 2/21, at 1000.    Patient would be due for 2nd letter on 2/22 if he fails to show.

## 2022-02-21 NOTE — TELEPHONE ENCOUNTER
Patient LVM prior to clinic hours requesting reschedule    LVM for patient to discuss.    He would be due for 2nd letter tomorrow if INR is not received

## 2022-02-28 NOTE — TELEPHONE ENCOUNTER
LVM re: overdue PT/INR lab draw    Sending 2x copies of 2nd letter: 1x via [employer] regular mail and 1x via USPS certified mail (tracking #: [id number])    Will continue to reach out to patient. He would be due for 3rd letter on 3/14/22 if INR is not received.

## 2022-03-04 ENCOUNTER — APPOINTMENT (OUTPATIENT)
Dept: PHARMACY | Facility: HOSPITAL | Age: 42
End: 2022-03-04

## 2022-03-04 NOTE — TELEPHONE ENCOUNTER
Patient LVM prior to clinic hours this morning requesting to cancel/reschedule today's appt.    LVM for patient to reschedule

## 2022-03-09 NOTE — TELEPHONE ENCOUNTER
WILBER re: overdue PT/INR lab draw    Had previously suspended letter process as patient had re-scheduled appt. Will resume by sending 2nd letter.    Sending 2x copies of 2nd letter: 1x via [employer] regular mail and 1x via USPS certified mail (tracking #: [id number] [id number])    Will continue to reach out to patient. @He would be due for 3rd letter on 2/23/22 if INR is not received.     left sided weakness chronic s/p CVA. Normal speech. Alert and oriented, no focal deficits, no motor or sensory deficits.

## 2022-03-14 ENCOUNTER — APPOINTMENT (OUTPATIENT)
Dept: PHARMACY | Facility: HOSPITAL | Age: 42
End: 2022-03-14

## 2022-03-15 ENCOUNTER — TELEPHONE (OUTPATIENT)
Dept: PHARMACY | Facility: HOSPITAL | Age: 42
End: 2022-03-15

## 2022-03-15 DIAGNOSIS — Z86.711 HISTORY OF PULMONARY EMBOLISM: ICD-10-CM

## 2022-03-15 RX ORDER — WARFARIN SODIUM 5 MG/1
TABLET ORAL
Qty: 10 TABLET | Refills: 0 | Status: SHIPPED | OUTPATIENT
Start: 2022-03-15 | End: 2022-03-21 | Stop reason: SDUPTHER

## 2022-03-17 ENCOUNTER — APPOINTMENT (OUTPATIENT)
Dept: PHARMACY | Facility: HOSPITAL | Age: 42
End: 2022-03-17

## 2022-03-18 ENCOUNTER — APPOINTMENT (OUTPATIENT)
Dept: PHARMACY | Facility: HOSPITAL | Age: 42
End: 2022-03-18

## 2022-03-21 ENCOUNTER — ANTICOAGULATION VISIT (OUTPATIENT)
Dept: PHARMACY | Facility: HOSPITAL | Age: 42
End: 2022-03-21

## 2022-03-21 DIAGNOSIS — Z86.711 HISTORY OF PULMONARY EMBOLISM: Primary | ICD-10-CM

## 2022-03-21 LAB
INR PPP: 1.7 (ref 0.91–1.09)
PROTHROMBIN TIME: 20.2 SECONDS (ref 10–13.8)

## 2022-03-21 PROCEDURE — 36416 COLLJ CAPILLARY BLOOD SPEC: CPT

## 2022-03-21 PROCEDURE — 85610 PROTHROMBIN TIME: CPT

## 2022-03-21 RX ORDER — WARFARIN SODIUM 5 MG/1
TABLET ORAL
Qty: 60 TABLET | Refills: 1 | Status: SHIPPED | OUTPATIENT
Start: 2022-03-21 | End: 2022-04-25 | Stop reason: SDUPTHER

## 2022-03-21 NOTE — PROGRESS NOTES
Anticoagulation Clinic Progress Note     Indication: Hx of PE and LE DVT (2010, 2014)  Referring Provider: Vaishali  Initial Warfarin Start Date: 2010  Planned Duration of Therapy: lifelong  Goal INR: 2.0-3.0 (verified Dr Vaishali 9/19/19)  Will likely need lovenox perioperatively (Vaishali 7/29/20)  Current Drug Interactions: Cymbalta and Pantoprazole  Other: d/c Eliquis 9/6/19 due to itching     Diet: green beans,broccoli (1/week), salads daily (iceburg lettuce, carrots, and red cabbage mixture) 5/21/2021  Alcohol: None  Tobacco: Smokes ~5 cigarettes a week  OTC Pain Medication: Recommended Tylenol prn     Anticoagulation Clinic INR History:  Date 9/19 10/16 11/7 11/19 12/3 12/9 1/3/2020 2/3-2/10 2/13 2/17 2/24 3/2 3/16   Total Weekly Dose 80mg 80 mg 77.5 mg 77.5mg 57.5 mg 80mg 72.5mg hosp: admission 65mg 67.5 mg 65 mg 65 mg 65mg   INR 2.6 3.4 3.1 2.7 1.5 2.1 2.6   3.0 3 2.7 2.4 1.9   Notes         S/p c'scope, librado greens 1 boost Decr cig use Diverticulitis; end colostomy stopped smoking; recent admission        Green beans ([facility])      Date  3/24 4/1 4/10 5/6 5/20 6/8 6/19 7/31 8/31 10/14 12/7 1/5/2021 1/8 1/13 1/20 1/27 2/2   Total Weekly Dose 67.5mg 72.5 mg 70mg 70mg 70mg 55mg? 65mg 65mg 67.5mg 67.5 67.5 mg 27.5mg colonoscopy 45mg 70mg 70mg  75mg   INR 1.8 2.23 2.8 3.4 3.1 2.0 2.5 2.0 2.3 2.5 2.1 1.1   1.2 1.8 1.7 2.5   Notes   Extra dose   Less GLV Less GLV Missed x1   incr GLV       Hold x4 Hold x 7 days missed x1               Warfarin Dosing During Admission 1/28:  Date  2/3 2/4 2/5 2/6 2/7 2/8 2/9 2/10   INR  1.38 1.55 1.88 2.22 2.1 2.2 2.4 2.46   Dose  10mg 10mg 7.5mg 7.5mg  10mg 10mg 10mg 10mg      Date 3/17 4/1-4/15 4/16 4/19 4/23 5/7 5/21 6/1    10/4    12/22  1/6     Total Weekly Dose 60mg? [facility] admission 42.5mg?? 47.5mg? 45mg 45 mg 45mg 52.5mg  noncompliance  57.5mg  non compliance  50mg  47.5mg     INR 1.8   2.2 2.0 2.2 2.95 venipuncture 1.4 1.5    2.1    1.7  1.5     Notes Miss x 1; self adjust dose reverse  "cholestomy; hemorrhage; vit k       POC 4.5 x2   Inc GLV; lovenox        1x miss?  inc tobacco, 1x miss inc GLV         Date 1/6/22-3/21/22 3/21          Total Weekly Dose Noncompliance  57.5 mg          INR  1.7          Notes                Phone Interview:  Tablet Strength: 5mg tablet  Verbal Release Authorization signed on 9/19/19-- may speak with Jammie Rodriguez (mother) John Rodriguez (father)  Patient contact info: [phone] (Mobile); [phone] (Cell) 5/7/21     Positives:  Change in diet/appetite   Comments:  States he has been eating more salads than normal and will continue to do this. Does not think he has missed any doses.   He also reports he is \"trying to get things back on track\" in terms of his health. He does bring up that due to stress he does occasionally smoke \"a few cigarettes\" but this is not a daily occurrence.  Did suggest smoking cessation and educate that smoking can effect INRs.   Mr Rodriguez mentions he has had a lot going on recently and dealing with depression and anxiety that has been a contributing factor to his noncompliance with repeat INRs.  Tried to express the importance of checking INRs on time so that they stay in range.  Also, that by following up with [facility] clinic to ensure he is therapeutic, we could help take away some stress from his warfarin management and worry from subtherapeutic INRs.         Plan:  1. INR is subtherapeutic at 1.7, see patient findings. Will have patient take 10mg on MonTuesFri and 7.5mg all other days.  2. Voices understanding of seeking immediate medical attention if abnormal bleeding or bruising occur.   3. Repeat INR in 1 week  4. Verbal and written information provided in the clinic.  Brigida Rodriguez RBV dosing instructions, expresses understanding by teach back, and has no further questions at this time.  6.Previously, Owen PAULINO Working on paperwork for home monitor. He confirmed he has sent off to both Alisha and KORI Hernandez, PharmD, " BCPS   3/21/2022  12:13 EDT

## 2022-03-28 ENCOUNTER — ANTICOAGULATION VISIT (OUTPATIENT)
Dept: PHARMACY | Facility: HOSPITAL | Age: 42
End: 2022-03-28

## 2022-03-28 DIAGNOSIS — Z86.711 HISTORY OF PULMONARY EMBOLISM: Primary | ICD-10-CM

## 2022-03-28 LAB
INR PPP: 1.3 (ref 0.91–1.09)
PROTHROMBIN TIME: 16.2 SECONDS (ref 10–13.8)

## 2022-03-28 PROCEDURE — 36416 COLLJ CAPILLARY BLOOD SPEC: CPT

## 2022-03-28 PROCEDURE — G0463 HOSPITAL OUTPT CLINIC VISIT: HCPCS

## 2022-03-28 PROCEDURE — 85610 PROTHROMBIN TIME: CPT

## 2022-03-28 NOTE — PROGRESS NOTES
Anticoagulation Clinic Progress Note     Indication: Hx of PE and LE DVT (2010, 2014)  Referring Provider: Vaishali  Initial Warfarin Start Date: 2010  Planned Duration of Therapy: lifelong  Goal INR: 2.0-3.0 (verified Dr Vaishali 9/19/19)  Will likely need lovenox perioperatively (Vaishali 7/29/20)  Current Drug Interactions: Cymbalta and Pantoprazole  Other: d/c Eliquis 9/6/19 due to itching     Diet: green beans,broccoli (1/week), salads daily (iceburg lettuce, carrots, and red cabbage mixture) 5/21/2021  Alcohol: None  Tobacco: Smokes ~5 cigarettes a week  OTC Pain Medication: Recommended Tylenol prn     Anticoagulation Clinic INR History:  Date 9/19 10/16 11/7 11/19 12/3 12/9 1/3/2020 2/3-2/10 2/13 2/17 2/24 3/2 3/16   Total Weekly Dose 80mg 80 mg 77.5 mg 77.5mg 57.5 mg 80mg 72.5mg hosp: admission 65mg 67.5 mg 65 mg 65 mg 65mg   INR 2.6 3.4 3.1 2.7 1.5 2.1 2.6   3.0 3 2.7 2.4 1.9   Notes         S/p c'scope, librado greens 1 boost Decr cig use Diverticulitis; end colostomy stopped smoking; recent admission        Green beans ([facility])      Date  3/24 4/1 4/10 5/6 5/20 6/8 6/19 7/31 8/31 10/14 12/7 1/5/2021 1/8 1/13 1/20 1/27 2/2   Total Weekly Dose 67.5mg 72.5 mg 70mg 70mg 70mg 55mg? 65mg 65mg 67.5mg 67.5 67.5 mg 27.5mg colonoscopy 45mg 70mg 70mg  75mg   INR 1.8 2.23 2.8 3.4 3.1 2.0 2.5 2.0 2.3 2.5 2.1 1.1   1.2 1.8 1.7 2.5   Notes   Extra dose   Less GLV Less GLV Missed x1   incr GLV       Hold x4 Hold x 7 days missed x1               Warfarin Dosing During Admission 1/28:  Date  2/3 2/4 2/5 2/6 2/7 2/8 2/9 2/10   INR  1.38 1.55 1.88 2.22 2.1 2.2 2.4 2.46   Dose  10mg 10mg 7.5mg 7.5mg  10mg 10mg 10mg 10mg      Date 3/17 4/1-4/15 4/16 4/19 4/23 5/7 5/21 6/1    10/4    12/22  1/6     Total Weekly Dose 60mg? [facility] admission 42.5mg?? 47.5mg? 45mg 45 mg 45mg 52.5mg  noncompliance  57.5mg  non compliance  50mg  47.5mg     INR 1.8   2.2 2.0 2.2 2.95 venipuncture 1.4 1.5    2.1    1.7  1.5     Notes Miss x 1; self adjust dose reverse  cholestomy; hemorrhage; vit k       POC 4.5 x2   Inc GLV; lovenox        1x miss?  inc tobacco, 1x miss inc GLV         Date 1/6/22-3/21/22 3/21 3/28         Total Weekly Dose Noncompliance  57.5 mg 60mg         INR  1.7 1.3         Notes                Phone Interview:  Tablet Strength: 5mg tablet  Verbal Release Authorization signed on 9/19/19-- may speak with Jammie Rodriguez (mother) John Rodriguez (father)  Patient contact info: [phone] (Mobile); [phone] (Cell) 5/7/21      Negatives:  Signs/symptoms of thrombosis, Signs/symptoms of bleeding, Laboratory test error suspected, Change in health, Change in alcohol use, Change in activity, Upcoming invasive procedure, Emergency department visit, Upcoming dental procedure, Missed doses, Extra doses, Change in medications, Change in diet/appetite, Hospital admission, Bruising, Other complaints   Comments:  Reports consistency with diet, denies excess GLV. Had 4 cigarettes total for last week           Plan:  1. INR is subtherapeutic at 1.3. Will have patient take increased dose of warfarin 10mg daily except 7.5mg /sunThurs.  2. Voices understanding of seeking immediate medical attention if abnormal bleeding or bruising occur.   3. Repeat INR in 1 week  4. Verbal and written information provided in the clinic.  Brigida Rodriguez RBV dosing instructions, expresses understanding by teach back, and has no further questions at this time.  6.Previously, Owen PAULINO Working on paperwork for home monitor. He confirmed he has sent off to both Alisha and KORI Barger PharmD.  03/28/22   12:01 EDT

## 2022-04-04 ENCOUNTER — APPOINTMENT (OUTPATIENT)
Dept: PHARMACY | Facility: HOSPITAL | Age: 42
End: 2022-04-04

## 2022-04-06 ENCOUNTER — APPOINTMENT (OUTPATIENT)
Dept: PHARMACY | Facility: HOSPITAL | Age: 42
End: 2022-04-06

## 2022-04-11 ENCOUNTER — TELEPHONE (OUTPATIENT)
Dept: PHARMACY | Facility: HOSPITAL | Age: 42
End: 2022-04-11

## 2022-04-11 ENCOUNTER — APPOINTMENT (OUTPATIENT)
Dept: PHARMACY | Facility: HOSPITAL | Age: 42
End: 2022-04-11

## 2022-04-11 NOTE — TELEPHONE ENCOUNTER
Patient LVM after hours stating he has a family emergency and would like to reschedule some time on Wednesday    LVM re: rescheduling

## 2022-04-13 NOTE — TELEPHONE ENCOUNTER
LVM re: rescheduling missed appt    If no response from patient by EOD - will send first overdue letter tomorrow.

## 2022-04-16 DIAGNOSIS — I10 ESSENTIAL HYPERTENSION: ICD-10-CM

## 2022-04-16 DIAGNOSIS — F33.9 RECURRENT MAJOR DEPRESSIVE DISORDER, REMISSION STATUS UNSPECIFIED: ICD-10-CM

## 2022-04-18 NOTE — TELEPHONE ENCOUNTER
Rx Refill Note  Requested Prescriptions     Pending Prescriptions Disp Refills   • lisinopril (PRINIVIL,ZESTRIL) 10 MG tablet 90 tablet 3     Sig: Take 1 tablet by mouth Daily.   • carvedilol (COREG) 6.25 MG tablet 60 tablet 5     Sig: Take 1 tablet by mouth 2 (Two) Times a Day With Meals.   • buPROPion SR (WELLBUTRIN SR) 150 MG 12 hr tablet 60 tablet 1     Sig: Take 1 tablet by mouth 2 (Two) Times a Day.      Last office visit with prescribing clinician: 11/8/2021      Next office visit with prescribing clinician: Visit date not found            Cyndi Diggs MA  04/18/22, 11:28 EDT     Patient is due for follow up called and left  for patient to return call     OK FOR HUB TO RELAY MESSAGE AND SCHEDULE

## 2022-04-19 NOTE — TELEPHONE ENCOUNTER
Patient is due for follow up called and left vm for patient to return call      OK FOR HUB TO RELAY MESSAGE AND SCHEDULE    2nd attempt

## 2022-04-20 RX ORDER — LISINOPRIL 10 MG/1
10 TABLET ORAL DAILY
Qty: 30 TABLET | Refills: 0 | Status: SHIPPED | OUTPATIENT
Start: 2022-04-20 | End: 2022-09-06 | Stop reason: SDUPTHER

## 2022-04-20 RX ORDER — CARVEDILOL 6.25 MG/1
6.25 TABLET ORAL 2 TIMES DAILY WITH MEALS
Qty: 60 TABLET | Refills: 0 | Status: SHIPPED | OUTPATIENT
Start: 2022-04-20 | End: 2022-07-22 | Stop reason: SDUPTHER

## 2022-04-20 RX ORDER — BUPROPION HYDROCHLORIDE 150 MG/1
150 TABLET, EXTENDED RELEASE ORAL 2 TIMES DAILY
Qty: 60 TABLET | Refills: 0 | Status: SHIPPED | OUTPATIENT
Start: 2022-04-20 | End: 2022-07-05

## 2022-04-20 NOTE — TELEPHONE ENCOUNTER
Rx Refill Note  Requested Prescriptions     Pending Prescriptions Disp Refills   • lisinopril (PRINIVIL,ZESTRIL) 10 MG tablet 30 tablet 0     Sig: Take 1 tablet by mouth Daily.   • carvedilol (COREG) 6.25 MG tablet 60 tablet 0     Sig: Take 1 tablet by mouth 2 (Two) Times a Day With Meals.   • buPROPion SR (WELLBUTRIN SR) 150 MG 12 hr tablet 60 tablet 0     Sig: Take 1 tablet by mouth 2 (Two) Times a Day.      Last office visit with prescribing clinician: 11/8/2021      Next office visit with prescribing clinician: 4/25/2022            Cyndi Diggs MA  04/20/22, 08:29 EDT

## 2022-04-25 DIAGNOSIS — Z86.711 HISTORY OF PULMONARY EMBOLISM: ICD-10-CM

## 2022-04-28 NOTE — TELEPHONE ENCOUNTER
Rx Refill Note  Requested Prescriptions     Pending Prescriptions Disp Refills   • warfarin (COUMADIN) 5 MG tablet 60 tablet 1     Sig: Take 2 to 2/12 tablets by mouth daily or as directed by anticoagulation clinic.      Last office visit with prescribing clinician: 11/8/2021      Next office visit with prescribing clinician: Visit date not found            Maricel Rosado MA  04/28/22, 12:08 EDT

## 2022-04-29 RX ORDER — WARFARIN SODIUM 5 MG/1
TABLET ORAL
Qty: 60 TABLET | Refills: 1 | Status: SHIPPED | OUTPATIENT
Start: 2022-04-29 | End: 2022-08-04

## 2022-05-19 ENCOUNTER — ANTICOAGULATION VISIT (OUTPATIENT)
Dept: PHARMACY | Facility: HOSPITAL | Age: 42
End: 2022-05-19

## 2022-05-19 ENCOUNTER — OFFICE VISIT (OUTPATIENT)
Dept: FAMILY MEDICINE CLINIC | Facility: CLINIC | Age: 42
End: 2022-05-19

## 2022-05-19 VITALS
BODY MASS INDEX: 38.36 KG/M2 | WEIGHT: 315 LBS | SYSTOLIC BLOOD PRESSURE: 138 MMHG | HEIGHT: 76 IN | DIASTOLIC BLOOD PRESSURE: 96 MMHG | OXYGEN SATURATION: 98 % | HEART RATE: 91 BPM

## 2022-05-19 DIAGNOSIS — E55.9 VITAMIN D DEFICIENCY: ICD-10-CM

## 2022-05-19 DIAGNOSIS — F41.1 GENERALIZED ANXIETY DISORDER: Primary | ICD-10-CM

## 2022-05-19 DIAGNOSIS — G89.29 CHRONIC PAIN OF RIGHT KNEE: ICD-10-CM

## 2022-05-19 DIAGNOSIS — I10 ESSENTIAL HYPERTENSION: ICD-10-CM

## 2022-05-19 DIAGNOSIS — M25.561 CHRONIC PAIN OF RIGHT KNEE: ICD-10-CM

## 2022-05-19 DIAGNOSIS — Z86.711 HISTORY OF PULMONARY EMBOLISM: Primary | ICD-10-CM

## 2022-05-19 LAB
INR PPP: 2 (ref 0.91–1.09)
PROTHROMBIN TIME: 24.5 SECONDS (ref 10–13.8)

## 2022-05-19 PROCEDURE — 99214 OFFICE O/P EST MOD 30 MIN: CPT | Performed by: INTERNAL MEDICINE

## 2022-05-19 PROCEDURE — 36416 COLLJ CAPILLARY BLOOD SPEC: CPT

## 2022-05-19 PROCEDURE — 85610 PROTHROMBIN TIME: CPT

## 2022-05-19 NOTE — PROGRESS NOTES
Chief Complaint   Patient presents with   • Anxiety     Discuss medications       HPI:  Brigida Rodriguez is a 42 y.o. male who presents today for follow-up anxiety.  Continues to take Cymbalta and Wellbutrin.  Worsening symptoms over the last several months.  Would like to discuss ongoing right knee pain.  History of reconstruction on the left.  No prior trauma or injury.    ROS:  Constitutional: no fevers, night sweats or unexplained weight loss  Eyes: no vision changes  ENT: no runny nose, ear pain, sore throat  Cardio: no chest pain, palpitations  Pulm: no shortness of breath, wheezing, or cough  GI: no abdominal pain or changes in bowel movements  : no difficulty urinating  MSK: no difficulty ambulating, + joint pain  Neuro: no weakness, dizziness or headache  Psych: no trouble sleeping  Endo: no change in appetite      Past Medical History:   Diagnosis Date   • Anxiety    • Depression    • GERD (gastroesophageal reflux disease)    • H/O blood clots    • Heart failure (HCC)    • Hypertension    • Presence of IVC filter    • Pulmonary embolism (HCC)     10 YEARS AGO      Family History   Problem Relation Age of Onset   • Diabetes Mother    • Obesity Maternal Grandmother    • Diabetes Maternal Grandmother    • Cancer Father         prostrate   • No Known Problems Sister    • No Known Problems Brother    • No Known Problems Maternal Grandfather    • No Known Problems Paternal Grandmother    • No Known Problems Paternal Grandfather       Social History     Socioeconomic History   • Marital status:    Tobacco Use   • Smoking status: Current Every Day Smoker     Packs/day: 0.50     Years: 20.00     Pack years: 10.00     Types: Cigarettes   • Smokeless tobacco: Never Used   Substance and Sexual Activity   • Alcohol use: Yes     Alcohol/week: 6.0 standard drinks     Types: 6 Cans of beer per week   • Drug use: Yes     Types: Marijuana     Comment: everyday smoker   • Sexual activity: Yes     Partners:  Female     Birth control/protection: Condom      No Known Allergies   Immunization History   Administered Date(s) Administered   • COVID-19 (PFIZER) PURPLE CAP 03/06/2021, 03/30/2021   • Flu Vaccine Quad PF >36MO 12/21/2016, 12/18/2017, 12/07/2018, 11/04/2019   • FluLaval/Fluarix/Fluzone >6 11/04/2019, 12/23/2020, 11/08/2021   • Hepatitis A 12/07/2018   • Pneumococcal Polysaccharide (PPSV23) 12/21/2016   • Tdap 12/21/2016        PE:  Vitals:    05/19/22 1105   BP: 138/96   Pulse: 91   SpO2: 98%      Body mass index is 45.4 kg/m².    Gen Appearance: NAD  HEENT: Normocephalic, PERRLA, no thyromegaly, trache midline  Heart: RRR, normal S1 and S2, no murmur  Lungs: CTA b/l, no wheezing, no crackles  Abdomen: Soft, non-tender, non-distended, no guarding and BSx4  MSK: Moves all extremities well, normal gait, no peripheral edema  Pulses: Palpable and equal b/l  Lymph nodes: No palpable lymphadenopathy   Neuro: No focal deficits      Current Outpatient Medications   Medication Sig Dispense Refill   • acyclovir (Zovirax) 400 MG tablet Take 1 tablet by mouth Daily As Needed (PRN). Take no more than 5 doses during flare up. 30 tablet 2   • buPROPion SR (WELLBUTRIN SR) 150 MG 12 hr tablet Take 1 tablet by mouth 2 (Two) Times a Day. 60 tablet 0   • carvedilol (COREG) 6.25 MG tablet Take 1 tablet by mouth 2 (Two) Times a Day With Meals. 60 tablet 0   • DULoxetine (CYMBALTA) 30 MG capsule Take 1 capsule by mouth Daily. 30 capsule 2   • lisinopril (PRINIVIL,ZESTRIL) 10 MG tablet Take 1 tablet by mouth Daily. 30 tablet 0   • Multiple Vitamins-Minerals (HM MULTIVITAMIN ADULT GUMMY PO) Take  by mouth.     • pantoprazole (PROTONIX) 40 MG EC tablet Take 1 tablet by mouth Every Morning Before Breakfast. 90 tablet 3   • warfarin (COUMADIN) 5 MG tablet Take 2 to 2/12 tablets by mouth daily or as directed by anticoagulation clinic. 60 tablet 1     No current facility-administered medications for this visit.      Refer to behavioral health  to establish care.  No change in anxiety medication today.  Recommend checking x-ray to further evaluate knee.  Refer to orthopedics.  Blood pressure checks well-controlled.  Suspect whitecoat syndrome.  Recommend checking updated labs.    Diagnoses and all orders for this visit:    1. Generalized anxiety disorder (Primary)  -     Ambulatory Referral to Behavioral Health    2. Chronic pain of right knee  -     XR Knee 3 View Right; Future  -     Ambulatory Referral to Orthopedic Surgery    3. Essential hypertension         No follow-ups on file.     Dictated Utilizing Dragon Dictation    Please note that portions of this note were completed with a voice recognition program.    Part of this note may be an electronic transcription/translation of spoken language to printed text using the Dragon Dictation System.

## 2022-05-19 NOTE — PROGRESS NOTES
Anticoagulation Clinic Progress Note     Indication: Hx of PE and LE DVT (2010, 2014)  Referring Provider: Vaishali  Initial Warfarin Start Date: 2010  Planned Duration of Therapy: lifelong  Goal INR: 2.0-3.0 (verified Dr Vaishali 9/19/19)  Will likely need lovenox perioperatively (Vaishali 7/29/20)  Current Drug Interactions: Cymbalta and Pantoprazole  Other: d/c Eliquis 9/6/19 due to itching     Diet: green beans,broccoli (1/week), salads daily (iceburg lettuce, carrots, and red cabbage mixture) 5/21/2021  Alcohol: None  Tobacco: Smokes ~5 cigarettes a week  OTC Pain Medication: Recommended Tylenol prn     Anticoagulation Clinic INR History:  Date 9/19 10/16 11/7 11/19 12/3 12/9 1/3/2020 2/3-2/10 2/13 2/17 2/24 3/2 3/16   Total Weekly Dose 80mg 80 mg 77.5 mg 77.5mg 57.5 mg 80mg 72.5mg hosp: admission 65mg 67.5 mg 65 mg 65 mg 65mg   INR 2.6 3.4 3.1 2.7 1.5 2.1 2.6   3.0 3 2.7 2.4 1.9   Notes         S/p c'scope, librado greens 1 boost Decr cig use Diverticulitis; end colostomy stopped smoking; recent admission        Green beans (HH)      Date  3/24 4/1 4/10 5/6 5/20 6/8 6/19 7/31 8/31 10/14 12/7 1/5/2021 1/8 1/13 1/20 1/27 2/2   Total Weekly Dose 67.5mg 72.5 mg 70mg 70mg 70mg 55mg? 65mg 65mg 67.5mg 67.5 67.5 mg 27.5mg colonoscopy 45mg 70mg 70mg  75mg   INR 1.8 2.23 2.8 3.4 3.1 2.0 2.5 2.0 2.3 2.5 2.1 1.1   1.2 1.8 1.7 2.5   Notes   Extra dose   Less GLV Less GLV Missed x1   incr GLV       Hold x4 Hold x 7 days missed x1               Warfarin Dosing During Admission 1/28:  Date  2/3 2/4 2/5 2/6 2/7 2/8 2/9 2/10   INR  1.38 1.55 1.88 2.22 2.1 2.2 2.4 2.46   Dose  10mg 10mg 7.5mg 7.5mg  10mg 10mg 10mg 10mg      Date 3/17 4/1-4/15 4/16 4/19 4/23 5/7 5/21 6/1    10/4    12/22  1/6     Total Weekly Dose 60mg? [facility] admission 42.5mg?? 47.5mg? 45mg 45 mg 45mg 52.5mg  noncompliance  57.5mg  non compliance  50mg  47.5mg     INR 1.8   2.2 2.0 2.2 2.95 venipuncture 1.4 1.5    2.1    1.7  1.5     Notes Miss x 1; self adjust dose reverse  cholestomy; hemorrhage; vit k       POC 4.5 x2   Inc GLV; lovenox        1x miss?  inc tobacco, 1x miss inc GLV         Date 1/6/22-3/21/22 3/21 3/28 Non-compliant 5/19       Total Weekly Dose Noncompliance  57.5 mg 60mg  65 mg       INR  1.7 1.3  2.0       Notes                Phone Interview:  Tablet Strength: 5mg tablet  Verbal Release Authorization signed on 9/19/19-- may speak with Jammie Rodriguez (mother) John Rodriguez (father)  Patient contact info: [phone] (Mobile); [phone] (Cell) 5/7/21    Negatives:  Signs/symptoms of thrombosis, Signs/symptoms of bleeding, Laboratory test error suspected, Change in health, Change in alcohol use, Change in activity, Upcoming invasive procedure, Emergency department visit, Upcoming dental procedure, Missed doses, Extra doses, Change in medications, Change in diet/appetite, Hospital admission, Bruising, Other complaints   Comments:  Eating more GLV weekly.       Plan:  1. INR is therapeutic at 2.0. Will have patient continue increased dose of warfarin 10mg daily except 7.5mg Sun/Thurs.  2. Voices understanding of seeking immediate medical attention if abnormal bleeding or bruising occur.   3. Repeat INR in 2 weeks  4. Verbal and written information provided in the clinic.  Brigida Rodriguez RBV dosing instructions, expresses understanding by teach back, and has no further questions at this time.  6.Previously, Owen PAULINO Working on paperwork for home monitor. He confirmed he has sent off to both Alisha and KORI Hernandez, PharmD, BCPS   5/19/2022  10:44 EDT

## 2022-06-02 ENCOUNTER — APPOINTMENT (OUTPATIENT)
Dept: PHARMACY | Facility: HOSPITAL | Age: 42
End: 2022-06-02

## 2022-06-09 ENCOUNTER — TELEPHONE (OUTPATIENT)
Dept: PHARMACY | Facility: HOSPITAL | Age: 42
End: 2022-06-09

## 2022-06-21 ENCOUNTER — APPOINTMENT (OUTPATIENT)
Dept: PHARMACY | Facility: HOSPITAL | Age: 42
End: 2022-06-21

## 2022-06-29 ENCOUNTER — APPOINTMENT (OUTPATIENT)
Dept: PHARMACY | Facility: HOSPITAL | Age: 42
End: 2022-06-29

## 2022-07-03 DIAGNOSIS — F33.9 RECURRENT MAJOR DEPRESSIVE DISORDER, REMISSION STATUS UNSPECIFIED: ICD-10-CM

## 2022-07-05 RX ORDER — BUPROPION HYDROCHLORIDE 150 MG/1
TABLET, EXTENDED RELEASE ORAL
Qty: 180 TABLET | Refills: 3 | Status: SHIPPED | OUTPATIENT
Start: 2022-07-05 | End: 2022-11-07 | Stop reason: SDUPTHER

## 2022-07-05 NOTE — TELEPHONE ENCOUNTER
Rx Refill Note  Requested Prescriptions     Pending Prescriptions Disp Refills   • buPROPion SR (WELLBUTRIN SR) 150 MG 12 hr tablet [Pharmacy Med Name: buPROPion HCL  MG TABLET] 60 tablet 0     Sig: TAKE ONE TABLET BY MOUTH TWICE A DAY      Last office visit with prescribing clinician: 5/19/2022      Next office visit with prescribing clinician: Visit date not found            Laura Grey MA  07/05/22, 10:08 EDT

## 2022-07-18 ENCOUNTER — TELEPHONE (OUTPATIENT)
Dept: PHARMACY | Facility: HOSPITAL | Age: 42
End: 2022-07-18

## 2022-07-22 DIAGNOSIS — F33.9 RECURRENT MAJOR DEPRESSIVE DISORDER, REMISSION STATUS UNSPECIFIED: ICD-10-CM

## 2022-07-22 RX ORDER — CARVEDILOL 6.25 MG/1
TABLET ORAL
Qty: 60 TABLET | Refills: 0 | OUTPATIENT
Start: 2022-07-22

## 2022-07-22 RX ORDER — CARVEDILOL 6.25 MG/1
6.25 TABLET ORAL 2 TIMES DAILY WITH MEALS
Qty: 60 TABLET | Refills: 0 | Status: SHIPPED | OUTPATIENT
Start: 2022-07-22 | End: 2022-09-06 | Stop reason: SDUPTHER

## 2022-07-22 RX ORDER — BUPROPION HYDROCHLORIDE 150 MG/1
150 TABLET, EXTENDED RELEASE ORAL 2 TIMES DAILY
Qty: 180 TABLET | Refills: 3 | OUTPATIENT
Start: 2022-07-22

## 2022-07-22 NOTE — TELEPHONE ENCOUNTER
Rx Refill Note  Requested Prescriptions     Pending Prescriptions Disp Refills   • carvedilol (COREG) 6.25 MG tablet 60 tablet 0     Sig: Take 1 tablet by mouth 2 (Two) Times a Day With Meals.   • buPROPion SR (WELLBUTRIN SR) 150 MG 12 hr tablet 180 tablet 3     Sig: Take 1 tablet by mouth 2 (Two) Times a Day.      Last office visit with prescribing clinician: 5/19/2022      Next office visit with prescribing clinician: Visit date not found            Cyndi Diggs MA  07/22/22, 15:41 EDT

## 2022-08-03 DIAGNOSIS — Z86.711 HISTORY OF PULMONARY EMBOLISM: ICD-10-CM

## 2022-08-03 DIAGNOSIS — I10 ESSENTIAL HYPERTENSION: ICD-10-CM

## 2022-08-03 DIAGNOSIS — B00.9 HERPES: ICD-10-CM

## 2022-08-03 DIAGNOSIS — K57.80 PERFORATED DIVERTICULUM: ICD-10-CM

## 2022-08-04 DIAGNOSIS — Z86.711 HISTORY OF PULMONARY EMBOLISM: ICD-10-CM

## 2022-08-04 RX ORDER — WARFARIN SODIUM 5 MG/1
TABLET ORAL
Qty: 11 TABLET | Refills: 0 | Status: SHIPPED | OUTPATIENT
Start: 2022-08-04 | End: 2022-08-11 | Stop reason: SDUPTHER

## 2022-08-04 NOTE — TELEPHONE ENCOUNTER
Rx Refill Note  Requested Prescriptions     Pending Prescriptions Disp Refills   • pantoprazole (PROTONIX) 40 MG EC tablet 90 tablet 3     Sig: Take 1 tablet by mouth Every Morning Before Breakfast.   • acyclovir (Zovirax) 400 MG tablet 30 tablet 2     Sig: Take 1 tablet by mouth Daily As Needed (PRN). Take no more than 5 doses during flare up.   • lisinopril (PRINIVIL,ZESTRIL) 10 MG tablet 30 tablet 0     Sig: Take 1 tablet by mouth Daily.   • warfarin (COUMADIN) 5 MG tablet 60 tablet 1     Sig: Take 2 to 2/12 tablets by mouth daily or as directed by anticoagulation clinic.      Last office visit with prescribing clinician: 5/19/2022      Next office visit with prescribing clinician:             Cyndi Diggs MA  08/04/22, 12:47 EDT     Patient was to follow up in 4 weeks for HTN called and left vm for patient to return call    OK FOR HUB TO RELAY MESSAGE AND SCHEDULE APPOINTMENT

## 2022-08-05 NOTE — TELEPHONE ENCOUNTER
Patient was to follow up in 4 weeks for HTN called and left vm for patient to return call     OK FOR HUB TO RELAY MESSAGE AND SCHEDULE APPOINTMENT     2nd attempt

## 2022-08-08 RX ORDER — ACYCLOVIR 400 MG/1
400 TABLET ORAL DAILY PRN
Qty: 30 TABLET | Refills: 2 | OUTPATIENT
Start: 2022-08-08

## 2022-08-08 RX ORDER — WARFARIN SODIUM 5 MG/1
TABLET ORAL
Qty: 60 TABLET | Refills: 1 | OUTPATIENT
Start: 2022-08-08

## 2022-08-08 RX ORDER — LISINOPRIL 10 MG/1
10 TABLET ORAL DAILY
Qty: 30 TABLET | Refills: 0 | OUTPATIENT
Start: 2022-08-08

## 2022-08-08 RX ORDER — PANTOPRAZOLE SODIUM 40 MG/1
40 TABLET, DELAYED RELEASE ORAL
Qty: 90 TABLET | Refills: 0 | OUTPATIENT
Start: 2022-08-08

## 2022-08-08 NOTE — TELEPHONE ENCOUNTER
Rx Refill Note  Requested Prescriptions     Pending Prescriptions Disp Refills   • pantoprazole (PROTONIX) 40 MG EC tablet 90 tablet 0     Sig: Take 1 tablet by mouth Every Morning Before Breakfast.   • acyclovir (Zovirax) 400 MG tablet 30 tablet 2     Sig: Take 1 tablet by mouth Daily As Needed (PRN). Take no more than 5 doses during flare up.   • lisinopril (PRINIVIL,ZESTRIL) 10 MG tablet 30 tablet 0     Sig: Take 1 tablet by mouth Daily.   • warfarin (COUMADIN) 5 MG tablet 60 tablet 1     Sig: Take 2 to 2/12 tablets by mouth daily or as directed by anticoagulation clinic.      Last office visit with prescribing clinician: 5/19/2022      Next office visit with prescribing clinician:             Earline Ordonez MA  08/08/22, 08:14 EDT     Patient was to follow up in 4 weeks for HTN called and left vm for patient to return call. 3rd attempt      OK FOR HUB TO RELAY MESSAGE AND SCHEDULE APPOINTMENT

## 2022-08-09 ENCOUNTER — APPOINTMENT (OUTPATIENT)
Dept: PHARMACY | Facility: HOSPITAL | Age: 42
End: 2022-08-09

## 2022-08-11 ENCOUNTER — ANTICOAGULATION VISIT (OUTPATIENT)
Dept: PHARMACY | Facility: HOSPITAL | Age: 42
End: 2022-08-11

## 2022-08-11 DIAGNOSIS — Z86.711 HISTORY OF PULMONARY EMBOLISM: Primary | ICD-10-CM

## 2022-08-11 LAB
INR PPP: 1.6 (ref 0.91–1.09)
PROTHROMBIN TIME: 19.1 SECONDS (ref 10–13.8)

## 2022-08-11 PROCEDURE — 85610 PROTHROMBIN TIME: CPT

## 2022-08-11 PROCEDURE — G0463 HOSPITAL OUTPT CLINIC VISIT: HCPCS

## 2022-08-11 PROCEDURE — 36416 COLLJ CAPILLARY BLOOD SPEC: CPT

## 2022-08-11 RX ORDER — WARFARIN SODIUM 5 MG/1
TABLET ORAL
Qty: 55 TABLET | Refills: 0 | Status: SHIPPED | OUTPATIENT
Start: 2022-08-11 | End: 2022-09-06 | Stop reason: SDUPTHER

## 2022-08-11 NOTE — PROGRESS NOTES
Anticoagulation Clinic Progress Note     Indication: Hx of PE and LE DVT (2010, 2014)  Referring Provider: Vaishali  Initial Warfarin Start Date: 2010  Planned Duration of Therapy: lifelong  Goal INR: 2.0-3.0 (verified Dr Vaishali 9/19/19)  Will likely need lovenox perioperatively (Vaishali 7/29/20)  Current Drug Interactions: Cymbalta and Pantoprazole  Other: d/c Eliquis 9/6/19 due to itching     Diet: green beans,broccoli (1/week), salads daily (iceburg lettuce, carrots, and red cabbage mixture) 5/21/2021  Alcohol: None  Tobacco: Smokes ~5 cigarettes a week  OTC Pain Medication: Recommended Tylenol prn     Anticoagulation Clinic INR History:  Date 9/19 10/16 11/7 11/19 12/3 12/9 1/3/2020 2/3-2/10 2/13 2/17 2/24 3/2 3/16   Total Weekly Dose 80mg 80 mg 77.5 mg 77.5mg 57.5 mg 80mg 72.5mg hosp: admission 65mg 67.5 mg 65 mg 65 mg 65mg   INR 2.6 3.4 3.1 2.7 1.5 2.1 2.6   3.0 3 2.7 2.4 1.9   Notes         S/p c'scope, librado greens 1 boost Decr cig use Diverticulitis; end colostomy stopped smoking; recent admission        Green beans (HH)      Date  3/24 4/1 4/10 5/6 5/20 6/8 6/19 7/31 8/31 10/14 12/7 1/5/2021 1/8 1/13 1/20 1/27 2/2   Total Weekly Dose 67.5mg 72.5 mg 70mg 70mg 70mg 55mg? 65mg 65mg 67.5mg 67.5 67.5 mg 27.5mg colonoscopy 45mg 70mg 70mg  75mg   INR 1.8 2.23 2.8 3.4 3.1 2.0 2.5 2.0 2.3 2.5 2.1 1.1   1.2 1.8 1.7 2.5   Notes   Extra dose   Less GLV Less GLV Missed x1   incr GLV       Hold x4 Hold x 7 days missed x1               Warfarin Dosing During Admission 1/28:  Date  2/3 2/4 2/5 2/6 2/7 2/8 2/9 2/10   INR  1.38 1.55 1.88 2.22 2.1 2.2 2.4 2.46   Dose  10mg 10mg 7.5mg 7.5mg  10mg 10mg 10mg 10mg      Date 3/17 4/1-4/15 4/16 4/19 4/23 5/7 5/21 6/1    10/4    12/22  1/6     Total Weekly Dose 60mg? [facility] admission 42.5mg?? 47.5mg? 45mg 45 mg 45mg 52.5mg  noncompliance  57.5mg  non compliance  50mg  47.5mg     INR 1.8   2.2 2.0 2.2 2.95 venipuncture 1.4 1.5    2.1    1.7  1.5     Notes Miss x 1; self adjust dose reverse  cholestomy; hemorrhage; vit k       POC 4.5 x2   Inc GLV; lovenox        1x miss?  inc tobacco, 1x miss inc GLV         Date 1/6/22-3/21/22 3/21 3/28 Non-compliant 5/19 8/11     Total Weekly Dose Noncompliance  57.5 mg 60mg  65 mg Non compliant 45mg?     INR  1.7 1.3  2.0  1.6     Notes       2x miss         Phone Interview:  Tablet Strength: 5mg tablet  Verbal Release Authorization signed on 9/19/19-- may speak with Jammie Rodriguez (mother) John Rodriguez (father)  Patient contact info: [phone] (Mobile); [phone] (Cell) 5/7/21    Positives:  Missed doses   Negatives:  Signs/symptoms of thrombosis, Signs/symptoms of bleeding, Laboratory test error suspected, Change in health, Change in alcohol use, Change in activity, Upcoming invasive procedure, Emergency department visit, Upcoming dental procedure, Extra doses, Change in medications, Change in diet/appetite, Hospital admission, Bruising, Other complaints   Comments:  Patient missed doses d/t being out of medication-- discussed importance of adherence         Plan:  1. INR is SUBtherapeutic at 1.6 following extensive noncompliance with clinic. Will BOOST dose to 15mg x 2 days then resume warfarin 10mg daily except 7.5mg Sun/Thurs.  2. Voices understanding of seeking immediate medical attention if abnormal bleeding or bruising occur.   3. Repeat INR in 2 weeks  4. Verbal and written information provided in the clinic.  Brigida Rodriguez RBV dosing instructions, expresses understanding by teach back, and has no further questions at this time.  5. Refill sent      Branden AtwoodD.  08/11/22   10:46 EDT

## 2022-08-25 ENCOUNTER — APPOINTMENT (OUTPATIENT)
Dept: PHARMACY | Facility: HOSPITAL | Age: 42
End: 2022-08-25

## 2022-09-02 DIAGNOSIS — Z86.711 HISTORY OF PULMONARY EMBOLISM: ICD-10-CM

## 2022-09-02 DIAGNOSIS — K57.80 PERFORATED DIVERTICULUM: ICD-10-CM

## 2022-09-02 RX ORDER — PANTOPRAZOLE SODIUM 40 MG/1
TABLET, DELAYED RELEASE ORAL
Qty: 90 TABLET | Refills: 3 | OUTPATIENT
Start: 2022-09-02

## 2022-09-02 RX ORDER — WARFARIN SODIUM 5 MG/1
TABLET ORAL
Qty: 55 TABLET | Refills: 0 | OUTPATIENT
Start: 2022-09-02

## 2022-09-02 RX ORDER — CARVEDILOL 6.25 MG/1
TABLET ORAL
Qty: 60 TABLET | Refills: 0 | OUTPATIENT
Start: 2022-09-02

## 2022-09-06 ENCOUNTER — E-VISIT (OUTPATIENT)
Dept: ADMINISTRATIVE | Facility: OTHER | Age: 42
End: 2022-09-06

## 2022-09-06 DIAGNOSIS — I10 ESSENTIAL HYPERTENSION: ICD-10-CM

## 2022-09-06 DIAGNOSIS — F33.9 RECURRENT MAJOR DEPRESSIVE DISORDER, REMISSION STATUS UNSPECIFIED: ICD-10-CM

## 2022-09-06 DIAGNOSIS — B00.9 HERPES: ICD-10-CM

## 2022-09-06 DIAGNOSIS — Z86.711 HISTORY OF PULMONARY EMBOLISM: ICD-10-CM

## 2022-09-06 DIAGNOSIS — K57.80 PERFORATED DIVERTICULUM: ICD-10-CM

## 2022-09-06 DIAGNOSIS — F41.1 GENERALIZED ANXIETY DISORDER: ICD-10-CM

## 2022-09-06 RX ORDER — LISINOPRIL 10 MG/1
10 TABLET ORAL DAILY
Qty: 30 TABLET | Refills: 0 | Status: SHIPPED | OUTPATIENT
Start: 2022-09-06 | End: 2022-09-07 | Stop reason: SDUPTHER

## 2022-09-06 RX ORDER — ACYCLOVIR 400 MG/1
400 TABLET ORAL DAILY PRN
Qty: 30 TABLET | Refills: 2 | Status: SHIPPED | OUTPATIENT
Start: 2022-09-06 | End: 2022-09-07 | Stop reason: SDUPTHER

## 2022-09-06 RX ORDER — DULOXETIN HYDROCHLORIDE 30 MG/1
30 CAPSULE, DELAYED RELEASE ORAL DAILY
Qty: 30 CAPSULE | Refills: 2 | Status: SHIPPED | OUTPATIENT
Start: 2022-09-06 | End: 2022-11-07 | Stop reason: SDUPTHER

## 2022-09-06 RX ORDER — CARVEDILOL 6.25 MG/1
6.25 TABLET ORAL 2 TIMES DAILY WITH MEALS
Qty: 60 TABLET | Refills: 0 | Status: SHIPPED | OUTPATIENT
Start: 2022-09-06 | End: 2022-10-06 | Stop reason: SDUPTHER

## 2022-09-06 RX ORDER — WARFARIN SODIUM 5 MG/1
TABLET ORAL
Qty: 55 TABLET | Refills: 0 | Status: SHIPPED | OUTPATIENT
Start: 2022-09-06 | End: 2022-09-07 | Stop reason: SDUPTHER

## 2022-09-06 RX ORDER — BUPROPION HYDROCHLORIDE 150 MG/1
150 TABLET, EXTENDED RELEASE ORAL 2 TIMES DAILY
Qty: 180 TABLET | Refills: 3 | OUTPATIENT
Start: 2022-09-06

## 2022-09-06 RX ORDER — PANTOPRAZOLE SODIUM 40 MG/1
40 TABLET, DELAYED RELEASE ORAL
Qty: 90 TABLET | Refills: 0 | Status: SHIPPED | OUTPATIENT
Start: 2022-09-06 | End: 2022-09-07 | Stop reason: SDUPTHER

## 2022-09-06 NOTE — TELEPHONE ENCOUNTER
Caller: Brigida Rodriguezmonalisa    Relationship: Self    Best call back number: [phone]    Requested Prescriptions:   Requested Prescriptions     Pending Prescriptions Disp Refills   • carvedilol (COREG) 6.25 MG tablet 60 tablet 0     Sig: Take 1 tablet by mouth 2 (Two) Times a Day With Meals.   • lisinopril (PRINIVIL,ZESTRIL) 10 MG tablet 30 tablet 0     Sig: Take 1 tablet by mouth Daily.   • warfarin (COUMADIN) 5 MG tablet 55 tablet 0     Sig: TAKE 1.5-2 TABLETS BY MOUTH DAILY OR AS DIRECTED BY ANTICOAGULATION CLINIC   • acyclovir (Zovirax) 400 MG tablet 30 tablet 2     Sig: Take 1 tablet by mouth Daily As Needed (PRN). Take no more than 5 doses during flare up.   • DULoxetine (CYMBALTA) 30 MG capsule 30 capsule 2     Sig: Take 1 capsule by mouth Daily.   • buPROPion SR (WELLBUTRIN SR) 150 MG 12 hr tablet 180 tablet 3     Sig: Take 1 tablet by mouth 2 (Two) Times a Day.   • pantoprazole (PROTONIX) 40 MG EC tablet 90 tablet 3     Sig: Take 1 tablet by mouth Every Morning Before Breakfast.        [place] [address] [address] [place] [facility] [phone] [facility] [phone]      Additional details provided by patient:    Does the patient have less than a 3 day supply:  [x] Yes  [] No    Suellen West   09/06/22 12:20 EDT         ”

## 2022-09-06 NOTE — TELEPHONE ENCOUNTER
Rx Refill Note  Requested Prescriptions     Pending Prescriptions Disp Refills   • carvedilol (COREG) 6.25 MG tablet 60 tablet 0     Sig: Take 1 tablet by mouth 2 (Two) Times a Day With Meals.   • lisinopril (PRINIVIL,ZESTRIL) 10 MG tablet 30 tablet 0     Sig: Take 1 tablet by mouth Daily.   • warfarin (COUMADIN) 5 MG tablet 55 tablet 0     Sig: TAKE 1.5-2 TABLETS BY MOUTH DAILY OR AS DIRECTED BY ANTICOAGULATION CLINIC   • acyclovir (Zovirax) 400 MG tablet 30 tablet 2     Sig: Take 1 tablet by mouth Daily As Needed (PRN). Take no more than 5 doses during flare up.   • DULoxetine (CYMBALTA) 30 MG capsule 30 capsule 2     Sig: Take 1 capsule by mouth Daily.   • buPROPion SR (WELLBUTRIN SR) 150 MG 12 hr tablet 180 tablet 3     Sig: Take 1 tablet by mouth 2 (Two) Times a Day.   • pantoprazole (PROTONIX) 40 MG EC tablet 90 tablet 3     Sig: Take 1 tablet by mouth Every Morning Before Breakfast.      Last office visit with prescribing clinician: 5/19/2022      Next office visit with prescribing clinician: 9/14/2022            Cyndi Diggs MA  09/06/22, 14:48 EDT

## 2022-09-06 NOTE — E-VISIT ESCALATED
Chief Complaint: Coronavirus (COVID-19), cold, sinus pain, allergy, or flu   Patient was shown the following escalation message:   You do not need treatment at this time   Since you're not having symptoms, you don't need to see a healthcare provider or get treatment.   If you develop symptoms, take a new interview or speak with a provider.   For information about COVID-19, see www.cdc.gov/coronavirus/2019-ncov/your-health/about-covid-19.html  .   ----------   Patient Interview Transcript:   Why are you getting care through eVisit today? We can't guarantee a specific treatment or test. Your provider will decide what's best for you. Select all that apply.    I just want to feel better!   Not selected:    I want to know if I have a cold or something more serious    I want to know if I need to be seen by a provider    I need a doctor's note    I want to be tested for COVID-19    I want to get the COVID-19 vaccine    I think I'm having side effects from the COVID-19 vaccine    None of the above   COVID-19 symptoms are similar to symptoms of other illnesses. This makes it difficult to diagnose. Please carefully consider each question and answer as best you can. This will help your provider make the right diagnosis. Which of these symptoms are bothering you? Select all that apply.    I don't have any of these symptoms   Not selected:    Cough    Shortness of breath    Fever    Stuffed-up nose or sinuses    Runny nose    Itchy or watery eyes    Itchy nose or sneezing    Loss of smell or taste    Sore throat    Hoarse voice or loss of voice    Headache    Sweats    Chills    Muscle or body aches    Fatigue or tiredness    Nausea or vomiting    Diarrhea   ----------   Medical history   Medical history data does not currently exist for this patient.

## 2022-09-07 DIAGNOSIS — I10 ESSENTIAL HYPERTENSION: ICD-10-CM

## 2022-09-07 DIAGNOSIS — B00.9 HERPES: ICD-10-CM

## 2022-09-07 DIAGNOSIS — K57.80 PERFORATED DIVERTICULUM: ICD-10-CM

## 2022-09-07 DIAGNOSIS — Z86.711 HISTORY OF PULMONARY EMBOLISM: ICD-10-CM

## 2022-09-07 RX ORDER — WARFARIN SODIUM 5 MG/1
TABLET ORAL
Qty: 55 TABLET | Refills: 0 | Status: SHIPPED | OUTPATIENT
Start: 2022-09-07 | End: 2022-09-07 | Stop reason: SDUPTHER

## 2022-09-07 RX ORDER — LISINOPRIL 10 MG/1
10 TABLET ORAL DAILY
Qty: 30 TABLET | Refills: 0 | Status: SHIPPED | OUTPATIENT
Start: 2022-09-07 | End: 2022-10-06 | Stop reason: SDUPTHER

## 2022-09-07 RX ORDER — PANTOPRAZOLE SODIUM 40 MG/1
40 TABLET, DELAYED RELEASE ORAL
Qty: 90 TABLET | Refills: 0 | Status: SHIPPED | OUTPATIENT
Start: 2022-09-07 | End: 2022-11-07 | Stop reason: SDUPTHER

## 2022-09-07 RX ORDER — WARFARIN SODIUM 5 MG/1
TABLET ORAL
Qty: 55 TABLET | Refills: 0 | Status: SHIPPED | OUTPATIENT
Start: 2022-09-07 | End: 2022-10-06 | Stop reason: SDUPTHER

## 2022-09-07 RX ORDER — ACYCLOVIR 400 MG/1
400 TABLET ORAL DAILY PRN
Qty: 30 TABLET | Refills: 2 | Status: SHIPPED | OUTPATIENT
Start: 2022-09-07 | End: 2022-11-07 | Stop reason: SDUPTHER

## 2022-09-07 NOTE — TELEPHONE ENCOUNTER
Mr Michael called clinic today to request refill on warfarin, he also requests more than a month supply.  Explained that due to history of non-compliance with requested INR checks would not be able to send in more than a month until he starts reporting INRs more regularly. He requested an appt on 9/14/22

## 2022-09-14 ENCOUNTER — APPOINTMENT (OUTPATIENT)
Dept: PHARMACY | Facility: HOSPITAL | Age: 42
End: 2022-09-14

## 2022-09-22 ENCOUNTER — ANTICOAGULATION VISIT (OUTPATIENT)
Dept: PHARMACY | Facility: HOSPITAL | Age: 42
End: 2022-09-22

## 2022-09-22 ENCOUNTER — OFFICE VISIT (OUTPATIENT)
Dept: FAMILY MEDICINE CLINIC | Facility: CLINIC | Age: 42
End: 2022-09-22

## 2022-09-22 VITALS
DIASTOLIC BLOOD PRESSURE: 86 MMHG | HEIGHT: 76 IN | HEART RATE: 84 BPM | SYSTOLIC BLOOD PRESSURE: 122 MMHG | WEIGHT: 315 LBS | OXYGEN SATURATION: 98 % | BODY MASS INDEX: 38.36 KG/M2

## 2022-09-22 DIAGNOSIS — I10 ESSENTIAL HYPERTENSION: ICD-10-CM

## 2022-09-22 DIAGNOSIS — Z86.711 HISTORY OF PULMONARY EMBOLISM: Primary | ICD-10-CM

## 2022-09-22 DIAGNOSIS — G89.29 CHRONIC KNEE PAIN, UNSPECIFIED LATERALITY: ICD-10-CM

## 2022-09-22 DIAGNOSIS — Z86.711 HISTORY OF PULMONARY EMBOLISM: ICD-10-CM

## 2022-09-22 DIAGNOSIS — M25.569 CHRONIC KNEE PAIN, UNSPECIFIED LATERALITY: ICD-10-CM

## 2022-09-22 DIAGNOSIS — F41.1 GENERALIZED ANXIETY DISORDER: Primary | ICD-10-CM

## 2022-09-22 DIAGNOSIS — F33.9 RECURRENT MAJOR DEPRESSIVE DISORDER, REMISSION STATUS UNSPECIFIED: ICD-10-CM

## 2022-09-22 LAB
INR PPP: 3.4 (ref 0.91–1.09)
PROTHROMBIN TIME: 40.7 SECONDS (ref 10–13.8)

## 2022-09-22 PROCEDURE — 99214 OFFICE O/P EST MOD 30 MIN: CPT | Performed by: INTERNAL MEDICINE

## 2022-09-22 PROCEDURE — G0463 HOSPITAL OUTPT CLINIC VISIT: HCPCS

## 2022-09-22 PROCEDURE — 36416 COLLJ CAPILLARY BLOOD SPEC: CPT

## 2022-09-22 PROCEDURE — 85610 PROTHROMBIN TIME: CPT

## 2022-09-22 NOTE — PROGRESS NOTES
Anticoagulation Clinic Progress Note     Indication: Hx of PE and LE DVT (2010, 2014)  Referring Provider: Vaishali  Initial Warfarin Start Date: 2010  Planned Duration of Therapy: lifelong  Goal INR: 2.0-3.0 (verified Dr Vaishali 9/19/19)  Will likely need lovenox perioperatively (Vaishali 7/29/20)  Current Drug Interactions: Cymbalta and Pantoprazole  Other: d/c Eliquis 9/6/19 due to itching     Diet: green beans,broccoli (1/week), salads daily (iceburg lettuce, carrots, and red cabbage mixture) 5/21/2021  Alcohol: None  Tobacco: Smokes ~5 cigarettes a week  OTC Pain Medication: Recommended Tylenol prn     Anticoagulation Clinic INR History:  Date 9/19 10/16 11/7 11/19 12/3 12/9 1/3/2020 2/3-2/10 2/13 2/17 2/24 3/2 3/16   Total Weekly Dose 80mg 80 mg 77.5 mg 77.5mg 57.5 mg 80mg 72.5mg hosp: admission 65mg 67.5 mg 65 mg 65 mg 65mg   INR 2.6 3.4 3.1 2.7 1.5 2.1 2.6   3.0 3 2.7 2.4 1.9   Notes         S/p c'scope, librado greens 1 boost Decr cig use Diverticulitis; end colostomy stopped smoking; recent admission        Green beans (HH)      Date  3/24 4/1 4/10 5/6 5/20 6/8 6/19 7/31 8/31 10/14 12/7 1/5/2021 1/8 1/13 1/20 1/27 2/2   Total Weekly Dose 67.5mg 72.5 mg 70mg 70mg 70mg 55mg? 65mg 65mg 67.5mg 67.5 67.5 mg 27.5mg colonoscopy 45mg 70mg 70mg  75mg   INR 1.8 2.23 2.8 3.4 3.1 2.0 2.5 2.0 2.3 2.5 2.1 1.1   1.2 1.8 1.7 2.5   Notes   Extra dose   Less GLV Less GLV Missed x1   incr GLV       Hold x4 Hold x 7 days missed x1               Warfarin Dosing During Admission 1/28:  Date  2/3 2/4 2/5 2/6 2/7 2/8 2/9 2/10   INR  1.38 1.55 1.88 2.22 2.1 2.2 2.4 2.46   Dose  10mg 10mg 7.5mg 7.5mg  10mg 10mg 10mg 10mg      Date 3/17 4/1-4/15 4/16 4/19 4/23 5/7 5/21 6/1    10/4    12/22  1/6     Total Weekly Dose 60mg? [facility] admission 42.5mg?? 47.5mg? 45mg 45 mg 45mg 52.5mg  noncompliance  57.5mg  non compliance  50mg  47.5mg     INR 1.8   2.2 2.0 2.2 2.95 venipuncture 1.4 1.5    2.1    1.7  1.5     Notes Miss x 1; self adjust dose reverse  cholestomy; hemorrhage; vit k       POC 4.5 x2   Inc GLV; lovenox        1x miss?  inc tobacco, 1x miss inc GLV         Date 1/6/22-3/21/22 3/21 3/28 Non-compliant 5/19 8/11 9/22   Total Weekly Dose Noncompliance  57.5 mg 60mg  65 mg Non compliant 45mg? Non compliant 65mg?   INR  1.7 1.3  2.0  1.6  3.4   Notes       2x miss         Phone Interview:  Tablet Strength: 5mg tablet  Verbal Release Authorization signed on 9/19/19-- may speak with Jammie Rodriguez (mother) John Rodriguez (father)  Patient contact info: [phone] (Mobile); [phone] (Cell) 5/7/21      Negatives:  Signs/symptoms of thrombosis, Signs/symptoms of bleeding, Laboratory test error suspected, Change in health, Change in alcohol use, Change in activity, Upcoming invasive procedure, Emergency department visit, Upcoming dental procedure, Missed doses, Extra doses, Change in medications, Change in diet/appetite, Hospital admission, Bruising, Other complaints    Discussed to monitor for s/sx of bleeding including epistaxis, hematuria, unusual bruising, hemoptysis, hematochezia as well as s/sx of stroke including impaired speech, unilateral paralysis, blurry vision and when to seek medical attention             Plan:  1. INR is SUPRAtherapeutic at 3.4 following extensive noncompliance with clinic. Will reduce tonight's dose to 5mg then reduce dose to warfarin 10mg daily except 7.5mg SunTUESThurs.  2. Voices understanding of seeking immediate medical attention if abnormal bleeding or bruising occur.   3. Repeat INR in 2 weeks  4. Verbal and written information provided in the clinic.  Brigida Rodriguez RBV dosing instructions, expresses understanding by teach back, and has no further questions at this time.  5. Declines refills    Tara Barger, BrandenD.  09/22/22   10:39 EDT

## 2022-09-22 NOTE — PROGRESS NOTES
Chief Complaint   Patient presents with   • Anxiety       HPI:  Brigida Rodriguez is a 42 y.o. male who presents today for follow-up chronic medical conditions.  He has not yet established with orthopedics for knee pain or psychiatry for anxiety and depression.  Patient reports he has difficulty keeping appointments.    ROS:  Constitutional: no fevers, night sweats or unexplained weight loss  Eyes: no vision changes  ENT: no runny nose, ear pain, sore throat  Cardio: no chest pain, palpitations  Pulm: no shortness of breath, wheezing, or cough  GI: no abdominal pain or changes in bowel movements  : no difficulty urinating  MSK: no difficulty ambulating, no joint pain  Neuro: no weakness, dizziness or headache  Psych: no trouble sleeping  Endo: no change in appetite      Past Medical History:   Diagnosis Date   • Anxiety    • Depression    • GERD (gastroesophageal reflux disease)    • H/O blood clots    • Heart failure (HCC)    • Hypertension    • Presence of IVC filter    • Pulmonary embolism (HCC)     10 YEARS AGO      Family History   Problem Relation Age of Onset   • Diabetes Mother    • Obesity Maternal Grandmother    • Diabetes Maternal Grandmother    • Cancer Father         prostrate   • No Known Problems Sister    • No Known Problems Brother    • No Known Problems Maternal Grandfather    • No Known Problems Paternal Grandmother    • No Known Problems Paternal Grandfather       Social History     Socioeconomic History   • Marital status:    Tobacco Use   • Smoking status: Current Every Day Smoker     Packs/day: 0.50     Years: 20.00     Pack years: 10.00     Types: Cigarettes   • Smokeless tobacco: Never Used   Substance and Sexual Activity   • Alcohol use: Yes     Alcohol/week: 6.0 standard drinks     Types: 6 Cans of beer per week   • Drug use: Yes     Types: Marijuana     Comment: everyday smoker   • Sexual activity: Yes     Partners: Female     Birth control/protection: Condom      No Known  Allergies   Immunization History   Administered Date(s) Administered   • COVID-19 (PFIZER) PURPLE CAP 03/06/2021, 03/30/2021   • Flu Vaccine Quad PF >36MO 12/21/2016, 12/18/2017, 12/07/2018, 11/04/2019   • FluLaval/Fluzone >6mos 11/04/2019, 12/23/2020, 11/08/2021   • Hepatitis A 12/07/2018   • Pneumococcal Polysaccharide (PPSV23) 12/21/2016   • Tdap 12/21/2016        PE:  Vitals:    09/22/22 1054   BP: 122/86   Pulse: 84   SpO2: 98%      Body mass index is 45.89 kg/m².    Gen Appearance: NAD  HEENT: Normocephalic, PERRLA, no thyromegaly, trache midline  Heart: RRR, normal S1 and S2, no murmur  Lungs: CTA b/l, no wheezing, no crackles  Abdomen: Soft, non-tender, non-distended, no guarding and BSx4  MSK: Moves all extremities well, normal gait, no peripheral edema  Pulses: Palpable and equal b/l  Lymph nodes: No palpable lymphadenopathy   Neuro: No focal deficits      Current Outpatient Medications   Medication Sig Dispense Refill   • acyclovir (Zovirax) 400 MG tablet Take 1 tablet by mouth Daily As Needed (PRN). Take no more than 5 doses during flare up. 30 tablet 2   • buPROPion SR (WELLBUTRIN SR) 150 MG 12 hr tablet TAKE ONE TABLET BY MOUTH TWICE A  tablet 3   • carvedilol (COREG) 6.25 MG tablet Take 1 tablet by mouth 2 (Two) Times a Day With Meals. 60 tablet 0   • DULoxetine (CYMBALTA) 30 MG capsule Take 1 capsule by mouth Daily. 30 capsule 2   • lisinopril (PRINIVIL,ZESTRIL) 10 MG tablet Take 1 tablet by mouth Daily. 30 tablet 0   • Multiple Vitamins-Minerals (HM MULTIVITAMIN ADULT GUMMY PO) Take  by mouth.     • pantoprazole (PROTONIX) 40 MG EC tablet Take 1 tablet by mouth Every Morning Before Breakfast. 90 tablet 0   • warfarin (COUMADIN) 5 MG tablet TAKE 1.5-2 TABLETS BY MOUTH DAILY OR AS DIRECTED BY ANTICOAGULATION CLINIC 55 tablet 0     No current facility-administered medications for this visit.        Diagnoses and all orders for this visit:    1. Generalized anxiety disorder (Primary)  Continue  current medication, refer to behavioral health to establish care.  2. Recurrent major depressive disorder, remission status unspecified (HCC)  See above.    3. History of DVT/ PE on home coumadin with IVC filter in place  Continue warfarin.  4. Essential hypertension  Blood pressure well controlled today.  5. Chronic knee pain   Recommend follow-up with Ortho as scheduled.  Reviewed x-ray with patient.  No follow-ups on file.     Dictated Utilizing Dragon Dictation    Please note that portions of this note were completed with a voice recognition program.    Part of this note may be an electronic transcription/translation of spoken language to printed text using the Dragon Dictation System.

## 2022-10-06 ENCOUNTER — APPOINTMENT (OUTPATIENT)
Dept: PHARMACY | Facility: HOSPITAL | Age: 42
End: 2022-10-06

## 2022-10-06 DIAGNOSIS — I10 ESSENTIAL HYPERTENSION: ICD-10-CM

## 2022-10-06 DIAGNOSIS — F41.1 GENERALIZED ANXIETY DISORDER: ICD-10-CM

## 2022-10-06 DIAGNOSIS — K57.80 PERFORATED DIVERTICULUM: ICD-10-CM

## 2022-10-06 DIAGNOSIS — Z86.711 HISTORY OF PULMONARY EMBOLISM: ICD-10-CM

## 2022-10-06 DIAGNOSIS — F33.9 RECURRENT MAJOR DEPRESSIVE DISORDER, REMISSION STATUS UNSPECIFIED: ICD-10-CM

## 2022-10-06 DIAGNOSIS — B00.9 HERPES: ICD-10-CM

## 2022-10-07 ENCOUNTER — APPOINTMENT (OUTPATIENT)
Dept: PHARMACY | Facility: HOSPITAL | Age: 42
End: 2022-10-07

## 2022-10-07 RX ORDER — DULOXETIN HYDROCHLORIDE 30 MG/1
30 CAPSULE, DELAYED RELEASE ORAL DAILY
Qty: 30 CAPSULE | Refills: 2 | OUTPATIENT
Start: 2022-10-07

## 2022-10-07 RX ORDER — PANTOPRAZOLE SODIUM 40 MG/1
40 TABLET, DELAYED RELEASE ORAL
Qty: 90 TABLET | Refills: 0 | OUTPATIENT
Start: 2022-10-07

## 2022-10-07 RX ORDER — WARFARIN SODIUM 5 MG/1
TABLET ORAL
Qty: 55 TABLET | Refills: 0 | Status: SHIPPED | OUTPATIENT
Start: 2022-10-07 | End: 2022-11-07 | Stop reason: SDUPTHER

## 2022-10-07 RX ORDER — BUPROPION HYDROCHLORIDE 150 MG/1
150 TABLET, EXTENDED RELEASE ORAL 2 TIMES DAILY
Qty: 180 TABLET | Refills: 3 | OUTPATIENT
Start: 2022-10-07

## 2022-10-07 RX ORDER — LISINOPRIL 10 MG/1
10 TABLET ORAL DAILY
Qty: 30 TABLET | Refills: 2 | Status: SHIPPED | OUTPATIENT
Start: 2022-10-07 | End: 2022-11-07 | Stop reason: SDUPTHER

## 2022-10-07 RX ORDER — CARVEDILOL 6.25 MG/1
6.25 TABLET ORAL 2 TIMES DAILY WITH MEALS
Qty: 60 TABLET | Refills: 2 | Status: SHIPPED | OUTPATIENT
Start: 2022-10-07 | End: 2022-11-07 | Stop reason: SDUPTHER

## 2022-10-07 RX ORDER — ACYCLOVIR 400 MG/1
400 TABLET ORAL DAILY PRN
Qty: 30 TABLET | Refills: 2 | OUTPATIENT
Start: 2022-10-07

## 2022-10-07 NOTE — TELEPHONE ENCOUNTER
Rx Refill Note  Requested Prescriptions     Pending Prescriptions Disp Refills   • buPROPion SR (WELLBUTRIN SR) 150 MG 12 hr tablet 180 tablet 3     Sig: Take 1 tablet by mouth 2 (Two) Times a Day.   • carvedilol (COREG) 6.25 MG tablet 60 tablet 0     Sig: Take 1 tablet by mouth 2 (Two) Times a Day With Meals.   • DULoxetine (CYMBALTA) 30 MG capsule 30 capsule 2     Sig: Take 1 capsule by mouth Daily.   • acyclovir (Zovirax) 400 MG tablet 30 tablet 2     Sig: Take 1 tablet by mouth Daily As Needed (PRN). Take no more than 5 doses during flare up.   • lisinopril (PRINIVIL,ZESTRIL) 10 MG tablet 30 tablet 0     Sig: Take 1 tablet by mouth Daily.   • pantoprazole (PROTONIX) 40 MG EC tablet 90 tablet 0     Sig: Take 1 tablet by mouth Every Morning Before Breakfast.   • warfarin (COUMADIN) 5 MG tablet 55 tablet 0     Sig: TAKE 1.5-2 TABLETS BY MOUTH DAILY OR AS DIRECTED BY ANTICOAGULATION CLINIC      Last office visit with prescribing clinician: 9/22/2022      Next office visit with prescribing clinician: Visit date not found            Cyndi Diggs MA  10/07/22, 09:44 EDT

## 2022-10-11 ENCOUNTER — APPOINTMENT (OUTPATIENT)
Dept: PHARMACY | Facility: HOSPITAL | Age: 42
End: 2022-10-11

## 2022-10-13 ENCOUNTER — APPOINTMENT (OUTPATIENT)
Dept: PHARMACY | Facility: HOSPITAL | Age: 42
End: 2022-10-13

## 2022-10-17 ENCOUNTER — APPOINTMENT (OUTPATIENT)
Dept: PHARMACY | Facility: HOSPITAL | Age: 42
End: 2022-10-17

## 2022-11-07 ENCOUNTER — APPOINTMENT (OUTPATIENT)
Dept: PHARMACY | Facility: HOSPITAL | Age: 42
End: 2022-11-07

## 2022-11-07 DIAGNOSIS — Z86.711 HISTORY OF PULMONARY EMBOLISM: ICD-10-CM

## 2022-11-07 DIAGNOSIS — I10 ESSENTIAL HYPERTENSION: ICD-10-CM

## 2022-11-07 DIAGNOSIS — K57.80 PERFORATED DIVERTICULUM: ICD-10-CM

## 2022-11-07 DIAGNOSIS — B00.9 HERPES: ICD-10-CM

## 2022-11-07 DIAGNOSIS — F41.1 GENERALIZED ANXIETY DISORDER: ICD-10-CM

## 2022-11-07 DIAGNOSIS — F33.9 RECURRENT MAJOR DEPRESSIVE DISORDER, REMISSION STATUS UNSPECIFIED: ICD-10-CM

## 2022-11-07 RX ORDER — LISINOPRIL 10 MG/1
10 TABLET ORAL DAILY
Qty: 90 TABLET | Refills: 1 | Status: SHIPPED | OUTPATIENT
Start: 2022-11-07 | End: 2022-12-07 | Stop reason: SDUPTHER

## 2022-11-07 RX ORDER — BUPROPION HYDROCHLORIDE 150 MG/1
150 TABLET, EXTENDED RELEASE ORAL 2 TIMES DAILY
Qty: 180 TABLET | Refills: 1 | Status: SHIPPED | OUTPATIENT
Start: 2022-11-07 | End: 2022-11-08 | Stop reason: SDUPTHER

## 2022-11-07 RX ORDER — WARFARIN SODIUM 5 MG/1
TABLET ORAL
Qty: 55 TABLET | Refills: 0 | Status: CANCELLED | OUTPATIENT
Start: 2022-11-07

## 2022-11-07 RX ORDER — CARVEDILOL 6.25 MG/1
6.25 TABLET ORAL 2 TIMES DAILY WITH MEALS
Qty: 180 TABLET | Refills: 1 | Status: SHIPPED | OUTPATIENT
Start: 2022-11-07 | End: 2022-12-07 | Stop reason: SDUPTHER

## 2022-11-07 RX ORDER — WARFARIN SODIUM 5 MG/1
TABLET ORAL
Qty: 20 TABLET | Refills: 0 | Status: SHIPPED | OUTPATIENT
Start: 2022-11-07 | End: 2022-11-11 | Stop reason: SDUPTHER

## 2022-11-07 RX ORDER — ACYCLOVIR 400 MG/1
400 TABLET ORAL DAILY PRN
Qty: 30 TABLET | Refills: 1 | Status: SHIPPED | OUTPATIENT
Start: 2022-11-07 | End: 2022-12-07 | Stop reason: SDUPTHER

## 2022-11-07 RX ORDER — DULOXETIN HYDROCHLORIDE 30 MG/1
30 CAPSULE, DELAYED RELEASE ORAL DAILY
Qty: 90 CAPSULE | Refills: 1 | Status: SHIPPED | OUTPATIENT
Start: 2022-11-07 | End: 2023-01-06 | Stop reason: SDUPTHER

## 2022-11-07 RX ORDER — PANTOPRAZOLE SODIUM 40 MG/1
40 TABLET, DELAYED RELEASE ORAL
Qty: 90 TABLET | Refills: 1 | Status: SHIPPED | OUTPATIENT
Start: 2022-11-07 | End: 2022-12-02

## 2022-11-07 NOTE — TELEPHONE ENCOUNTER
Rx Refill Note  Requested Prescriptions     Pending Prescriptions Disp Refills   • buPROPion SR (WELLBUTRIN SR) 150 MG 12 hr tablet 180 tablet 3     Sig: Take 1 tablet by mouth 2 (Two) Times a Day.   • DULoxetine (CYMBALTA) 30 MG capsule 30 capsule 2     Sig: Take 1 capsule by mouth Daily.   • acyclovir (Zovirax) 400 MG tablet 30 tablet 2     Sig: Take 1 tablet by mouth Daily As Needed (PRN). Take no more than 5 doses during flare up.   • pantoprazole (PROTONIX) 40 MG EC tablet 90 tablet 0     Sig: Take 1 tablet by mouth Every Morning Before Breakfast.   • carvedilol (COREG) 6.25 MG tablet 60 tablet 2     Sig: Take 1 tablet by mouth 2 (Two) Times a Day With Meals.   • lisinopril (PRINIVIL,ZESTRIL) 10 MG tablet 30 tablet 2     Sig: Take 1 tablet by mouth Daily.      Last office visit with prescribing clinician: 9/22/2022      Next office visit with prescribing clinician: Visit date not found            Shanell Maravilla MA  11/07/22, 15:46 EST

## 2022-11-08 DIAGNOSIS — F33.9 RECURRENT MAJOR DEPRESSIVE DISORDER, REMISSION STATUS UNSPECIFIED: ICD-10-CM

## 2022-11-09 ENCOUNTER — ANTICOAGULATION VISIT (OUTPATIENT)
Dept: PHARMACY | Facility: HOSPITAL | Age: 42
End: 2022-11-09

## 2022-11-09 DIAGNOSIS — Z86.711 HISTORY OF PULMONARY EMBOLISM: Primary | ICD-10-CM

## 2022-11-09 RX ORDER — BUPROPION HYDROCHLORIDE 150 MG/1
150 TABLET, EXTENDED RELEASE ORAL 2 TIMES DAILY
Qty: 180 TABLET | Refills: 1 | Status: SHIPPED | OUTPATIENT
Start: 2022-11-09 | End: 2023-01-06 | Stop reason: SDUPTHER

## 2022-11-09 NOTE — TELEPHONE ENCOUNTER
Rx Refill Note  Requested Prescriptions     Pending Prescriptions Disp Refills   • buPROPion SR (WELLBUTRIN SR) 150 MG 12 hr tablet 180 tablet 1     Sig: Take 1 tablet by mouth 2 (Two) Times a Day.      Last office visit with prescribing clinician: 9/22/2022      Next office visit with prescribing clinician: Visit date not found            Earline Ordonez MA  11/09/22, 07:49 EST

## 2022-11-11 ENCOUNTER — ANTICOAGULATION VISIT (OUTPATIENT)
Dept: PHARMACY | Facility: HOSPITAL | Age: 42
End: 2022-11-11

## 2022-11-11 DIAGNOSIS — Z86.711 HISTORY OF PULMONARY EMBOLISM: Primary | ICD-10-CM

## 2022-11-11 LAB
INR PPP: 2 (ref 0.91–1.09)
PROTHROMBIN TIME: 24.2 SECONDS (ref 10–13.8)

## 2022-11-11 PROCEDURE — 85610 PROTHROMBIN TIME: CPT

## 2022-11-11 PROCEDURE — G0463 HOSPITAL OUTPT CLINIC VISIT: HCPCS | Performed by: PHARMACIST

## 2022-11-11 PROCEDURE — 36416 COLLJ CAPILLARY BLOOD SPEC: CPT

## 2022-11-11 RX ORDER — WARFARIN SODIUM 5 MG/1
TABLET ORAL
Qty: 35 TABLET | Refills: 0 | Status: SHIPPED | OUTPATIENT
Start: 2022-11-11 | End: 2022-12-07 | Stop reason: SDUPTHER

## 2022-11-11 NOTE — PROGRESS NOTES
Anticoagulation Clinic Progress Note     Indication: Hx of PE and LE DVT (2010, 2014)  Referring Provider: Vaishali  Initial Warfarin Start Date: 2010  Planned Duration of Therapy: lifelong  Goal INR: 2.0-3.0 (verified Dr Vaishali 9/19/19)  Will likely need lovenox perioperatively (Vaishali 7/29/20)  Current Drug Interactions: Cymbalta and Pantoprazole  Other: d/c Eliquis 9/6/19 due to itching     Diet: green beans,broccoli (1/week), salads daily (iceburg lettuce, carrots, and red cabbage mixture) 5/21/2021  Alcohol: None  Tobacco: Smokes ~5 cigarettes a week  OTC Pain Medication: Recommended Tylenol prn     Anticoagulation Clinic INR History:  Date 9/19 10/16 11/7 11/19 12/3 12/9 1/3/2020 2/3-2/10 2/13 2/17 2/24 3/2 3/16   Total Weekly Dose 80mg 80 mg 77.5 mg 77.5mg 57.5 mg 80mg 72.5mg hosp: admission 65mg 67.5 mg 65 mg 65 mg 65mg   INR 2.6 3.4 3.1 2.7 1.5 2.1 2.6   3.0 3 2.7 2.4 1.9   Notes         S/p c'scope, librado greens 1 boost Decr cig use Diverticulitis; end colostomy stopped smoking; recent admission        Green beans (HH)      Date  3/24 4/1 4/10 5/6 5/20 6/8 6/19 7/31 8/31 10/14 12/7 1/5/2021 1/8 1/13 1/20 1/27 2/2   Total Weekly Dose 67.5mg 72.5 mg 70mg 70mg 70mg 55mg? 65mg 65mg 67.5mg 67.5 67.5 mg 27.5mg colonoscopy 45mg 70mg 70mg  75mg   INR 1.8 2.23 2.8 3.4 3.1 2.0 2.5 2.0 2.3 2.5 2.1 1.1   1.2 1.8 1.7 2.5   Notes   Extra dose   Less GLV Less GLV Missed x1   incr GLV       Hold x4 Hold x 7 days missed x1               Warfarin Dosing During Admission 1/28:  Date  2/3 2/4 2/5 2/6 2/7 2/8 2/9 2/10   INR  1.38 1.55 1.88 2.22 2.1 2.2 2.4 2.46   Dose  10mg 10mg 7.5mg 7.5mg  10mg 10mg 10mg 10mg      Date 3/17 4/1-4/15 4/16 4/19 4/23 5/7 5/21 6/1    10/4    12/22  1/6     Total Weekly Dose 60mg? [facility] admission 42.5mg?? 47.5mg? 45mg 45 mg 45mg 52.5mg  noncompliance  57.5mg  non compliance  50mg  47.5mg     INR 1.8   2.2 2.0 2.2 2.95 venipuncture 1.4 1.5    2.1    1.7  1.5     Notes Miss x 1; self adjust dose reverse  cholestomy; hemorrhage; vit k       POC 4.5 x2   Inc GLV; lovenox        1x miss?  inc tobacco, 1x miss inc GLV         Date 1/6/22-3/21/22 3/21 3/28 Non-compliant 5/19 8/11 9/22 11/11    Total Weekly Dose Noncompliance  57.5 mg 60mg  65 mg Non compliant 45mg? Non compliant 65mg? 52.5mg?    INR  1.7 1.3  2.0  1.6  3.4 2.0    Notes       2x miss   2x miss        Phone Interview:  Tablet Strength: 5mg tablet  Verbal Release Authorization signed on 9/19/19-- may speak with Jammie Rodriguez (mother) John Rodriguez (father)  Patient contact info: [phone] (Mobile); [phone] (Cell) 5/7/21    Patient Findings    Positives:  Missed doses   Negatives:  Signs/symptoms of thrombosis, Signs/symptoms of bleeding, Laboratory test error suspected, Change in health, Change in alcohol use, Change in activity, Upcoming invasive procedure, Emergency department visit, Upcoming dental procedure, Extra doses, Change in medications, Change in diet/appetite, Hospital admission, Bruising, Other complaints   Comments:  He ran out of warfarin; missed two consecutive doses. He did take 15 mg after the two missed doses.  He has had some car troubles in shop currently. Friend brought him         Plan:  1. INR is therapeutic at 2.0 following extensive noncompliance with clinic. Will reduce tonight's dose to 5mg then reduce dose to warfarin 10mg daily except 7.5mg SunTUESThurs.  2. Repeat INR in 1 week;  If in range on 62.5 mg/week may be able to go 3 weeks.  3. Verbal and written information provided in the clinic.  Brigida Rodriguez RBV dosing instructions, expresses understanding by teach back, and has no further questions at this time.  4. Requests refills; only gave 35#; will refill 11/18 as well.    Owen Vallejo, PharmD  11/11/22   09:41 EST

## 2022-11-18 ENCOUNTER — APPOINTMENT (OUTPATIENT)
Dept: PHARMACY | Facility: HOSPITAL | Age: 42
End: 2022-11-18

## 2022-11-28 ENCOUNTER — TELEMEDICINE (OUTPATIENT)
Dept: PSYCHIATRY | Facility: CLINIC | Age: 42
End: 2022-11-28

## 2022-11-28 DIAGNOSIS — F51.05 INSOMNIA DUE TO MENTAL CONDITION: ICD-10-CM

## 2022-11-28 DIAGNOSIS — F41.1 GENERALIZED ANXIETY DISORDER: ICD-10-CM

## 2022-11-28 DIAGNOSIS — F33.1 MODERATE EPISODE OF RECURRENT MAJOR DEPRESSIVE DISORDER: Primary | ICD-10-CM

## 2022-11-28 PROCEDURE — 90792 PSYCH DIAG EVAL W/MED SRVCS: CPT

## 2022-11-28 RX ORDER — MIRTAZAPINE 15 MG/1
15 TABLET, FILM COATED ORAL NIGHTLY
Qty: 30 TABLET | Refills: 0 | Status: SHIPPED | OUTPATIENT
Start: 2022-11-28 | End: 2023-01-06 | Stop reason: SDUPTHER

## 2022-11-28 RX ORDER — HYDROXYZINE HYDROCHLORIDE 25 MG/1
25 TABLET, FILM COATED ORAL 3 TIMES DAILY PRN
Qty: 90 TABLET | Refills: 0 | Status: SHIPPED | OUTPATIENT
Start: 2022-11-28 | End: 2023-01-06 | Stop reason: SDUPTHER

## 2022-11-28 NOTE — PROGRESS NOTES
This provider is located at [place], [place]. The Patient is seen remotely using Video. Patient is being seen via telehealth and confirm that they are in a secure environment for this session. Patient is located in [place], [place] at his home. The patient's condition being diagnosed/treated is appropriate for telemedicine. Provider identified as Reed Hathaway as well as credentials APRN MSN PMHNP-BC.   The client/patient gave consent to be seen remotely, and when consent is given they understand that the consent allows for patient identifiable information to be sent to a third party as needed.  They may refuse to be seen remotely at any time. The electronic data is encrypted and password protected, and the patient has been advised of the potential risks to privacy not withstanding such measures.    Subjective     Brigida Rodriguez is a 42 y.o. male who presents today for initial evaluation     Chief Complaint: Depression, anxiety, and insomnia    History of Present Illness: This is the first encounter for this APRN with the patient.  Patient is a referral for evaluation and management of depression and anxiety.  Patient reports that several years ago he had a blood clot that caused him to have to go onto disability.  He had previously worked as an .  States that since he first retch out for counseling with Desiree Gomez and eventually spent a week in the [facility] due to his depression.  He has been maintained on Cymbalta 30 mg daily and Wellbutrin  mg twice daily.  He states that he has noticed worsening symptoms over the past several months.  He currently rates his depression of 5-6 on a 1-10 scale with 10 being the worst.  He states that he has a lack of motivation and fatigue.  He states that he has crying spells.  Has feelings of hopelessness.  He denies any suicidal or homicidal ideation.  States that his sleep is poor even with the help of melatonin.  States his appetite is good.  He states  anxiety is even worse of a problem for him.  Rates anxiety a 10 on a 1-10 scale with 10 being the worst.  He states he is a worrier and over thinker.  States he is often irritable and angry and easily annoyed.  States he worries about things to excess.  States he will get up many times during the night to make sure his windows and doors are locked and fear of someone breaking in.  Denies panic attacks.  He denies any history of any manic type symptoms.  Denies any paranoia.  Denies any auditory or visual hallucinations.    The following portions of the patient's history were reviewed and updated as appropriate: allergies, current medications, past family history, past medical history, past social history, past surgical history and problem list.    Past Psychiatric History: Spent 1 week in the [facility] about 3 years ago.  He has been on Zoloft in the past.  States he is also been on hydroxyzine in the past and it was helpful for his breakthrough anxiety.  Currently takes Cymbalta and Wellbutrin.  Denies any history of suicide attempts.    Family Psychiatric History: States his father was an alcoholic.  No suicides among first-degree relatives.    Substance Use History: He smokes marijuana daily.  States he drinks alcohol occasionally.  States that he only smokes tobacco when he drinks alcohol.  Denies any other drug use.    Past Medical History:  Past Medical History:   Diagnosis Date   • Anxiety    • Depression    • GERD (gastroesophageal reflux disease)    • H/O blood clots    • Heart failure (HCC)    • Hypertension    • Presence of IVC filter    • Pulmonary embolism (HCC)     10 YEARS AGO       Social History: Patient was born and raised in [place], [place].  He was raised by his mother and father.  They  when he was age 4.  He has 2 brothers and 2 sisters.  He is currently working on his bachelor's degree in business management.  He has been on disability for the past 3 years due to blood clots after  working as an  prior to that.  He has been  for the past 4 years.  He has had 3 total marriages.  He has 6 children ages 20, 17, 15, 13, 12, and 3.  He lives with his wife and 3-year-old.  Denies any legal issues.  Hobbies include video games and watching sports.  Social History     Socioeconomic History   • Marital status:    Tobacco Use   • Smoking status: Every Day     Packs/day: 0.50     Years: 20.00     Pack years: 10.00     Types: Cigarettes   • Smokeless tobacco: Never   Substance and Sexual Activity   • Alcohol use: Yes     Alcohol/week: 6.0 standard drinks     Types: 6 Cans of beer per week   • Drug use: Yes     Types: Marijuana     Comment: everyday smoker   • Sexual activity: Yes     Partners: Female     Birth control/protection: Condom       Family History:  Family History   Problem Relation Age of Onset   • Diabetes Mother    • Obesity Maternal Grandmother    • Diabetes Maternal Grandmother    • Cancer Father         prostrate   • No Known Problems Sister    • No Known Problems Brother    • No Known Problems Maternal Grandfather    • No Known Problems Paternal Grandmother    • No Known Problems Paternal Grandfather        Past Surgical History:  Past Surgical History:   Procedure Laterality Date   • ADENOIDECTOMY     • EXPLORATORY LAPAROTOMY N/A 1/30/2020    Procedure: EXPLORATORY LAPAROTOMY,  SIGMOID COLECTOMY, COLOSTOMY, EGD;  Surgeon: Taye Valenzuela MD;  Location[facility];  Service: General   • KNEE SURGERY     • TONSILLECTOMY         Problem List:  Patient Active Problem List   Diagnosis   • Chronic congestive heart failure. 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 Take no more than 5 doses during flare up. 30 tablet 1   • buPROPion SR (WELLBUTRIN SR) 150 MG 12 hr tablet Take 1 tablet by mouth 2 (Two) Times a Day. 180 tablet 1   • carvedilol (COREG) 6.25 MG tablet Take 1 tablet by mouth 2 (Two) Times a Day With Meals. 180 tablet 1   • DULoxetine (CYMBALTA) 30 MG capsule Take 1 capsule by mouth Daily. 90 capsule 1   • hydrOXYzine (ATARAX) 25 MG tablet Take 1 tablet by mouth 3 (Three) Times a Day As Needed for Anxiety. 90 tablet 0   • lisinopril (PRINIVIL,ZESTRIL) 10 MG tablet Take 1 tablet by mouth Daily. 90 tablet 1   • mirtazapine (Remeron) 15 MG tablet Take 1 tablet by mouth Every Night. 30 tablet 0   • Multiple Vitamins-Minerals (HM MULTIVITAMIN ADULT GUMMY PO) Take  by mouth.     • pantoprazole (PROTONIX) 40 MG EC tablet Take 1 tablet by mouth Every Morning Before Breakfast. 90 tablet 1   • warfarin (COUMADIN) 5 MG tablet TAKE 1.5-2 TABLETS BY MOUTH DAILY OR AS DIRECTED BY ANTICOAGULATION CLINIC 35 tablet 0     No current facility-administered medications for this visit.       Review of Symptoms:    Review of Systems   Constitutional: Negative.    HENT: Negative.    Eyes: Negative.    Respiratory: Negative.    Cardiovascular:        Hypertension, history of blood clots   Gastrointestinal:        History of diverticulitis with colon resection.   Endocrine: Negative.    Genitourinary: Negative.    Musculoskeletal: Negative.    Skin: Negative.    Allergic/Immunologic: Negative.    Neurological: Negative.    Hematological: Negative.    Psychiatric/Behavioral: Positive for sleep disturbance and depressed mood.  The patient is nervous/anxious.          Physical Exam:   There were no vitals taken for this visit.    Appearance: Normal  Gait, Station, Strength: Within normal limits    Mental Status Exam:   Hygiene:   good  Cooperation:  Cooperative  Eye Contact:  Good  Psychomotor Behavior:  Appropriate  Affect:  Full range  Mood: depressed and anxious  Hopelessness: 2  Speech:  Normal  Thought Process:  Goal directed  Thought Content:  Normal  Suicidal:  None  Homicidal:  None  Hallucinations:  None  Delusion:  None  Memory:  Intact  Orientation:  Person, Place, Time and Situation  Reliability:  good  Insight:  Good  Judgement:  Good  Impulse Control:  Good    PHQ-9 Total Score:   16    WILLIAM 7 anxiety screening tool that patient filled out virtually reviewed by this APRN at today's encounter.    PROMIS scale screening tool that patient filled out virtually reviewed by this APRN at today's encounter.    [facility] request number [id number] reviewed by this APRN at today's encounter.    Previous Provider notes and available records reviewed by this APRN today.     Lab Results:   Anticoagulation Visit on 11/11/2022   Component Date Value Ref Range Status   • Protime 11/11/2022 24.2 (H)  10.0 - 13.8 seconds Final    Meter: [id number] : [id number] Best Tanner   • INR 11/11/2022 2.0 (H)  0.91 - 1.09 Final   Anticoagulation Visit on 09/22/2022   Component Date Value Ref Range Status   • Protime 09/22/2022 40.7 (H)  10.0 - 13.8 seconds Final    Meter: [id number] : [id number] Ken Thurman   • INR 09/22/2022 3.4 (H)  0.91 - 1.09 Final   Anticoagulation Visit on 08/11/2022   Component Date Value Ref Range Status   • Protime 08/11/2022 19.1 (H)  10.0 - 13.8 seconds Final    Meter: [id number] : [id number] Ken Thurman   • INR 08/11/2022 1.6 (H)  0.91 - 1.09 Final       Assessment & Plan   Problems Addressed this Visit        Mental Health    Generalized anxiety disorder    Relevant Medications    hydrOXYzine (ATARAX) 25 MG tablet     mirtazapine (Remeron) 15 MG tablet    Moderate episode of recurrent major depressive disorder (HCC) - Primary    Relevant Medications    hydrOXYzine (ATARAX) 25 MG tablet    mirtazapine (Remeron) 15 MG tablet    Insomnia due to mental condition    Relevant Medications    hydrOXYzine (ATARAX) 25 MG tablet    mirtazapine (Remeron) 15 MG tablet   Diagnoses       Codes Comments    Moderate episode of recurrent major depressive disorder (HCC)    -  Primary ICD-10-CM: F33.1  ICD-9-CM: 296.32     Generalized anxiety disorder     ICD-10-CM: F41.1  ICD-9-CM: 300.02     Insomnia due to mental condition     ICD-10-CM: F51.05  ICD-9-CM: 300.9, 327.02           Visit Diagnoses:    ICD-10-CM ICD-9-CM   1. Moderate episode of recurrent major depressive disorder (HCC)  F33.1 296.32   2. Generalized anxiety disorder  F41.1 300.02   3. Insomnia due to mental condition  F51.05 300.9     327.02     Discussed treatment options with patient.  Discussed with patient the best course of action would be to increase his Cymbalta since it is at a very low dose.  Patient agreeable.  We will increase Cymbalta to 30 mg twice daily for depression and anxiety.  Patient has a supply of medication to be able to do this for 4 weeks.  He will also continue his Wellbutrin  mg twice daily for depression.  Discussed restarting patient's hydroxyzine since it was helpful in the past and patient would like to do so.  Start hydroxyzine 25 mg 3 times daily as needed for anxiety.  Discussed patient's sleep also, and patient states he would like some help in that area.  Discussed Remeron with patient.  Patient agreeable to start the medication.  Start Remeron 15 mg nightly for sleep.  We will see patient again in 4 weeks to reassess.  Encouraged patient to call if he had any issues sooner.    TREATMENT PLAN/GOALS: Continue supportive psychotherapy efforts and medications as indicated. Treatment and medication options discussed during today's visit.  Patient acknowledged and verbally consented to continue with current treatment plan and was educated on the importance of compliance with treatment and follow-up appointments.    Short Term Goals: Patient will be compliant with medication, and patient will have no significant medication related side effects.  Patient will be engaged in psychotherapy as indicated.  Patient will report subjective improvement of symptoms.    Long term goals: To stabilize mood and treat/improve subjective symptoms, the patient will stay out of the hospital, the patient will be at an optimal level of functioning, and the patient will take all medications as prescribed.  The patient verbalized understanding and agreement with goals that were mutually set.    MEDICATION ISSUES:    Discussed medication options and treatment plan of prescribed medication as well as the risks, benefits, and side effects including potential falls, possible impaired driving and metabolic adversities among others. Patient is agreeable to call the office with any worsening of symptoms or onset of side effects. Patient is agreeable to call 911 or go to the nearest ER should he/she begin having SI/HI. If patient has any concerns or needs assistance they were instructed to call the [facility] at [phone].    MEDS ORDERED DURING VISIT:  New Medications Ordered This Visit   Medications   • hydrOXYzine (ATARAX) 25 MG tablet     Sig: Take 1 tablet by mouth 3 (Three) Times a Day As Needed for Anxiety.     Dispense:  90 tablet     Refill:  0   • mirtazapine (Remeron) 15 MG tablet     Sig: Take 1 tablet by mouth Every Night.     Dispense:  30 tablet     Refill:  0       Return in about 4 weeks (around 12/26/2022) for Video visit.             This document has been electronically signed by SILVIA Jain  November 28, 2022 17:44 EST    Part of this note may be an electronic transmission of spoken language to printed text using the  [facility] Dictation System.

## 2022-12-02 DIAGNOSIS — K57.80 PERFORATED DIVERTICULUM: ICD-10-CM

## 2022-12-02 RX ORDER — PANTOPRAZOLE SODIUM 40 MG/1
40 TABLET, DELAYED RELEASE ORAL
Qty: 90 TABLET | Refills: 1 | Status: SHIPPED | OUTPATIENT
Start: 2022-12-02 | End: 2022-12-07 | Stop reason: SDUPTHER

## 2022-12-02 NOTE — TELEPHONE ENCOUNTER
Rx Refill Note  Requested Prescriptions     Pending Prescriptions Disp Refills   • pantoprazole (PROTONIX) 40 MG EC tablet [Pharmacy Med Name: PANTOPRAZOLE SOD DR 40MG TAB, UU] 90 tablet 1     Sig: TAKE 1 TABLET BY MOUTH EVERY MORNING BEFORE BREAKFAST.      Last office visit with prescribing clinician: 9/22/2022   Next office visit with prescribing clinician: Visit date not found       Shanell Maravilla MA  12/02/22, 10:11 EST

## 2022-12-07 ENCOUNTER — TELEPHONE (OUTPATIENT)
Dept: PHARMACY | Facility: HOSPITAL | Age: 42
End: 2022-12-07

## 2022-12-07 DIAGNOSIS — B00.9 HERPES: ICD-10-CM

## 2022-12-07 DIAGNOSIS — Z86.711 HISTORY OF PULMONARY EMBOLISM: ICD-10-CM

## 2022-12-07 DIAGNOSIS — K57.80 PERFORATED DIVERTICULUM: ICD-10-CM

## 2022-12-07 DIAGNOSIS — I10 ESSENTIAL HYPERTENSION: ICD-10-CM

## 2022-12-07 RX ORDER — WARFARIN SODIUM 5 MG/1
TABLET ORAL
Qty: 35 TABLET | Refills: 0 | Status: SHIPPED | OUTPATIENT
Start: 2022-12-07 | End: 2023-01-09 | Stop reason: SDUPTHER

## 2022-12-07 RX ORDER — ACYCLOVIR 400 MG/1
400 TABLET ORAL DAILY PRN
Qty: 30 TABLET | Refills: 1 | Status: SHIPPED | OUTPATIENT
Start: 2022-12-07 | End: 2023-01-06 | Stop reason: SDUPTHER

## 2022-12-07 RX ORDER — PANTOPRAZOLE SODIUM 40 MG/1
40 TABLET, DELAYED RELEASE ORAL
Qty: 90 TABLET | Refills: 1 | Status: SHIPPED | OUTPATIENT
Start: 2022-12-07 | End: 2023-01-06 | Stop reason: SDUPTHER

## 2022-12-07 RX ORDER — LISINOPRIL 10 MG/1
10 TABLET ORAL DAILY
Qty: 90 TABLET | Refills: 1 | Status: SHIPPED | OUTPATIENT
Start: 2022-12-07 | End: 2023-01-06 | Stop reason: SDUPTHER

## 2022-12-07 RX ORDER — CARVEDILOL 6.25 MG/1
6.25 TABLET ORAL 2 TIMES DAILY WITH MEALS
Qty: 180 TABLET | Refills: 1 | Status: SHIPPED | OUTPATIENT
Start: 2022-12-07 | End: 2023-01-06 | Stop reason: SDUPTHER

## 2023-01-06 DIAGNOSIS — Z86.711 HISTORY OF PULMONARY EMBOLISM: ICD-10-CM

## 2023-01-06 DIAGNOSIS — F33.9 RECURRENT MAJOR DEPRESSIVE DISORDER, REMISSION STATUS UNSPECIFIED: ICD-10-CM

## 2023-01-06 DIAGNOSIS — F41.1 GENERALIZED ANXIETY DISORDER: ICD-10-CM

## 2023-01-06 DIAGNOSIS — I10 ESSENTIAL HYPERTENSION: ICD-10-CM

## 2023-01-06 DIAGNOSIS — B00.9 HERPES: ICD-10-CM

## 2023-01-06 DIAGNOSIS — F51.05 INSOMNIA DUE TO MENTAL CONDITION: ICD-10-CM

## 2023-01-06 DIAGNOSIS — K57.80 PERFORATED DIVERTICULUM: ICD-10-CM

## 2023-01-06 RX ORDER — WARFARIN SODIUM 5 MG/1
TABLET ORAL
Qty: 35 TABLET | Refills: 0 | Status: CANCELLED | OUTPATIENT
Start: 2023-01-06

## 2023-01-07 ENCOUNTER — TELEPHONE (OUTPATIENT)
Dept: FAMILY MEDICINE CLINIC | Facility: CLINIC | Age: 43
End: 2023-01-07
Payer: COMMERCIAL

## 2023-01-07 DIAGNOSIS — Z86.711 HISTORY OF PULMONARY EMBOLISM: ICD-10-CM

## 2023-01-09 DIAGNOSIS — Z86.711 HISTORY OF PULMONARY EMBOLISM: ICD-10-CM

## 2023-01-09 RX ORDER — HYDROXYZINE HYDROCHLORIDE 25 MG/1
25 TABLET, FILM COATED ORAL 3 TIMES DAILY PRN
Qty: 90 TABLET | Refills: 0 | Status: SHIPPED | OUTPATIENT
Start: 2023-01-09

## 2023-01-09 RX ORDER — MIRTAZAPINE 15 MG/1
15 TABLET, FILM COATED ORAL NIGHTLY
Qty: 30 TABLET | Refills: 0 | Status: SHIPPED | OUTPATIENT
Start: 2023-01-09

## 2023-01-09 RX ORDER — WARFARIN SODIUM 5 MG/1
TABLET ORAL
Qty: 14 TABLET | Refills: 0 | Status: SHIPPED | OUTPATIENT
Start: 2023-01-09 | End: 2023-01-16 | Stop reason: SDUPTHER

## 2023-01-09 NOTE — TELEPHONE ENCOUNTER
Attempted to contact, no answer. Left detailed voicemail relaying message to follow up at [facility] clinic during his appt tomrrow for further refills    Has not had his PT/INR checked recently. This needs to be managed by co[facility] clinic.     Hub can relay and document

## 2023-01-10 ENCOUNTER — APPOINTMENT (OUTPATIENT)
Dept: PHARMACY | Facility: HOSPITAL | Age: 43
End: 2023-01-10
Payer: COMMERCIAL

## 2023-01-10 RX ORDER — CARVEDILOL 6.25 MG/1
6.25 TABLET ORAL 2 TIMES DAILY WITH MEALS
Qty: 180 TABLET | Refills: 1 | Status: SHIPPED | OUTPATIENT
Start: 2023-01-10 | End: 2023-03-31 | Stop reason: SDUPTHER

## 2023-01-10 RX ORDER — DULOXETIN HYDROCHLORIDE 30 MG/1
30 CAPSULE, DELAYED RELEASE ORAL DAILY
Qty: 90 CAPSULE | Refills: 1 | Status: SHIPPED | OUTPATIENT
Start: 2023-01-10 | End: 2023-03-31 | Stop reason: SDUPTHER

## 2023-01-10 RX ORDER — LISINOPRIL 10 MG/1
10 TABLET ORAL DAILY
Qty: 90 TABLET | Refills: 1 | Status: SHIPPED | OUTPATIENT
Start: 2023-01-10 | End: 2023-03-31 | Stop reason: SDUPTHER

## 2023-01-10 RX ORDER — ACYCLOVIR 400 MG/1
400 TABLET ORAL DAILY PRN
Qty: 30 TABLET | Refills: 1 | Status: SHIPPED | OUTPATIENT
Start: 2023-01-10 | End: 2023-03-31 | Stop reason: SDUPTHER

## 2023-01-10 RX ORDER — PANTOPRAZOLE SODIUM 40 MG/1
40 TABLET, DELAYED RELEASE ORAL
Qty: 90 TABLET | Refills: 1 | Status: SHIPPED | OUTPATIENT
Start: 2023-01-10 | End: 2023-03-31 | Stop reason: SDUPTHER

## 2023-01-10 RX ORDER — BUPROPION HYDROCHLORIDE 150 MG/1
150 TABLET, EXTENDED RELEASE ORAL 2 TIMES DAILY
Qty: 180 TABLET | Refills: 1 | Status: SHIPPED | OUTPATIENT
Start: 2023-01-10 | End: 2023-03-31 | Stop reason: SDUPTHER

## 2023-01-16 ENCOUNTER — ANTICOAGULATION VISIT (OUTPATIENT)
Dept: PHARMACY | Facility: HOSPITAL | Age: 43
End: 2023-01-16
Payer: COMMERCIAL

## 2023-01-16 DIAGNOSIS — Z86.711 HISTORY OF PULMONARY EMBOLISM: Primary | ICD-10-CM

## 2023-01-16 LAB
INR PPP: 1.4 (ref 0.91–1.09)
PROTHROMBIN TIME: 16.5 SECONDS (ref 10–13.8)

## 2023-01-16 PROCEDURE — 36416 COLLJ CAPILLARY BLOOD SPEC: CPT

## 2023-01-16 PROCEDURE — 85610 PROTHROMBIN TIME: CPT

## 2023-01-16 PROCEDURE — G0463 HOSPITAL OUTPT CLINIC VISIT: HCPCS

## 2023-01-16 RX ORDER — WARFARIN SODIUM 5 MG/1
TABLET ORAL
Qty: 25 TABLET | Refills: 0 | Status: SHIPPED | OUTPATIENT
Start: 2023-01-16 | End: 2023-03-10 | Stop reason: SDUPTHER

## 2023-01-16 NOTE — PROGRESS NOTES
Anticoagulation Clinic Progress Note     Indication: Hx of PE and LE DVT (2010, 2014)  Referring Provider: Vaishali  Initial Warfarin Start Date: 2010  Planned Duration of Therapy: lifelong  Goal INR: 2.0-3.0 (verified Dr Vaishali 9/19/19)  Will likely need lovenox perioperatively (Vaishali 7/29/20)  Current Drug Interactions: Cymbalta and Pantoprazole  Other: d/c Eliquis 9/6/19 due to itching     Diet: green beans,broccoli (1/week), salads daily (iceburg lettuce, carrots, and red cabbage mixture) 5/21/2021  Alcohol: None  Tobacco: Smokes ~5 cigarettes a week  OTC Pain Medication: Recommended Tylenol prn     Anticoagulation Clinic INR History:  Date 9/19 10/16 11/7 11/19 12/3 12/9 1/3/2020 2/3-2/10 2/13 2/17 2/24 3/2 3/16   Total Weekly Dose 80mg 80 mg 77.5 mg 77.5mg 57.5 mg 80mg 72.5mg hosp: admission 65mg 67.5 mg 65 mg 65 mg 65mg   INR 2.6 3.4 3.1 2.7 1.5 2.1 2.6   3.0 3 2.7 2.4 1.9   Notes         S/p c'scope, librado greens 1 boost Decr cig use Diverticulitis; end colostomy stopped smoking; recent admission        Green beans (HH)      Date  3/24 4/1 4/10 5/6 5/20 6/8 6/19 7/31 8/31 10/14 12/7 1/5/2021 1/8 1/13 1/20 1/27 2/2   Total Weekly Dose 67.5mg 72.5 mg 70mg 70mg 70mg 55mg? 65mg 65mg 67.5mg 67.5 67.5 mg 27.5mg colonoscopy 45mg 70mg 70mg  75mg   INR 1.8 2.23 2.8 3.4 3.1 2.0 2.5 2.0 2.3 2.5 2.1 1.1   1.2 1.8 1.7 2.5   Notes   Extra dose   Less GLV Less GLV Missed x1   incr GLV       Hold x4 Hold x 7 days missed x1               Warfarin Dosing During Admission 1/28:  Date  2/3 2/4 2/5 2/6 2/7 2/8 2/9 2/10   INR  1.38 1.55 1.88 2.22 2.1 2.2 2.4 2.46   Dose  10mg 10mg 7.5mg 7.5mg  10mg 10mg 10mg 10mg      Date 3/17 4/1-4/15 4/16 4/19 4/23 5/7 5/21 6/1    10/4    12/22  1/6     Total Weekly Dose 60mg? [facility] admission 42.5mg?? 47.5mg? 45mg 45 mg 45mg 52.5mg  noncompliance  57.5mg  non compliance  50mg  47.5mg     INR 1.8   2.2 2.0 2.2 2.95 venipuncture 1.4 1.5    2.1    1.7  1.5     Notes Miss x 1; self adjust dose reverse  "cholestomy; hemorrhage; vit k       POC 4.5 x2   Inc GLV; lovenox        1x miss?  inc tobacco, 1x miss inc GLV         Date 1/6/22-3/21/22 3/21 3/28 Non-compliant 5/19 8/11 9/22 11/11 1/16/23   Total Weekly Dose Noncompliance  57.5 mg 60mg  65 mg Non compliant 45mg? Non compliant 65mg? 52.5mg? ??   INR  1.7 1.3  2.0  1.6  3.4 2.0 1.4   Notes       2x miss   2x miss \"some doses missed\"       Phone Interview:  Tablet Strength: 5mg tablet  Verbal Release Authorization signed on 9/19/19-- may speak with Jammie Rodriguez (mother) John Rodriguez (father)  Patient contact info: [phone] (Mobile); [phone] (Cell) 5/7/21    Patient Findings    Positives:  Missed doses   Negatives:  Signs/symptoms of thrombosis, Signs/symptoms of bleeding, Laboratory test error suspected, Change in health, Change in alcohol use, Change in activity, Upcoming invasive procedure, Emergency department visit, Upcoming dental procedure, Extra doses, Change in medications, Change in diet/appetite, Hospital admission, Bruising, Other complaints   Comments:  Patient states he definitely missed \"some doses\" but unable to verify on which days. All other findings negative.          Plan:  1. INR is SUBtherapeutic at 1.4 following extensive noncompliance with clinic. Will take boosted dose of 15mg tonight and continue warfarin 10mg daily except 7.5mg SunTUESThurs. Counseled on using pill box to remember doses and to know which days were missed in the future. Patient agreeable.   2. Repeat INR in 1 week, RTC 1/23.  3. Verbal and written information provided in the clinic.  Brigida Nia Rodriguez RBV dosing instructions, expresses understanding by teach back, and has no further questions at this time.  4. Requests refills; sent 25 tablets to [facility] for 2 week supply.     Aaron King, [facility]  01/16/23   10:20 Tara ROBISON, PharmD, have reviewed the note in full and agree with the assessment and plan.  01/16/23  15:21 " EST

## 2023-01-23 ENCOUNTER — APPOINTMENT (OUTPATIENT)
Dept: PHARMACY | Facility: HOSPITAL | Age: 43
End: 2023-01-23
Payer: COMMERCIAL

## 2023-01-30 ENCOUNTER — APPOINTMENT (OUTPATIENT)
Dept: PHARMACY | Facility: HOSPITAL | Age: 43
End: 2023-01-30
Payer: COMMERCIAL

## 2023-03-10 ENCOUNTER — ANTICOAGULATION VISIT (OUTPATIENT)
Dept: PHARMACY | Facility: HOSPITAL | Age: 43
End: 2023-03-10
Payer: COMMERCIAL

## 2023-03-10 DIAGNOSIS — Z86.711 HISTORY OF PULMONARY EMBOLISM: Primary | ICD-10-CM

## 2023-03-10 LAB
INR PPP: 2 (ref 0.91–1.09)
PROTHROMBIN TIME: 23.9 SECONDS (ref 10–13.8)

## 2023-03-10 PROCEDURE — 36416 COLLJ CAPILLARY BLOOD SPEC: CPT

## 2023-03-10 PROCEDURE — 85610 PROTHROMBIN TIME: CPT

## 2023-03-10 RX ORDER — WARFARIN SODIUM 5 MG/1
TABLET ORAL
Qty: 50 TABLET | Refills: 0 | Status: SHIPPED | OUTPATIENT
Start: 2023-03-10

## 2023-03-10 NOTE — PROGRESS NOTES
Anticoagulation Clinic Progress Note     Indication: Hx of PE and LE DVT (2010, 2014)  Referring Provider: Vaishali  Initial Warfarin Start Date: 2010  Planned Duration of Therapy: lifelong  Goal INR: 2.0-3.0 (verified Dr Vaishali 9/19/19)  Will likely need lovenox perioperatively (Vaishali 7/29/20)  Current Drug Interactions: Cymbalta and Pantoprazole  Other: d/c Eliquis 9/6/19 due to itching     Diet: green beans,broccoli (1/week), salads daily (iceburg lettuce, carrots, and red cabbage mixture) 5/21/2021  Alcohol: None  Tobacco: Smokes ~5 cigarettes a week  OTC Pain Medication: Recommended Tylenol prn     Anticoagulation Clinic INR History:  Date 9/19 10/16 11/7 11/19 12/3 12/9 1/3/2020 2/3-2/10 2/13 2/17 2/24 3/2 3/16   Total Weekly Dose 80mg 80 mg 77.5 mg 77.5mg 57.5 mg 80mg 72.5mg hosp: admission 65mg 67.5 mg 65 mg 65 mg 65mg   INR 2.6 3.4 3.1 2.7 1.5 2.1 2.6   3.0 3 2.7 2.4 1.9   Notes         S/p c'scope, librado greens 1 boost Decr cig use Diverticulitis; end colostomy stopped smoking; recent admission        Green beans (HH)      Date  3/24 4/1 4/10 5/6 5/20 6/8 6/19 7/31 8/31 10/14 12/7 1/5/2021 1/8 1/13 1/20 1/27 2/2   Total Weekly Dose 67.5mg 72.5 mg 70mg 70mg 70mg 55mg? 65mg 65mg 67.5mg 67.5 67.5 mg 27.5mg colonoscopy 45mg 70mg 70mg  75mg   INR 1.8 2.23 2.8 3.4 3.1 2.0 2.5 2.0 2.3 2.5 2.1 1.1   1.2 1.8 1.7 2.5   Notes   Extra dose   Less GLV Less GLV Missed x1   incr GLV       Hold x4 Hold x 7 days missed x1               Warfarin Dosing During Admission 1/28:  Date  2/3 2/4 2/5 2/6 2/7 2/8 2/9 2/10   INR  1.38 1.55 1.88 2.22 2.1 2.2 2.4 2.46   Dose  10mg 10mg 7.5mg 7.5mg  10mg 10mg 10mg 10mg      Date 3/17 4/1-4/15 4/16 4/19 4/23 5/7 5/21 6/1    10/4    12/22  1/6     Total Weekly Dose 60mg? [facility] admission 42.5mg?? 47.5mg? 45mg 45 mg 45mg 52.5mg  noncompliance  57.5mg  non compliance  50mg  47.5mg     INR 1.8   2.2 2.0 2.2 2.95 venipuncture 1.4 1.5    2.1    1.7  1.5     Notes Miss x 1; self adjust dose reverse  "cholestomy; hemorrhage; vit k       POC 4.5 x2   Inc GLV; lovenox        1x miss?  inc tobacco, 1x miss inc GLV         Date 1/6/22-3/21/22 3/21 3/28 Non-compliant 5/19 8/11 9/22 11/11 1/16/23   Total Weekly Dose Noncompliance  57.5 mg 60mg  65 mg Non compliant 45mg? Non compliant 65mg? 52.5mg? ??   INR  1.7 1.3  2.0  1.6  3.4 2.0 1.4   Notes       2x miss   2x miss \"some doses missed\"     Date 3/10         Total Weekly Dose 55mg 62.5 mg        INR 2.0         Notes Miss x1           Phone Interview:  Tablet Strength: 5mg tablet  Verbal Release Authorization signed on 9/19/19-- may speak with Jammie Rodriguez (mother) John Rodriguez (father)  Patient contact info: [phone] (Mobile); [phone] (Cell) 5/7/21    Patient Findings      Positives:  Missed doses   Negatives:  Signs/symptoms of thrombosis, Signs/symptoms of bleeding, Laboratory test error suspected, Change in health, Change in alcohol use, Change in activity, Upcoming invasive procedure, Emergency department visit, Upcoming dental procedure, Extra doses, Change in medications, Change in diet/appetite, Hospital admission, Bruising, Other complaints   Comments:  Missed yesterdays dose         Plan:  1. INR is therapeutic at 2.0 following extensive noncompliance with clinic. Will continue warfarin 10mg daily except 7.5mg SunTUESThurs.   2. Repeat INR in 3 week, RTC 3/31  3. Verbal and written information provided in the clinic.  Brigida Rodriguez RBV dosing instructions, expresses understanding by teach back, and has no further questions at this time.  4. Requests refills; sent 50 tablets to [facility] for 4 week supply until next appt    Susan Hernandez, PharmD, BCPS   3/10/2023  11:14 EST                      "

## 2023-03-31 DIAGNOSIS — I10 ESSENTIAL HYPERTENSION: ICD-10-CM

## 2023-03-31 DIAGNOSIS — K57.80 PERFORATED DIVERTICULUM: ICD-10-CM

## 2023-03-31 DIAGNOSIS — F41.1 GENERALIZED ANXIETY DISORDER: ICD-10-CM

## 2023-03-31 DIAGNOSIS — Z86.711 HISTORY OF PULMONARY EMBOLISM: ICD-10-CM

## 2023-03-31 DIAGNOSIS — F33.9 RECURRENT MAJOR DEPRESSIVE DISORDER, REMISSION STATUS UNSPECIFIED: ICD-10-CM

## 2023-03-31 DIAGNOSIS — B00.9 HERPES: ICD-10-CM

## 2023-03-31 NOTE — TELEPHONE ENCOUNTER
Rx Refill Note  Requested Prescriptions     Pending Prescriptions Disp Refills   • DULoxetine (CYMBALTA) 30 MG capsule 90 capsule 1     Sig: Take 1 capsule by mouth Daily.   • buPROPion SR (WELLBUTRIN SR) 150 MG 12 hr tablet 180 tablet 1     Sig: Take 1 tablet by mouth 2 (Two) Times a Day.   • acyclovir (Zovirax) 400 MG tablet 30 tablet 1     Sig: Take 1 tablet by mouth Daily As Needed (PRN). Take no more than 5 doses during flare up.   • carvedilol (COREG) 6.25 MG tablet 180 tablet 1     Sig: Take 1 tablet by mouth 2 (Two) Times a Day With Meals.   • lisinopril (PRINIVIL,ZESTRIL) 10 MG tablet 90 tablet 1     Sig: Take 1 tablet by mouth Daily.   • pantoprazole (PROTONIX) 40 MG EC tablet 90 tablet 1     Sig: Take 1 tablet by mouth Every Morning Before Breakfast.   • warfarin (COUMADIN) 5 MG tablet 50 tablet 0     Sig: TAKE 1.5-2 TABLETS BY MOUTH DAILY. Must attend appt 1/10/2023 for refills      Last office visit with prescribing clinician: 9/22/2022   Last telemedicine visit with prescribing clinician: Visit date not found   Next office visit with prescribing clinician: Visit date not found                         Would you like a call back once the refill request has been completed: [] Yes [] No    If the office needs to give you a call back, can they leave a voicemail: [] Yes [] No    April JUNAID Mercado  03/31/23, 16:57 EDT

## 2023-04-04 RX ORDER — BUPROPION HYDROCHLORIDE 150 MG/1
150 TABLET, EXTENDED RELEASE ORAL 2 TIMES DAILY
Qty: 60 TABLET | Refills: 0 | Status: SHIPPED | OUTPATIENT
Start: 2023-04-04

## 2023-04-04 RX ORDER — DULOXETIN HYDROCHLORIDE 30 MG/1
30 CAPSULE, DELAYED RELEASE ORAL DAILY
Qty: 30 CAPSULE | Refills: 0 | Status: SHIPPED | OUTPATIENT
Start: 2023-04-04 | End: 2023-04-10

## 2023-04-04 RX ORDER — PANTOPRAZOLE SODIUM 40 MG/1
40 TABLET, DELAYED RELEASE ORAL
Qty: 30 TABLET | Refills: 0 | Status: SHIPPED | OUTPATIENT
Start: 2023-04-04

## 2023-04-04 RX ORDER — WARFARIN SODIUM 5 MG/1
TABLET ORAL
Qty: 50 TABLET | Refills: 0 | OUTPATIENT
Start: 2023-04-04

## 2023-04-04 RX ORDER — ACYCLOVIR 400 MG/1
400 TABLET ORAL DAILY PRN
Qty: 30 TABLET | Refills: 0 | Status: SHIPPED | OUTPATIENT
Start: 2023-04-04

## 2023-04-04 RX ORDER — CARVEDILOL 6.25 MG/1
6.25 TABLET ORAL 2 TIMES DAILY WITH MEALS
Qty: 180 TABLET | Refills: 1 | Status: SHIPPED | OUTPATIENT
Start: 2023-04-04

## 2023-04-04 RX ORDER — LISINOPRIL 10 MG/1
10 TABLET ORAL DAILY
Qty: 30 TABLET | Refills: 0 | Status: SHIPPED | OUTPATIENT
Start: 2023-04-04

## 2023-04-10 ENCOUNTER — OFFICE VISIT (OUTPATIENT)
Dept: FAMILY MEDICINE CLINIC | Facility: CLINIC | Age: 43
End: 2023-04-10
Payer: COMMERCIAL

## 2023-04-10 VITALS
SYSTOLIC BLOOD PRESSURE: 136 MMHG | WEIGHT: 315 LBS | HEIGHT: 76 IN | OXYGEN SATURATION: 98 % | BODY MASS INDEX: 38.36 KG/M2 | HEART RATE: 89 BPM | DIASTOLIC BLOOD PRESSURE: 84 MMHG

## 2023-04-10 DIAGNOSIS — G89.29 CHRONIC PAIN OF RIGHT KNEE: ICD-10-CM

## 2023-04-10 DIAGNOSIS — M25.561 CHRONIC PAIN OF RIGHT KNEE: ICD-10-CM

## 2023-04-10 DIAGNOSIS — Z00.00 PREVENTATIVE HEALTH CARE: Primary | ICD-10-CM

## 2023-04-10 DIAGNOSIS — F41.1 GENERALIZED ANXIETY DISORDER: ICD-10-CM

## 2023-04-10 RX ORDER — DULOXETIN HYDROCHLORIDE 60 MG/1
60 CAPSULE, DELAYED RELEASE ORAL DAILY
Qty: 90 CAPSULE | Refills: 3 | Status: SHIPPED | OUTPATIENT
Start: 2023-04-10

## 2023-04-10 NOTE — PROGRESS NOTES
Chief Complaint   Patient presents with       • Anxiety     medications   • Knee Pain     Right knee - x a couple months        HPI:  Brigida Rodriguez is a 43 y.o. male who presents today for follow-up generalized anxiety and knee pain.  Reports his duloxetine was recently increased to 60 mg by behavioral health.  He has ran out of medication over the past few weeks.  Continues to have right knee pain, would like to establish with specialist.    ROS:  Constitutional: no fevers, night sweats or unexplained weight loss  Eyes: no vision changes  ENT: no runny nose, ear pain, sore throat  Cardio: no chest pain, palpitations  Pulm: no shortness of breath, wheezing, or cough  GI: no abdominal pain or changes in bowel movements  : no difficulty urinating  MSK: no difficulty ambulating, no joint pain  Neuro: no weakness, dizziness or headache  Psych: no trouble sleeping  Endo: no change in appetite      Past Medical History:   Diagnosis Date   • Anxiety    • Depression    • GERD (gastroesophageal reflux disease)    • H/O blood clots    • Heart failure    • Hypertension    • Presence of IVC filter    • Pulmonary embolism     10 YEARS AGO      Family History   Problem Relation Age of Onset   • Diabetes Mother    • Obesity Maternal Grandmother    • Diabetes Maternal Grandmother    • Cancer Father         prostrate   • No Known Problems Sister    • No Known Problems Brother    • No Known Problems Maternal Grandfather    • No Known Problems Paternal Grandmother    • No Known Problems Paternal Grandfather       Social History     Socioeconomic History   • Marital status:    Tobacco Use   • Smoking status: Every Day     Packs/day: 0.50     Years: 20.00     Pack years: 10.00     Types: Cigarettes   • Smokeless tobacco: Never   Substance and Sexual Activity   • Alcohol use: Yes     Alcohol/week: 6.0 standard drinks     Types: 6 Cans of beer per week   • Drug use: Yes     Types: Marijuana     Comment: everyday smoker    • Sexual activity: Yes     Partners: Female     Birth control/protection: Condom      No Known Allergies   Immunization History   Administered Date(s) Administered   • COVID-19 (PFIZER) PURPLE CAP 03/06/2021, 03/30/2021   • Flu Vaccine Quad PF >36MO 12/21/2016, 12/18/2017, 12/07/2018, 11/04/2019   • FluLaval/Fluzone >6mos 12/21/2016, 12/18/2017, 12/07/2018, 11/04/2019, 12/23/2020, 11/08/2021   • Hepatitis A 12/07/2018   • Pneumococcal Polysaccharide (PPSV23) 12/21/2016   • Tdap 12/21/2016        PE:  Vitals:    04/10/23 0824   BP: 136/84   Pulse: 89   SpO2: 98%      Body mass index is 50.52 kg/m².    Gen Appearance: NAD  HEENT: Normocephalic, PERRLA, no thyromegaly, trache midline  Heart: RRR, normal S1 and S2, no murmur  Lungs: CTA b/l, no wheezing, no crackles  Abdomen: Soft, non-tender, non-distended, no guarding and BSx4  MSK: Moves all extremities well, normal gait, no peripheral edema  Pulses: Palpable and equal b/l  Lymph nodes: No palpable lymphadenopathy   Neuro: No focal deficits      Current Outpatient Medications   Medication Sig Dispense Refill   • acyclovir (Zovirax) 400 MG tablet Take 1 tablet by mouth Daily As Needed (PRN). Take no more than 5 doses during flare up. 30 tablet 0   • buPROPion SR (WELLBUTRIN SR) 150 MG 12 hr tablet Take 1 tablet by mouth 2 (Two) Times a Day. 60 tablet 0   • carvedilol (COREG) 6.25 MG tablet Take 1 tablet by mouth 2 (Two) Times a Day With Meals. 180 tablet 1   • hydrOXYzine (ATARAX) 25 MG tablet Take 1 tablet by mouth 3 (Three) Times a Day As Needed for Anxiety. 90 tablet 0   • lisinopril (PRINIVIL,ZESTRIL) 10 MG tablet Take 1 tablet by mouth Daily. 30 tablet 0   • mirtazapine (Remeron) 15 MG tablet Take 1 tablet by mouth Every Night. 30 tablet 0   • Multiple Vitamins-Minerals (HM MULTIVITAMIN ADULT GUMMY PO) Take  by mouth.     • pantoprazole (PROTONIX) 40 MG EC tablet Take 1 tablet by mouth Every Morning Before Breakfast. 30 tablet 0   • warfarin (COUMADIN) 5 MG  tablet TAKE 1.5-2 TABLETS BY MOUTH DAILY. Must attend appt 1/10/2023 for refills 50 tablet 0   • DULoxetine (Cymbalta) 60 MG capsule Take 1 capsule by mouth Daily. 90 capsule 3     No current facility-administered medications for this visit.      Counseling was given to patient for the following topics: instructions for management, impressions and risks and benefits of treatment options . Total time of the encounter was 30 minutes and 15 minutes was spent face to face counseling.    Diagnoses and all orders for this visit:    1. Generalized anxiety disorder  Resume Cymbalta 60 mg.  Recommend follow-up with psychiatry as scheduled as well.  2. Chronic pain of right knee  -     Ambulatory Referral to Orthopedic Surgery  Refer to Ortho to establish care.  Other orders  -     DULoxetine (Cymbalta) 60 MG capsule; Take 1 capsule by mouth Daily.  Dispense: 90 capsule; Refill: 3         Return in about 4 weeks (around 5/8/2023) for Annual.     Dictated Utilizing Dragon Dictation    Please note that portions of this note were completed with a voice recognition program.    Part of this note may be an electronic transcription/translation of spoken language to printed text using the Dragon Dictation System.

## 2023-04-14 DIAGNOSIS — B00.9 HERPES: ICD-10-CM

## 2023-04-14 DIAGNOSIS — Z86.711 HISTORY OF PULMONARY EMBOLISM: ICD-10-CM

## 2023-04-14 RX ORDER — WARFARIN SODIUM 5 MG/1
TABLET ORAL
Qty: 50 TABLET | Refills: 0 | Status: SHIPPED | OUTPATIENT
Start: 2023-04-14

## 2023-04-14 RX ORDER — ACYCLOVIR 400 MG/1
400 TABLET ORAL DAILY PRN
Qty: 30 TABLET | Refills: 0 | OUTPATIENT
Start: 2023-04-14

## 2023-05-11 ENCOUNTER — TELEPHONE (OUTPATIENT)
Dept: PHARMACY | Facility: HOSPITAL | Age: 43
End: 2023-05-11

## 2023-05-22 DIAGNOSIS — Z86.711 HISTORY OF PULMONARY EMBOLISM: ICD-10-CM

## 2023-05-22 RX ORDER — WARFARIN SODIUM 5 MG/1
TABLET ORAL
Qty: 10 TABLET | Refills: 0 | Status: SHIPPED | OUTPATIENT
Start: 2023-05-22 | End: 2023-05-26 | Stop reason: SDUPTHER

## 2023-05-26 DIAGNOSIS — Z86.711 HISTORY OF PULMONARY EMBOLISM: ICD-10-CM

## 2023-05-26 RX ORDER — WARFARIN SODIUM 5 MG/1
TABLET ORAL
Qty: 10 TABLET | Refills: 0 | Status: SHIPPED | OUTPATIENT
Start: 2023-05-26

## 2023-06-01 ENCOUNTER — ANTICOAGULATION VISIT (OUTPATIENT)
Dept: PHARMACY | Facility: HOSPITAL | Age: 43
End: 2023-06-01

## 2023-06-01 DIAGNOSIS — Z86.711 HISTORY OF PULMONARY EMBOLISM: Primary | ICD-10-CM

## 2023-06-01 LAB
INR PPP: 2.1 (ref 0.91–1.09)
PROTHROMBIN TIME: 25.1 SECONDS (ref 10–13.8)

## 2023-06-01 PROCEDURE — G0463 HOSPITAL OUTPT CLINIC VISIT: HCPCS

## 2023-06-01 PROCEDURE — 36416 COLLJ CAPILLARY BLOOD SPEC: CPT

## 2023-06-01 PROCEDURE — 85610 PROTHROMBIN TIME: CPT

## 2023-06-01 RX ORDER — WARFARIN SODIUM 5 MG/1
TABLET ORAL
Qty: 55 TABLET | Refills: 0 | Status: SHIPPED | OUTPATIENT
Start: 2023-06-01

## 2023-06-01 NOTE — PROGRESS NOTES
Anticoagulation Clinic Progress Note     Indication: Hx of PE and LE DVT (2010, 2014)  Referring Provider: Vaishali  Initial Warfarin Start Date: 2010  Planned Duration of Therapy: lifelong  Goal INR: 2.0-3.0 (verified Dr Vaishali 9/19/19)  Will likely need lovenox perioperatively (Vaishali 7/29/20)  Current Drug Interactions: Cymbalta and Pantoprazole  Other: d/c Eliquis 9/6/19 due to itching     Diet: green beans,broccoli (1/week), salads daily (iceburg lettuce, carrots, and red cabbage mixture) 5/21/2021  Alcohol: None  Tobacco: Smokes ~5 cigarettes a week  OTC Pain Medication: Recommended Tylenol prn     Anticoagulation Clinic INR History:  Date 9/19 10/16 11/7 11/19 12/3 12/9 1/3/2020 2/3-2/10 2/13 2/17 2/24 3/2 3/16   Total Weekly Dose 80mg 80 mg 77.5 mg 77.5mg 57.5 mg 80mg 72.5mg hosp: admission 65mg 67.5 mg 65 mg 65 mg 65mg   INR 2.6 3.4 3.1 2.7 1.5 2.1 2.6   3.0 3 2.7 2.4 1.9   Notes         S/p c'scope, librado greens 1 boost Decr cig use Diverticulitis; end colostomy stopped smoking; recent admission        Green beans ([facility])      Date  3/24 4/1 4/10 5/6 5/20 6/8 6/19 7/31 8/31 10/14 12/7 1/5/2021 1/8 1/13 1/20 1/27 2/2   Total Weekly Dose 67.5mg 72.5 mg 70mg 70mg 70mg 55mg? 65mg 65mg 67.5mg 67.5 67.5 mg 27.5mg colonoscopy 45mg 70mg 70mg  75mg   INR 1.8 2.23 2.8 3.4 3.1 2.0 2.5 2.0 2.3 2.5 2.1 1.1   1.2 1.8 1.7 2.5   Notes   Extra dose   Less GLV Less GLV Missed x1   incr GLV       Hold x4 Hold x 7 days missed x1               Warfarin Dosing During Admission 1/28:  Date  2/3 2/4 2/5 2/6 2/7 2/8 2/9 2/10   INR  1.38 1.55 1.88 2.22 2.1 2.2 2.4 2.46   Dose  10mg 10mg 7.5mg 7.5mg  10mg 10mg 10mg 10mg      Date 3/17 4/1-4/15 4/16 4/19 4/23 5/7 5/21 6/1    10/4    12/22  1/6     Total Weekly Dose 60mg? [facility] admission 42.5mg?? 47.5mg? 45mg 45 mg 45mg 52.5mg  noncompliance  57.5mg  non compliance  50mg  47.5mg     INR 1.8   2.2 2.0 2.2 2.95 venipuncture 1.4 1.5    2.1    1.7  1.5     Notes Miss x 1; self adjust dose reverse  "cholestomy; hemorrhage; vit k       POC 4.5 x2   Inc GLV; lovenox        1x miss?  inc tobacco, 1x miss inc GLV         Date 1/6/22-3/21/22 3/21 3/28 Non-compliant 5/19 8/11 9/22 11/11 1/16/23   Total Weekly Dose Noncompliance  57.5 mg 60mg  65 mg Non compliant 45mg? Non compliant 65mg? 52.5mg? ??   INR  1.7 1.3  2.0  1.6  3.4 2.0 1.4   Notes       2x miss   2x miss \"some doses missed\"     Date 3/10   6/1      Total Weekly Dose 55mg 62.5 mg Non compliant 62.5 mg      INR 2.0   2.1      Notes Miss x1           Phone Interview:  Tablet Strength: 5mg tablet  Verbal Release Authorization signed on 9/19/19-- may speak with Jammie Rodriguez (mother) John Rodriguez (father)  Patient contact info: [phone] (Mobile); [phone] (Cell) 5/7/21    Patient Findings  Negatives:  Signs/symptoms of thrombosis, Signs/symptoms of bleeding, Laboratory test error suspected, Change in health, Change in alcohol use, Change in activity, Upcoming invasive procedure, Emergency department visit, Upcoming dental procedure, Missed doses, Extra doses, Change in medications, Change in diet/appetite, Hospital admission, Bruising, Other complaints     Plan:  1. INR is therapeutic at 2.1 following extensive noncompliance with clinic. Will continue warfarin 10mg daily except 7.5mg SunTUESThurs.   2. Repeat INR in 4 week, RTC 06/29  3. Verbal and written information provided in the clinic.  Brigida Rodriguez RBV dosing instructions, expresses understanding by teach back, and has no further questions at this time.      Patient Requested to refills.    Delmar Oglesby, Pharmacy Intern   6/1/2023  10:49 EDT                      "

## 2023-08-14 DIAGNOSIS — Z86.711 HISTORY OF PULMONARY EMBOLISM: ICD-10-CM

## 2023-08-14 DIAGNOSIS — I10 ESSENTIAL HYPERTENSION: ICD-10-CM

## 2023-08-14 DIAGNOSIS — F33.9 RECURRENT MAJOR DEPRESSIVE DISORDER, REMISSION STATUS UNSPECIFIED: ICD-10-CM

## 2023-08-14 DIAGNOSIS — F41.1 GENERALIZED ANXIETY DISORDER: ICD-10-CM

## 2023-08-14 DIAGNOSIS — K57.80 PERFORATED DIVERTICULUM: ICD-10-CM

## 2023-08-14 DIAGNOSIS — B00.9 HERPES: ICD-10-CM

## 2023-08-15 RX ORDER — HYDROXYZINE HYDROCHLORIDE 25 MG/1
25 TABLET, FILM COATED ORAL 3 TIMES DAILY PRN
Qty: 90 TABLET | Refills: 0 | OUTPATIENT
Start: 2023-08-15

## 2023-08-15 RX ORDER — BUPROPION HYDROCHLORIDE 150 MG/1
150 TABLET, EXTENDED RELEASE ORAL 2 TIMES DAILY
Qty: 60 TABLET | Refills: 0 | Status: SHIPPED | OUTPATIENT
Start: 2023-08-15

## 2023-08-15 RX ORDER — LISINOPRIL 10 MG/1
10 TABLET ORAL DAILY
Qty: 30 TABLET | Refills: 0 | Status: SHIPPED | OUTPATIENT
Start: 2023-08-15

## 2023-08-15 RX ORDER — ACYCLOVIR 400 MG/1
400 TABLET ORAL DAILY PRN
Qty: 30 TABLET | Refills: 0 | Status: SHIPPED | OUTPATIENT
Start: 2023-08-15

## 2023-08-15 RX ORDER — PANTOPRAZOLE SODIUM 40 MG/1
40 TABLET, DELAYED RELEASE ORAL
Qty: 30 TABLET | Refills: 0 | Status: SHIPPED | OUTPATIENT
Start: 2023-08-15

## 2023-08-15 RX ORDER — WARFARIN SODIUM 5 MG/1
TABLET ORAL
Qty: 55 TABLET | Refills: 0 | Status: SHIPPED | OUTPATIENT
Start: 2023-08-15

## 2023-09-05 NOTE — PROGRESS NOTES
Anticoagulation Clinic Progress Note  Indication: Hx of PE and LE DVT (2010,2014)  Referring Provider: Vaishali  Initial Warfarin Start Date: 2010  Planned Duration of Therapy: lifelong  Goal INR: 2-3 (verified Dr Tom 9/19/19)  Current Drug Interactions:   Other: d/c Eliquis 9/6/19 due to itching    Diet: does like cooked greens, provided Vit K handout- encouraged consistency 9/19/19  Alcohol:   Tobacco: trying to quit smoking, started Chantix 9/16/19  OTC Pain Medication: recommended Tylenol prn    Anticoagulation Clinic INR History:  Date 9/19 10/16         Total Weekly Dose 80mg 80 mg         INR 2.6 3.4         Notes             Clinic Interview:  Tablet Strength: 5mg  Verbal Release Authorization signed on 9/19/19-- may speak with Jammie Rodriguez (mother) John Rodriguez (father)  Patient contact info:  [phone] (Mobile)    Patient Findings  Positives:  Change in diet/appetite, Other complaints   Negatives:  Signs/symptoms of thrombosis, Signs/symptoms of bleeding, Laboratory test error suspected, Change in health, Change in alcohol use, Change in activity, Upcoming invasive procedure, Emergency department visit, Upcoming dental procedure, Missed doses, Extra doses, Change in medications, Hospital admission, Bruising   Comments:  Reviewed again that he would be coming into the clinic more frequently initially in order to achieve a therapeutic INR x 2 consecutive readings and would then be able to follow up about every 4 weeks if INR remains therapeutic.  Discussed importance of timely follow up (appt originally scheduled for 10/3) and mentioned the option of a home monitor in a few months if he were interested.   He denies any unusual bruising or bleeding   He has been eating 1 to 2 salads per week rather than 3 to 4 each week. He stated that he was getting tired of them  He has continued to try to quit smoking but has stopped taking the Chantix about 2 weeks ago.  Encouraged him to keep trying.   Mr. Rodriguez reported  that he is to begin taking Wellbutrin  mg oral BID today.  No DDI noted     Plan:    1. INR is supra therapeutic today at 3.4.  Instructed Mr. Rodriguez to take warfarin 10mg daily except 12.5mg MonWedFri (77.5mg/week, 3.1 % reduction)  2. RTC in 2 weeks on 10/30 to ensure WNL, patient would prefer monthly checks after this time.  3. Pt declines warfarin refills.  4. Verbal and written information provided. Brigida Rodriguez expresses understanding by teach back and has no further questions at this time.       Nishi Hooper, PharmD  10/16/2019  8:16 AM     independent

## 2023-09-06 ENCOUNTER — LAB (OUTPATIENT)
Dept: LAB | Facility: HOSPITAL | Age: 43
End: 2023-09-06
Payer: COMMERCIAL

## 2023-09-06 ENCOUNTER — OFFICE VISIT (OUTPATIENT)
Dept: FAMILY MEDICINE CLINIC | Facility: CLINIC | Age: 43
End: 2023-09-06
Payer: COMMERCIAL

## 2023-09-06 VITALS
HEIGHT: 76 IN | DIASTOLIC BLOOD PRESSURE: 90 MMHG | SYSTOLIC BLOOD PRESSURE: 144 MMHG | TEMPERATURE: 98.5 F | HEART RATE: 90 BPM | BODY MASS INDEX: 38.36 KG/M2 | OXYGEN SATURATION: 98 % | WEIGHT: 315 LBS

## 2023-09-06 DIAGNOSIS — M25.561 CHRONIC PAIN OF RIGHT KNEE: ICD-10-CM

## 2023-09-06 DIAGNOSIS — G89.29 CHRONIC PAIN OF RIGHT KNEE: ICD-10-CM

## 2023-09-06 DIAGNOSIS — Z86.711 HISTORY OF PULMONARY EMBOLISM: Primary | ICD-10-CM

## 2023-09-06 DIAGNOSIS — B49 FUNGAL INFECTION: ICD-10-CM

## 2023-09-06 DIAGNOSIS — Z00.00 PREVENTATIVE HEALTH CARE: Primary | ICD-10-CM

## 2023-09-06 DIAGNOSIS — I10 PRIMARY HYPERTENSION: ICD-10-CM

## 2023-09-06 LAB
25(OH)D3 SERPL-MCNC: 9.6 NG/ML (ref 30–100)
ALBUMIN SERPL-MCNC: 4.8 G/DL (ref 3.5–5.2)
ALBUMIN/GLOB SERPL: 1.5 G/DL
ALP SERPL-CCNC: 65 U/L (ref 39–117)
ALT SERPL W P-5'-P-CCNC: 22 U/L (ref 1–41)
ANION GAP SERPL CALCULATED.3IONS-SCNC: 11 MMOL/L (ref 5–15)
AST SERPL-CCNC: 31 U/L (ref 1–40)
BACTERIA UR QL AUTO: ABNORMAL /HPF
BASOPHILS # BLD AUTO: 0.05 10*3/MM3 (ref 0–0.2)
BASOPHILS NFR BLD AUTO: 0.8 % (ref 0–1.5)
BILIRUB SERPL-MCNC: 0.7 MG/DL (ref 0–1.2)
BILIRUB UR QL STRIP: NEGATIVE
BUN SERPL-MCNC: 17 MG/DL (ref 6–20)
BUN/CREAT SERPL: 14.4 (ref 7–25)
CALCIUM SPEC-SCNC: 10 MG/DL (ref 8.6–10.5)
CHLORIDE SERPL-SCNC: 106 MMOL/L (ref 98–107)
CHOLEST SERPL-MCNC: 152 MG/DL (ref 0–200)
CLARITY UR: ABNORMAL
CO2 SERPL-SCNC: 24 MMOL/L (ref 22–29)
COLOR UR: YELLOW
CREAT SERPL-MCNC: 1.18 MG/DL (ref 0.76–1.27)
DEPRECATED RDW RBC AUTO: 44.2 FL (ref 37–54)
EGFRCR SERPLBLD CKD-EPI 2021: 78.5 ML/MIN/1.73
EOSINOPHIL # BLD AUTO: 0.28 10*3/MM3 (ref 0–0.4)
EOSINOPHIL NFR BLD AUTO: 4.6 % (ref 0.3–6.2)
ERYTHROCYTE [DISTWIDTH] IN BLOOD BY AUTOMATED COUNT: 13.8 % (ref 12.3–15.4)
GLOBULIN UR ELPH-MCNC: 3.3 GM/DL
GLUCOSE SERPL-MCNC: 103 MG/DL (ref 65–99)
GLUCOSE UR STRIP-MCNC: NEGATIVE MG/DL
HBA1C MFR BLD: 5.8 % (ref 4.8–5.6)
HCT VFR BLD AUTO: 40.5 % (ref 37.5–51)
HDLC SERPL-MCNC: 47 MG/DL (ref 40–60)
HGB BLD-MCNC: 13.8 G/DL (ref 13–17.7)
HGB UR QL STRIP.AUTO: ABNORMAL
HYALINE CASTS UR QL AUTO: ABNORMAL /LPF
IMM GRANULOCYTES # BLD AUTO: 0.01 10*3/MM3 (ref 0–0.05)
IMM GRANULOCYTES NFR BLD AUTO: 0.2 % (ref 0–0.5)
KETONES UR QL STRIP: NEGATIVE
LDLC SERPL CALC-MCNC: 90 MG/DL (ref 0–100)
LDLC/HDLC SERPL: 1.9 {RATIO}
LEUKOCYTE ESTERASE UR QL STRIP.AUTO: ABNORMAL
LYMPHOCYTES # BLD AUTO: 2 10*3/MM3 (ref 0.7–3.1)
LYMPHOCYTES NFR BLD AUTO: 33.1 % (ref 19.6–45.3)
MCH RBC QN AUTO: 30.4 PG (ref 26.6–33)
MCHC RBC AUTO-ENTMCNC: 34.1 G/DL (ref 31.5–35.7)
MCV RBC AUTO: 89.2 FL (ref 79–97)
MONOCYTES # BLD AUTO: 0.53 10*3/MM3 (ref 0.1–0.9)
MONOCYTES NFR BLD AUTO: 8.8 % (ref 5–12)
NEUTROPHILS NFR BLD AUTO: 3.18 10*3/MM3 (ref 1.7–7)
NEUTROPHILS NFR BLD AUTO: 52.5 % (ref 42.7–76)
NITRITE UR QL STRIP: NEGATIVE
NRBC BLD AUTO-RTO: 0 /100 WBC (ref 0–0.2)
PH UR STRIP.AUTO: 5.5 [PH] (ref 5–8)
PLATELET # BLD AUTO: 219 10*3/MM3 (ref 140–450)
PMV BLD AUTO: 11.1 FL (ref 6–12)
POTASSIUM SERPL-SCNC: 4.3 MMOL/L (ref 3.5–5.2)
PROT SERPL-MCNC: 8.1 G/DL (ref 6–8.5)
PROT UR QL STRIP: ABNORMAL
RBC # BLD AUTO: 4.54 10*6/MM3 (ref 4.14–5.8)
RBC # UR STRIP: ABNORMAL /HPF
REF LAB TEST METHOD: ABNORMAL
SODIUM SERPL-SCNC: 141 MMOL/L (ref 136–145)
SP GR UR STRIP: 1.02 (ref 1–1.03)
SQUAMOUS #/AREA URNS HPF: ABNORMAL /HPF
T4 FREE SERPL-MCNC: 1.26 NG/DL (ref 0.93–1.7)
TRIGL SERPL-MCNC: 79 MG/DL (ref 0–150)
TSH SERPL DL<=0.05 MIU/L-ACNC: 0.93 UIU/ML (ref 0.27–4.2)
UROBILINOGEN UR QL STRIP: ABNORMAL
VLDLC SERPL-MCNC: 15 MG/DL (ref 5–40)
WBC # UR STRIP: ABNORMAL /HPF
WBC NRBC COR # BLD: 6.05 10*3/MM3 (ref 3.4–10.8)

## 2023-09-06 PROCEDURE — 80050 GENERAL HEALTH PANEL: CPT | Performed by: INTERNAL MEDICINE

## 2023-09-06 PROCEDURE — 87086 URINE CULTURE/COLONY COUNT: CPT | Performed by: INTERNAL MEDICINE

## 2023-09-06 PROCEDURE — 83036 HEMOGLOBIN GLYCOSYLATED A1C: CPT | Performed by: INTERNAL MEDICINE

## 2023-09-06 PROCEDURE — 1159F MED LIST DOCD IN RCRD: CPT | Performed by: INTERNAL MEDICINE

## 2023-09-06 PROCEDURE — 82306 VITAMIN D 25 HYDROXY: CPT | Performed by: INTERNAL MEDICINE

## 2023-09-06 PROCEDURE — 3080F DIAST BP >= 90 MM HG: CPT | Performed by: INTERNAL MEDICINE

## 2023-09-06 PROCEDURE — 99396 PREV VISIT EST AGE 40-64: CPT | Performed by: INTERNAL MEDICINE

## 2023-09-06 PROCEDURE — 81001 URINALYSIS AUTO W/SCOPE: CPT | Performed by: INTERNAL MEDICINE

## 2023-09-06 PROCEDURE — 84439 ASSAY OF FREE THYROXINE: CPT | Performed by: INTERNAL MEDICINE

## 2023-09-06 PROCEDURE — 1160F RVW MEDS BY RX/DR IN RCRD: CPT | Performed by: INTERNAL MEDICINE

## 2023-09-06 PROCEDURE — 80061 LIPID PANEL: CPT | Performed by: INTERNAL MEDICINE

## 2023-09-06 PROCEDURE — 3077F SYST BP >= 140 MM HG: CPT | Performed by: INTERNAL MEDICINE

## 2023-09-06 RX ORDER — CLOTRIMAZOLE 1 %
1 CREAM (GRAM) TOPICAL 2 TIMES DAILY
Qty: 14 G | Refills: 0 | Status: SHIPPED | OUTPATIENT
Start: 2023-09-06 | End: 2023-09-13

## 2023-09-06 NOTE — PROGRESS NOTES
Chief Complaint   Patient presents with    Knee Pain     Right Knee Pain   Annual exam    HPI:  Brigida Rodriguez is a 43 y.o. male who presents today for annual exam.  He would like to discuss chronic right knee pain.    ROS:  Constitutional: no fevers, night sweats or unexplained weight loss  Eyes: no vision changes  ENT: no runny nose, ear pain, sore throat  Cardio: no chest pain, palpitations  Pulm: no shortness of breath, wheezing, or cough  GI: no abdominal pain or changes in bowel movements  : no difficulty urinating  MSK: no difficulty ambulating, no joint pain  Neuro: no weakness, dizziness or headache  Psych: no trouble sleeping  Endo: no change in appetite      Past Medical History:   Diagnosis Date    Anxiety     Depression     GERD (gastroesophageal reflux disease)     H/O blood clots     Heart failure     Hypertension     Presence of IVC filter     Pulmonary embolism     10 YEARS AGO      Family History   Problem Relation Age of Onset    Diabetes Mother     Obesity Maternal Grandmother     Diabetes Maternal Grandmother     Cancer Father         prostrate    No Known Problems Sister     No Known Problems Brother     No Known Problems Maternal Grandfather     No Known Problems Paternal Grandmother     No Known Problems Paternal Grandfather       Social History     Socioeconomic History    Marital status:    Tobacco Use    Smoking status: Every Day     Packs/day: 0.50     Years: 20.00     Pack years: 10.00     Types: Cigarettes     Passive exposure: Never    Smokeless tobacco: Never   Vaping Use    Vaping Use: Never used   Substance and Sexual Activity    Alcohol use: Yes     Alcohol/week: 6.0 standard drinks     Types: 6 Cans of beer per week    Drug use: Yes     Types: Marijuana     Comment: everyday smoker    Sexual activity: Yes     Partners: Female     Birth control/protection: Condom      No Known Allergies   Immunization History   Administered Date(s) Administered    COVID-19 (PFIZER)  Purple Cap Monovalent 03/06/2021, 03/30/2021    Flu Vaccine Quad PF >36MO 12/21/2016, 12/18/2017, 12/07/2018, 11/04/2019    Fluzone >6mos 12/21/2016, 12/18/2017, 12/07/2018, 11/04/2019, 12/23/2020, 11/08/2021    Hepatitis A 12/07/2018    Pneumococcal Polysaccharide (PPSV23) 12/21/2016    Tdap 12/21/2016        PE:  Vitals:    09/06/23 0945   BP: 144/90   Pulse: 90   Temp: 98.5 °F (36.9 °C)   SpO2: 98%      Body mass index is 48.05 kg/m².    Gen Appearance: NAD  HEENT: Normocephalic, PERRLA, no thyromegaly, trache midline  Heart: RRR, normal S1 and S2, no murmur  Lungs: CTA b/l, no wheezing, no crackles  Abdomen: Soft, non-tender, non-distended, no guarding and BSx4  MSK: Moves all extremities well, normal gait, no peripheral edema  Pulses: Palpable and equal b/l  Lymph nodes: No palpable lymphadenopathy   Neuro: No focal deficits      Current Outpatient Medications   Medication Sig Dispense Refill    acyclovir (Zovirax) 400 MG tablet Take 1 tablet by mouth Daily As Needed (PRN). Take no more than 5 doses during flare up. 30 tablet 0    buPROPion SR (WELLBUTRIN SR) 150 MG 12 hr tablet Take 1 tablet by mouth 2 (Two) Times a Day. 60 tablet 0    carvedilol (COREG) 6.25 MG tablet Take 1 tablet by mouth 2 (Two) Times a Day With Meals. 180 tablet 1    DULoxetine (Cymbalta) 60 MG capsule Take 1 capsule by mouth Daily. 90 capsule 3    hydrOXYzine (ATARAX) 25 MG tablet Take 1 tablet by mouth 3 (Three) Times a Day As Needed for Anxiety. 90 tablet 0    lisinopril (PRINIVIL,ZESTRIL) 10 MG tablet Take 1 tablet by mouth Daily. 30 tablet 0    mirtazapine (Remeron) 15 MG tablet Take 1 tablet by mouth Every Night. 30 tablet 0    Multiple Vitamins-Minerals (HM MULTIVITAMIN ADULT GUMMY PO) Take  by mouth.      pantoprazole (PROTONIX) 40 MG EC tablet Take 1 tablet by mouth Every Morning Before Breakfast. 30 tablet 0    warfarin (COUMADIN) 5 MG tablet TAKE 1.5-2 TABLETS BY MOUTH DAILY or as directed by the anticoagulation clinic 55  tablet 0     No current facility-administered medications for this visit.        Diagnoses and all orders for this visit:    1. Preventative health care (Primary)  -     CBC & Differential; Future  -     Comprehensive Metabolic Panel; Future  -     Hemoglobin A1c; Future  -     Lipid Panel; Future  -     TSH+Free T4; Future  -     Urinalysis With Culture If Indicated - Urine, Clean Catch; Future  -     Vitamin D,25-Hydroxy; Future  -     CBC & Differential  -     Comprehensive Metabolic Panel  -     Hemoglobin A1c  -     Lipid Panel  -     TSH+Free T4  -     Urinalysis With Culture If Indicated - Urine, Clean Catch  -     Vitamin D,25-Hydroxy  Counseled on healthy weight, nutrition, physical activity, cancer screening, and immunizations.    2. Primary hypertension  Elevated blood pressure reading today at 144/90.  Recommend monitoring blood pressure at home over the next month and follow-up for recheck.  3. Chronic pain of right knee  -     XR Knee 3 View Right; Future  -     Ambulatory Referral to Orthopedic Surgery    4. Fungal infection         Return in about 4 weeks (around 10/4/2023) for htn.     Dictated Utilizing Dragon Dictation    Please note that portions of this note were completed with a voice recognition program.    Part of this note may be an electronic transcription/translation of spoken language to printed text using the Dragon Dictation System.

## 2023-09-07 LAB — BACTERIA SPEC AEROBE CULT: NO GROWTH

## 2023-09-16 DIAGNOSIS — Z86.711 HISTORY OF PULMONARY EMBOLISM: ICD-10-CM

## 2023-09-18 ENCOUNTER — TELEPHONE (OUTPATIENT)
Dept: PHARMACY | Facility: HOSPITAL | Age: 43
End: 2023-09-18
Payer: COMMERCIAL

## 2023-09-18 DIAGNOSIS — I10 ESSENTIAL HYPERTENSION: ICD-10-CM

## 2023-09-18 DIAGNOSIS — Z86.711 HISTORY OF PULMONARY EMBOLISM: ICD-10-CM

## 2023-09-18 RX ORDER — WARFARIN SODIUM 5 MG/1
TABLET ORAL
Qty: 7 TABLET | Refills: 0 | Status: SHIPPED | OUTPATIENT
Start: 2023-09-18 | End: 2023-09-22 | Stop reason: SDUPTHER

## 2023-09-18 RX ORDER — WARFARIN SODIUM 5 MG/1
TABLET ORAL
Qty: 55 TABLET | Refills: 0 | OUTPATIENT
Start: 2023-09-18

## 2023-09-18 NOTE — TELEPHONE ENCOUNTER
Rx Refill Note  Requested Prescriptions     Pending Prescriptions Disp Refills    warfarin (COUMADIN) 5 MG tablet 55 tablet 0     Sig: TAKE 1.5-2 TABLETS BY MOUTH DAILY or as directed by the anticoagulation clinic      Last office visit with prescribing clinician: 9/6/2023   Last telemedicine visit with prescribing clinician: Visit date not found   Next office visit with prescribing clinician: Visit date not found                         Would you like a call back once the refill request has been completed: [] Yes [] No    If the office needs to give you a call back, can they leave a voicemail: [] Yes [] No    Alejandra Pittman MA  09/18/23, 08:30 EDT

## 2023-09-18 NOTE — TELEPHONE ENCOUNTER
Patient called and requested warfarin sent to [facility]- He reports he has been out for two days.     Discussed last INR was 6/1/2023 and need for compliance in order to continue to manage his care. He voiced understanding.     Will call in warfarin enough until Friday, and scheduled new appointment in the clinic. Sending 7 tablets. Warfarin is 10mg daily except 7.5mg SunTueThur    Will no longer fill warfarin Rx for longer than time anticipated until next appointment until compliance is re- established.

## 2023-09-19 RX ORDER — LISINOPRIL 10 MG/1
10 TABLET ORAL DAILY
Qty: 30 TABLET | Refills: 0 | Status: SHIPPED | OUTPATIENT
Start: 2023-09-19

## 2023-09-19 NOTE — TELEPHONE ENCOUNTER
Rx Refill Note  Requested Prescriptions     Pending Prescriptions Disp Refills    lisinopril (PRINIVIL,ZESTRIL) 10 MG tablet [Pharmacy Med Name: LISINOPRIL 10 MG TABLET] 30 tablet 0     Sig: TAKE 1 TABLET BY MOUTH DAILY      Last office visit with prescribing clinician: 9/6/2023   Last telemedicine visit with prescribing clinician: Visit date not found   Next office visit with prescribing clinician: Visit date not found                         Would you like a call back once the refill request has been completed: [] Yes [] No    If the office needs to give you a call back, can they leave a voicemail: [] Yes [] No    Alejandra Pittman MA  09/19/23, 08:06 EDT

## 2023-09-22 ENCOUNTER — ANTICOAGULATION VISIT (OUTPATIENT)
Dept: PHARMACY | Facility: HOSPITAL | Age: 43
End: 2023-09-22
Payer: COMMERCIAL

## 2023-09-22 DIAGNOSIS — Z86.711 HISTORY OF PULMONARY EMBOLISM: Primary | ICD-10-CM

## 2023-09-22 LAB
INR PPP: 2 (ref 0.91–1.09)
PROTHROMBIN TIME: 24.5 SECONDS (ref 10–13.8)

## 2023-09-22 PROCEDURE — 36416 COLLJ CAPILLARY BLOOD SPEC: CPT

## 2023-09-22 PROCEDURE — 85610 PROTHROMBIN TIME: CPT

## 2023-09-22 PROCEDURE — G0463 HOSPITAL OUTPT CLINIC VISIT: HCPCS

## 2023-09-22 RX ORDER — WARFARIN SODIUM 5 MG/1
TABLET ORAL
Qty: 25 TABLET | Refills: 0 | Status: SHIPPED | OUTPATIENT
Start: 2023-09-22

## 2023-09-22 NOTE — PROGRESS NOTES
Anticoagulation Clinic Progress Note     Indication: Hx of PE and LE DVT (2010, 2014)  Referring Provider: Vaishali  Initial Warfarin Start Date: 2010  Planned Duration of Therapy: lifelong  Goal INR: 2.0-3.0 (verified Dr Vaishali 9/19/19)  Will likely need lovenox perioperatively (Vaishali 7/29/20)  Current Drug Interactions: Cymbalta and Pantoprazole  Other: d/c Eliquis 9/6/19 due to itching     Diet: green beans,broccoli (1/week), salads daily (iceburg lettuce, carrots, and red cabbage mixture) 5/21/2021  Alcohol: None  Tobacco: Smokes ~5 cigarettes a week  OTC Pain Medication: Recommended Tylenol prn     Anticoagulation Clinic INR History:  Date 9/19 10/16 11/7 11/19 12/3 12/9 1/3/2020 2/3-2/10 2/13 2/17 2/24 3/2 3/16   Total Weekly Dose 80mg 80 mg 77.5 mg 77.5mg 57.5 mg 80mg 72.5mg hosp: admission 65mg 67.5 mg 65 mg 65 mg 65mg   INR 2.6 3.4 3.1 2.7 1.5 2.1 2.6   3.0 3 2.7 2.4 1.9   Notes         S/p c'scope, librado greens 1 boost Decr cig use Diverticulitis; end colostomy stopped smoking; recent admission        Green beans ([facility])      Date  3/24 4/1 4/10 5/6 5/20 6/8 6/19 7/31 8/31 10/14 12/7 1/5/2021 1/8 1/13 1/20 1/27 2/2   Total Weekly Dose 67.5mg 72.5 mg 70mg 70mg 70mg 55mg? 65mg 65mg 67.5mg 67.5 67.5 mg 27.5mg colonoscopy 45mg 70mg 70mg  75mg   INR 1.8 2.23 2.8 3.4 3.1 2.0 2.5 2.0 2.3 2.5 2.1 1.1   1.2 1.8 1.7 2.5   Notes   Extra dose   Less GLV Less GLV Missed x1   incr GLV       Hold x4 Hold x 7 days missed x1               Warfarin Dosing During Admission 1/28:  Date  2/3 2/4 2/5 2/6 2/7 2/8 2/9 2/10   INR  1.38 1.55 1.88 2.22 2.1 2.2 2.4 2.46   Dose  10mg 10mg 7.5mg 7.5mg  10mg 10mg 10mg 10mg      Date 3/17 4/1-4/15 4/16 4/19 4/23 5/7 5/21 6/1    10/4    12/22  1/6     Total Weekly Dose 60mg? [facility] admission 42.5mg?? 47.5mg? 45mg 45 mg 45mg 52.5mg  noncompliance  57.5mg  non compliance  50mg  47.5mg     INR 1.8   2.2 2.0 2.2 2.95 venipuncture 1.4 1.5    2.1    1.7  1.5     Notes Miss x 1; self adjust dose reverse  "cholestomy; hemorrhage; vit k       POC 4.5 x2   Inc GLV; lovenox        1x miss?  inc tobacco, 1x miss inc GLV         Date 1/6/22-3/21/22 3/21 3/28 Non-compliant 5/19 8/11 9/22 11/11 1/16/23   Total Weekly Dose Noncompliance  57.5 mg 60mg  65 mg Non compliant 45mg? Non compliant 65mg? 52.5mg? ??   INR  1.7 1.3  2.0  1.6  3.4 2.0 1.4   Notes       2x miss   2x miss \"some doses missed\"     Date 3/10   6/1  9/22    Total Weekly Dose 55mg 62.5 mg Non compliant 62.5 mg Non compliant 45 mg    INR 2.0   2.1  2.0    Notes Miss x1     2x miss      Phone Interview:  Tablet Strength: 5mg tablet  Verbal Release Authorization signed on 9/19/19-- may speak with Jammie Rodriguez (mother) John Rodriguez (father)  Patient contact info: [phone] (Mobile); [phone] (Cell) 5/7/21    Patient Findings  Positives: Missed doses   Negatives: Signs/symptoms of thrombosis, Signs/symptoms of bleeding, Laboratory test error suspected, Change in health, Change in alcohol use, Change in activity, Upcoming invasive procedure, Emergency department visit, Upcoming dental procedure, Extra doses, Change in medications, Change in diet/appetite, Hospital admission, Bruising, Other complaints   Comments: No changes per patient since last encounter June 1st other than running out of warfarin last weekend. Patient was prescribed enough warfarin to get through appointment today and now will need another refill. Dosing verified.     Plan:  1. INR is therapeutic at 2.0 following extensive noncompliance with clinic and patient running out of warfarin last weekend. Will continue warfarin 10mg daily except 7.5mg SunTUESThurs for now until recheck.   2. Repeat INR in 1 week due to ensure 62.5 mg weekly dose is appropriate. We haven't seen this today given missed doses over weekend. Patient says he will be able to come into clinic.  3. Verbal and written information provided in the clinic.  Brigida Rodriguez RBV dosing instructions, expresses " understanding by teach back, and has no further questions at this time.  4. Warfarin 5 mg tablet refill sent to [facility] [address] today, 2 week supply only given non-compliance (see 9/18 telephone encounter).  5. Patient has appointment with Orthopedics Monday. Instructed patient to call clinic if medication changes or procedures planned.    Maximo Hernandez, BrandenD, BCPS  9/22/2023  10:48 EDT

## 2023-09-29 ENCOUNTER — APPOINTMENT (OUTPATIENT)
Dept: PHARMACY | Facility: HOSPITAL | Age: 43
End: 2023-09-29
Payer: COMMERCIAL

## 2023-10-06 DIAGNOSIS — Z86.711 HISTORY OF PULMONARY EMBOLISM: ICD-10-CM

## 2023-10-06 RX ORDER — WARFARIN SODIUM 5 MG/1
TABLET ORAL
Qty: 25 TABLET | Refills: 0 | Status: SHIPPED | OUTPATIENT
Start: 2023-10-06

## 2023-10-18 ENCOUNTER — TELEPHONE (OUTPATIENT)
Dept: PHARMACY | Facility: HOSPITAL | Age: 43
End: 2023-10-18

## 2023-10-18 NOTE — TELEPHONE ENCOUNTER
Called to inform patient about OVERDUE INR on 10/18/2023, was not able to leave the Patient a voice mail at this time.    Patient last INR test was in Clinic on 09/22/2023 at 2.0, Patient was suppose to retest his INR on 09/29/2023, Patient has not had is INR done yet and has cancel the last 5 appointment with the anticoagulation clinic.    If Patient is not complaint with the Anticoagulation clinic, we will have to start the discharge letter process with patient and to his referring provider Yoni Dover  Pharmacy Technician  10/18/2023 10:49 EDT

## 2023-10-20 DIAGNOSIS — I10 ESSENTIAL HYPERTENSION: ICD-10-CM

## 2023-10-20 DIAGNOSIS — Z86.711 HISTORY OF PULMONARY EMBOLISM: ICD-10-CM

## 2023-10-20 RX ORDER — WARFARIN SODIUM 5 MG/1
TABLET ORAL
Qty: 11 TABLET | Refills: 0 | Status: SHIPPED | OUTPATIENT
Start: 2023-10-20

## 2023-10-20 RX ORDER — LISINOPRIL 10 MG/1
10 TABLET ORAL DAILY
Qty: 30 TABLET | Refills: 0 | Status: SHIPPED | OUTPATIENT
Start: 2023-10-20

## 2023-10-20 NOTE — TELEPHONE ENCOUNTER
Caller: Brigida Rodriguez Nia    Relationship: Self    Best call back number:       [phone] (Mobile)     Requested Prescriptions:   Requested Prescriptions     Pending Prescriptions Disp Refills    lisinopril (PRINIVIL,ZESTRIL) 10 MG tablet 30 tablet 0     Sig: Take 1 tablet by mouth Daily.      Pharmacy where request should be sent:     [facility] [id number] [address] AT [facility] [phone] [facility] [phone]      Last office visit with prescribing clinician: 9/6/2023   Last telemedicine visit with prescribing clinician: Visit date not found   Next office visit with prescribing clinician: Visit date not found     Additional details provided by patient:     PATIENT STATED HE IS CURRENTLY OUT OF THE MEDICATION    Does the patient have less than a 3 day supply:  [x] Yes  [] No    Would you like a call back once the refill request has been completed: [] Yes [] No    If the office needs to give you a call back, can they leave a voicemail: [] Yes [] No    Suellen Nails Rep   10/20/23 14:47 EDT     DR TOVAR

## 2023-10-20 NOTE — TELEPHONE ENCOUNTER
PT stated he is out of warfarin and asked if we could refill enough until next Wednesday for his follow-up with the anticoagulation clinic.    Katia Dover  Pharmacy Technician  10/20/2023 13:35 EDT

## 2023-10-20 NOTE — TELEPHONE ENCOUNTER
Rx Refill Note  Requested Prescriptions     Pending Prescriptions Disp Refills    lisinopril (PRINIVIL,ZESTRIL) 10 MG tablet 30 tablet 0     Sig: Take 1 tablet by mouth Daily.      Last office visit with prescribing clinician: 9/6/2023   Last telemedicine visit with prescribing clinician: Visit date not found   Next office visit with prescribing clinician: Visit date not found                         Would you like a call back once the refill request has been completed: [] Yes [] No    If the office needs to give you a call back, can they leave a voicemail: [] Yes [] No    Alejandra Pittman MA  10/20/23, 15:39 EDT

## 2023-10-27 ENCOUNTER — TELEPHONE (OUTPATIENT)
Dept: PHARMACY | Facility: HOSPITAL | Age: 43
End: 2023-10-27
Payer: COMMERCIAL

## 2023-10-27 DIAGNOSIS — Z86.711 HISTORY OF PULMONARY EMBOLISM: ICD-10-CM

## 2023-10-27 RX ORDER — WARFARIN SODIUM 5 MG/1
TABLET ORAL
Qty: 15 TABLET | Refills: 0 | Status: SHIPPED | OUTPATIENT
Start: 2023-10-27

## 2023-10-27 NOTE — TELEPHONE ENCOUNTER
Patient called on 10/27/23 to get a refill on his warfarin sent to [facility] [id number] - [place], [place] - [address] AT [facility] - [phone]  - [phone], we will send enough to do until Friday, so patient can get INR done soon.   Patient stated he had canalled his appt on 10/25/23 do to car trouble once again. After speaking to Grace Coyle PharmD who has recommend maybe getting a remote lab closer to Patient to get INR's done at, Patient will be calling the anticoagulation clinic back with information about a remote lab he is wanting to use for remote testing for now.      Katia Dover  Pharmacy Technician  10/27/2023 11:38 EDT

## 2023-10-28 DIAGNOSIS — K57.80 PERFORATED DIVERTICULUM: ICD-10-CM

## 2023-10-30 RX ORDER — PANTOPRAZOLE SODIUM 40 MG/1
40 TABLET, DELAYED RELEASE ORAL
Qty: 30 TABLET | Refills: 0 | Status: SHIPPED | OUTPATIENT
Start: 2023-10-30

## 2023-10-30 NOTE — TELEPHONE ENCOUNTER
Rx Refill Note  Requested Prescriptions     Pending Prescriptions Disp Refills    pantoprazole (PROTONIX) 40 MG EC tablet 30 tablet 0     Sig: Take 1 tablet by mouth Every Morning Before Breakfast.      Last office visit with prescribing clinician: 9/6/2023   Last telemedicine visit with prescribing clinician: Visit date not found   Next office visit with prescribing clinician: Visit date not found                         Would you like a call back once the refill request has been completed: [] Yes [] No    If the office needs to give you a call back, can they leave a voicemail: [] Yes [] No    Alejandra Pittman MA  10/30/23, 09:12 EDT

## 2023-11-02 ENCOUNTER — TELEPHONE (OUTPATIENT)
Dept: PHARMACY | Facility: HOSPITAL | Age: 43
End: 2023-11-02

## 2023-11-02 DIAGNOSIS — Z86.711 HISTORY OF PULMONARY EMBOLISM: Primary | ICD-10-CM

## 2023-11-02 NOTE — TELEPHONE ENCOUNTER
Called to follow up with Patient about a doing a remote lab testing and left a voice mail.    Patient had called on 10/30/23 with a information about testing at a remote lab closer to home, since patient has moved out of town and from now on will be recheck his INR at [address], [address], Phone number [phone] :  Fax Number [phone]    Patient needs standing order placed.    Katia Dover  Pharmacy Technician  11/2/2023 14:45 EDT

## 2023-11-05 DIAGNOSIS — Z86.711 HISTORY OF PULMONARY EMBOLISM: ICD-10-CM

## 2023-11-06 ENCOUNTER — TELEPHONE (OUTPATIENT)
Dept: PHARMACY | Facility: HOSPITAL | Age: 43
End: 2023-11-06
Payer: COMMERCIAL

## 2023-11-06 DIAGNOSIS — Z86.711 HISTORY OF PULMONARY EMBOLISM: ICD-10-CM

## 2023-11-06 RX ORDER — WARFARIN SODIUM 5 MG/1
TABLET ORAL
Qty: 15 TABLET | Refills: 0 | OUTPATIENT
Start: 2023-11-06

## 2023-11-06 RX ORDER — WARFARIN SODIUM 5 MG/1
TABLET ORAL
Qty: 30 TABLET | Refills: 0 | Status: SHIPPED | OUTPATIENT
Start: 2023-11-06

## 2023-11-06 NOTE — TELEPHONE ENCOUNTER
Mr Rodriguez called to check on [facility] order. Standing order sent 11/2 per chart. Patient without transportation until 11/8, expect INR result 11/9. Will send warfarin refill.    Skyler Mary, PharmD  11/6/2023  13:13 EST

## 2023-11-22 DIAGNOSIS — Z86.711 HISTORY OF PULMONARY EMBOLISM: ICD-10-CM

## 2023-11-22 RX ORDER — WARFARIN SODIUM 5 MG/1
TABLET ORAL
Qty: 30 TABLET | Refills: 0 | Status: SHIPPED | OUTPATIENT
Start: 2023-11-22

## 2023-11-22 NOTE — TELEPHONE ENCOUNTER
Pt had INR drawn at [facility] in [place]- will receive INR on Monday. Sent in new refill to last two weeks.    Confirmed tablet strength and 10mg daily except 7.5mg SunTueThur

## 2023-11-22 NOTE — TELEPHONE ENCOUNTER
Patient had called to have a new standing order for his lab to be sent in, patient is currently waiting at lab, also a refill to be sent in.    Patient is needing a refill on his 5 mg warfarin sent in  to the following Pharmacy  [facility] [id number] - [place], [place] - [address] AT [address] - [phone]  - [phone]     Katia Dover  Pharmacy Technician  11/22/2023 10:28 EST

## 2023-11-27 ENCOUNTER — ANTICOAGULATION VISIT (OUTPATIENT)
Dept: PHARMACY | Facility: HOSPITAL | Age: 43
End: 2023-11-27
Payer: COMMERCIAL

## 2023-11-27 DIAGNOSIS — Z86.711 HISTORY OF PULMONARY EMBOLISM: Primary | ICD-10-CM

## 2023-11-27 LAB — INR PPP: 1.8

## 2023-11-27 RX ORDER — WARFARIN SODIUM 5 MG/1
TABLET ORAL
Qty: 30 TABLET | Refills: 0 | OUTPATIENT
Start: 2023-11-27

## 2023-11-27 NOTE — PROGRESS NOTES
Anticoagulation Clinic Progress Note     Indication: Hx of PE and LE DVT (2010, 2014)  Referring Provider: Vaishali  Initial Warfarin Start Date: 2010  Planned Duration of Therapy: lifelong  Goal INR: 2.0-3.0 (verified Dr Vaishali 9/19/19)  Will likely need lovenox perioperatively (Vaishali 7/29/20)  Current Drug Interactions: Cymbalta and Pantoprazole  Other: d/c Eliquis 9/6/19 due to itching     Diet: green beans,broccoli (1/week), salads daily (iceburg lettuce, carrots, and red cabbage mixture) 5/21/2021  Alcohol: None  Tobacco: Smokes ~5 cigarettes a week  OTC Pain Medication: Recommended Tylenol prn     Anticoagulation Clinic INR History:  Date 9/19 10/16 11/7 11/19 12/3 12/9 1/3/2020 2/3-2/10 2/13 2/17 2/24 3/2 3/16   Total Weekly Dose 80mg 80 mg 77.5 mg 77.5mg 57.5 mg 80mg 72.5mg hosp: admission 65mg 67.5 mg 65 mg 65 mg 65mg   INR 2.6 3.4 3.1 2.7 1.5 2.1 2.6   3.0 3 2.7 2.4 1.9   Notes         S/p c'scope, librado greens 1 boost Decr cig use Diverticulitis; end colostomy stopped smoking; recent admission        Green beans (HH)      Date  3/24 4/1 4/10 5/6 5/20 6/8 6/19 7/31 8/31 10/14 12/7 1/5/2021 1/8 1/13 1/20 1/27 2/2   Total Weekly Dose 67.5mg 72.5 mg 70mg 70mg 70mg 55mg? 65mg 65mg 67.5mg 67.5 67.5 mg 27.5mg colonoscopy 45mg 70mg 70mg  75mg   INR 1.8 2.23 2.8 3.4 3.1 2.0 2.5 2.0 2.3 2.5 2.1 1.1   1.2 1.8 1.7 2.5   Notes   Extra dose   Less GLV Less GLV Missed x1   incr GLV       Hold x4 Hold x 7 days missed x1               Warfarin Dosing During Admission 1/28:  Date  2/3 2/4 2/5 2/6 2/7 2/8 2/9 2/10   INR  1.38 1.55 1.88 2.22 2.1 2.2 2.4 2.46   Dose  10mg 10mg 7.5mg 7.5mg  10mg 10mg 10mg 10mg      Date 3/17 4/1-4/15 4/16 4/19 4/23 5/7 5/21 6/1    10/4    12/22  1/6     Total Weekly Dose 60mg? [facility] admission 42.5mg?? 47.5mg? 45mg 45 mg 45mg 52.5mg  noncompliance  57.5mg  non compliance  50mg  47.5mg     INR 1.8   2.2 2.0 2.2 2.95 venipuncture 1.4 1.5    2.1    1.7  1.5     Notes Miss x 1; self adjust dose reverse  "cholestomy; hemorrhage; vit k       POC 4.5 x2   Inc GLV; lovenox        1x miss?  inc tobacco, 1x miss inc GLV         Date 1/6/22-3/21/22 3/21 3/28 Non-compliant 5/19 8/11 9/22 11/11 1/16/23   Total Weekly Dose Noncompliance  57.5 mg 60mg  65 mg Non compliant 45mg? Non compliant 65mg? 52.5mg? ??   INR  1.7 1.3  2.0  1.6  3.4 2.0 1.4   Notes       2x miss   2x miss \"some doses missed\"     Date 3/10   6/1  9/22    Total Weekly Dose 55mg 62.5 mg Non compliant 62.5 mg Non compliant 45 mg Non compliant   INR 2.0   2.1  2.0    Notes Miss x1     2x miss      Date 11/22           Total Weekly Dose            INR 1.8           Notes 1x miss               Phone Interview:  Tablet Strength: 5mg tablet  Verbal Release Authorization signed on 9/19/19-- may speak with Jammie Michael (mother) John Rodriguez (father)  Patient contact info: [phone] (Mobile)      Patient Findings    Positives: Missed doses   Negatives: Signs/symptoms of thrombosis, Signs/symptoms of bleeding, Laboratory test error suspected, Change in health, Change in alcohol use, Change in activity, Upcoming invasive procedure, Emergency department visit, Upcoming dental procedure, Extra doses, Change in medications, Change in diet/appetite, Hospital admission, Bruising, Other complaints   Comments: Missed a dose prior to checking INR     Plan:  1. INR is therapeutic at 1.8 following extensive noncompliance with clinic and patient missed a dose prior to INR check. Will continue warfarin 10mg daily except 7.5mg SunTUESThurs for now until recheck.   2. Repeat INR in 1 week to ensure WNL  3. Verbal and written information provided in the clinic.  Brigida Rodriguez RBV dosing instructions, expresses understanding by teach back, and has no further questions at this time.    Tara Barger, PharmD.  11/27/23   12:05 EST      "

## 2023-11-28 DIAGNOSIS — F41.1 GENERALIZED ANXIETY DISORDER: ICD-10-CM

## 2023-11-28 DIAGNOSIS — F33.9 RECURRENT MAJOR DEPRESSIVE DISORDER, REMISSION STATUS UNSPECIFIED: ICD-10-CM

## 2023-11-28 DIAGNOSIS — B00.9 HERPES: ICD-10-CM

## 2023-11-28 DIAGNOSIS — I10 ESSENTIAL HYPERTENSION: ICD-10-CM

## 2023-11-28 DIAGNOSIS — K57.80 PERFORATED DIVERTICULUM: ICD-10-CM

## 2023-11-28 RX ORDER — HYDROXYZINE HYDROCHLORIDE 25 MG/1
25 TABLET, FILM COATED ORAL 3 TIMES DAILY PRN
Qty: 90 TABLET | Refills: 0 | Status: SHIPPED | OUTPATIENT
Start: 2023-11-28

## 2023-11-30 RX ORDER — DULOXETIN HYDROCHLORIDE 60 MG/1
60 CAPSULE, DELAYED RELEASE ORAL DAILY
Qty: 90 CAPSULE | Refills: 0 | Status: SHIPPED | OUTPATIENT
Start: 2023-11-30

## 2023-11-30 RX ORDER — LISINOPRIL 10 MG/1
10 TABLET ORAL DAILY
Qty: 30 TABLET | Refills: 0 | Status: SHIPPED | OUTPATIENT
Start: 2023-11-30

## 2023-11-30 RX ORDER — BUPROPION HYDROCHLORIDE 150 MG/1
150 TABLET, EXTENDED RELEASE ORAL 2 TIMES DAILY
Qty: 60 TABLET | Refills: 0 | Status: SHIPPED | OUTPATIENT
Start: 2023-11-30

## 2023-11-30 RX ORDER — CARVEDILOL 6.25 MG/1
6.25 TABLET ORAL 2 TIMES DAILY WITH MEALS
Qty: 180 TABLET | Refills: 0 | Status: SHIPPED | OUTPATIENT
Start: 2023-11-30

## 2023-11-30 RX ORDER — ACYCLOVIR 400 MG/1
400 TABLET ORAL DAILY PRN
Qty: 30 TABLET | Refills: 0 | Status: SHIPPED | OUTPATIENT
Start: 2023-11-30

## 2023-11-30 RX ORDER — PANTOPRAZOLE SODIUM 40 MG/1
40 TABLET, DELAYED RELEASE ORAL
Qty: 30 TABLET | Refills: 0 | Status: SHIPPED | OUTPATIENT
Start: 2023-11-30

## 2023-12-12 DIAGNOSIS — Z86.711 HISTORY OF PULMONARY EMBOLISM: ICD-10-CM

## 2023-12-12 RX ORDER — WARFARIN SODIUM 5 MG/1
TABLET ORAL
Qty: 24 TABLET | Refills: 0 | Status: SHIPPED | OUTPATIENT
Start: 2023-12-12

## 2023-12-13 ENCOUNTER — ANTICOAGULATION VISIT (OUTPATIENT)
Dept: PHARMACY | Facility: HOSPITAL | Age: 43
End: 2023-12-13
Payer: COMMERCIAL

## 2023-12-13 DIAGNOSIS — Z86.711 HISTORY OF PULMONARY EMBOLISM: Primary | ICD-10-CM

## 2023-12-13 LAB
INR PPP: 2.1 (ref 0.9–1.2)
PROTHROMBIN TIME: 21.6 SEC (ref 9.1–12)

## 2023-12-13 NOTE — PROGRESS NOTES
Anticoagulation Clinic Progress Note     Indication: Hx of PE and LE DVT (2010, 2014)  Referring Provider: Vaishali  Initial Warfarin Start Date: 2010  Planned Duration of Therapy: lifelong  Goal INR: 2.0-3.0 (verified Dr Vaishali 9/19/19)  Will likely need lovenox perioperatively (Vaishali 7/29/20)  Current Drug Interactions: Cymbalta and Pantoprazole  Other: d/c Eliquis 9/6/19 due to itching     Diet: green beans,broccoli (1/week), salads daily (iceburg lettuce, carrots, and red cabbage mixture) 5/21/2021  Alcohol: None  Tobacco: Smokes ~5 cigarettes a week  OTC Pain Medication: Recommended Tylenol prn     Anticoagulation Clinic INR History:  Date 9/19 10/16 11/7 11/19 12/3 12/9 1/3/2020 2/3-2/10 2/13 2/17 2/24 3/2 3/16   Total Weekly Dose 80mg 80 mg 77.5 mg 77.5mg 57.5 mg 80mg 72.5mg hosp: admission 65mg 67.5 mg 65 mg 65 mg 65mg   INR 2.6 3.4 3.1 2.7 1.5 2.1 2.6   3.0 3 2.7 2.4 1.9   Notes         S/p c'scope, librado greens 1 boost Decr cig use Diverticulitis; end colostomy stopped smoking; recent admission        Green beans (HH)      Date  3/24 4/1 4/10 5/6 5/20 6/8 6/19 7/31 8/31 10/14 12/7 1/5/2021 1/8 1/13 1/20 1/27 2/2   Total Weekly Dose 67.5mg 72.5 mg 70mg 70mg 70mg 55mg? 65mg 65mg 67.5mg 67.5 67.5 mg 27.5mg colonoscopy 45mg 70mg 70mg  75mg   INR 1.8 2.23 2.8 3.4 3.1 2.0 2.5 2.0 2.3 2.5 2.1 1.1   1.2 1.8 1.7 2.5   Notes   Extra dose   Less GLV Less GLV Missed x1   incr GLV       Hold x4 Hold x 7 days missed x1               Warfarin Dosing During Admission 1/28:  Date  2/3 2/4 2/5 2/6 2/7 2/8 2/9 2/10   INR  1.38 1.55 1.88 2.22 2.1 2.2 2.4 2.46   Dose  10mg 10mg 7.5mg 7.5mg  10mg 10mg 10mg 10mg      Date 3/17 4/1-4/15 4/16 4/19 4/23 5/7 5/21 6/1    10/4    12/22  1/6     Total Weekly Dose 60mg? [facility] admission 42.5mg?? 47.5mg? 45mg 45 mg 45mg 52.5mg  noncompliance  57.5mg  non compliance  50mg  47.5mg     INR 1.8   2.2 2.0 2.2 2.95 venipuncture 1.4 1.5    2.1    1.7  1.5     Notes Miss x 1; self adjust dose reverse  "cholestomy; hemorrhage; vit k       POC 4.5 x2   Inc GLV; lovenox        1x miss?  inc tobacco, 1x miss inc GLV         Date 1/6/22-3/21/22 3/21 3/28 Non-compliant 5/19 8/11 9/22 11/11 1/16/23   Total Weekly Dose Noncompliance  57.5 mg 60mg  65 mg Non compliant 45mg? Non compliant 65mg? 52.5mg? ??   INR  1.7 1.3  2.0  1.6  3.4 2.0 1.4   Notes       2x miss   2x miss \"some doses missed\"     Date 3/10   6/1  9/22    Total Weekly Dose 55mg 62.5 mg Non compliant 62.5 mg Non compliant 45 mg Non compliant   INR 2.0   2.1  2.0    Notes Miss x1     2x miss      Date 11/22 12/12          Total Weekly Dose  62.5 mg          INR 1.8 2.1          Notes 1x miss Rec'd 12/13              Phone Interview:  Tablet Strength: 5mg tablet  Verbal Release Authorization signed on 9/19/19-- may speak with Jammie Michael (mother) John Rodriguez (father)  Patient contact info: [phone] (Mobile)      Patient Findings  Negatives: Signs/symptoms of thrombosis, Signs/symptoms of bleeding, Laboratory test error suspected, Change in health, Change in alcohol use, Change in activity, Upcoming invasive procedure, Emergency department visit, Upcoming dental procedure, Missed doses, Extra doses, Change in medications, Change in diet/appetite, Hospital admission, Bruising, Other complaints   Comments: All findings negative per pt.     Plan:  1. INR is therapeutic at 2.1 following extensive noncompliance with clinic and patient missed a dose prior to INR check. Will continue warfarin 10mg daily except 7.5mg SunTUESThurs for now until recheck.   2. Repeat INR in 1 week on 12/20/23 to ensure WNL  3. Verbal and written information provided in the clinic.  Brigida Rodriguez RBV dosing instructions, expresses understanding by teach back, and has no further questions at this time.    Kimberly Washington,  Certified Pharmacy Technician  12/14/2023 16:24 EST      I, Grace Coyle, PharmD, have reviewed the note in full and agree with the assessment and " plan.  12/15/23  10:48 EST

## 2023-12-28 DIAGNOSIS — K57.80 PERFORATED DIVERTICULUM: ICD-10-CM

## 2023-12-28 DIAGNOSIS — Z86.711 HISTORY OF PULMONARY EMBOLISM: ICD-10-CM

## 2023-12-28 DIAGNOSIS — I10 ESSENTIAL HYPERTENSION: ICD-10-CM

## 2023-12-28 RX ORDER — LISINOPRIL 10 MG/1
10 TABLET ORAL DAILY
Qty: 30 TABLET | Refills: 0 | Status: SHIPPED | OUTPATIENT
Start: 2023-12-28

## 2023-12-28 RX ORDER — PANTOPRAZOLE SODIUM 40 MG/1
40 TABLET, DELAYED RELEASE ORAL
Qty: 30 TABLET | Refills: 0 | Status: SHIPPED | OUTPATIENT
Start: 2023-12-28

## 2023-12-28 RX ORDER — WARFARIN SODIUM 5 MG/1
TABLET ORAL
Qty: 24 TABLET | Refills: 0 | Status: SHIPPED | OUTPATIENT
Start: 2023-12-28

## 2023-12-28 NOTE — TELEPHONE ENCOUNTER
Rx Refill Note  Requested Prescriptions     Pending Prescriptions Disp Refills    lisinopril (PRINIVIL,ZESTRIL) 10 MG tablet 30 tablet 0     Sig: Take 1 tablet by mouth Daily.    pantoprazole (PROTONIX) 40 MG EC tablet 30 tablet 0     Sig: Take 1 tablet by mouth Every Morning Before Breakfast.    warfarin (COUMADIN) 5 MG tablet 24 tablet 0     Sig: TAKE 1.5-2 TABLETS BY MOUTH DAILY or as directed by the anticoagulation clinic      Last office visit with prescribing clinician: 9/6/2023     Next office visit with prescribing clinician: Return in about 4 weeks (around 10/4/2023) for htn.     NEEDS APPT     Shanell Maravilla MA  12/28/23, 13:25 EST

## 2024-01-24 RX ORDER — WARFARIN SODIUM 5 MG/1
TABLET ORAL
Qty: 60 TABLET | Refills: 0 | Status: SHIPPED | OUTPATIENT
Start: 2024-01-24

## 2024-02-04 DIAGNOSIS — I10 ESSENTIAL HYPERTENSION: ICD-10-CM

## 2024-02-05 RX ORDER — LISINOPRIL 10 MG/1
10 TABLET ORAL DAILY
Qty: 90 TABLET | Refills: 0 | Status: SHIPPED | OUTPATIENT
Start: 2024-02-05

## 2024-02-07 ENCOUNTER — ANTICOAGULATION VISIT (OUTPATIENT)
Dept: PHARMACY | Facility: HOSPITAL | Age: 44
End: 2024-02-07
Payer: COMMERCIAL

## 2024-02-07 DIAGNOSIS — Z86.711 HISTORY OF PULMONARY EMBOLISM: Primary | ICD-10-CM

## 2024-02-07 LAB
INR PPP: 2 (ref 0.9–1.2)
PROTHROMBIN TIME: 20.1 SEC (ref 9.1–12)

## 2024-02-09 DIAGNOSIS — K57.80 PERFORATED DIVERTICULUM: ICD-10-CM

## 2024-02-09 DIAGNOSIS — F33.9 RECURRENT MAJOR DEPRESSIVE DISORDER, REMISSION STATUS UNSPECIFIED: ICD-10-CM

## 2024-02-09 DIAGNOSIS — F41.1 GENERALIZED ANXIETY DISORDER: ICD-10-CM

## 2024-02-09 RX ORDER — BUPROPION HYDROCHLORIDE 150 MG/1
150 TABLET, EXTENDED RELEASE ORAL 2 TIMES DAILY
Qty: 180 TABLET | Refills: 0 | Status: SHIPPED | OUTPATIENT
Start: 2024-02-09

## 2024-02-09 RX ORDER — PANTOPRAZOLE SODIUM 40 MG/1
40 TABLET, DELAYED RELEASE ORAL
Qty: 90 TABLET | Refills: 0 | Status: SHIPPED | OUTPATIENT
Start: 2024-02-09

## 2024-02-09 RX ORDER — PANTOPRAZOLE SODIUM 40 MG/1
40 TABLET, DELAYED RELEASE ORAL
Qty: 90 TABLET | Refills: 0 | OUTPATIENT
Start: 2024-02-09

## 2024-02-12 RX ORDER — HYDROXYZINE HYDROCHLORIDE 25 MG/1
25 TABLET, FILM COATED ORAL 3 TIMES DAILY PRN
Qty: 90 TABLET | Refills: 0 | OUTPATIENT
Start: 2024-02-12

## 2024-02-23 RX ORDER — WARFARIN SODIUM 5 MG/1
TABLET ORAL
Qty: 60 TABLET | Refills: 0 | Status: SHIPPED | OUTPATIENT
Start: 2024-02-23

## 2024-02-23 NOTE — TELEPHONE ENCOUNTER
Refill requested by patient. Patient with extensive history of noncompliance. Patient was set to recheck INR 2/20/24 but has not to this point. Have sent in 1 month supply with further refills to be given with INR recheck.    Maximo Hernandez, BrandenD, BCPS  2/23/2024  15:59 EST

## 2024-02-24 LAB
INR PPP: 1.2 (ref 0.9–1.2)
PROTHROMBIN TIME: 13 SEC (ref 9.1–12)

## 2024-02-26 ENCOUNTER — ANTICOAGULATION VISIT (OUTPATIENT)
Dept: PHARMACY | Facility: HOSPITAL | Age: 44
End: 2024-02-26
Payer: COMMERCIAL

## 2024-02-26 DIAGNOSIS — Z86.711 HISTORY OF PULMONARY EMBOLISM: Primary | ICD-10-CM

## 2024-02-26 RX ORDER — ENOXAPARIN SODIUM 100 MG/ML
INJECTION SUBCUTANEOUS
Qty: 16 ML | Refills: 1 | Status: SHIPPED | OUTPATIENT
Start: 2024-02-26

## 2024-02-26 NOTE — PROGRESS NOTES
Anticoagulation Clinic Progress Note     Indication: Hx of PE and LE DVT (2010, 2014)  Referring Provider: Dr Tom  Initial Warfarin Start Date: 2010  Planned Duration of Therapy: lifelong  Goal INR: 2.0-3.0 (verified Dr Tom 9/19/19)  Will likely need lovenox perioperatively per Dr Tom 7/29/20  Current Drug Interactions: duloxetine, mirtazapine and Pantoprazole  Other: d/c Eliquis 9/6/19 due to itching     Diet: green beans,broccoli (1/week), salads daily (iceburg lettuce, carrots, and red cabbage mixture) 5/21/2021  Alcohol: None  Tobacco: Smokes ~5 cigarettes a week  OTC Pain Medication: Recommended Tylenol prn     Anticoagulation Clinic INR History:  Date 9/19 10/16 11/7 11/19 12/3 12/9 1/3/2020 2/3-2/10 2/13 2/17 2/24 3/2 3/16   Total Weekly Dose 80mg 80 mg 77.5 mg 77.5mg 57.5 mg 80mg 72.5mg hosp: admission 65mg 67.5 mg 65 mg 65 mg 65mg   INR 2.6 3.4 3.1 2.7 1.5 2.1 2.6   3.0 3 2.7 2.4 1.9   Notes         S/p c'scope, librado greens 1 boost Decr cig use Diverticulitis; end colostomy stopped smoking; recent admit       Green beans (HH)      Date  3/24 4/1 4/10 5/6 5/20 6/8 6/19 7/31 8/31 10/14 12/7 1/5/2021 1/8 1/13 1/20 1/27 2/2   Total Weekly Dose 67.5mg 72.5 mg 70mg 70mg 70mg 55mg? 65mg 65mg 67.5mg 67.5 67.5 mg 27.5mg colonoscopy 45mg 70mg 70mg  75mg   INR 1.8 2.23 2.8 3.4 3.1 2.0 2.5 2.0 2.3 2.5 2.1 1.1   1.2 1.8 1.7 2.5   Notes   Extra dose   Less GLV Less GLV Missed x1   incr GLV       Hold x4 Hold x 7 days missed x1            Date 3/17 4/1-4/15 4/16 4/19 4/23 5/7 5/21 6/1    10/4    12/22  1/6     Total Weekly Dose 60mg? [facility] admission 42.5mg?? 47.5mg? 45mg 45 mg 45mg 52.5mg  non-compliance  57.5mg  non compliance  50mg  47.5mg     INR 1.8   2.2 2.0 2.2 2.95 venipuncture 1.4 1.5    2.1    1.7  1.5     Notes Miss x 1; self adjust dose reverse cholestomy; hemorrhage; vit k       POC 4.5 x2   Inc GLV; lovenox        1x miss?  inc tobacco, 1x miss inc GLV         Date 1/6/22-3/21/22 3/21 3/28 Non-compliant  "5/19 8/11 9/22 11/11 1/16/23   Total Weekly Dose Noncompliance  57.5 mg 60mg  65 mg Non compliant 45mg? Non compliant 65mg? 52.5mg? ??   INR  1.7 1.3  2.0  1.6  3.4 2.0 1.4   Notes       2x miss   2x miss \"some doses missed\"     Date 3/10   6/1  9/22  11/22  12/12/23   2/6/24   Total Weekly Dose 55mg 62.5 mg Non compliant 62.5 mg Non compliant 45 mg Non compliant ? 62.5 mg Lost to follow up  62.5 mg   INR 2.0   2.1  2.0  1.8  2.1   2   Notes Miss x1     2x miss  1xmiss Rec 12/13  Inc GLV     Date  2/23               Total Weekly  Dose  45 mg???               INR  1.2               Notes  Inc GLV, missedx2?  enox                Phone Interview:  Tablet Strength: 5mg tablet  Verbal Release Authorization signed on 9/19/19-- may speak with Jammie Rodriguez (mother) John Rodriguez (father)  Patient contact info: [phone] (Mobile)      Patient Findings  Positives: Missed doses, Change in diet/appetite   Negatives: Signs/symptoms of thrombosis, Signs/symptoms of bleeding, Laboratory test error suspected, Change in health, Change in alcohol use, Change in activity, Upcoming invasive procedure, Emergency department visit, Upcoming dental procedure, Extra doses, Change in medications, Hospital admission, Bruising, Other complaints   Comments: He believes that he missed two doses over the past couple weeks but is unsure which days.  He has continued his increased intake of GLV.  Discussed enoxaparin bridge until recheck later this week as his INR was essentially at baseline.  Dr. Tom had recommended previously.  He stated that his weight is 370 pounds (~ 168 kg); recommend 160 mg subcut q12h until recheck.    Otherwise, above findings negative     Plan:    1. INR was SUB therapeutic on 2/23 at 1.2 (goal 2 to 3).  Results received today.  He has an extensive history of non-compliance.    Instructed Mr. Rodriguez to BOOST warfarin 12.5 mg tonight, 10 mg tomorrow, then on Wednesday, resume warfarin 10mg oral daily except 7.5mg " [place] until recheck.  He will begin enoxaparin 160 mg subcut q12h until recheck.  He plans to continue recent increased intake of GLV.  Will continue to evaluate for maintenance dosing needs.  2. Repeat INR on 2/29/24 to ensure WNL  3. Verbal and written information provided in the clinic.  Brigida Rodriguez RBV dosing instructions, expresses understanding by teach back, and has no further questions at this time.  4. Rx enoxaparin sent    Nishi Hooper, PharmD  2/26/2024   08:37 EST

## 2024-02-29 ENCOUNTER — TELEPHONE (OUTPATIENT)
Dept: PHARMACY | Facility: HOSPITAL | Age: 44
End: 2024-02-29
Payer: COMMERCIAL

## 2024-03-07 LAB — INR PPP: 2.5

## 2024-03-08 ENCOUNTER — ANTICOAGULATION VISIT (OUTPATIENT)
Dept: PHARMACY | Facility: HOSPITAL | Age: 44
End: 2024-03-08
Payer: COMMERCIAL

## 2024-03-08 DIAGNOSIS — Z86.711 HISTORY OF PULMONARY EMBOLISM: Primary | ICD-10-CM

## 2024-03-08 LAB
INR PPP: 2.5 (ref 0.9–1.2)
PROTHROMBIN TIME: 24.8 SEC (ref 9.1–12)

## 2024-03-08 NOTE — PROGRESS NOTES
Anticoagulation Clinic Progress Note     Indication: Hx of PE and LE DVT (2010, 2014)  Referring Provider: Dr Tom  Initial Warfarin Start Date: 2010  Planned Duration of Therapy: lifelong  Goal INR: 2.0-3.0 (verified Dr Tom 9/19/19)  Will likely need lovenox perioperatively per Dr Tom 7/29/20  Current Drug Interactions: duloxetine, mirtazapine and Pantoprazole  Other: d/c Eliquis 9/6/19 due to itching     Diet: green beans,broccoli (1/week), salads daily (iceburg lettuce, carrots, and red cabbage mixture) 5/21/2021  Alcohol: None  Tobacco: Smokes ~5 cigarettes a week  OTC Pain Medication: Recommended Tylenol prn     Anticoagulation Clinic INR History:  Date 9/19 10/16 11/7 11/19 12/3 12/9 1/3/2020 2/3-2/10 2/13 2/17 2/24 3/2 3/16   Total Weekly Dose 80mg 80 mg 77.5 mg 77.5mg 57.5 mg 80mg 72.5mg hosp: admission 65mg 67.5 mg 65 mg 65 mg 65mg   INR 2.6 3.4 3.1 2.7 1.5 2.1 2.6   3.0 3 2.7 2.4 1.9   Notes         S/p c'scope, librado greens 1 boost Decr cig use Diverticulitis; end colostomy stopped smoking; recent admit       Green beans (HH)      Date  3/24 4/1 4/10 5/6 5/20 6/8 6/19 7/31 8/31 10/14 12/7 1/5/2021 1/8 1/13 1/20 1/27 2/2   Total Weekly Dose 67.5mg 72.5 mg 70mg 70mg 70mg 55mg? 65mg 65mg 67.5mg 67.5 67.5 mg 27.5mg colonoscopy 45mg 70mg 70mg  75mg   INR 1.8 2.23 2.8 3.4 3.1 2.0 2.5 2.0 2.3 2.5 2.1 1.1   1.2 1.8 1.7 2.5   Notes   Extra dose   Less GLV Less GLV Missed x1   incr GLV       Hold x4 Hold x 7 days missed x1            Date 3/17 4/1-4/15 4/16 4/19 4/23 5/7 5/21 6/1    10/4    12/22  1/6     Total Weekly Dose 60mg? [facility] admission 42.5mg?? 47.5mg? 45mg 45 mg 45mg 52.5mg  non-compliance  57.5mg  non compliance  50mg  47.5mg     INR 1.8   2.2 2.0 2.2 2.95 venipuncture 1.4 1.5    2.1    1.7  1.5     Notes Miss x 1; self adjust dose reverse cholestomy; hemorrhage; vit k       POC 4.5 x2   Inc GLV; lovenox        1x miss?  inc tobacco, 1x miss inc GLV         Date 1/6/22-3/21/22 3/21 3/28 Non-compliant  "5/19 8/11 9/22 11/11 1/16/23   Total Weekly Dose Noncompliance  57.5 mg 60mg  65 mg Non compliant 45mg? Non compliant 65mg? 52.5mg? ??   INR  1.7 1.3  2.0  1.6  3.4 2.0 1.4   Notes       2x miss   2x miss \"some doses missed\"     Date 3/10   6/1  9/22  11/22  12/12/23   2/6/24   Total Weekly Dose 55mg 62.5 mg Non compliant 62.5 mg Non compliant 45 mg Non compliant ? 62.5 mg Lost to follow up  62.5 mg   INR 2.0   2.1  2.0  1.8  2.1   2   Notes Miss x1     2x miss  1xmiss Rec 12/13  Inc GLV     Date  2/23 3/7              Total Weekly  Dose  45 mg??? 70 mg              INR  1.2 2.5              Notes  Inc GLV, missedx2?  Rec'd 3/8                Phone Interview:  Tablet Strength: 5mg tablet  Verbal Release Authorization signed on 9/19/19-- may speak with Jammie Rodriguez (mother) John Rodriguez (father)  Patient contact info: [phone] (Mobile)      Patient Findings  Positives: Extra doses   Negatives: Signs/symptoms of thrombosis, Signs/symptoms of bleeding, Laboratory test error suspected, Change in health, Change in alcohol use, Change in activity, Upcoming invasive procedure, Emergency department visit, Upcoming dental procedure, Missed doses, Change in medications, Change in diet/appetite, Hospital admission, Bruising, Other complaints   Comments: Patient self-adjusted dose to 10 mg daily for the past ~5 days since he was smoking more after recent passing of a family member. Can't remember exact dosing from last week though.       Plan:  1. INR therapeutic yesterday at 2.5 (goal 2 to 3).  Results received today 3/8.  He has an extensive history of non-compliance. Patient self-adjusted doses to warfarin 10 mg daily as he has been smoking more after the recent passing of a family member. Instructed Mr. Rodriguez to continue self-adjusted dose of warfarin 10mg oral daily until recheck. Will continue to evaluate for maintenance dosing needs.  2. Repeat INR in two weeks, 3/21.  3. Verbal and written information provided in " the clinic.  Brigida Rodriguez RBV dosing instructions, expresses understanding by teach back, and has no further questions at this time.  4. Rx enoxaparin sent    Maximo Hernandez, PharmD  3/8/2024   08:47 EST

## 2024-03-28 DIAGNOSIS — Z86.711 HISTORY OF PULMONARY EMBOLISM: Primary | ICD-10-CM

## 2024-03-28 RX ORDER — WARFARIN SODIUM 5 MG/1
TABLET ORAL
Qty: 28 TABLET | Refills: 0 | Status: SHIPPED | OUTPATIENT
Start: 2024-03-28

## 2024-03-29 ENCOUNTER — OFFICE VISIT (OUTPATIENT)
Dept: FAMILY MEDICINE CLINIC | Facility: CLINIC | Age: 44
End: 2024-03-29
Payer: COMMERCIAL

## 2024-03-29 VITALS
BODY MASS INDEX: 38.36 KG/M2 | OXYGEN SATURATION: 99 % | WEIGHT: 315 LBS | SYSTOLIC BLOOD PRESSURE: 130 MMHG | DIASTOLIC BLOOD PRESSURE: 90 MMHG | HEIGHT: 76 IN | HEART RATE: 77 BPM

## 2024-03-29 DIAGNOSIS — Z72.0 TOBACCO USE: ICD-10-CM

## 2024-03-29 DIAGNOSIS — R06.09 DYSPNEA ON EXERTION: Primary | ICD-10-CM

## 2024-03-29 DIAGNOSIS — I10 ESSENTIAL HYPERTENSION: ICD-10-CM

## 2024-03-29 PROCEDURE — 3075F SYST BP GE 130 - 139MM HG: CPT | Performed by: PHYSICIAN ASSISTANT

## 2024-03-29 PROCEDURE — 99214 OFFICE O/P EST MOD 30 MIN: CPT | Performed by: PHYSICIAN ASSISTANT

## 2024-03-29 PROCEDURE — 3080F DIAST BP >= 90 MM HG: CPT | Performed by: PHYSICIAN ASSISTANT

## 2024-03-29 RX ORDER — CARVEDILOL 6.25 MG/1
6.25 TABLET ORAL 2 TIMES DAILY WITH MEALS
Qty: 60 TABLET | Refills: 0 | Status: SHIPPED | OUTPATIENT
Start: 2024-03-29

## 2024-03-29 NOTE — PROGRESS NOTES
"    Chief Complaint   Patient presents with    Shortness of Breath     SOB and knee is buckling   Wants to know if his referral is still valid or will need new one   Would like scans for lungs   Needs work note       HPI     Brigida Rodriguez is a pleasant 44 y.o. 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 Tom who presents with shortness of breath with exertion for the last few weeks. He has symptoms going up 1 flight of stairs and at times just walking 15-20 feet. He reports compliance on warfarin; INRs have been therapeutic. His last INR was 2.5 on 3/7. He has wheezing if he laughs and can produce sputum intermittently. Of note, he started smoking again when he went back to work 1 month ago. Has history of 1 ppd tobacco use for 20 yrs. He denies worsening leg swelling, wears compression stockings to work. No chest pain, fever, chills, increased cough, hemoptysis, orthopnea, or PND. Few days ago had episode of vomiting and reports throwing up small amount of bright red blood after eating a meal. This has not persisted.     Past Medical History:   Diagnosis Date    Anxiety     Depression     GERD (gastroesophageal reflux disease)     H/O blood clots     Heart failure     Hypertension     Presence of IVC filter     Pulmonary embolism     10 YEARS AGO       Past Surgical History:   Procedure Laterality Date    ADENOIDECTOMY      EXPLORATORY LAPAROTOMY N/A 1/30/2020    Procedure: EXPLORATORY LAPAROTOMY,  SIGMOID COLECTOMY, COLOSTOMY, EGD;  Surgeon: Taye Valenzuela MD;  Location: [facility];  Service: General    KNEE SURGERY      TONSILLECTOMY         Family History   Problem Relation Age of Onset    Diabetes Mother     Obesity Maternal Grandmother     Diabetes Maternal Grandmother     Cancer Father         prostrate    No Known Problems Sister     " No Known Problems Brother     No Known Problems Maternal Grandfather     No Known Problems Paternal Grandmother     No Known Problems Paternal Grandfather        Social History     Socioeconomic History    Marital status:    Tobacco Use    Smoking status: Every Day     Current packs/day: 0.50     Average packs/day: 0.5 packs/day for 20.0 years (10.0 ttl pk-yrs)     Types: Cigarettes     Passive exposure: Never    Smokeless tobacco: Never   Vaping Use    Vaping status: Never Used   Substance and Sexual Activity    Alcohol use: Yes     Alcohol/week: 6.0 standard drinks of alcohol     Types: 6 Cans of beer per week    Drug use: Yes     Types: Marijuana     Comment: everyday smoker    Sexual activity: Yes     Partners: Female     Birth control/protection: Condom       No Known Allergies    ROS    Review of Systems   Constitutional:  Negative for chills and fever.   Respiratory:  Positive for shortness of breath and wheezing. Negative for cough.    Cardiovascular:  Negative for chest pain.       Vitals:    03/29/24 1400   BP: 130/90   Pulse:    SpO2:      Body mass index is 47.2 kg/m².      Current Outpatient Medications:     acyclovir (Zovirax) 400 MG tablet, Take 1 tablet by mouth Daily As Needed (PRN). Take no more than 5 doses during flare up., Disp: 30 tablet, Rfl: 0    buPROPion SR (WELLBUTRIN SR) 150 MG 12 hr tablet, Take 1 tablet by mouth 2 (Two) Times a Day., Disp: 180 tablet, Rfl: 0    carvedilol (COREG) 6.25 MG tablet, Take 1 tablet by mouth 2 (Two) Times a Day With Meals., Disp: 60 tablet, Rfl: 0    DULoxetine (Cymbalta) 60 MG capsule, Take 1 capsule by mouth Daily., Disp: 90 capsule, Rfl: 0    hydrOXYzine (ATARAX) 25 MG tablet, Take 1 tablet by mouth 3 (Three) Times a Day As Needed for Anxiety., Disp: 90 tablet, Rfl: 0    lisinopril (PRINIVIL,ZESTRIL) 10 MG tablet, TAKE 1 TABLET BY MOUTH DAILY, Disp: 90 tablet, Rfl: 0    pantoprazole (PROTONIX) 40 MG EC tablet, TAKE ONE TABLET BY MOUTH EVERY MORNING  BEFORE BREAKFAST, Disp: 90 tablet, Rfl: 0    warfarin (Coumadin) 5 MG tablet, Take 1-2 tablets by mouth once daily or as directed by the anticoagulation clinic. Patient needs to recheck INR for additional refills., Disp: 28 tablet, Rfl: 0    mirtazapine (Remeron) 15 MG tablet, Take 1 tablet by mouth Every Night. (Patient not taking: Reported on 3/29/2024), Disp: 30 tablet, Rfl: 0    Multiple Vitamins-Minerals (HM MULTIVITAMIN ADULT GUMMY PO), Take  by mouth. (Patient not taking: Reported on 3/29/2024), Disp: , Rfl:     PE    Physical Exam  Vitals reviewed.   Constitutional:       General: He is not in acute distress.     Appearance: He is well-developed. He is morbidly obese.   HENT:      Head: Normocephalic and atraumatic.   Eyes:      Conjunctiva/sclera: Conjunctivae normal.   Cardiovascular:      Rate and Rhythm: Normal rate and regular rhythm.      Heart sounds: Normal heart sounds. No murmur heard.  Pulmonary:      Effort: Pulmonary effort is normal. No respiratory distress.      Breath sounds: No wheezing, rhonchi or rales.      Comments: Diffusely decreased breath sounds.   Musculoskeletal:      Cervical back: Normal range of motion.      Comments: BLE varicose veins. No pitting edema.    Skin:     General: Skin is warm and dry.   Neurological:      Mental Status: He is alert.      Gait: Gait normal.   Psychiatric:         Speech: Speech normal.         Behavior: Behavior normal.          A/P    Problem List Items Addressed This Visit          Cardiac and Vasculature    Essential hypertension    Current Assessment & Plan     BP is mildly high today, however, the patient did not take lisinopril or carvedilol today. He was encouraged to take these medications when he gets home. According to EHR refill history, it appears he should be out of carvedilol but he report having this medication at home. I sent a refill of carvedilol to his pharmacy.          Relevant Medications    lisinopril (PRINIVIL,ZESTRIL) 10 MG  tablet    carvedilol (COREG) 6.25 MG tablet    Dyspnea on exertion - Primary    Current Assessment & Plan     New onset the past few weeks after resuming cigarette smoking. No prior diagnosis of COPD. The patient is concerned about this because a family member with COPD recently passed away.   Labs requested today to evaluate for anemia, heart failure, ACS, PE. Check chest x-ray.   Encouraged smoking cessation. Will give patient sample of Trelegy to try and schedule follow-up with his PCP in 2 weeks. He was counseled to present to the ER for worsening shortness of breath or chest pain.          Relevant Orders    CBC & Differential    Comprehensive Metabolic Panel    BNP    D-dimer, Quantitative    XR Chest PA & Lateral    High Sensitivity Troponin T       Tobacco    Active Tobacco use       Plan of care was reviewed with patient at the conclusion of today's visit. Education was provided regarding diagnoses, management, prescribed or recommended OTC products, and the importance of compliance with follow-up appointments. The patient was counseled regarding the risks, benefits, and possible side-effects of treatment. I advised the patient to keep me informed of any acute changes in their status including new, worsening, or persistent symptoms. Patient expresses understanding and agreement with the management plan.        Hadley Tavera PA-C

## 2024-03-29 NOTE — ASSESSMENT & PLAN NOTE
New onset the past few weeks after resuming cigarette smoking. No prior diagnosis of COPD. The patient is concerned about this because a family member with COPD recently passed away.   Labs requested today to evaluate for anemia, heart failure, ACS, PE. Check chest x-ray.   Encouraged smoking cessation. Will give patient sample of Trelegy to try and schedule follow-up with his PCP in 2 weeks. He was counseled to present to the ER for worsening shortness of breath or chest pain.

## 2024-03-29 NOTE — ASSESSMENT & PLAN NOTE
BP is mildly high today, however, the patient did not take lisinopril or carvedilol today. He was encouraged to take these medications when he gets home. According to EHR refill history, it appears he should be out of carvedilol but he report having this medication at home. I sent a refill of carvedilol to his pharmacy.

## 2024-04-12 ENCOUNTER — TELEPHONE (OUTPATIENT)
Dept: PHARMACY | Facility: HOSPITAL | Age: 44
End: 2024-04-12

## 2024-04-12 DIAGNOSIS — Z86.711 HISTORY OF PULMONARY EMBOLISM: ICD-10-CM

## 2024-04-12 RX ORDER — WARFARIN SODIUM 5 MG/1
TABLET ORAL
Qty: 30 TABLET | Refills: 0 | Status: SHIPPED | OUTPATIENT
Start: 2024-04-12

## 2024-04-12 NOTE — TELEPHONE ENCOUNTER
Pt called and asked for a refill of his warfarin... pt has not given us an INR since beginning of March.  Brooke gave him a refill for 20-30 days so he has time to go get one taken.   Expressed to patient he needs to go get INR checked before we will refill his medication again.   Pt understood and said he would get tested.    Giselle García  Pharmacy Technician   4/12/2024 12:00 EDT

## 2024-05-02 DIAGNOSIS — I10 ESSENTIAL HYPERTENSION: ICD-10-CM

## 2024-05-02 DIAGNOSIS — Z86.711 HISTORY OF PULMONARY EMBOLISM: ICD-10-CM

## 2024-05-02 RX ORDER — WARFARIN SODIUM 5 MG/1
TABLET ORAL
Qty: 30 TABLET | Refills: 0 | Status: SHIPPED | OUTPATIENT
Start: 2024-05-02

## 2024-05-02 RX ORDER — LISINOPRIL 10 MG/1
10 TABLET ORAL DAILY
Qty: 90 TABLET | Refills: 0 | Status: SHIPPED | OUTPATIENT
Start: 2024-05-02

## 2024-05-02 NOTE — TELEPHONE ENCOUNTER
Rx Refill Note  Requested Prescriptions     Pending Prescriptions Disp Refills    warfarin (Coumadin) 5 MG tablet 30 tablet 0     Sig: Take 1-2 tablets by mouth once daily or as directed by the anticoagulation clinic. Patient needs to recheck INR for additional refills.     Signed Prescriptions Disp Refills    lisinopril (PRINIVIL,ZESTRIL) 10 MG tablet 90 tablet 0     Sig: Take 1 tablet by mouth Daily.     Authorizing Provider: ADELITA TOVAR     Ordering User: ALEJANDRA QUINTANILLA      Last office visit with prescribing clinician: 9/6/2023   Last telemedicine visit with prescribing clinician: Visit date not found   Next office visit with prescribing clinician: Visit date not found                         Would you like a call back once the refill request has been completed: [] Yes [] No    If the office needs to give you a call back, can they leave a voicemail: [] Yes [] No    Alejandra Quintanilla MA  05/02/24, 10:51 EDT

## 2024-05-15 DIAGNOSIS — Z86.711 HISTORY OF PULMONARY EMBOLISM: ICD-10-CM

## 2024-05-15 RX ORDER — WARFARIN SODIUM 5 MG/1
TABLET ORAL
Qty: 30 TABLET | Refills: 0 | Status: SHIPPED | OUTPATIENT
Start: 2024-05-15

## 2024-05-15 NOTE — TELEPHONE ENCOUNTER
Rx Refill Note  Requested Prescriptions     Pending Prescriptions Disp Refills    warfarin (COUMADIN) 5 MG tablet [Pharmacy Med Name: WARFARIN SODIUM 5 MG TABLET] 30 tablet 0     Sig: TAKE 1-2 TABLET(S) BY MOUTH DAILY OR AS DIRECTED BY THE ANTICOAGULATION CLINIC. RECHECK INR FOR ADDITIONAL REFILLS.      Last office visit with prescribing clinician: 9/6/2023   Last telemedicine visit with prescribing clinician: Visit date not found   Next office visit with prescribing clinician: Visit date not found                         Would you like a call back once the refill request has been completed: [] Yes [] No    If the office needs to give you a call back, can they leave a voicemail: [] Yes [] No    Alejandra Pittman MA  05/15/24, 15:03 EDT

## 2024-05-18 DIAGNOSIS — B00.9 HERPES: ICD-10-CM

## 2024-05-18 DIAGNOSIS — K57.80 PERFORATED DIVERTICULUM: ICD-10-CM

## 2024-05-18 RX ORDER — PANTOPRAZOLE SODIUM 40 MG/1
40 TABLET, DELAYED RELEASE ORAL
Qty: 90 TABLET | Refills: 0 | Status: SHIPPED | OUTPATIENT
Start: 2024-05-18

## 2024-05-18 RX ORDER — ACYCLOVIR 400 MG/1
400 TABLET ORAL DAILY PRN
Qty: 30 TABLET | Refills: 0 | Status: SHIPPED | OUTPATIENT
Start: 2024-05-18

## 2024-05-18 NOTE — TELEPHONE ENCOUNTER
Rx Refill Note  Requested Prescriptions     Signed Prescriptions Disp Refills    acyclovir (Zovirax) 400 MG tablet 30 tablet 0     Sig: Take 1 tablet by mouth Daily As Needed (PRN). Take no more than 5 doses during flare up.     Authorizing Provider: ADELITA TOVAR     Ordering User: KATHLEEN AGUILERA    pantoprazole (PROTONIX) 40 MG EC tablet 90 tablet 0     Sig: Take 1 tablet by mouth Every Morning Before Breakfast.     Authorizing Provider: ADELITA TOVAR     Ordering User: KATHLEEN AGUILERA      Last office visit with prescribing clinician: 3/29/24  Next office visit with prescribing clinician: Return in about 2 weeks (around 4/12/2024), or if symptoms worsen or fail to improve, for f/u with Dr. Tovar to dyspnea.       Kathleen Aguilera MA  05/18/24, 10:56 EDT

## 2024-06-07 ENCOUNTER — ANTICOAGULATION VISIT (OUTPATIENT)
Dept: PHARMACY | Facility: HOSPITAL | Age: 44
End: 2024-06-07
Payer: COMMERCIAL

## 2024-06-07 DIAGNOSIS — Z86.711 HISTORY OF PULMONARY EMBOLISM: Primary | ICD-10-CM

## 2024-06-07 DIAGNOSIS — Z86.711 HISTORY OF PULMONARY EMBOLISM: ICD-10-CM

## 2024-06-07 LAB
INR PPP: 1.5 (ref 0.9–1.2)
PROTHROMBIN TIME: 15.9 SEC (ref 9.1–12)

## 2024-06-07 RX ORDER — ENOXAPARIN SODIUM 150 MG/ML
INJECTION SUBCUTANEOUS
Qty: 10 ML | Refills: 0 | Status: SHIPPED | OUTPATIENT
Start: 2024-06-07

## 2024-06-07 RX ORDER — WARFARIN SODIUM 5 MG/1
TABLET ORAL
Qty: 30 TABLET | Refills: 0 | Status: SHIPPED | OUTPATIENT
Start: 2024-06-07

## 2024-06-07 NOTE — PROGRESS NOTES
Anticoagulation Clinic Progress Note     Indication: Hx of PE and LE DVT (2010, 2014)  Referring Provider: Dr Tom  Initial Warfarin Start Date: 2010  Planned Duration of Therapy: lifelong  Goal INR: 2.0-3.0 (verified Dr Tom 9/19/19)  Will likely need lovenox perioperatively per Dr Tom 7/29/20  Current Drug Interactions: duloxetine, mirtazapine and Pantoprazole  Other: d/c Eliquis 9/6/19 due to itching     Diet: green beans,broccoli (1/week), salads daily (iceburg lettuce, carrots, and red cabbage mixture) 5/21/2021  Alcohol: None  Tobacco: Smokes ~5 cigarettes a week  OTC Pain Medication: Recommended Tylenol prn     Anticoagulation Clinic INR History:  Date 9/19 10/16 11/7 11/19 12/3 12/9 1/3/2020 2/3-2/10 2/13 2/17 2/24 3/2 3/16   Total Weekly Dose 80mg 80 mg 77.5 mg 77.5mg 57.5 mg 80mg 72.5mg hosp: admission 65mg 67.5 mg 65 mg 65 mg 65mg   INR 2.6 3.4 3.1 2.7 1.5 2.1 2.6   3.0 3 2.7 2.4 1.9   Notes         S/p c'scope, librado greens 1 boost Decr cig use Diverticulitis; end colostomy stopped smoking; recent admit       Green beans (HH)      Date  3/24 4/1 4/10 5/6 5/20 6/8 6/19 7/31 8/31 10/14 12/7 1/5/2021 1/8 1/13 1/20 1/27 2/2   Total Weekly Dose 67.5mg 72.5 mg 70mg 70mg 70mg 55mg? 65mg 65mg 67.5mg 67.5 67.5 mg 27.5mg colonoscopy 45mg 70mg 70mg  75mg   INR 1.8 2.23 2.8 3.4 3.1 2.0 2.5 2.0 2.3 2.5 2.1 1.1   1.2 1.8 1.7 2.5   Notes   Extra dose   Less GLV Less GLV Missed x1   incr GLV       Hold x4 Hold x 7 days missed x1            Date 3/17 4/1-4/15 4/16 4/19 4/23 5/7 5/21 6/1    10/4    12/22  1/6     Total Weekly Dose 60mg? [facility] admission 42.5mg?? 47.5mg? 45mg 45 mg 45mg 52.5mg  non-compliance  57.5mg  non compliance  50mg  47.5mg     INR 1.8   2.2 2.0 2.2 2.95 venipuncture 1.4 1.5    2.1    1.7  1.5     Notes Miss x 1; self adjust dose reverse cholestomy; hemorrhage; vit k       POC 4.5 x2   Inc GLV; lovenox        1x miss?  inc tobacco, 1x miss inc GLV         Date 1/6/22-3/21/22 3/21 3/28 Non-compliant  "5/19 8/11 9/22 11/11 1/16/23   Total Weekly Dose Noncompliance  57.5 mg 60mg  65 mg Non compliant 45mg? Non compliant 65mg? 52.5mg? ??   INR  1.7 1.3  2.0  1.6  3.4 2.0 1.4   Notes       2x miss   2x miss \"some doses missed\"     Date 3/10   6/1  9/22  11/22  12/12/23   2/6/24   Total Weekly Dose 55mg 62.5 mg Non compliant 62.5 mg Non compliant 45 mg Non compliant ? 62.5 mg Lost to follow up  62.5 mg   INR 2.0   2.1  2.0  1.8  2.1   2   Notes Miss x1     2x miss  1xmiss Rec 12/13  Inc GLV     Date  2/23 3/7  6/6/24            Total Weekly  Dose  45 mg??? 70 mg Non-compliant  52.5 mg            INR  1.2 2.5  1.5            Notes  Inc GLV, missedx2?  Rec'd 3/8  1x miss   Rec'd 6/7              Phone Interview:  Tablet Strength: 5mg tablet  Verbal Release Authorization signed on 9/19/19-- may speak with Jammie Rodriguez (mother) John Rodriguez (father)  Patient contact info: [phone] (Mobile)        Patient Findings    Positives: Missed doses   Negatives: Signs/symptoms of thrombosis, Signs/symptoms of bleeding, Laboratory test error suspected, Change in health, Change in alcohol use, Change in activity, Upcoming invasive procedure, Emergency department visit, Upcoming dental procedure, Extra doses, Change in medications, Change in diet/appetite, Hospital admission, Bruising, Other complaints   Comments: Mr. Rodriguez stated that he has been taking Warfarin 10 mg oral daily except 7.5 mg on Sun, Tues, and Thurs since we last spoke with him in March. He stated that he missed his dose of warfarin 10 mg on Wednesday this week. He has been more stressed recently so he has been smoking more cigarettes than usual. Stressed the importance of compliance to requested INR recheck dates and staying in contact with clinic. He denies all the other above listed findings.       Plan:  1. INR subtherapeutic yesterday at 1.5 after noncompliance with requested recheck date (goal 2 to 3). He has an extensive history of non-compliance. " Instructed Mr. Rodriguez to boost this evenings dose to warfarin 15 mg and then resume warfarin 10 mg daily except warfarin 7.5 mg on Sunday, Tuesday and Thursday until recheck. He will also do Lovenox 150 mg q12h starting this evening through the weekend (9/6/23 Scr 1.18; CrCl 138.4 mL/min)  2. Recheck INR in 1 week, 6/13. Will order BMP as well for future Lovenox dosing.   3. 2-week supply of warfarin 5 mg tablets and Lovenox 150 mg syringes sent to [facility] in [place]   4. Verbal and written information provided in the clinic.  Brigida Rodriguez RBV dosing instructions, expresses understanding by teach back, and has no further questions at this time.      Shahida Mancuso RPH  6/7/2024   12:58 EDT

## 2024-06-18 ENCOUNTER — PATIENT MESSAGE (OUTPATIENT)
Dept: FAMILY MEDICINE CLINIC | Facility: CLINIC | Age: 44
End: 2024-06-18
Payer: COMMERCIAL

## 2024-06-18 ENCOUNTER — TELEPHONE (OUTPATIENT)
Dept: FAMILY MEDICINE CLINIC | Facility: CLINIC | Age: 44
End: 2024-06-18
Payer: COMMERCIAL

## 2024-06-18 DIAGNOSIS — R05.9 COUGH, UNSPECIFIED TYPE: Primary | ICD-10-CM

## 2024-06-18 RX ORDER — DEXTROMETHORPHAN HYDROBROMIDE AND PROMETHAZINE HYDROCHLORIDE 15; 6.25 MG/5ML; MG/5ML
5 SYRUP ORAL NIGHTLY PRN
Qty: 118 ML | Refills: 0 | Status: SHIPPED | OUTPATIENT
Start: 2024-06-18

## 2024-06-18 NOTE — TELEPHONE ENCOUNTER
Caller: Brigida Rodriguez    Relationship: Self    Best call back number: [phone]     What medication are you requesting: SOMETHING TO CALM COUGH SO CAN REST/SLEEP UNTIL APPT WITH PCP ON 6/19/24    What are your current symptoms: COUGH, PAINFUL AND OFTEN    How long have you been experiencing symptoms: 6/17/2    If a prescription is needed, what is your preferred pharmacy and phone number:    [facility] [id number] - [address] [address] AT [address] - [phone] [facility] [phone]  [phone]       Additional notes: PLEASE CALL PATIENT TO ADVISE IF/WHEN RX IS SENT.  THANK YOU

## 2024-06-28 DIAGNOSIS — F33.9 RECURRENT MAJOR DEPRESSIVE DISORDER, REMISSION STATUS UNSPECIFIED: ICD-10-CM

## 2024-06-28 DIAGNOSIS — B00.9 HERPES: ICD-10-CM

## 2024-06-28 DIAGNOSIS — Z86.711 HISTORY OF PULMONARY EMBOLISM: ICD-10-CM

## 2024-07-01 DIAGNOSIS — Z86.711 HISTORY OF PULMONARY EMBOLISM: ICD-10-CM

## 2024-07-01 RX ORDER — WARFARIN SODIUM 5 MG/1
TABLET ORAL
Qty: 45 TABLET | Refills: 0 | Status: SHIPPED | OUTPATIENT
Start: 2024-07-01

## 2024-07-02 ENCOUNTER — ANTICOAGULATION VISIT (OUTPATIENT)
Dept: PHARMACY | Facility: HOSPITAL | Age: 44
End: 2024-07-02
Payer: COMMERCIAL

## 2024-07-02 DIAGNOSIS — Z86.711 HISTORY OF PULMONARY EMBOLISM: Primary | ICD-10-CM

## 2024-07-02 LAB
INR PPP: 1.1 (ref 0.9–1.2)
PROTHROMBIN TIME: 11.5 SEC (ref 9.1–12)

## 2024-07-02 RX ORDER — BUPROPION HYDROCHLORIDE 150 MG/1
150 TABLET, EXTENDED RELEASE ORAL 2 TIMES DAILY
Qty: 180 TABLET | Refills: 0 | Status: SHIPPED | OUTPATIENT
Start: 2024-07-02

## 2024-07-02 RX ORDER — WARFARIN SODIUM 5 MG/1
TABLET ORAL
Qty: 30 TABLET | Refills: 0 | OUTPATIENT
Start: 2024-07-02

## 2024-07-02 RX ORDER — DULOXETIN HYDROCHLORIDE 60 MG/1
60 CAPSULE, DELAYED RELEASE ORAL DAILY
Qty: 90 CAPSULE | Refills: 0 | Status: SHIPPED | OUTPATIENT
Start: 2024-07-02

## 2024-07-02 RX ORDER — ACYCLOVIR 400 MG/1
400 TABLET ORAL DAILY PRN
Qty: 30 TABLET | Refills: 0 | Status: SHIPPED | OUTPATIENT
Start: 2024-07-02

## 2024-07-02 NOTE — PROGRESS NOTES
Anticoagulation Clinic Progress Note     Indication: Hx of PE and LE DVT (2010, 2014)  Referring Provider: Dr Tom  Initial Warfarin Start Date: 2010  Planned Duration of Therapy: lifelong  Goal INR: 2.0-3.0 (verified Dr Tom 9/19/19)  Will likely need lovenox perioperatively per Dr Tom 7/29/20  Current Drug Interactions: duloxetine, mirtazapine and Pantoprazole  Other: d/c Eliquis 9/6/19 due to itching     Diet: green beans,broccoli (1/week), salads daily (iceburg lettuce, carrots, and red cabbage mixture) 5/21/2021  Alcohol: None  Tobacco: Smokes ~5 cigarettes a week  OTC Pain Medication: Recommended Tylenol prn     Anticoagulation Clinic INR History:  Date 9/19 10/16 11/7 11/19 12/3 12/9 1/3/2020 2/3-2/10 2/13 2/17 2/24 3/2 3/16   Total Weekly Dose 80mg 80 mg 77.5 mg 77.5mg 57.5 mg 80mg 72.5mg hosp: admission 65mg 67.5 mg 65 mg 65 mg 65mg   INR 2.6 3.4 3.1 2.7 1.5 2.1 2.6   3.0 3 2.7 2.4 1.9   Notes         S/p c'scope, librado greens 1 boost Decr cig use Diverticulitis; end colostomy stopped smoking; recent admit       Green beans (HH)      Date  3/24 4/1 4/10 5/6 5/20 6/8 6/19 7/31 8/31 10/14 12/7 1/5/2021 1/8 1/13 1/20 1/27 2/2   Total Weekly Dose 67.5mg 72.5 mg 70mg 70mg 70mg 55mg? 65mg 65mg 67.5mg 67.5 67.5 mg 27.5mg colonoscopy 45mg 70mg 70mg  75mg   INR 1.8 2.23 2.8 3.4 3.1 2.0 2.5 2.0 2.3 2.5 2.1 1.1   1.2 1.8 1.7 2.5   Notes   Extra dose   Less GLV Less GLV Missed x1   incr GLV       Hold x4 Hold x 7 days missed x1            Date 3/17 4/1-4/15 4/16 4/19 4/23 5/7 5/21 6/1    10/4    12/22  1/6     Total Weekly Dose 60mg? [facility] admission 42.5mg?? 47.5mg? 45mg 45 mg 45mg 52.5mg  non-compliance  57.5mg  non compliance  50mg  47.5mg     INR 1.8   2.2 2.0 2.2 2.95 venipuncture 1.4 1.5    2.1    1.7  1.5     Notes Miss x 1; self adjust dose reverse cholestomy; hemorrhage; vit k       POC 4.5 x2   Inc GLV; lovenox        1x miss?  inc tobacco, 1x miss inc GLV         Date 1/6/22-3/21/22 3/21 3/28 Non-compliant  "5/19 8/11 9/22 11/11 1/16/23   Total Weekly Dose Noncompliance  57.5 mg 60mg  65 mg Non compliant 45mg? Non compliant 65mg? 52.5mg? ??   INR  1.7 1.3  2.0  1.6  3.4 2.0 1.4   Notes       2x miss   2x miss \"some doses missed\"     Date 3/10   6/1  9/22  11/22  12/12/23   2/6/24   Total Weekly Dose 55mg 62.5 mg Non compliant 62.5 mg Non compliant 45 mg Non compliant ? 62.5 mg Lost to follow up  62.5 mg   INR 2.0   2.1  2.0  1.8  2.1   2   Notes Miss x1     2x miss  1xmiss Rec 12/13  Inc GLV     Date  2/23 3/7  6/6/24  6/13  7/1          Total Weekly  Dose  45 mg??? 70 mg Non-compliant  52.5 mg  overdue  ??          INR  1.2 2.5  1.5   1.1          Notes  Inc GLV, missedx2?  Rec'd 3/8  1x miss   Rec'd 6/7; inc tobacco  Rec 7/2  Unable to contact            Phone Interview:  Tablet Strength: 5mg tablet  Verbal Release Authorization signed on 9/19/19-- may speak with Jammie Rodriguez (mother) John Rodriguez (father)  Patient contact info: [phone] (Mobile)        UNABLE TO GET IN CONTACT WITH THE PATIENT.   Unable to LVM on 7/2/2024 at 08:30 EDT   LVM on his mother's VM    PLEASE DISREGARD THE FOLLOWING PLAN UNTIL ABLE TO GET IN CONTACT WITH PATIENT/ PATIENT REPRESENTATIVE.        Comments: Mr. Rodriguez stated that he has been taking Warfarin 10 mg oral daily except 7.5 mg on Sun, Tues, and Thurs since we last spoke with him in March. He stated that he missed his dose of warfarin 10 mg on Wednesday this week. He has been more stressed recently so he has been smoking more cigarettes than usual. Stressed the importance of compliance to requested INR recheck dates and staying in contact with clinic. He denies all the other above listed findings.       Plan:  14. Verbal and written information provided in the clinic.  Brigida Hodgeryankenn Rodriguez RBV dosing instructions, expresses understanding by teach back, and has no further questions at this time.      Nishi Hooper, PharmD  7/2/2024   08:26 EDT    "

## 2024-07-02 NOTE — TELEPHONE ENCOUNTER
Rx Refill Note  Requested Prescriptions     Pending Prescriptions Disp Refills    DULoxetine (Cymbalta) 60 MG capsule 90 capsule 0     Sig: Take 1 capsule by mouth Daily.    buPROPion SR (WELLBUTRIN SR) 150 MG 12 hr tablet 180 tablet 0     Sig: Take 1 tablet by mouth 2 (Two) Times a Day.    acyclovir (Zovirax) 400 MG tablet 30 tablet 0     Sig: Take 1 tablet by mouth Daily As Needed (PRN). Take no more than 5 doses during flare up.    warfarin (COUMADIN) 5 MG tablet 30 tablet 0     Sig: TAKE 1-2 TABLET(S) BY MOUTH DAILY OR AS DIRECTED BY THE ANTICOAGULATION CLINIC. RECHECK INR FOR ADDITIONAL REFILLS.      Last office visit with prescribing clinician: 9/6/2023   Last telemedicine visit with prescribing clinician: Visit date not found   Next office visit with prescribing clinician: Visit date not found                         Would you like a call back once the refill request has been completed: [] Yes [] No    If the office needs to give you a call back, can they leave a voicemail: [] Yes [] No    Yamileth Bravo MA  07/02/24, 12:36 EDT

## 2024-07-10 ENCOUNTER — TELEPHONE (OUTPATIENT)
Dept: PHARMACY | Facility: HOSPITAL | Age: 44
End: 2024-07-10

## 2024-07-10 NOTE — TELEPHONE ENCOUNTER
Multiple pharmacist have attempted to contact Mr. Rodriguez regarding his most recent INR results from 7/1/24.  Today an unable to contact letter, and 1st overdue INR letter will be sent.     Ciro Jewell, Pharmacy Technician  7/10/2024  12:30 EDT

## 2024-07-25 ENCOUNTER — TELEPHONE (OUTPATIENT)
Dept: PHARMACY | Facility: HOSPITAL | Age: 44
End: 2024-07-25
Payer: COMMERCIAL

## 2024-07-25 LAB — INR PPP: 1.8
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
## 2024-07-26 ENCOUNTER — ANTICOAGULATION VISIT (OUTPATIENT)
Dept: PHARMACY | Facility: HOSPITAL | Age: 44
End: 2024-07-26
Payer: COMMERCIAL

## 2024-07-26 DIAGNOSIS — Z86.711 HISTORY OF PULMONARY EMBOLISM: Primary | ICD-10-CM

## 2024-07-26 LAB
INR PPP: 1.8 (ref 0.9–1.2)
PROTHROMBIN TIME: 18.6 SEC (ref 9.1–12)

## 2024-07-26 RX ORDER — WARFARIN SODIUM 5 MG/1
TABLET ORAL
Qty: 60 TABLET | Refills: 0 | Status: SHIPPED | OUTPATIENT
Start: 2024-07-26

## 2024-07-26 NOTE — PROGRESS NOTES
Anticoagulation Clinic Progress Note     Indication: Hx of PE and LE DVT (2010, 2014)  Referring Provider: Dr Tom  Initial Warfarin Start Date: 2010  Planned Duration of Therapy: lifelong  Goal INR: 2.0-3.0 (verified Dr Tom 9/19/19)  Will likely need lovenox perioperatively per Dr Tom 7/29/20  Current Drug Interactions: duloxetine, mirtazapine and Pantoprazole  Other: d/c Eliquis 9/6/19 due to itching     Diet: salad 3-4x/week (7/2024)  Alcohol: None  Tobacco: Smokes ~5 cigarettes a week  OTC Pain Medication: Recommended Tylenol prn     Anticoagulation Clinic INR History:  Date 9/19 10/16 11/7 11/19 12/3 12/9 1/3/2020 2/3-2/10 2/13 2/17 2/24 3/2 3/16   Total Weekly Dose 80mg 80 mg 77.5 mg 77.5mg 57.5 mg 80mg 72.5mg hosp: admission 65mg 67.5 mg 65 mg 65 mg 65mg   INR 2.6 3.4 3.1 2.7 1.5 2.1 2.6   3.0 3 2.7 2.4 1.9   Notes         S/p c'scope, librado greens 1 boost Decr cig use Diverticulitis; end colostomy stopped smoking; recent admit       Green beans (HH)      Date  3/24 4/1 4/10 5/6 5/20 6/8 6/19 7/31 8/31 10/14 12/7 1/5/2021 1/8 1/13 1/20 1/27 2/2   Total Weekly Dose 67.5mg 72.5 mg 70mg 70mg 70mg 55mg? 65mg 65mg 67.5mg 67.5 67.5 mg 27.5mg colonoscopy 45mg 70mg 70mg  75mg   INR 1.8 2.23 2.8 3.4 3.1 2.0 2.5 2.0 2.3 2.5 2.1 1.1   1.2 1.8 1.7 2.5   Notes   Extra dose   Less GLV Less GLV Missed x1   incr GLV       Hold x4 Hold x 7 days missed x1            Date 3/17 4/1-4/15 4/16 4/19 4/23 5/7 5/21 6/1    10/4    12/22  1/6     Total Weekly Dose 60mg? [facility] admission 42.5mg?? 47.5mg? 45mg 45 mg 45mg 52.5mg  non-compliance  57.5mg  non compliance  50mg  47.5mg     INR 1.8   2.2 2.0 2.2 2.95 venipuncture 1.4 1.5    2.1    1.7  1.5     Notes Miss x 1; self adjust dose reverse cholestomy; hemorrhage; vit k       POC 4.5 x2   Inc GLV; lovenox        1x miss?  inc tobacco, 1x miss inc GLV         Date 1/6/22-3/21/22 3/21 3/28 Non-compliant 5/19 8/11 9/22 11/11 1/16/23   Total Weekly Dose Noncompliance  57.5 mg 60mg   "65 mg Non compliant 45mg? Non compliant 65mg? 52.5mg? ??   INR  1.7 1.3  2.0  1.6  3.4 2.0 1.4   Notes       2x miss   2x miss \"some doses missed\"     Date 3/10   6/1  9/22  11/22  12/12/23   2/6/24   Total Weekly Dose 55mg 62.5 mg Non compliant 62.5 mg Non compliant 45 mg Non compliant ? 62.5 mg Lost to follow up  62.5 mg   INR 2.0   2.1  2.0  1.8  2.1   2   Notes Miss x1     2x miss  1xmiss Rec 12/13  Inc GLV     Date  2/23 3/7  6/6/24  6/13  7/1 7/25         Total Weekly  Dose  45 mg??? 70 mg Non-compliant  52.5 mg  overdue  ?? 62.5 mg         INR  1.2 2.5  1.5   1.1 1.8         Notes  Inc GLV, missedx2?  Rec'd 3/8  1x miss   Rec'd 6/7; inc tobacco  Rec 7/2  Unable to contact Rec 7/26    Inc GLV           Phone Interview:  Tablet Strength: 5mg tablet  Verbal Release Authorization signed on 9/19/19-- may speak with Jammie Rodriguez (mother) John Rodriguez (father)  Patient contact info:  ; as of 7/25/24: patient using wife's phone for now [phone]    Patient Findings    Positives: Change in diet/appetite   Negatives: Signs/symptoms of thrombosis, Signs/symptoms of bleeding, Laboratory test error suspected, Change in health, Change in alcohol use, Change in activity, Upcoming invasive procedure, Emergency department visit, Upcoming dental procedure, Missed doses, Extra doses, Change in medications, Hospital admission, Bruising, Other complaints   Comments: Patient reports he has been taking warfarin consistently as instructed for the past 2 weeks with no missed doses. Patient consistently eating 3-4 salads/week. He reports he wants to make this a consistent change moving forward, in an effort to eat healthier. He denies any medication changes or signs/sx of bleeding.     Plan:  1. INR was subtherapeutic yesterday at 1.8 after noncompliance with requested recheck date (goal 2.0 to 3.0). He has an extensive history of non-compliance with INR check dates. Instructed Mr. Rodriguez to take warfarin 10 mg daily except 7.5 mg " [place] until recheck. (65 mg/week, ~5% increase in total weekly dose)  2. Recheck INR in 2 weeks, 8/8/24.  3. ~1 month supply of warfarin 5 mg tablets sent to patient's preferred pharmacy today ([facility] in [place]). Will send further refills after next INR check in 2 weeks. Patient in agreement with this plan.  4. Verbal and written information provided in the clinic.  Brigida Rodriguez RBV dosing instructions, expresses understanding by teach back, and has no further questions at this time.      Klaudia Mitchell [facility]  7/26/2024   08:19 EDT

## 2024-07-27 DIAGNOSIS — I10 ESSENTIAL HYPERTENSION: ICD-10-CM

## 2024-07-29 RX ORDER — LISINOPRIL 10 MG/1
10 TABLET ORAL DAILY
Qty: 90 TABLET | Refills: 0 | Status: SHIPPED | OUTPATIENT
Start: 2024-07-29

## 2024-07-29 NOTE — TELEPHONE ENCOUNTER
Rx Refill Note  Requested Prescriptions     Pending Prescriptions Disp Refills    lisinopril (PRINIVIL,ZESTRIL) 10 MG tablet [Pharmacy Med Name: LISINOPRIL 10 MG TABLET] 90 tablet 0     Sig: TAKE 1 TABLET BY MOUTH DAILY      Last office visit with prescribing clinician: 9/6/2023   Last telemedicine visit with prescribing clinician: Visit date not found   Next office visit with prescribing clinician: 7/30/2024                         Would you like a call back once the refill request has been completed: [] Yes [] No    If the office needs to give you a call back, can they leave a voicemail: [] Yes [] No    Yamileth Bravo MA  07/29/24, 08:56 EDT

## 2024-08-08 ENCOUNTER — ANTICOAGULATION VISIT (OUTPATIENT)
Dept: PHARMACY | Facility: HOSPITAL | Age: 44
End: 2024-08-08

## 2024-08-08 DIAGNOSIS — Z86.711 HISTORY OF PULMONARY EMBOLISM: Primary | ICD-10-CM

## 2024-08-08 LAB
INR PPP: 1.8 (ref 0.9–1.2)
PROTHROMBIN TIME: 18.5 SEC (ref 9.1–12)

## 2024-08-08 NOTE — PROGRESS NOTES
Anticoagulation Clinic Progress Note     Indication: Hx of PE and LE DVT (2010, 2014)  Referring Provider: Dr Tom  Initial Warfarin Start Date: 2010  Planned Duration of Therapy: lifelong  Goal INR: 2.0-3.0 (verified Dr Tom 9/19/19)  Will likely need lovenox perioperatively per Dr Tom 7/29/20  Current Drug Interactions: duloxetine, mirtazapine and Pantoprazole  Other: d/c Eliquis 9/6/19 due to itching     Diet: salad 3-4x/week (7/2024)  Alcohol: None  Tobacco: Smokes ~5 cigarettes a week  OTC Pain Medication: Recommended Tylenol prn     Anticoagulation Clinic INR History:  Date 9/19 10/16 11/7 11/19 12/3 12/9 1/3/2020 2/3-2/10 2/13 2/17 2/24 3/2 3/16   Total Weekly Dose 80mg 80 mg 77.5 mg 77.5mg 57.5 mg 80mg 72.5mg hosp: admission 65mg 67.5 mg 65 mg 65 mg 65mg   INR 2.6 3.4 3.1 2.7 1.5 2.1 2.6   3.0 3 2.7 2.4 1.9   Notes         S/p c'scope, librado greens 1 boost Decr cig use Diverticulitis; end colostomy stopped smoking; recent admit       Green beans (HH)      Date  3/24 4/1 4/10 5/6 5/20 6/8 6/19 7/31 8/31 10/14 12/7 1/5/2021 1/8 1/13 1/20 1/27 2/2   Total Weekly Dose 67.5mg 72.5 mg 70mg 70mg 70mg 55mg? 65mg 65mg 67.5mg 67.5 67.5 mg 27.5mg colonoscopy 45mg 70mg 70mg  75mg   INR 1.8 2.23 2.8 3.4 3.1 2.0 2.5 2.0 2.3 2.5 2.1 1.1   1.2 1.8 1.7 2.5   Notes   Extra dose   Less GLV Less GLV Missed x1   incr GLV       Hold x4 Hold x 7 days missed x1            Date 3/17 4/1-4/15 4/16 4/19 4/23 5/7 5/21 6/1    10/4    12/22  1/6     Total Weekly Dose 60mg? [facility] admission 42.5mg?? 47.5mg? 45mg 45 mg 45mg 52.5mg  non-compliance  57.5mg  non compliance  50mg  47.5mg     INR 1.8   2.2 2.0 2.2 2.95 venipuncture 1.4 1.5    2.1    1.7  1.5     Notes Miss x 1; self adjust dose reverse cholestomy; hemorrhage; vit k       POC 4.5 x2   Inc GLV; lovenox        1x miss?  inc tobacco, 1x miss inc GLV         Date 1/6/22-3/21/22 3/21 3/28 Non-compliant 5/19 8/11 9/22 11/11 1/16/23   Total Weekly Dose Noncompliance  57.5 mg 60mg   "65 mg Non compliant 45mg? Non compliant 65mg? 52.5mg? ??   INR  1.7 1.3  2.0  1.6  3.4 2.0 1.4   Notes       2x miss   2x miss \"some doses missed\"     Date 3/10   6/1  9/22  11/22  12/12/23   2/6/24   Total Weekly Dose 55mg 62.5 mg Non compliant 62.5 mg Non compliant 45 mg Non compliant ? 62.5 mg Lost to follow up  62.5 mg   INR 2.0   2.1  2.0  1.8  2.1   2   Notes Miss x1     2x miss  1xmiss Rec 12/13  Inc GLV     Date  2/23 3/7  6/6/24  6/13  7/1 7/25         Total Weekly  Dose  45 mg??? 70 mg Non-compliant  52.5 mg  overdue  ?? 62.5 mg         INR  1.2 2.5  1.5   1.1 1.8         Notes  Inc GLV, missedx2?  Rec'd 3/8  1x miss   Rec'd 6/7; inc tobacco  Rec 7/2  Unable to contact Rec 7/26    Inc GLV           Phone Interview:  Tablet Strength: 5mg tablet  Verbal Release Authorization signed on 9/19/19-- may speak with Jammie Rodriguez (mother) John Rodriguez (father)  Patient contact info:  ; as of 7/25/24: patient using wife's phone for now [phone]      UNABLE TO GET IN CONTACT WITH THE PATIENT.   LVM on 8/8/2024 at 09:27 EDT    PLEASE DISREGARD THE FOLLOWING PLAN UNTIL ABLE TO GET IN CONTACT WITH PATIENT/ PATIENT REPRESENTATIVE.    Plan:  1. INR was subtherapeutic again yesterday at 1.8 (goal 2.0 to 3.0). Instructed Mr. Rodriguez to take warfarin 10 mg daily except 7.5 mg SunThurs until recheck. (65 mg/week, ~5% increase in total weekly dose)  2. Recheck INR in 2 weeks, 8/8/24. He has an extensive history of non-compliance with INR check dates.  3. ~1 month supply of warfarin 5 mg tablets sent to patient's preferred pharmacy today ([facility] in [place]). Will send further refills after next INR check in 2 weeks. Patient in agreement with this plan.  4. Verbal and written information provided in the clinic.  Brigida Rodriguez RBV dosing instructions, expresses understanding by teach back, and has no further questions at this time.      Klaudia Mitchell [facility]  8/8/2024   09:23 EDT    "

## 2024-08-15 DIAGNOSIS — K57.80 PERFORATED DIVERTICULUM: ICD-10-CM

## 2024-08-15 RX ORDER — PANTOPRAZOLE SODIUM 40 MG/1
40 TABLET, DELAYED RELEASE ORAL
Qty: 90 TABLET | Refills: 0 | Status: SHIPPED | OUTPATIENT
Start: 2024-08-15

## 2024-08-15 NOTE — TELEPHONE ENCOUNTER
Rx Refill Note  Requested Prescriptions     Pending Prescriptions Disp Refills    pantoprazole (PROTONIX) 40 MG EC tablet [Pharmacy Med Name: PANTOPRAZOLE SOD DR 40 MG TAB] 90 tablet 0     Sig: TAKE ONE TABLET BY MOUTH EVERY MORNING BEFORE BREAKFAST      Last office visit with prescribing clinician: 9/6/2023   Last telemedicine visit with prescribing clinician: Visit date not found   Next office visit with prescribing clinician: Visit date not found                         Would you like a call back once the refill request has been completed: [] Yes [] No    If the office needs to give you a call back, can they leave a voicemail: [] Yes [] No    Yamileth Bravo MA  08/15/24, 09:09 EDT

## 2024-08-19 DIAGNOSIS — I10 ESSENTIAL HYPERTENSION: ICD-10-CM

## 2024-08-19 DIAGNOSIS — Z86.711 HISTORY OF PULMONARY EMBOLISM: ICD-10-CM

## 2024-08-19 DIAGNOSIS — F33.9 RECURRENT MAJOR DEPRESSIVE DISORDER, REMISSION STATUS UNSPECIFIED: ICD-10-CM

## 2024-08-19 DIAGNOSIS — K57.80 PERFORATED DIVERTICULUM: ICD-10-CM

## 2024-08-19 DIAGNOSIS — B00.9 HERPES: ICD-10-CM

## 2024-08-19 RX ORDER — LISINOPRIL 10 MG/1
10 TABLET ORAL DAILY
Qty: 90 TABLET | Refills: 0 | Status: SHIPPED | OUTPATIENT
Start: 2024-08-19

## 2024-08-19 RX ORDER — DULOXETIN HYDROCHLORIDE 60 MG/1
60 CAPSULE, DELAYED RELEASE ORAL DAILY
Qty: 90 CAPSULE | Refills: 0 | Status: SHIPPED | OUTPATIENT
Start: 2024-08-19

## 2024-08-19 RX ORDER — ACYCLOVIR 400 MG/1
400 TABLET ORAL DAILY PRN
Qty: 30 TABLET | Refills: 0 | Status: SHIPPED | OUTPATIENT
Start: 2024-08-19

## 2024-08-19 RX ORDER — CARVEDILOL 6.25 MG/1
6.25 TABLET ORAL 2 TIMES DAILY WITH MEALS
Qty: 60 TABLET | Refills: 0 | Status: SHIPPED | OUTPATIENT
Start: 2024-08-19

## 2024-08-19 RX ORDER — BUPROPION HYDROCHLORIDE 150 MG/1
150 TABLET, EXTENDED RELEASE ORAL 2 TIMES DAILY
Qty: 180 TABLET | Refills: 0 | Status: SHIPPED | OUTPATIENT
Start: 2024-08-19

## 2024-08-19 RX ORDER — WARFARIN SODIUM 5 MG/1
TABLET ORAL
Qty: 60 TABLET | Refills: 0 | Status: SHIPPED | OUTPATIENT
Start: 2024-08-19

## 2024-08-19 RX ORDER — PANTOPRAZOLE SODIUM 40 MG/1
40 TABLET, DELAYED RELEASE ORAL
Qty: 90 TABLET | Refills: 0 | Status: CANCELLED | OUTPATIENT
Start: 2024-08-19

## 2024-08-19 NOTE — TELEPHONE ENCOUNTER
Rx Refill Note  Requested Prescriptions     Pending Prescriptions Disp Refills    DULoxetine (Cymbalta) 60 MG capsule 90 capsule 0     Sig: Take 1 capsule by mouth Daily.    buPROPion SR (WELLBUTRIN SR) 150 MG 12 hr tablet 180 tablet 0     Sig: Take 1 tablet by mouth 2 (Two) Times a Day.    acyclovir (Zovirax) 400 MG tablet 30 tablet 0     Sig: Take 1 tablet by mouth Daily As Needed (PRN). Take no more than 5 doses during flare up.    warfarin (COUMADIN) 5 MG tablet 60 tablet 0     Sig: TAKE 1-2 TABLET(S) BY MOUTH DAILY OR AS DIRECTED BY THE ANTICOAGULATION CLINIC. RECHECK INR FOR ADDITIONAL REFILLS.    lisinopril (PRINIVIL,ZESTRIL) 10 MG tablet 90 tablet 0     Sig: Take 1 tablet by mouth Daily.      Last office visit with prescribing clinician: 9/6/2023   Last telemedicine visit with prescribing clinician: Visit date not found   Next office visit with prescribing clinician: Visit date not found                         Would you like a call back once the refill request has been completed: [] Yes [] No    If the office needs to give you a call back, can they leave a voicemail: [] Yes [] No    Yamileth Bravo MA  08/19/24, 09:08 EDT

## 2024-08-23 DIAGNOSIS — Z86.711 HISTORY OF PULMONARY EMBOLISM: ICD-10-CM

## 2024-08-23 RX ORDER — WARFARIN SODIUM 5 MG/1
TABLET ORAL
Qty: 60 TABLET | Refills: 0 | OUTPATIENT
Start: 2024-08-23

## 2024-10-02 DIAGNOSIS — B00.9 HERPES: ICD-10-CM

## 2024-10-02 DIAGNOSIS — Z86.711 HISTORY OF PULMONARY EMBOLISM: ICD-10-CM

## 2024-10-03 DIAGNOSIS — Z86.711 HISTORY OF PULMONARY EMBOLISM: ICD-10-CM

## 2024-10-03 RX ORDER — ACYCLOVIR 400 MG/1
400 TABLET ORAL DAILY PRN
Qty: 90 TABLET | Refills: 0 | Status: SHIPPED | OUTPATIENT
Start: 2024-10-03

## 2024-10-03 RX ORDER — WARFARIN SODIUM 5 MG/1
TABLET ORAL
Qty: 14 TABLET | Refills: 0 | Status: SHIPPED | OUTPATIENT
Start: 2024-10-03

## 2024-10-03 RX ORDER — WARFARIN SODIUM 5 MG/1
TABLET ORAL
Qty: 60 TABLET | Refills: 0 | OUTPATIENT
Start: 2024-10-03

## 2024-10-03 NOTE — TELEPHONE ENCOUNTER
Refill for 1 week of warfarin sent to [facility] on [address] until patient can check INR again    Tianna La, PharmD  10/3/2024  08:19 EDT

## 2024-10-11 ENCOUNTER — TELEPHONE (OUTPATIENT)
Dept: PHARMACY | Facility: HOSPITAL | Age: 44
End: 2024-10-11
Payer: COMMERCIAL

## 2024-10-11 DIAGNOSIS — Z86.711 HISTORY OF PULMONARY EMBOLISM: ICD-10-CM

## 2024-10-11 RX ORDER — WARFARIN SODIUM 5 MG/1
TABLET ORAL
Qty: 14 TABLET | Refills: 0 | Status: CANCELLED | OUTPATIENT
Start: 2024-10-11

## 2024-10-11 NOTE — TELEPHONE ENCOUNTER
Good morning Dr. Tom,    Mr. Rodriguez is calling to request a refill on his warfarin tablets. He has not had an INR since 7/2024 and we have sent in multiple one-time refills with the conditionally understanding that he would test his INR. He states that he is going to check his INR at [facility] today. Are you willing to authorize an additional refill pending the results of his lab collection today?     Thanks so much for your help!    Natalio Ziegler, PharmD     [facility] Anticoagulation Team  (t) [phone]  (f) [phone]

## 2024-10-11 NOTE — TELEPHONE ENCOUNTER
This is managed by the [facility]ticoagulation [facility] - patient will need to contact them directly.

## 2024-10-11 NOTE — TELEPHONE ENCOUNTER
Rx Refill Note  Requested Prescriptions     Pending Prescriptions Disp Refills    warfarin (COUMADIN) 5 MG tablet 14 tablet 0     Sig: TAKE 1-2 TABLET(S) BY MOUTH DAILY OR AS DIRECTED BY THE ANTICOAGULATION CLINIC. RECHECK INR FOR ADDITIONAL REFILLS.      Last office visit with prescribing clinician: 9/6/2023   Last telemedicine visit with prescribing clinician: Visit date not found   Next office visit with prescribing clinician: 10/22/2024                         Would you like a call back once the refill request has been completed: [] Yes [] No    If the office needs to give you a call back, can they leave a voicemail: [] Yes [] No    Alejandra Pittman MA  10/11/24, 13:12 EDT

## 2024-10-14 DIAGNOSIS — Z86.711 HISTORY OF PULMONARY EMBOLISM: ICD-10-CM

## 2024-10-14 LAB
INR PPP: NORMAL
PROTHROMBIN TIME: NORMAL S
SPECIMEN STATUS: NORMAL

## 2024-10-14 RX ORDER — WARFARIN SODIUM 5 MG/1
TABLET ORAL
Qty: 14 TABLET | Refills: 0 | Status: SHIPPED | OUTPATIENT
Start: 2024-10-14 | End: 2024-10-21 | Stop reason: SDUPTHER

## 2024-10-14 RX ORDER — WARFARIN SODIUM 5 MG/1
TABLET ORAL
Qty: 14 TABLET | Refills: 0 | OUTPATIENT
Start: 2024-10-14

## 2024-10-14 NOTE — TELEPHONE ENCOUNTER
Mr. Rodriguez requested a refill of warfarin be sent to [facility] in [place]. Patient says he had his INR checked last week, but the clinic has not received the result.     Fanny Tarango CPhT   08:22 EDT 10/14/2024

## 2024-10-14 NOTE — TELEPHONE ENCOUNTER
Called to inform the patient that [facility] was unable to complete the sample assessment due to the specimen being under-filled. Mr Rodriguez did not answer the phone but I left a message explaining the situation and informed him that he should have his INR drawn as soon as possible as we were only authorized to send a refill for 7 additional days.

## 2024-10-21 DIAGNOSIS — Z86.711 HISTORY OF PULMONARY EMBOLISM: ICD-10-CM

## 2024-10-21 RX ORDER — WARFARIN SODIUM 5 MG/1
TABLET ORAL
Qty: 28 TABLET | Refills: 0 | Status: SHIPPED | OUTPATIENT
Start: 2024-10-21
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
## 2024-10-21 NOTE — TELEPHONE ENCOUNTER
Called patient and briefly explained who I was and why calling.    MBB #1 follow up call    Patient would like to proceed with next step in protocol, MBB #2 of Bilateral Thoracic 7,8,9, if approved by Dr. Amador.    Patient is OK with COA to call and schedule next procedure.     Reviewed teaching regarding NPO status,  and location.     Blood thinner - no  Routed to Dr. Amador for review.     PROCEDURE:  Medial Branch Block #1/2  Level: Thoracic  Laterality:  Bilateral  Level Number: 7, 8, 9    DATE OF PROCEDURE:  June 6, 2019 at 4pm  INFORMANT:  Patient    Usual pain score prior to procedure: 5  Pain score 15 minutes after the injection: 4    What were your pain scores for the first six hours post procedure?   · FIRST HOUR:    Pain Score:  4   Activity: drove home did stretches, took out garbage.  Any difference in doing activity? No.   Pain usually 5 w/ this activity.  · SECOND HOUR:    Pain Score:  4.5   Activity: stood and folded laundry, bent down to floor to  an acticle of clothing to fold each time.  Any difference in doing activity? Felt pretty good, less burning, still felt crunching/like not smooth in his back.   Pain usually 7 w/ this activity.  · THIRD HOUR:    Pain Score:  4.5    Activity: played video games sitting on a hard chair w/o arms leaning forward.  Any difference in doing activity? Less terrible than usual, less burn, still had muscle ache.   Pain usually 7 at least w/ this activity.   · FOURTH HOUR:   Pain Score:  5 to 5.5    Activity: cooked light meal, on feet 75% of hour, did some PT exerecises.  Any difference in doing activity? Spot on right acted up, hadn't been there for weeks as he had cut back on activity level.  Muscle aching quite a bit as he was more active than usual of late.  Felt nerve burning pain was 50% less.   Pain usually 7.5 w/ this activity.   · FIFTH HOUR:   Pain Score:  5.5 to 6   Activity: sat and did desk work, stood and did desk work, worked w/ dough as  Mr. Rodriguez called to request a Warfarin refill.   [facility]    would at work.  Any difference in doing activity? Felt tired, severe ache w/ all this leaning forward.   Pain usually 8 w/ this activity.  · SIXTH HOUR:     Pain Score:  7    Activity: Took a shower, did more video games in same chair/position as before.  Any difference in doing activity? ache in bones, felt right trigger point really acting up.   Pain usually 8.5 w/ this activity.        Did you get any relief? Yes  How many hours did it last? 4 to 5    Were you able to more comfortably do activities that normally cause you pain ? Yes a little  What percentage of pain/symptoms were gone?  50%    Did you have any bleeding or drainage from the injection site? No  Did you experience any difficulty breathing? No  Did you experience any difficulty urinating? No  Did you experience any temperature above 101 degrees? No  Did you experience any swelling/warmth in the legs? No    Do you have any questions? No

## 2024-10-22 ENCOUNTER — ANTICOAGULATION VISIT (OUTPATIENT)
Dept: PHARMACY | Facility: HOSPITAL | Age: 44
End: 2024-10-22
Payer: COMMERCIAL

## 2024-10-22 DIAGNOSIS — Z86.711 HISTORY OF PULMONARY EMBOLISM: Primary | ICD-10-CM

## 2024-10-22 LAB
INR PPP: 1.8 (ref 0.9–1.2)
PROTHROMBIN TIME: 19.2 SEC (ref 9.1–12)
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
## 2024-11-01 DIAGNOSIS — Z86.711 HISTORY OF PULMONARY EMBOLISM: ICD-10-CM

## 2024-11-01 RX ORDER — WARFARIN SODIUM 5 MG/1
TABLET ORAL
Qty: 28 TABLET | Refills: 0 | OUTPATIENT
Start: 2024-11-01

## 2024-11-06 DIAGNOSIS — Z86.711 HISTORY OF PULMONARY EMBOLISM: ICD-10-CM

## 2024-11-06 RX ORDER — WARFARIN SODIUM 5 MG/1
TABLET ORAL
Qty: 42 TABLET | Refills: 0 | Status: SHIPPED | OUTPATIENT
Start: 2024-11-06

## 2024-11-06 NOTE — TELEPHONE ENCOUNTER
Pt. Is requesting a Warfarin refill.  [facility]     Ciro Jewell, Pharmacy Technician  11/6/2024  14:13 EST

## 2024-11-06 NOTE — TELEPHONE ENCOUNTER
Also discussed with patient. Patient reports he ran out of warfarin yesterday. Will send in refill to [facility] in [place]. Patient aware and says he will be able to answer phone tomorrow when INR result should be available to discuss dosing.    Maximo Hernandez, PharmD, BCPS  11/6/2024  14:31 EST

## 2024-11-08 LAB
INR PPP: NORMAL
PROTHROMBIN TIME: NORMAL S
SPECIMEN STATUS: NORMAL

## 2024-11-11 ENCOUNTER — TELEPHONE (OUTPATIENT)
Dept: PHARMACY | Facility: HOSPITAL | Age: 44
End: 2024-11-11
Payer: COMMERCIAL

## 2024-11-11 NOTE — TELEPHONE ENCOUNTER
"Patient's INR from 11/6 unable to be processed by [facility] with comment \"deterioration occurred during specimen handling\". Have called patient and left message to discuss further on his mobile phone ([phone]). 3 week refill sent in last week after patient got INR done given somewhat improved recent compliance.    Maximo Hernandez, PharmD, BCPS  11/11/2024  08:57 EST    "

## 2024-11-17 DIAGNOSIS — K57.80 PERFORATED DIVERTICULUM: ICD-10-CM

## 2024-11-18 RX ORDER — PANTOPRAZOLE SODIUM 40 MG/1
40 TABLET, DELAYED RELEASE ORAL
Qty: 90 TABLET | Refills: 0 | Status: SHIPPED | OUTPATIENT
Start: 2024-11-18 | End: 2024-11-19 | Stop reason: SDUPTHER

## 2024-11-18 NOTE — TELEPHONE ENCOUNTER
Rx Refill Note  Requested Prescriptions     Pending Prescriptions Disp Refills    pantoprazole (PROTONIX) 40 MG EC tablet [Pharmacy Med Name: PANTOPRAZOLE SOD DR 40 MG TAB] 90 tablet 0     Sig: TAKE ONE TABLET BY MOUTH EVERY MORNING BEFORE BREAKFAST      Last office visit with prescribing clinician: 9/6/2023   Last telemedicine visit with prescribing clinician: Visit date not found   Next office visit with prescribing clinician: Visit date not found                         Would you like a call back once the refill request has been completed: [] Yes [] No    If the office needs to give you a call back, can they leave a voicemail: [] Yes [] No    Yamileth Bravo MA  11/18/24, 10:10 EST

## 2024-11-19 DIAGNOSIS — K57.80 PERFORATED DIVERTICULUM: ICD-10-CM

## 2024-11-20 RX ORDER — PANTOPRAZOLE SODIUM 40 MG/1
40 TABLET, DELAYED RELEASE ORAL
Qty: 90 TABLET | Refills: 0 | Status: SHIPPED | OUTPATIENT
Start: 2024-11-20 | End: 2024-11-23 | Stop reason: SDUPTHER

## 2024-11-20 RX ORDER — PANTOPRAZOLE SODIUM 40 MG/1
40 TABLET, DELAYED RELEASE ORAL
Qty: 90 TABLET | Refills: 0 | OUTPATIENT
Start: 2024-11-20

## 2024-11-23 DIAGNOSIS — K57.80 PERFORATED DIVERTICULUM: ICD-10-CM

## 2024-11-23 DIAGNOSIS — Z86.711 HISTORY OF PULMONARY EMBOLISM: ICD-10-CM

## 2024-11-24 DIAGNOSIS — B00.9 HERPES: ICD-10-CM

## 2024-11-24 DIAGNOSIS — F33.9 RECURRENT MAJOR DEPRESSIVE DISORDER, REMISSION STATUS UNSPECIFIED: ICD-10-CM

## 2024-11-24 DIAGNOSIS — I10 ESSENTIAL HYPERTENSION: ICD-10-CM

## 2024-11-24 DIAGNOSIS — Z86.711 HISTORY OF PULMONARY EMBOLISM: ICD-10-CM

## 2024-11-25 RX ORDER — WARFARIN SODIUM 5 MG/1
TABLET ORAL
Qty: 42 TABLET | Refills: 0 | OUTPATIENT
Start: 2024-11-25

## 2024-11-25 RX ORDER — BUPROPION HYDROCHLORIDE 150 MG/1
150 TABLET, EXTENDED RELEASE ORAL 2 TIMES DAILY
Qty: 180 TABLET | Refills: 0 | Status: SHIPPED | OUTPATIENT
Start: 2024-11-25

## 2024-11-25 RX ORDER — DULOXETIN HYDROCHLORIDE 60 MG/1
60 CAPSULE, DELAYED RELEASE ORAL DAILY
Qty: 90 CAPSULE | Refills: 0 | Status: SHIPPED | OUTPATIENT
Start: 2024-11-25

## 2024-11-25 RX ORDER — CARVEDILOL 6.25 MG/1
6.25 TABLET ORAL 2 TIMES DAILY WITH MEALS
Qty: 60 TABLET | Refills: 0 | Status: SHIPPED | OUTPATIENT
Start: 2024-11-25

## 2024-11-25 RX ORDER — PANTOPRAZOLE SODIUM 40 MG/1
40 TABLET, DELAYED RELEASE ORAL
Qty: 90 TABLET | Refills: 0 | Status: SHIPPED | OUTPATIENT
Start: 2024-11-25

## 2024-11-25 RX ORDER — ACYCLOVIR 400 MG/1
400 TABLET ORAL DAILY PRN
Qty: 90 TABLET | Refills: 0 | Status: SHIPPED | OUTPATIENT
Start: 2024-11-25

## 2024-11-25 RX ORDER — WARFARIN SODIUM 5 MG/1
TABLET ORAL
Qty: 42 TABLET | Refills: 0 | Status: SHIPPED | OUTPATIENT
Start: 2024-11-25 | End: 2024-11-26 | Stop reason: SDUPTHER

## 2024-11-25 RX ORDER — LISINOPRIL 10 MG/1
10 TABLET ORAL DAILY
Qty: 90 TABLET | Refills: 0 | Status: SHIPPED | OUTPATIENT
Start: 2024-11-25

## 2024-11-25 NOTE — TELEPHONE ENCOUNTER
Rx Refill Note  Requested Prescriptions     Pending Prescriptions Disp Refills    pantoprazole (PROTONIX) 40 MG EC tablet 90 tablet 0     Sig: Take 1 tablet by mouth Every Morning Before Breakfast.      Last office visit with prescribing clinician: 9/6/2023   Last telemedicine visit with prescribing clinician: Visit date not found   Next office visit with prescribing clinician: 11/23/2024                         Would you like a call back once the refill request has been completed: [] Yes [] No    If the office needs to give you a call back, can they leave a voicemail: [] Yes [] No    Yamileth Bravo MA  11/25/24, 10:16 EST

## 2024-11-26 ENCOUNTER — ANTICOAGULATION VISIT (OUTPATIENT)
Dept: PHARMACY | Facility: HOSPITAL | Age: 44
End: 2024-11-26
Payer: COMMERCIAL

## 2024-11-26 DIAGNOSIS — Z86.711 HISTORY OF PULMONARY EMBOLISM: Primary | ICD-10-CM

## 2024-11-26 LAB
INR PPP: 2.5
INR PPP: 2.5 (ref 0.9–1.2)
PROTHROMBIN TIME: 25.8 SEC (ref 9.1–12)

## 2024-11-26 RX ORDER — WARFARIN SODIUM 5 MG/1
TABLET ORAL
Qty: 60 TABLET | Refills: 2 | Status: SHIPPED | OUTPATIENT
Start: 2024-11-26

## 2024-11-26 NOTE — PROGRESS NOTES
Anticoagulation Clinic Progress Note     Indication: Hx of PE and LE DVT (2010, 2014)  Referring Provider: Dr Tom  Initial Warfarin Start Date: 2010  Planned Duration of Therapy: lifelong  Goal INR: 2.0-3.0 (verified Dr Tom 9/19/19)  Will likely need lovenox perioperatively per Dr Tom 7/29/20  Current Drug Interactions: duloxetine, mirtazapine and Pantoprazole  Other: d/c Eliquis 9/6/19 due to itching     Diet: salad 3-4x/week (7/2024)  Alcohol: None  Tobacco: Smokes ~5 cigarettes a week  OTC Pain Medication: Recommended Tylenol prn     Anticoagulation Clinic INR History:  Date 9/19 10/16 11/7 11/19 12/3 12/9 1/3/2020 2/3-2/10 2/13 2/17 2/24 3/2 3/16   Total Weekly Dose 80mg 80 mg 77.5 mg 77.5mg 57.5 mg 80mg 72.5mg hosp: admission 65mg 67.5 mg 65 mg 65 mg 65mg   INR 2.6 3.4 3.1 2.7 1.5 2.1 2.6   3.0 3 2.7 2.4 1.9   Notes         S/p c'scope, librado greens 1 boost Decr cig use Diverticulitis; end colostomy stopped smoking; recent admit       Green beans (HH)      Date  3/24 4/1 4/10 5/6 5/20 6/8 6/19 7/31 8/31 10/14 12/7 1/5/2021 1/8 1/13 1/20 1/27 2/2   Total Weekly Dose 67.5mg 72.5 mg 70mg 70mg 70mg 55mg? 65mg 65mg 67.5mg 67.5 67.5 mg 27.5mg colonoscopy 45mg 70mg 70mg  75mg   INR 1.8 2.23 2.8 3.4 3.1 2.0 2.5 2.0 2.3 2.5 2.1 1.1   1.2 1.8 1.7 2.5   Notes   Extra dose   Less GLV Less GLV Missed x1   incr GLV       Hold x4 Hold x 7 days missed x1            Date 3/17 4/1-4/15 4/16 4/19 4/23 5/7 5/21 6/1    10/4    12/22  1/6     Total Weekly Dose 60mg? [facility] admission 42.5mg?? 47.5mg? 45mg 45 mg 45mg 52.5mg  non-compliance  57.5mg  non compliance  50mg  47.5mg     INR 1.8   2.2 2.0 2.2 2.95 venipuncture 1.4 1.5    2.1    1.7  1.5     Notes Miss x 1; self adjust dose reverse cholestomy; hemorrhage; vit k       POC 4.5 x2   Inc GLV; lovenox        1x miss?  inc tobacco, 1x miss inc GLV         Date 1/6/22-3/21/22 3/21 3/28 Non-compliant 5/19 8/11 9/22 11/11 1/16/23   Total Weekly Dose Noncompliance  57.5 mg 60mg   "65 mg Non compliant 45mg? Non compliant 65mg? 52.5mg? ??   INR  1.7 1.3  2.0  1.6  3.4 2.0 1.4   Notes       2x miss   2x miss \"some doses missed\"     Date 3/10   6/1  9/22  11/22  12/12/23   2/6/24   Total Weekly Dose 55mg 62.5 mg Non compliant 62.5 mg Non compliant 45 mg Non compliant ? 62.5 mg Lost to follow up  62.5 mg   INR 2.0   2.1  2.0  1.8  2.1   2   Notes Miss x1     2x miss  1xmiss Rec 12/13  Inc GLV     Date  2/23 3/7  6/6/24  6/13  7/1 7/25  8/7/24   10/21 11/26      Total Weekly  Dose  45 mg??? 70 mg Non-compliant  52.5 mg  overdue  ?? 62.5 mg   Non-compliant  60 mg 67.5mg      INR  1.2 2.5  1.5   1.1 1.8 1.8   1.8 2.5      Notes  Inc GLV, missedx2?  Rec'd 3/8  1x miss   Rec'd 6/7; inc tobacco  Rec 7/2  Unable to contact Rec 7/26    Inc GLV Unable to reach pt  Rec 10/22          Phone Interview:  Tablet Strength: 5mg tablet  Verbal Release Authorization signed on 9/19/19-- may speak with Jammie Rodriguez (mother) John Rodriguez (father)  Patient contact info: [phone] (Mobile)    Patient Findings:  Positives: Change in medications   Negatives: Signs/symptoms of thrombosis, Signs/symptoms of bleeding, Laboratory test error suspected, Change in health, Change in alcohol use, Change in activity, Upcoming invasive procedure, Emergency department visit, Upcoming dental procedure, Missed doses, Extra doses, Change in diet/appetite, Hospital admission, Bruising, Other complaints   Comments: Patient has been taking warfarin 10mg daily except warfarin 7.5mg on Sundays since last INR check, which differs from tracker (updated).  All other findings negative.     Plan:  1. INR was therapeutic yesterday at 2.5 (goal 2.0 to 3.0). Instructed Mr. Rodriguez to continue regimen that he has been taking since last INR check - warfarin 10mg daily except warfarin 7.5mg on Sundays.   2. Recheck INR on Monday, 12/16/24. He has an extensive history of non-compliance with INR check dates.  3. Warfarin 5mg Rx sent - 3 mos supply   4. " Verbal information provided over the phone.  Brigida Rodriguez RBV dosing instructions, expresses understanding by teach back, and has no further questions at this time.        Elsy Mata, PharmD  11/26/2024   09:42 EST

## 2024-12-11 DIAGNOSIS — F41.1 GENERALIZED ANXIETY DISORDER: ICD-10-CM

## 2024-12-11 DIAGNOSIS — F33.9 RECURRENT MAJOR DEPRESSIVE DISORDER, REMISSION STATUS UNSPECIFIED: Primary | ICD-10-CM

## 2024-12-14 DIAGNOSIS — Z86.711 HISTORY OF PULMONARY EMBOLISM: ICD-10-CM

## 2024-12-16 RX ORDER — WARFARIN SODIUM 5 MG/1
TABLET ORAL
Qty: 42 TABLET | Refills: 11 | Status: SHIPPED | OUTPATIENT
Start: 2024-12-16

## 2024-12-27 ENCOUNTER — TELEPHONE (OUTPATIENT)
Dept: FAMILY MEDICINE CLINIC | Facility: CLINIC | Age: 44
End: 2024-12-27
Payer: COMMERCIAL

## 2024-12-27 ENCOUNTER — TELEPHONE (OUTPATIENT)
Dept: PHARMACY | Facility: HOSPITAL | Age: 44
End: 2024-12-27
Payer: COMMERCIAL

## 2024-12-27 NOTE — TELEPHONE ENCOUNTER
Reached out to contact Dr. Tom's office regarding recent warfarin prescription for a one-year supply. Spoke with Maureen who said that Dr. Tom was not in today and Dr. García was covering.     I brought up concerns with ordering one year of refills as Mr. Rodriguez has a history of non-compliance with warfarin therapy and will often self-manage his warfarin.      Maureen said she would send a message to Dr. Tom and Dr. García for assessment and evaluation of appropriate management strategies moving forward.     Will need to follow up week of 12/30 if the clinic does not hear back.

## 2024-12-27 NOTE — TELEPHONE ENCOUNTER
Pharmacist Natalio called to confirm the 11 refills for warfarin for pt. - he is concerned about pt non-compliance and frequent no-shows/cancellations unless pt is out of warfarin. Please call Natalio back for clarification if needed - he also wanted to know if Dr Tom wanted to continue Mr Rodriguez's medication management?

## 2025-01-06 ENCOUNTER — TELEPHONE (OUTPATIENT)
Dept: PHARMACY | Facility: HOSPITAL | Age: 45
End: 2025-01-06
Payer: COMMERCIAL

## 2025-01-06 NOTE — TELEPHONE ENCOUNTER
LVM for Mr. Rodriguez regarding overdue PT/INR.  He was due to recheck on 12/16/24    Nishi Hooper, PharmD  1/6/2025  14:37 EST

## 2025-01-07 ENCOUNTER — TELEPHONE (OUTPATIENT)
Dept: PHARMACY | Facility: HOSPITAL | Age: 45
End: 2025-01-07
Payer: COMMERCIAL

## 2025-01-07 DIAGNOSIS — Z86.711 HISTORY OF PULMONARY EMBOLISM: Primary | ICD-10-CM

## 2025-01-07 RX ORDER — WARFARIN SODIUM 5 MG/1
TABLET ORAL
Qty: 42 TABLET | Refills: 2 | Status: SHIPPED | OUTPATIENT
Start: 2025-01-07

## 2025-01-07 NOTE — TELEPHONE ENCOUNTER
Received call back from [facility] confirming that Dr. Tom changed to a 3 mo supply and planned to discuss INR reminders with Mr. Rodriguez. I also let her know that we will continue to work with Mr. Rodriguez as much as we can, too. I informed her that one of the ways we plan to accomplish this is to allow for flexibility in Mr. Rodriguez's scheduling, as he is a , and while we usually do not accept walk-ins, Mr Rodriguez is an exception to the policy in order to support compliance.     Thanks,   Natalio

## 2025-01-21 DIAGNOSIS — Z86.711 HISTORY OF PULMONARY EMBOLISM: Primary | ICD-10-CM

## 2025-01-22 LAB
INR PPP: NORMAL
PROTHROMBIN TIME: NORMAL S
SPECIMEN STATUS: NORMAL

## 2025-03-14 DIAGNOSIS — Z86.711 HISTORY OF PULMONARY EMBOLISM: ICD-10-CM

## 2025-03-14 RX ORDER — WARFARIN SODIUM 5 MG/1
TABLET ORAL
Qty: 12 TABLET | Refills: 0 | Status: SHIPPED | OUTPATIENT
Start: 2025-03-14

## 2025-03-14 NOTE — TELEPHONE ENCOUNTER
1 WEEK of warfarin refill sent - have not received INR for several months. Patient stated he will go next week.

## 2025-03-21 ENCOUNTER — ANTICOAGULATION VISIT (OUTPATIENT)
Dept: PHARMACY | Facility: HOSPITAL | Age: 45
End: 2025-03-21
Payer: COMMERCIAL

## 2025-03-21 DIAGNOSIS — Z86.711 HISTORY OF PULMONARY EMBOLISM: Primary | ICD-10-CM

## 2025-03-21 LAB
INR PPP: 3.2 (ref 0.9–1.2)
PROTHROMBIN TIME: 32.3 SEC (ref 9.1–12)

## 2025-03-21 RX ORDER — WARFARIN SODIUM 5 MG/1
TABLET ORAL
Qty: 50 TABLET | Refills: 0 | Status: SHIPPED | OUTPATIENT
Start: 2025-03-21

## 2025-03-21 NOTE — PROGRESS NOTES
Anticoagulation Clinic Progress Note     Indication: Hx of PE and LE DVT (2010, 2014)  Referring Provider: Dr Tom  Initial Warfarin Start Date: 2010  Planned Duration of Therapy: lifelong  Goal INR: 2.0-3.0 (verified Dr Tom 9/19/19)  Will likely need lovenox perioperatively per Dr Tom 7/29/20  Current Drug Interactions: duloxetine, mirtazapine and Pantoprazole  Other: d/c Eliquis 9/6/19 due to itching     Diet: salad 3-4x/week (7/2024)  Alcohol: None  Tobacco: Smokes ~5 cigarettes a week (3/21/2025)  OTC Pain Medication: Recommended Tylenol prn     Anticoagulation Clinic INR History:  Date 9/19 10/16 11/7 11/19 12/3 12/9 1/3/2020 2/3-2/10 2/13 2/17 2/24 3/2 3/16   Total Weekly Dose 80mg 80 mg 77.5 mg 77.5mg 57.5 mg 80mg 72.5mg hosp: admission 65mg 67.5 mg 65 mg 65 mg 65mg   INR 2.6 3.4 3.1 2.7 1.5 2.1 2.6   3.0 3 2.7 2.4 1.9   Notes         S/p c'scope, librado greens 1 boost Decr cig use Diverticulitis; end colostomy stopped smoking; recent admit       Green beans (HH)      Date  3/24 4/1 4/10 5/6 5/20 6/8 6/19 7/31 8/31 10/14 12/7 1/5/2021 1/8 1/13 1/20 1/27 2/2   Total Weekly Dose 67.5mg 72.5 mg 70mg 70mg 70mg 55mg? 65mg 65mg 67.5mg 67.5 67.5 mg 27.5mg colonoscopy 45mg 70mg 70mg  75mg   INR 1.8 2.23 2.8 3.4 3.1 2.0 2.5 2.0 2.3 2.5 2.1 1.1   1.2 1.8 1.7 2.5   Notes   Extra dose   Less GLV Less GLV Missed x1   incr GLV       Hold x4 Hold x 7 days missed x1            Date 3/17 4/1-4/15 4/16 4/19 4/23 5/7 5/21 6/1    10/4    12/22  1/6     Total Weekly Dose 60mg? [facility] admission 42.5mg?? 47.5mg? 45mg 45 mg 45mg 52.5mg  non-compliance  57.5mg  non compliance  50mg  47.5mg     INR 1.8   2.2 2.0 2.2 2.95 venipuncture 1.4 1.5    2.1    1.7  1.5     Notes Miss x 1; self adjust dose reverse cholestomy; hemorrhage; vit k       POC 4.5 x2   Inc GLV; lovenox        1x miss?  inc tobacco, 1x miss inc GLV         Date 1/6/22-3/21/22 3/21 3/28 Non-compliant 5/19 8/11 9/22 11/11 1/16/23   Total Weekly Dose Noncompliance   "57.5 mg 60mg  65 mg Non compliant 45mg? Non compliant 65mg? 52.5mg? ??   INR  1.7 1.3  2.0  1.6  3.4 2.0 1.4   Notes       2x miss   2x miss \"some doses missed\"     Date 3/10   6/1  9/22  11/22  12/12/23   2/6/24   Total Weekly Dose 55mg 62.5 mg Non compliant 62.5 mg Non compliant 45 mg Non compliant ? 62.5 mg Lost to follow up  62.5 mg   INR 2.0   2.1  2.0  1.8  2.1   2   Notes Miss x1     2x miss  1xmiss Rec 12/13  Inc GLV     Date  2/23 3/7  6/6/24  6/13  7/1 7/25  8/7/24   10/21 11/26   3/20   Total Weekly  Dose  45 mg??? 70 mg Non-compliant  52.5 mg  overdue  ?? 62.5 mg   Non-compliant  60 mg 67.5mg Non  compliant Non   compliant 67.5 mg   INR  1.2 2.5  1.5   1.1 1.8 1.8   1.8 2.5   3.2   Notes  Inc GLV, missedx2?  Rec'd 3/8  1x miss   Rec'd 6/7; inc tobacco  Rec 7/2  Unable to contact Rec 7/26    Inc GLV Unable to reach pt  Rec 10/22     Rec'd 3/21       Phone Interview:  Tablet Strength: 5mg tablet  Verbal Release Authorization signed on 9/19/19-- may speak with Jammie Rodriguez (mother) John Rodriguez (father)  Patient contact info: [phone] (Mobile)      Patient Findings    Negatives: Signs/symptoms of thrombosis, Signs/symptoms of bleeding, Laboratory test error suspected, Change in health, Change in alcohol use, Change in activity, Upcoming invasive procedure, Emergency department visit, Upcoming dental procedure, Missed doses, Extra doses, Change in medications, Change in diet/appetite, Hospital admission, Bruising, Other complaints   Comments: No reported symptoms       Plan:  1. INR was supratherapeutic yesterday at 3.2 (goal 2.0 to 3.0). Instructed Mr. Rodriguez to continue warfarin 10mg daily except 7.5mg Sunday.   2. Repeat INR in 4/3. He has an extensive history of non-compliance with INR check dates.  3. Warfarin 5mg Rx sent - 1 mo supply  4. Verbal information provided over the phone.  Brigida Rodriguez RBV dosing instructions, expresses understanding by teach back, and has no further questions at " this time.    Thank you,    Natalio Ziegler PharmD, JOSUÉ, BCPS  3/21/2025  09:43 EDT

## 2025-03-31 DIAGNOSIS — B00.9 HERPES: ICD-10-CM

## 2025-04-01 RX ORDER — ACYCLOVIR 400 MG/1
400 TABLET ORAL DAILY PRN
Qty: 90 TABLET | Refills: 2 | Status: SHIPPED | OUTPATIENT
Start: 2025-04-01

## 2025-04-01 RX ORDER — CARVEDILOL 6.25 MG/1
6.25 TABLET ORAL 2 TIMES DAILY WITH MEALS
Qty: 60 TABLET | Refills: 2 | Status: SHIPPED | OUTPATIENT
Start: 2025-04-01

## 2025-04-01 NOTE — TELEPHONE ENCOUNTER
Rx Refill Note  Requested Prescriptions     Pending Prescriptions Disp Refills    carvedilol (COREG) 6.25 MG tablet 60 tablet 0     Sig: Take 1 tablet by mouth 2 (Two) Times a Day With Meals.    acyclovir (Zovirax) 400 MG tablet 90 tablet 0     Sig: Take 1 tablet by mouth Daily As Needed (PRN). Take no more than 5 doses during flare up.      Last office visit with prescribing clinician: 9/6/2023   Last telemedicine visit with prescribing clinician: Visit date not found   Next office visit with prescribing clinician: Visit date not found                         Would you like a call back once the refill request has been completed: [] Yes [] No    If the office needs to give you a call back, can they leave a voicemail: [] Yes [] No    Alejandra Pittman MA  04/01/25, 10:13 EDT

## 2025-04-17 DIAGNOSIS — Z86.711 HISTORY OF PULMONARY EMBOLISM: ICD-10-CM

## 2025-04-17 RX ORDER — WARFARIN SODIUM 5 MG/1
TABLET ORAL
Qty: 56 TABLET | Refills: 1 | Status: SHIPPED | OUTPATIENT
Start: 2025-04-17

## 2025-04-18 ENCOUNTER — ANTICOAGULATION VISIT (OUTPATIENT)
Dept: PHARMACY | Facility: HOSPITAL | Age: 45
End: 2025-04-18
Payer: COMMERCIAL

## 2025-04-18 DIAGNOSIS — Z86.711 HISTORY OF PULMONARY EMBOLISM: Primary | ICD-10-CM

## 2025-04-18 LAB
INR PPP: 3.1 (ref 0.9–1.2)
PROTHROMBIN TIME: 30.7 SEC (ref 9.1–12)

## 2025-04-18 NOTE — PROGRESS NOTES
Anticoagulation Clinic Progress Note     Indication: Hx of PE and LE DVT (2010, 2014)  Referring Provider: Dr Tom  Initial Warfarin Start Date: 2010  Planned Duration of Therapy: lifelong  Goal INR: 2.0-3.0 (verified Dr Tom 9/19/19)  Will likely need lovenox perioperatively per Dr Tom 7/29/20  Current Drug Interactions: duloxetine, mirtazapine and Pantoprazole  Other: d/c Eliquis 9/6/19 due to itching  Diet: salad 3-4x/week (7/2024)  Alcohol: None  Tobacco: Smokes ~5 cigarettes a week (3/21/2025)  OTC Pain Medication: Recommended Tylenol prn     Anticoagulation Clinic INR History:  Date 9/19 10/16 11/7 11/19 12/3 12/9 1/3/2020 2/3-2/10 2/13 2/17 2/24 3/2 3/16   Total Weekly Dose 80mg 80 mg 77.5 mg 77.5mg 57.5 mg 80mg 72.5mg hosp: admission 65mg 67.5 mg 65 mg 65 mg 65mg   INR 2.6 3.4 3.1 2.7 1.5 2.1 2.6   3.0 3 2.7 2.4 1.9   Notes         S/p c'scope, librado greens 1 boost Decr cig use Diverticulitis; end colostomy stopped smoking; recent admit       Green beans (HH)      Date  3/24 4/1 4/10 5/6 5/20 6/8 6/19 7/31 8/31 10/14 12/7 1/5/2021 1/8 1/13 1/20 1/27 2/2   Total Weekly Dose 67.5mg 72.5 mg 70mg 70mg 70mg 55mg? 65mg 65mg 67.5mg 67.5 67.5 mg 27.5mg colonoscopy 45mg 70mg 70mg  75mg   INR 1.8 2.23 2.8 3.4 3.1 2.0 2.5 2.0 2.3 2.5 2.1 1.1   1.2 1.8 1.7 2.5   Notes   Extra dose   Less GLV Less GLV Missed x1   incr GLV       Hold x4 Hold x 7 days missed x1            Date 3/17 4/1-4/15 4/16 4/19 4/23 5/7 5/21 6/1    10/4    12/22  1/6   Total Weekly Dose 60mg? [facility] admission 42.5mg?? 47.5mg? 45mg 45 mg 45mg 52.5mg  non-compliance  57.5mg  non compliance  50mg  47.5mg   INR 1.8   2.2 2.0 2.2 2.95 venipuncture 1.4 1.5    2.1    1.7  1.5   Notes Miss x 1; self adjust dose reverse cholestomy; hemorrhage; vit k       POC 4.5 x2   Inc GLV; lovenox        1x miss?  inc tobacco, 1x miss inc GLV       Date 1/6/22-3/21/22 3/21 3/28 Non-compliant 5/19 8/11 9/22 11/11 1/16/23   Total Weekly Dose Noncompliance  57.5 mg 60mg   "65 mg Non compliant 45mg? Non compliant 65mg? 52.5mg? ??   INR  1.7 1.3  2.0  1.6  3.4 2.0 1.4   Notes       2x miss   2x miss \"some doses missed\"     Date 3/10   6/1  9/22  11/22 12/12/23   2/6/24   Total Weekly Dose 55mg 62.5 mg Non compliant 62.5 mg Non compliant 45 mg Non compliant ? 62.5 mg Lost to follow up 62.5 mg   INR 2.0   2.1  2.0  1.8  2.1   2   Notes Miss x1     2x miss  1xmiss Rec 12/13  Inc GLV     Date  2/23 3/7  6/6/24  6/13  7/1 7/25  8/7/24   10/21 11/26   3/20   Total Weekly  Dose  45 mg??? 70 mg Non-compliant  52.5 mg  overdue  ?? 62.5 mg   Non-compliant  60 mg 67.5mg Non  compliant Non   compliant 67.5 mg   INR  1.2 2.5  1.5   1.1 1.8 1.8   1.8 2.5   3.2   Notes  Inc GLV, missedx2?  Rec'd 3/8  1x miss   Rec'd 6/7; inc tobacco  Rec 7/2  Unable to contact Rec 7/26    Inc GLV Unable to reach pt  Rec 10/22     Rec'd 3/21     Date  4/17             Total Weekly  Dose Non  compliant 67.5 mg             INR  3.1             Notes  Rec'd 4/18               Phone Interview:  Tablet Strength: 5 mg tablet  Verbal Release Authorization signed on 9/19/19-- may speak with Jammie Rodriguez (mother) John Rodriguez (father)  Patient contact info: [phone] (Mobile)    Patient Findings    Negatives: Signs/symptoms of thrombosis, Signs/symptoms of bleeding, Laboratory test error suspected, Change in health, Change in alcohol use, Change in activity, Upcoming invasive procedure, Emergency department visit, Upcoming dental procedure, Missed doses, Extra doses, Change in medications, Change in diet/appetite, Hospital admission, Bruising, Other complaints   Comments: Patient reports inconsistent smoking and says he sometimes eats greens. All other findings negative per patient.     Plan:  INR was slightly supratherapeutic yesterday at 3.1 (goal 2.0 to 3.0). Instructed Mr. Rodriguez to continue warfarin 10 mg daily except 7.5 mg Sunday.   Repeat INR in 3 weeks, 5.08.25. Patient has extensive history of noncompliance with INR " rechecks.  Refill being sent to [facility] on [address]--refill was already sent 4/17/25 jat  Verbal information provided over the phone.  Brigida Rodriguez RBV dosing instructions, expresses understanding by teach back, and has no further questions at this time.      Fanny Tarango, [place], [facility]   12:19 EDT   4/18/2025    I, Tianna La, PharmD, have reviewed the note in full and agree with the assessment and plan.  04/18/25  13:02 EDT

## 2025-05-19 DIAGNOSIS — Z86.711 HISTORY OF PULMONARY EMBOLISM: ICD-10-CM

## 2025-05-19 RX ORDER — WARFARIN SODIUM 5 MG/1
TABLET ORAL
Qty: 60 TABLET | Refills: 1 | Status: SHIPPED | OUTPATIENT
Start: 2025-05-19

## 2025-05-19 NOTE — TELEPHONE ENCOUNTER
Patient requests refill of warfarin 5 mg tablets and that it be sent to [facility] on [address]. Patient states he will go to the lab to recheck INR tomorrow 5/20.      Uzair Forrest   [facility]  5/19/2025 15:45 EDT

## 2025-06-14 DIAGNOSIS — B00.9 HERPES: ICD-10-CM

## 2025-06-14 DIAGNOSIS — I10 ESSENTIAL HYPERTENSION: ICD-10-CM

## 2025-06-16 RX ORDER — DULOXETIN HYDROCHLORIDE 60 MG/1
60 CAPSULE, DELAYED RELEASE ORAL DAILY
Qty: 90 CAPSULE | Refills: 0 | Status: SHIPPED | OUTPATIENT
Start: 2025-06-16

## 2025-06-16 RX ORDER — LISINOPRIL 10 MG/1
10 TABLET ORAL DAILY
Qty: 90 TABLET | Refills: 0 | Status: SHIPPED | OUTPATIENT
Start: 2025-06-16

## 2025-06-16 RX ORDER — ACYCLOVIR 400 MG/1
400 TABLET ORAL DAILY PRN
Qty: 90 TABLET | Refills: 2 | Status: SHIPPED | OUTPATIENT
Start: 2025-06-16

## 2025-06-16 NOTE — TELEPHONE ENCOUNTER
Rx Refill Note  Requested Prescriptions     Pending Prescriptions Disp Refills    acyclovir (Zovirax) 400 MG tablet 90 tablet 2     Sig: Take 1 tablet by mouth Daily As Needed (PRN). Take no more than 5 doses during flare up.     Signed Prescriptions Disp Refills    DULoxetine (Cymbalta) 60 MG capsule 90 capsule 0     Sig: Take 1 capsule by mouth Daily.     Authorizing Provider: ADELITA TOVAR     Ordering User: ALEJANDRA QUINTANILLA    lisinopril (PRINIVIL,ZESTRIL) 10 MG tablet 90 tablet 0     Sig: Take 1 tablet by mouth Daily.     Authorizing Provider: ADELITA TOVAR     Ordering User: ALEJANDRA QUINTANILLA      Last office visit with prescribing clinician: 9/6/2023   Last telemedicine visit with prescribing clinician: Visit date not found   Next office visit with prescribing clinician: 6/23/2025                         Would you like a call back once the refill request has been completed: [] Yes [] No    If the office needs to give you a call back, can they leave a voicemail: [] Yes [] No    Alejandra Quintanilla MA  06/16/25, 14:18 EDT

## 2025-07-09 ENCOUNTER — TELEMEDICINE (OUTPATIENT)
Dept: FAMILY MEDICINE CLINIC | Facility: CLINIC | Age: 45
End: 2025-07-09
Payer: COMMERCIAL

## 2025-07-09 DIAGNOSIS — F33.9 RECURRENT MAJOR DEPRESSIVE DISORDER, REMISSION STATUS UNSPECIFIED: ICD-10-CM

## 2025-07-09 DIAGNOSIS — F41.1 GENERALIZED ANXIETY DISORDER: ICD-10-CM

## 2025-07-09 DIAGNOSIS — G47.00 INSOMNIA, UNSPECIFIED TYPE: ICD-10-CM

## 2025-07-09 DIAGNOSIS — L98.9 HAND LESION: Primary | ICD-10-CM

## 2025-07-09 PROCEDURE — 1160F RVW MEDS BY RX/DR IN RCRD: CPT | Performed by: INTERNAL MEDICINE

## 2025-07-09 PROCEDURE — 1159F MED LIST DOCD IN RCRD: CPT | Performed by: INTERNAL MEDICINE

## 2025-07-09 PROCEDURE — 99214 OFFICE O/P EST MOD 30 MIN: CPT | Performed by: INTERNAL MEDICINE

## 2025-07-09 PROCEDURE — 1125F AMNT PAIN NOTED PAIN PRSNT: CPT | Performed by: INTERNAL MEDICINE

## 2025-07-09 RX ORDER — MELATONIN 3 MG
3 CAPSULE ORAL NIGHTLY PRN
Qty: 30 CAPSULE | Refills: 1 | Status: SHIPPED | OUTPATIENT
Start: 2025-07-09

## 2025-07-09 RX ORDER — DULOXETIN HYDROCHLORIDE 60 MG/1
60 CAPSULE, DELAYED RELEASE ORAL 2 TIMES DAILY
Qty: 180 CAPSULE | Refills: 1 | Status: SHIPPED | OUTPATIENT
Start: 2025-07-09

## 2025-07-09 RX ORDER — BUPROPION HYDROCHLORIDE 150 MG/1
150 TABLET, EXTENDED RELEASE ORAL 2 TIMES DAILY
Qty: 180 TABLET | Refills: 1 | Status: SHIPPED | OUTPATIENT
Start: 2025-07-09

## 2025-07-09 NOTE — PROGRESS NOTES
Cc: anxiety  You have chosen to receive care through a telehealth visit.  Do you consent to use a video/audio connection for your medical care today? Yes      HPI:  Brigida Rodriguez is a 45 y.o. male who presents today for worsening anxiety and insomnia.  There is a lesion or wart on his hand that he would like evaluated as well.    ROS:  Constitutional: no fevers, night sweats or unexplained weight loss  Eyes: no vision changes  ENT: no runny nose, ear pain, sore throat  Cardio: no chest pain, palpitations  Pulm: no shortness of breath, wheezing, or cough  GI: no abdominal pain or changes in bowel movements  : no difficulty urinating  MSK: no difficulty ambulating, no joint pain  Neuro: no weakness, dizziness or headache  Psych: no trouble sleeping  Endo: no change in appetite      Past Medical History:   Diagnosis Date    Anxiety     Depression     GERD (gastroesophageal reflux disease)     H/O blood clots     Heart failure     Hypertension     Presence of IVC filter     Pulmonary embolism     10 YEARS AGO      Family History   Problem Relation Age of Onset    Diabetes Mother     Obesity Maternal Grandmother     Diabetes Maternal Grandmother     Cancer Father         prostrate    No Known Problems Sister     No Known Problems Brother     No Known Problems Maternal Grandfather     No Known Problems Paternal Grandmother     No Known Problems Paternal Grandfather       Social History     Socioeconomic History    Marital status:    Tobacco Use    Smoking status: Every Day     Current packs/day: 0.50     Average packs/day: 0.5 packs/day for 20.0 years (10.0 ttl pk-yrs)     Types: Cigarettes     Passive exposure: Never    Smokeless tobacco: Never   Vaping Use    Vaping status: Never Used   Substance and Sexual Activity    Alcohol use: Yes     Alcohol/week: 6.0 standard drinks of alcohol     Types: 6 Cans of beer per week    Drug use: Yes     Types: Marijuana     Comment: everyday smoker    Sexual activity:  Yes     Partners: Female     Birth control/protection: Condom      No Known Allergies   Immunization History   Administered Date(s) Administered    COVID-19 (PFIZER) Purple Cap Monovalent 03/06/2021, 03/30/2021    Flu Vaccine Quad PF >36MO 12/21/2016, 12/18/2017, 12/07/2018, 11/04/2019    Fluzone (or Fluarix & Flulaval for VFC) >6mos 12/21/2016, 12/18/2017, 12/07/2018, 11/04/2019, 12/23/2020, 11/08/2021    Hepatitis A 12/07/2018    Pneumococcal Polysaccharide (PPSV23) 12/21/2016    Tdap 12/21/2016        PE:  There were no vitals filed for this visit.   There is no height or weight on file to calculate BMI.    Gen Appearance: NAD        Current Outpatient Medications   Medication Sig Dispense Refill    buPROPion SR (WELLBUTRIN SR) 150 MG 12 hr tablet Take 1 tablet by mouth 2 (Two) Times a Day. 180 tablet 1    DULoxetine (Cymbalta) 60 MG capsule Take 1 capsule by mouth 2 (Two) Times a Day. 180 capsule 1    acyclovir (Zovirax) 400 MG tablet Take 1 tablet by mouth Daily As Needed (PRN). Take no more than 5 doses during flare up. 90 tablet 2    carvedilol (COREG) 6.25 MG tablet Take 1 tablet by mouth 2 (Two) Times a Day With Meals. 60 tablet 2    Enoxaparin Sodium (LOVENOX) 150 MG/ML syringe Inject 1 mL under the skin into the appropriate area as directed Every 12 (Twelve) Hour or as directed by the Anticoagulation Clinic 10 mL 0    hydrOXYzine (ATARAX) 25 MG tablet Take 1 tablet by mouth 3 (Three) Times a Day As Needed for Anxiety. 90 tablet 0    lisinopril (PRINIVIL,ZESTRIL) 10 MG tablet Take 1 tablet by mouth Daily. 90 tablet 0    Multiple Vitamins-Minerals (HM MULTIVITAMIN ADULT GUMMY PO) Take  by mouth. (Patient not taking: Reported on 3/29/2024)      pantoprazole (PROTONIX) 40 MG EC tablet Take 1 tablet by mouth Every Morning Before Breakfast. 90 tablet 0    warfarin (COUMADIN) 5 MG tablet Take 1.5 to 2 TABLETS BY MOUTH EVERY DAY OR AS DIRECTED BY THE ANTICOAGULATION CLINIC. 60 tablet 1     No current  facility-administered medications for this visit.      Refer to dermatology for lesion on back of hand, appears to be a wart.  Difficult to evaluate over video.    Recommend establishing care with behavioral health for med management and therapy in [place].  Increase Cymbalta to twice daily.  Continue Wellbutrin.    Discussed nonpharmaceutical ways to improve sleep.  Add on melatonin 3 mg nightly.    Diagnoses and all orders for this visit:    1. Hand lesion (Primary)    2. Generalized anxiety disorder  -     DULoxetine (Cymbalta) 60 MG capsule; Take 1 capsule by mouth 2 (Two) Times a Day.  Dispense: 180 capsule; Refill: 1  -     buPROPion SR (WELLBUTRIN SR) 150 MG 12 hr tablet; Take 1 tablet by mouth 2 (Two) Times a Day.  Dispense: 180 tablet; Refill: 1  -     Ambulatory Referral to Behavioral Health  -     Ambulatory Referral to Behavioral Health    3. Insomnia, unspecified type    4. Recurrent major depressive disorder, remission status unspecified  -     DULoxetine (Cymbalta) 60 MG capsule; Take 1 capsule by mouth 2 (Two) Times a Day.  Dispense: 180 capsule; Refill: 1  -     buPROPion SR (WELLBUTRIN SR) 150 MG 12 hr tablet; Take 1 tablet by mouth 2 (Two) Times a Day.  Dispense: 180 tablet; Refill: 1  -     Ambulatory Referral to Behavioral Health  -     Ambulatory Referral to Behavioral Health         Return in about 4 weeks (around 8/6/2025).     Dictated Utilizing Dragon Dictation    Please note that portions of this note were completed with a voice recognition program.    Part of this note may be an electronic transcription/translation of spoken language to printed text using the Dragon Dictation System.

## 2025-07-24 DIAGNOSIS — Z86.711 HISTORY OF PULMONARY EMBOLISM: Primary | ICD-10-CM

## 2025-07-25 ENCOUNTER — ANTICOAGULATION VISIT (OUTPATIENT)
Dept: PHARMACY | Facility: HOSPITAL | Age: 45
End: 2025-07-25
Payer: COMMERCIAL

## 2025-07-25 DIAGNOSIS — Z86.711 HISTORY OF PULMONARY EMBOLISM: Primary | ICD-10-CM

## 2025-07-25 LAB
INR PPP: 2.1 (ref 0.9–1.2)
PROTHROMBIN TIME: 22.3 SEC (ref 9.1–12)

## 2025-07-25 RX ORDER — WARFARIN SODIUM 5 MG/1
TABLET ORAL
Qty: 60 TABLET | Refills: 0 | Status: SHIPPED | OUTPATIENT
Start: 2025-07-25

## 2025-07-25 NOTE — PROGRESS NOTES
Anticoagulation Clinic Progress Note     Indication: Hx of PE and LE DVT (2010, 2014)  Referring Provider: Dr Tom  Initial Warfarin Start Date: 2010  Planned Duration of Therapy: lifelong  Goal INR: 2.0-3.0 (verified Dr Tom 9/19/19)  Will likely need lovenox perioperatively per Dr Tom 7/29/20  Current Drug Interactions: duloxetine, mirtazapine and Pantoprazole  Other: d/c Eliquis 9/6/19 due to itching  Diet: salad 3-4x/week (7/2024)  Alcohol: None  Tobacco: Smokes ~5 cigarettes a week (3/21/2025)  OTC Pain Medication: Recommended Tylenol prn     Anticoagulation Clinic INR History:    Date  2/23 3/7  6/6/24  6/13  7/1 7/25  8/7/24   10/21 11/26   3/20   Total Weekly  Dose  45 mg??? 70 mg Non-compliant  52.5 mg  overdue  ?? 62.5 mg   Non-compliant  60 mg 67.5mg Non  compliant Non   compliant 67.5 mg   INR  1.2 2.5  1.5   1.1 1.8 1.8   1.8 2.5   3.2   Notes  Inc GLV, missedx2?  Rec'd 3/8  1x miss   Rec'd 6/7; inc tobacco  Rec 7/2  Unable to contact Rec 7/26    Inc GLV Unable to reach pt  Rec 10/22     Rec'd 3/21     Date  4/17 7/25           Total Weekly  Dose Non  compliant 67.5 mg Non compliant 55 mg            INR  3.1  2.1           Notes  Rec'd 4/18               Phone Interview:  Tablet Strength: 5 mg tablet  Verbal Release Authorization signed on 9/19/19-- may speak with Jammie Michael (mother) John Rodriguez (father)  Patient contact info: [phone] (Mobile)      Patient Findings:  Positives: Missed doses   Negatives: Signs/symptoms of thrombosis, Signs/symptoms of bleeding, Laboratory test error suspected, Change in health, Change in alcohol use, Change in activity, Upcoming invasive procedure, Emergency department visit, Upcoming dental procedure, Extra doses, Change in medications, Change in diet/appetite, Hospital admission, Bruising, Other complaints   Comments: Patient reports quitting smoking ~1.5 weeks ago. Was having some bleeding from gums on Sunday and reduced dose to warfarin 5 mg. Patient unsure  "of dosing regimen, thinks he has been taking 10 daily except 5 mg on Sundays typically, states he needs to \"get back on track\" with his warfarin. Missed yesterdays dose due to being out of warfarin.       Plan:  INR was therapeutic yesterday at 2.1 (goal 2.0 to 3.0). Instructed Mr. Rodriguez to take warfarin 10 mg daily except 7.5 mg Sunday and Wednesday until recheck. Congratulated patient on quitting smoking but advised patient that smoking causes INR to be lower so his INR may start to increase - closer monitoring needed. Patient verbalized understanding. Hard to determine proper dosing regimen since patient unsure how he has been taking warfarin and misses doses often. Will trial this regimen as listed above.   Repeat INR in 2 weeks, 8.07.25. Patient has extensive history of noncompliance with INR rechecks.  Refill being sent to [facility] on [facility].  Verbal information provided over the phone.  Brigida Rodriguez RBV dosing instructions, expresses understanding by teach back, and has no further questions at this time.    Elsy Mata, PharmD   7/25/2025  08:51 EDT        "

## 2025-07-28 RX ORDER — CARVEDILOL 6.25 MG/1
6.25 TABLET ORAL 2 TIMES DAILY WITH MEALS
Qty: 60 TABLET | Refills: 2 | Status: SHIPPED | OUTPATIENT
Start: 2025-07-28

## 2025-08-25 DIAGNOSIS — Z86.711 HISTORY OF PULMONARY EMBOLISM: ICD-10-CM

## 2025-08-25 DIAGNOSIS — G47.00 INSOMNIA, UNSPECIFIED TYPE: ICD-10-CM

## 2025-08-25 DIAGNOSIS — F33.9 RECURRENT MAJOR DEPRESSIVE DISORDER, REMISSION STATUS UNSPECIFIED: ICD-10-CM

## 2025-08-25 DIAGNOSIS — K57.80 PERFORATED DIVERTICULUM: ICD-10-CM

## 2025-08-25 DIAGNOSIS — F41.1 GENERALIZED ANXIETY DISORDER: ICD-10-CM

## 2025-08-27 DIAGNOSIS — K57.80 PERFORATED DIVERTICULUM: ICD-10-CM

## 2025-08-27 RX ORDER — MELATONIN 3 MG
3 CAPSULE ORAL NIGHTLY PRN
Qty: 30 CAPSULE | Refills: 1 | Status: SHIPPED | OUTPATIENT
Start: 2025-08-27

## 2025-08-27 RX ORDER — PANTOPRAZOLE SODIUM 40 MG/1
40 TABLET, DELAYED RELEASE ORAL
Qty: 90 TABLET | Refills: 0 | Status: SHIPPED | OUTPATIENT
Start: 2025-08-27

## 2025-08-27 RX ORDER — WARFARIN SODIUM 5 MG/1
TABLET ORAL
Qty: 60 TABLET | Refills: 0 | Status: SHIPPED | OUTPATIENT
Start: 2025-08-27

## 2025-08-27 RX ORDER — DULOXETIN HYDROCHLORIDE 60 MG/1
60 CAPSULE, DELAYED RELEASE ORAL 2 TIMES DAILY
Qty: 180 CAPSULE | Refills: 1 | Status: SHIPPED | OUTPATIENT
Start: 2025-08-27

## (undated) DEVICE — SUT SILK 3/0 TIES 18IN A184H

## (undated) DEVICE — ANTIBACTERIAL UNDYED BRAIDED (POLYGLACTIN 910), SYNTHETIC ABSORBABLE SUTURE: Brand: COATED VICRYL

## (undated) DEVICE — SAFESECURE,SECUREMENT,FOLEY CATH,STERILE: Brand: MEDLINE

## (undated) DEVICE — TOTAL TRAY, 16FR 10ML SIL FOLEY, URN: Brand: MEDLINE

## (undated) DEVICE — GOWN,PREVENTION PLUS,XXLARGE,STERILE: Brand: MEDLINE

## (undated) DEVICE — CLTH CLENS READYCLEANSE PERI CARE PK/5

## (undated) DEVICE — SUT SILK 3/0 SH CR8 18IN C013D

## (undated) DEVICE — SUT SILK 2/0 TIES 18IN A185H

## (undated) DEVICE — GLV SURG SENSICARE MICRO PF LF 7.5 STRL

## (undated) DEVICE — LEX GENERAL ABDOMINAL SPLIT: Brand: MEDLINE INDUSTRIES, INC.

## (undated) DEVICE — COVER,LIGHT HANDLE,FLX,1/PK: Brand: MEDLINE INDUSTRIES, INC.

## (undated) DEVICE — SUT PDS LP 1 TP1 96IN VIO PDP880GA

## (undated) DEVICE — GLV SURG SENSICARE MICRO PF LF 7 STRL